# Patient Record
Sex: MALE | Race: WHITE | NOT HISPANIC OR LATINO | Employment: FULL TIME | URBAN - METROPOLITAN AREA
[De-identification: names, ages, dates, MRNs, and addresses within clinical notes are randomized per-mention and may not be internally consistent; named-entity substitution may affect disease eponyms.]

---

## 2017-03-15 ENCOUNTER — OFFICE VISIT (OUTPATIENT)
Dept: INTERNAL MEDICINE | Facility: CLINIC | Age: 52
End: 2017-03-15
Payer: COMMERCIAL

## 2017-03-15 VITALS
BODY MASS INDEX: 43.25 KG/M2 | RESPIRATION RATE: 20 BRPM | SYSTOLIC BLOOD PRESSURE: 136 MMHG | HEIGHT: 69 IN | WEIGHT: 292 LBS | TEMPERATURE: 99 F | DIASTOLIC BLOOD PRESSURE: 78 MMHG | HEART RATE: 100 BPM

## 2017-03-15 DIAGNOSIS — E11.69 OBESITY, DIABETES, AND HYPERTENSION SYNDROME: ICD-10-CM

## 2017-03-15 DIAGNOSIS — E78.00 PURE HYPERCHOLESTEROLEMIA: ICD-10-CM

## 2017-03-15 DIAGNOSIS — I10 ESSENTIAL HYPERTENSION: ICD-10-CM

## 2017-03-15 DIAGNOSIS — E66.01 MORBID OBESITY WITH BMI OF 40.0-44.9, ADULT: ICD-10-CM

## 2017-03-15 DIAGNOSIS — E11.59 OBESITY, DIABETES, AND HYPERTENSION SYNDROME: ICD-10-CM

## 2017-03-15 DIAGNOSIS — I15.2 HYPERTENSION COMPLICATING DIABETES: ICD-10-CM

## 2017-03-15 DIAGNOSIS — E11.59 HYPERTENSION COMPLICATING DIABETES: ICD-10-CM

## 2017-03-15 DIAGNOSIS — J06.9 UPPER RESPIRATORY TRACT INFECTION, UNSPECIFIED TYPE: Primary | ICD-10-CM

## 2017-03-15 DIAGNOSIS — I15.2 OBESITY, DIABETES, AND HYPERTENSION SYNDROME: ICD-10-CM

## 2017-03-15 DIAGNOSIS — G47.33 OSA (OBSTRUCTIVE SLEEP APNEA): ICD-10-CM

## 2017-03-15 DIAGNOSIS — E11.9 TYPE II OR UNSPECIFIED TYPE DIABETES MELLITUS WITHOUT MENTION OF COMPLICATION, NOT STATED AS UNCONTROLLED: ICD-10-CM

## 2017-03-15 DIAGNOSIS — E66.9 OBESITY, DIABETES, AND HYPERTENSION SYNDROME: ICD-10-CM

## 2017-03-15 PROCEDURE — 3075F SYST BP GE 130 - 139MM HG: CPT | Mod: S$GLB,,, | Performed by: INTERNAL MEDICINE

## 2017-03-15 PROCEDURE — 3044F HG A1C LEVEL LT 7.0%: CPT | Mod: S$GLB,,, | Performed by: INTERNAL MEDICINE

## 2017-03-15 PROCEDURE — 99213 OFFICE O/P EST LOW 20 MIN: CPT | Mod: S$GLB,,, | Performed by: INTERNAL MEDICINE

## 2017-03-15 PROCEDURE — 4010F ACE/ARB THERAPY RXD/TAKEN: CPT | Mod: S$GLB,,, | Performed by: INTERNAL MEDICINE

## 2017-03-15 PROCEDURE — 1160F RVW MEDS BY RX/DR IN RCRD: CPT | Mod: S$GLB,,, | Performed by: INTERNAL MEDICINE

## 2017-03-15 PROCEDURE — 2022F DILAT RTA XM EVC RTNOPTHY: CPT | Mod: S$GLB,,, | Performed by: INTERNAL MEDICINE

## 2017-03-15 PROCEDURE — 3078F DIAST BP <80 MM HG: CPT | Mod: S$GLB,,, | Performed by: INTERNAL MEDICINE

## 2017-03-15 PROCEDURE — 99999 PR PBB SHADOW E&M-EST. PATIENT-LVL III: CPT | Mod: PBBFAC,,, | Performed by: INTERNAL MEDICINE

## 2017-03-15 RX ORDER — METHYLPREDNISOLONE 4 MG/1
TABLET ORAL
Qty: 1 PACKAGE | Refills: 0 | Status: SHIPPED | OUTPATIENT
Start: 2017-03-15 | End: 2017-07-07 | Stop reason: ALTCHOICE

## 2017-03-15 RX ORDER — CODEINE PHOSPHATE AND GUAIFENESIN 10; 100 MG/5ML; MG/5ML
5 SOLUTION ORAL 3 TIMES DAILY PRN
Qty: 180 ML | Refills: 0 | Status: SHIPPED | OUTPATIENT
Start: 2017-03-15 | End: 2017-03-25

## 2017-03-15 NOTE — PROGRESS NOTES
Subjective:       Patient ID: Saud Mayers is a 52 y.o. male.    Chief Complaint: Nasal Congestion (c/o heat coming out of eyes, no fever x2 days); Cough; Back Pain; and Chills    HPI  Pt c/o 2 days of nonprod cough, hoarseness, coryza, HA, chill, myalgias, no fever or sOB.  + fluvax.  BSs in 150s.  Review of Systems    Objective:      Physical Exam   Constitutional: He appears well-developed. No distress.   Appears ill   HENT:   Head: Normocephalic.   Mouth/Throat: Oropharynx is clear and moist.   Eyes: Conjunctivae and EOM are normal. No scleral icterus.   Neck: Normal range of motion. No tracheal deviation present.   Cardiovascular: Normal rate, regular rhythm and intact distal pulses.    Pulmonary/Chest: Effort normal and breath sounds normal. No respiratory distress.   Abdominal: He exhibits no distension.   Musculoskeletal: Normal range of motion. He exhibits no edema.   Neurological: He is alert.   Skin: Skin is warm and dry. No rash noted. He is not diaphoretic. No erythema.   Psychiatric: He has a normal mood and affect. His behavior is normal.   Vitals reviewed.      Assessment:       1. Upper respiratory tract infection, unspecified type    2. Essential hypertension    3. Type II or unspecified type diabetes mellitus without mention of complication, not stated as uncontrolled    4. ANAMIKA (obstructive sleep apnea)    5. Morbid obesity with BMI of 40.0-44.9, adult    6. Hypertension complicating diabetes    7. Obesity, diabetes, and hypertension syndrome    8. Pure hypercholesterolemia        Plan:       Saud was seen today for nasal congestion, cough, back pain and chills.    Diagnoses and all orders for this visit:    Upper respiratory tract infection, unspecified type  -     guaifenesin-codeine 100-10 mg/5 ml (CHERATUSSIN AC)  mg/5 mL syrup; Take 5 mLs by mouth 3 (three) times daily as needed for Cough.  -     methylPREDNISolone (MEDROL DOSEPACK) 4 mg tablet; use as directed  -     POCT Influenza  A/B -    Essential hypertension   Well-cont    Type II or unspecified type diabetes mellitus without mention of complication, not stated as uncontrolled    ANAMIKA (obstructive sleep apnea)    Morbid obesity with BMI of 40.0-44.9, adult    Hypertension complicating diabetes    Obesity, diabetes, and hypertension syndrome    Pure hypercholesterolemia    Return if symptoms worsen or fail to improve.

## 2017-03-15 NOTE — MR AVS SNAPSHOT
[facility] - Internal Medicine  [address] [place] [address] [place], [address]  [place] [address]  Phone: [phone]  Fax: [phone]                  Saud Mayers   3/15/2017 3:00 PM   Office Visit    Description:  Male : 1965   Provider:  Tyler Tam MD   Department:  [facility] - Internal Medicine           Reason for Visit     Nasal Congestion     Cough     Back Pain     Chills           Diagnoses this Visit        Comments    Upper respiratory tract infection, unspecified type    -  Primary     Essential hypertension         Type II or unspecified type diabetes mellitus without mention of complication, not stated as uncontrolled         ANAMIKA (obstructive sleep apnea)         Morbid obesity with BMI of 40.0-44.9, adult         Hypertension complicating diabetes         Obesity, diabetes, and hypertension syndrome         Pure hypercholesterolemia                To Do List           Goals (5 Years of Data)     None      Follow-Up and Disposition     Return if symptoms worsen or fail to improve.       These Medications        Disp Refills Start End    guaifenesin-codeine 100-10 mg/5 ml (CHERATUSSIN AC)  mg/5 mL syrup 180 mL 0 3/15/2017 3/25/2017    Take 5 mLs by mouth 3 (three) times daily as needed for Cough. - Oral    Pharmacy: [facility] [address] [place] [address] [address] [facility] Ph #: [phone]       methylPREDNISolone (MEDROL DOSEPACK) 4 mg tablet 1 Package 0 3/15/2017     use as directed    Pharmacy: [facility] [address] [place] [address], [place] - [address] [facility] Ph #: [phone]         [facility]     [facility] On Call Nurse Care Line -  Assistance  Registered nurses in the [facility] provide clinical advisement, health education, appointment booking, and other advisory services.  Call for this free service at [phone].             Medications           Message regarding Medications     Verify  "the changes and/or additions to your medication regime listed below are the same as discussed with your clinician today.  If any of these changes or additions are incorrect, please notify your healthcare provider.        START taking these NEW medications        Refills    guaifenesin-codeine 100-10 mg/5 ml (CHERATUSSIN AC)  mg/5 mL syrup 0    Sig: Take 5 mLs by mouth 3 (three) times daily as needed for Cough.    Class: Normal    Route: Oral    methylPREDNISolone (MEDROL DOSEPACK) 4 mg tablet 0    Sig: use as directed    Class: Normal           Verify that the below list of medications is an accurate representation of the medications you are currently taking.  If none reported, the list may be blank. If incorrect, please contact your healthcare provider. Carry this list with you in case of emergency.           Current Medications     atorvastatin (LIPITOR) 20 MG tablet Take 1 tablet (20 mg total) by mouth once daily.    losartan (COZAAR) 50 MG tablet Take 1 tablet (50 mg total) by mouth once daily.    metformin (GLUCOPHAGE) 500 MG tablet Take 1 tablet (500 mg total) by mouth 2 (two) times daily with meals.    mometasone 0.1% (ELOCON) 0.1 % cream APPLY TO THE AFFECTED AREA EVERY DAY    sildenafil (VIAGRA) 100 MG tablet Take 1 tablet (100 mg total) by mouth daily as needed for Erectile Dysfunction.    guaifenesin-codeine 100-10 mg/5 ml (CHERATUSSIN AC)  mg/5 mL syrup Take 5 mLs by mouth 3 (three) times daily as needed for Cough.    methylPREDNISolone (MEDROL DOSEPACK) 4 mg tablet use as directed           Clinical Reference Information           Your Vitals Were     BP Pulse Temp Resp Height Weight    136/78 100 98.8 °F (37.1 °C) (Oral) 20 5' 9" (1.753 m) 132.5 kg (292 lb)    BMI                43.12 kg/m2          Blood Pressure          Most Recent Value    BP  136/78      Allergies as of 3/15/2017     Tizanidine      Immunizations Administered on Date of Encounter - 3/15/2017     None      Orders Placed " During Today's Visit      Normal Orders This Visit    POCT Influenza A/B       Language Assistance Services     ATTENTION: Language assistance services are available, free of charge. Please call [phone].      ATENCIÓN: Si ricardo miller, tiene a galvan disposición servicios gratuitos de asistencia lingüística. Llame al [phone].     CHÚ Ý: N?u b?n nói Ti?ng Vi?t, có các d?ch v? h? tr? ngôn ng? mi?n phí dành cho b?n. G?i s? [phone].         [facility] - Internal Medicine complies with applicable Federal civil rights laws and does not discriminate on the basis of race, color, national origin, age, disability, or sex.

## 2017-06-28 ENCOUNTER — PATIENT OUTREACH (OUTPATIENT)
Dept: ADMINISTRATIVE | Facility: HOSPITAL | Age: 52
End: 2017-06-28

## 2017-06-28 NOTE — PROGRESS NOTES
Phoned patient to discuss need for ey exam, labs and f/u visit. Labs scheduled for 6/29 and visit scheduled for 7/7 per patient's request. Patient stated that he will make an eye appt with an opthamologist soon, he knows it's time and needs new eye glasses. I asked that he have the results faxed to Dr. Tam office, pt verbalized understanding.

## 2017-06-29 ENCOUNTER — LAB VISIT (OUTPATIENT)
Dept: LAB | Facility: HOSPITAL | Age: 52
End: 2017-06-29
Attending: INTERNAL MEDICINE
Payer: COMMERCIAL

## 2017-06-29 DIAGNOSIS — E11.9 TYPE 2 DIABETES MELLITUS WITHOUT COMPLICATIONS: ICD-10-CM

## 2017-06-29 DIAGNOSIS — E78.00 PURE HYPERCHOLESTEROLEMIA: ICD-10-CM

## 2017-06-29 LAB
ALBUMIN SERPL BCP-MCNC: 4.1 G/DL
ALP SERPL-CCNC: 62 U/L
ALT SERPL W/O P-5'-P-CCNC: 50 U/L
ANION GAP SERPL CALC-SCNC: 10 MMOL/L
AST SERPL-CCNC: 32 U/L
BILIRUB SERPL-MCNC: 0.5 MG/DL
BUN SERPL-MCNC: 21 MG/DL
CALCIUM SERPL-MCNC: 8.8 MG/DL
CHLORIDE SERPL-SCNC: 103 MMOL/L
CHOLEST/HDLC SERPL: 4 {RATIO}
CO2 SERPL-SCNC: 28 MMOL/L
CREAT SERPL-MCNC: 0.83 MG/DL
EST. GFR  (AFRICAN AMERICAN): >60 ML/MIN/1.73 M^2
EST. GFR  (NON AFRICAN AMERICAN): >60 ML/MIN/1.73 M^2
ESTIMATED AVG GLUCOSE: 120 MG/DL
GLUCOSE SERPL-MCNC: 138 MG/DL
HBA1C MFR BLD HPLC: 5.8 %
HDL/CHOLESTEROL RATIO: 25.3 %
HDLC SERPL-MCNC: 162 MG/DL
HDLC SERPL-MCNC: 41 MG/DL
LDLC SERPL CALC-MCNC: 97 MG/DL
NONHDLC SERPL-MCNC: 121 MG/DL
POTASSIUM SERPL-SCNC: 4.4 MMOL/L
PROT SERPL-MCNC: 7.6 G/DL
SODIUM SERPL-SCNC: 141 MMOL/L
TRIGL SERPL-MCNC: 120 MG/DL

## 2017-06-29 PROCEDURE — 80053 COMPREHEN METABOLIC PANEL: CPT | Mod: PO

## 2017-06-29 PROCEDURE — 36415 COLL VENOUS BLD VENIPUNCTURE: CPT | Mod: PO

## 2017-06-29 PROCEDURE — 80061 LIPID PANEL: CPT

## 2017-06-29 PROCEDURE — 83036 HEMOGLOBIN GLYCOSYLATED A1C: CPT | Mod: PO

## 2017-07-07 ENCOUNTER — OFFICE VISIT (OUTPATIENT)
Dept: INTERNAL MEDICINE | Facility: CLINIC | Age: 52
End: 2017-07-07
Payer: COMMERCIAL

## 2017-07-07 VITALS
HEART RATE: 96 BPM | TEMPERATURE: 98 F | OXYGEN SATURATION: 95 % | SYSTOLIC BLOOD PRESSURE: 128 MMHG | RESPIRATION RATE: 16 BRPM | BODY MASS INDEX: 41.35 KG/M2 | DIASTOLIC BLOOD PRESSURE: 62 MMHG | HEIGHT: 69 IN | WEIGHT: 279.19 LBS

## 2017-07-07 DIAGNOSIS — I15.2 OBESITY, DIABETES, AND HYPERTENSION SYNDROME: ICD-10-CM

## 2017-07-07 DIAGNOSIS — E78.00 PURE HYPERCHOLESTEROLEMIA: ICD-10-CM

## 2017-07-07 DIAGNOSIS — E11.59 OBESITY, DIABETES, AND HYPERTENSION SYNDROME: ICD-10-CM

## 2017-07-07 DIAGNOSIS — E66.01 MORBID OBESITY WITH BMI OF 40.0-44.9, ADULT: ICD-10-CM

## 2017-07-07 DIAGNOSIS — E66.9 OBESITY, DIABETES, AND HYPERTENSION SYNDROME: ICD-10-CM

## 2017-07-07 DIAGNOSIS — Z00.00 ROUTINE GENERAL MEDICAL EXAMINATION AT A HEALTH CARE FACILITY: Primary | ICD-10-CM

## 2017-07-07 DIAGNOSIS — L30.9 ECZEMA, UNSPECIFIED TYPE: ICD-10-CM

## 2017-07-07 DIAGNOSIS — G47.33 OSA (OBSTRUCTIVE SLEEP APNEA): ICD-10-CM

## 2017-07-07 DIAGNOSIS — I10 ESSENTIAL HYPERTENSION: ICD-10-CM

## 2017-07-07 DIAGNOSIS — E11.9 TYPE 2 DIABETES MELLITUS WITHOUT COMPLICATION, WITHOUT LONG-TERM CURRENT USE OF INSULIN: ICD-10-CM

## 2017-07-07 DIAGNOSIS — E11.69 OBESITY, DIABETES, AND HYPERTENSION SYNDROME: ICD-10-CM

## 2017-07-07 DIAGNOSIS — E11.59 HYPERTENSION COMPLICATING DIABETES: ICD-10-CM

## 2017-07-07 DIAGNOSIS — I15.2 HYPERTENSION COMPLICATING DIABETES: ICD-10-CM

## 2017-07-07 PROCEDURE — 99396 PREV VISIT EST AGE 40-64: CPT | Mod: S$GLB,,, | Performed by: INTERNAL MEDICINE

## 2017-07-07 PROCEDURE — 99999 PR PBB SHADOW E&M-EST. PATIENT-LVL III: CPT | Mod: PBBFAC,,, | Performed by: INTERNAL MEDICINE

## 2017-07-07 RX ORDER — MOMETASONE FUROATE 1 MG/G
CREAM TOPICAL
Qty: 45 G | Refills: 3 | Status: SHIPPED | OUTPATIENT
Start: 2017-07-07 | End: 2019-08-13 | Stop reason: SDUPTHER

## 2017-07-07 RX ORDER — ATORVASTATIN CALCIUM 40 MG/1
40 TABLET, FILM COATED ORAL DAILY
Qty: 90 TABLET | Refills: 4 | Status: SHIPPED | OUTPATIENT
Start: 2017-07-07 | End: 2017-10-09 | Stop reason: SDUPTHER

## 2017-07-07 NOTE — PROGRESS NOTES
Subjective:       Patient ID: Saud Mayers is a 52 y.o. male.    Chief Complaint: Follow-up; Results; and Medication Refill    HPI  Wellness check.  Labs reviewed.  BSs < 120.  No pedal paresthesias.  Eye check due.  No CP, SOB.  Working on losing weight.  Review of Systems   All other systems reviewed and are negative.      Objective:      Physical Exam   Constitutional: He appears well-developed. No distress.   obese   HENT:   Head: Normocephalic.   Eyes: EOM are normal. No scleral icterus.   Neck: Normal range of motion. No tracheal deviation present.   Cardiovascular: Normal rate, regular rhythm, normal heart sounds and intact distal pulses.    Pulses:       Dorsalis pedis pulses are 3+ on the right side, and 3+ on the left side.        Posterior tibial pulses are 3+ on the right side, and 3+ on the left side.   Pulmonary/Chest: Effort normal and breath sounds normal. No respiratory distress.   Abdominal: Soft. Bowel sounds are normal. He exhibits no distension. There is no tenderness.   Musculoskeletal: Normal range of motion. He exhibits no edema.        Right foot: There is normal range of motion and no deformity.        Left foot: There is normal range of motion and no deformity.   Feet:   Right Foot:   Protective Sensation: 6 sites tested. 6 sites sensed.   Left Foot:   Protective Sensation: 6 sites tested. 6 sites sensed.   Neurological: He is alert.   Skin: Skin is warm and dry. No rash noted. He is not diaphoretic. No erythema.   Psychiatric: He has a normal mood and affect. His behavior is normal.   Vitals reviewed.      Assessment:       1. Routine general medical examination at a health care facility    2. Type 2 diabetes mellitus without complication, without long-term current use of insulin    3. Essential hypertension    4. ANAMIKA (obstructive sleep apnea)    5. Pure hypercholesterolemia    6. Morbid obesity with BMI of 40.0-44.9, adult    7. Hypertension complicating diabetes    8. Obesity,  diabetes, and hypertension syndrome    9. Eczema, unspecified type        Plan:       Saud was seen today for follow-up, results and medication refill.    Diagnoses and all orders for this visit:    Routine general medical examination at a health care facility  -     Prostate Specific Antigen, Diagnostic; Future    Type 2 diabetes mellitus without complication, without long-term current use of insulin  -     Hemoglobin A1c; Future   Well-cont    Essential hypertension   Well-cont    ANAMIKA (obstructive sleep apnea)    Pure hypercholesterolemia  -     Lipid panel; Future  -     atorvastatin (LIPITOR) 40 MG tablet; Take 1 tablet (40 mg total) by mouth once daily.    Morbid obesity with BMI of 40.0-44.9, adult    Hypertension complicating diabetes    Obesity, diabetes, and hypertension syndrome    Eczema, unspecified type  -     mometasone 0.1% (ELOCON) 0.1 % cream; APPLY TO THE AFFECTED AREA EVERY DAY      Return in about 6 months (around 1/7/2018).

## 2017-10-09 ENCOUNTER — OFFICE VISIT (OUTPATIENT)
Dept: INTERNAL MEDICINE | Facility: CLINIC | Age: 52
End: 2017-10-09
Payer: COMMERCIAL

## 2017-10-09 VITALS
TEMPERATURE: 99 F | RESPIRATION RATE: 20 BRPM | HEIGHT: 69 IN | SYSTOLIC BLOOD PRESSURE: 150 MMHG | WEIGHT: 296.88 LBS | BODY MASS INDEX: 43.97 KG/M2 | DIASTOLIC BLOOD PRESSURE: 64 MMHG | HEART RATE: 108 BPM

## 2017-10-09 DIAGNOSIS — I10 ESSENTIAL HYPERTENSION: ICD-10-CM

## 2017-10-09 DIAGNOSIS — Z23 NEED FOR PROPHYLACTIC VACCINATION AND INOCULATION AGAINST INFLUENZA: ICD-10-CM

## 2017-10-09 DIAGNOSIS — E78.00 PURE HYPERCHOLESTEROLEMIA: ICD-10-CM

## 2017-10-09 DIAGNOSIS — Z00.00 ROUTINE GENERAL MEDICAL EXAMINATION AT A HEALTH CARE FACILITY: ICD-10-CM

## 2017-10-09 DIAGNOSIS — M54.50 ACUTE BILATERAL LOW BACK PAIN WITHOUT SCIATICA: Primary | ICD-10-CM

## 2017-10-09 DIAGNOSIS — E11.9 TYPE 2 DIABETES MELLITUS WITHOUT COMPLICATION, UNSPECIFIED LONG TERM INSULIN USE STATUS: ICD-10-CM

## 2017-10-09 PROCEDURE — 99214 OFFICE O/P EST MOD 30 MIN: CPT | Mod: 25,S$GLB,, | Performed by: INTERNAL MEDICINE

## 2017-10-09 PROCEDURE — 90471 IMMUNIZATION ADMIN: CPT | Mod: S$GLB,,, | Performed by: INTERNAL MEDICINE

## 2017-10-09 PROCEDURE — 90715 TDAP VACCINE 7 YRS/> IM: CPT | Mod: S$GLB,,, | Performed by: INTERNAL MEDICINE

## 2017-10-09 PROCEDURE — 99999 PR PBB SHADOW E&M-EST. PATIENT-LVL III: CPT | Mod: PBBFAC,,, | Performed by: INTERNAL MEDICINE

## 2017-10-09 PROCEDURE — 90686 IIV4 VACC NO PRSV 0.5 ML IM: CPT | Mod: S$GLB,,, | Performed by: INTERNAL MEDICINE

## 2017-10-09 PROCEDURE — 90472 IMMUNIZATION ADMIN EACH ADD: CPT | Mod: S$GLB,,, | Performed by: INTERNAL MEDICINE

## 2017-10-09 RX ORDER — ATORVASTATIN CALCIUM 40 MG/1
40 TABLET, FILM COATED ORAL DAILY
Qty: 90 TABLET | Refills: 4 | Status: SHIPPED | OUTPATIENT
Start: 2017-10-09 | End: 2019-02-16 | Stop reason: SDUPTHER

## 2017-10-09 RX ORDER — PREDNISONE 20 MG/1
TABLET ORAL
Qty: 14 TABLET | Refills: 0 | Status: SHIPPED | OUTPATIENT
Start: 2017-10-09 | End: 2017-12-05

## 2017-10-09 RX ORDER — METFORMIN HYDROCHLORIDE 500 MG/1
500 TABLET ORAL 2 TIMES DAILY WITH MEALS
Qty: 180 TABLET | Refills: 3 | Status: SHIPPED | OUTPATIENT
Start: 2017-10-09 | End: 2019-01-09

## 2017-10-09 RX ORDER — TIZANIDINE 4 MG/1
4 TABLET ORAL EVERY 6 HOURS PRN
Qty: 60 TABLET | Refills: 3 | Status: SHIPPED | OUTPATIENT
Start: 2017-10-09 | End: 2017-10-19

## 2017-10-09 RX ORDER — LOSARTAN POTASSIUM 50 MG/1
50 TABLET ORAL DAILY
Qty: 90 TABLET | Refills: 3 | Status: SHIPPED | OUTPATIENT
Start: 2017-10-09 | End: 2019-01-09

## 2017-10-09 NOTE — PROGRESS NOTES
Subjective:       Patient ID: Saud Mayers is a 52 y.o. male.    Chief Complaint: Back Pain (lower back pain severe x 2 weeks, denies injury); Medication Refill; and Flu Vaccine    HPI  Pt with chronic LBP, hx of R sided sciatica with 10 days of exacerbation of LBP.  No radicular sxs,  No weakness, no incontinence.  Feels better when he moves around.  BSs in 120s.  Review of Systems   All other systems reviewed and are negative.      Objective:      Physical Exam   Constitutional: He appears well-developed. No distress.   HENT:   Head: Normocephalic.   Eyes: EOM are normal. No scleral icterus.   Neck: Normal range of motion. No tracheal deviation present.   Cardiovascular: Normal rate, regular rhythm and intact distal pulses.    Pulses:       Dorsalis pedis pulses are 3+ on the right side, and 3+ on the left side.        Posterior tibial pulses are 3+ on the right side, and 3+ on the left side.   Pulmonary/Chest: Effort normal. No respiratory distress.   Abdominal: He exhibits no distension.   Musculoskeletal: Normal range of motion. He exhibits edema (1+ ankles).        Right foot: There is normal range of motion and no deformity.        Left foot: There is normal range of motion and no deformity.   Feet:   Right Foot:   Protective Sensation: 0 sites tested. 0 sites sensed.   Left Foot:   Protective Sensation: 0 sites tested. 0 sites sensed.   Neurological: He is alert. He exhibits normal muscle tone.   bilat SLR at 20 deg   Skin: Skin is warm and dry. No rash noted. He is not diaphoretic. No erythema.   Psychiatric: He has a normal mood and affect. His behavior is normal.   Vitals reviewed.      Assessment:       1. Acute bilateral low back pain without sciatica    2. Pure hypercholesterolemia    3. Essential hypertension    4. Type 2 diabetes mellitus without complication, unspecified long term insulin use status    5. Need for prophylactic vaccination and inoculation against influenza    6. Routine general  medical examination at a health care facility        Plan:       Saud was seen today for back pain, medication refill and flu vaccine.    Diagnoses and all orders for this visit:    Acute bilateral low back pain without sciatica  -     predniSONE (DELTASONE) 20 MG tablet; 2 tabs po qd x 7 days  -     tizanidine (ZANAFLEX) 4 MG tablet; Take 1 tablet (4 mg total) by mouth every 6 (six) hours as needed.    Pure hypercholesterolemia  -     atorvastatin (LIPITOR) 40 MG tablet; Take 1 tablet (40 mg total) by mouth once daily.    Essential hypertension  -     losartan (COZAAR) 50 MG tablet; Take 1 tablet (50 mg total) by mouth once daily.    Type 2 diabetes mellitus without complication, unspecified long term insulin use status  -     metformin (GLUCOPHAGE) 500 MG tablet; Take 1 tablet (500 mg total) by mouth 2 (two) times daily with meals.  -     Ambulatory referral to Ophthalmology    Need for prophylactic vaccination and inoculation against influenza  -     Influenza - Quadrivalent (3 years & older) (PF)    Routine general medical examination at a health care facility  -     Tdap Vaccine      Return in about 3 months (around 1/9/2018).

## 2017-12-05 ENCOUNTER — OFFICE VISIT (OUTPATIENT)
Dept: INTERNAL MEDICINE | Facility: CLINIC | Age: 52
End: 2017-12-05
Payer: COMMERCIAL

## 2017-12-05 VITALS
RESPIRATION RATE: 20 BRPM | SYSTOLIC BLOOD PRESSURE: 130 MMHG | TEMPERATURE: 98 F | WEIGHT: 295.06 LBS | HEART RATE: 96 BPM | DIASTOLIC BLOOD PRESSURE: 66 MMHG | BODY MASS INDEX: 43.58 KG/M2

## 2017-12-05 DIAGNOSIS — J20.9 ACUTE BRONCHITIS, UNSPECIFIED ORGANISM: Primary | ICD-10-CM

## 2017-12-05 DIAGNOSIS — E11.9 TYPE 2 DIABETES MELLITUS WITHOUT COMPLICATION, UNSPECIFIED LONG TERM INSULIN USE STATUS: ICD-10-CM

## 2017-12-05 DIAGNOSIS — I10 HYPERTENSION, UNSPECIFIED TYPE: ICD-10-CM

## 2017-12-05 PROCEDURE — 99999 PR PBB SHADOW E&M-EST. PATIENT-LVL III: CPT | Mod: PBBFAC,,, | Performed by: INTERNAL MEDICINE

## 2017-12-05 PROCEDURE — 99213 OFFICE O/P EST LOW 20 MIN: CPT | Mod: S$GLB,,, | Performed by: INTERNAL MEDICINE

## 2017-12-05 RX ORDER — ALBUTEROL SULFATE 90 UG/1
2 AEROSOL, METERED RESPIRATORY (INHALATION) EVERY 6 HOURS PRN
Qty: 18 G | Refills: 6 | Status: SHIPPED | OUTPATIENT
Start: 2017-12-05 | End: 2019-01-09

## 2017-12-05 RX ORDER — CODEINE PHOSPHATE AND GUAIFENESIN 10; 100 MG/5ML; MG/5ML
5 SOLUTION ORAL 3 TIMES DAILY PRN
Qty: 180 ML | Refills: 0 | Status: SHIPPED | OUTPATIENT
Start: 2017-12-05 | End: 2017-12-12 | Stop reason: SDUPTHER

## 2017-12-05 RX ORDER — MELOXICAM 15 MG/1
1 TABLET ORAL DAILY
Refills: 5 | COMMUNITY
Start: 2017-11-10 | End: 2019-01-09

## 2017-12-05 NOTE — PROGRESS NOTES
Subjective:       Patient ID: Saud Mayers is a 52 y.o. male.    Chief Complaint: Cough (started as dry hacking x 3 wks ago, now becoming productive)    HPI  Pt with nonprod cough x 3 weeks.  No F/C, no SOB.  No resp to OTC meds.  BSs in 160s.  Review of Systems    Objective:      Physical Exam   Constitutional: He appears well-developed. No distress.   obese   HENT:   Head: Normocephalic.   Eyes: EOM are normal. No scleral icterus.   Neck: Normal range of motion. No tracheal deviation present.   Cardiovascular: Normal rate, regular rhythm and intact distal pulses.    Pulmonary/Chest: Effort normal and breath sounds normal. No respiratory distress.   Abdominal: He exhibits no distension.   Musculoskeletal: Normal range of motion. He exhibits no edema.   Neurological: He is alert.   Skin: Skin is warm and dry. No rash noted. He is not diaphoretic. No erythema.   Psychiatric: He has a normal mood and affect. His behavior is normal.   Vitals reviewed.      Assessment:       1. Acute bronchitis, unspecified organism    2. Hypertension, unspecified type    3. Type 2 diabetes mellitus without complication, unspecified long term insulin use status        Plan:       Saud was seen today for cough.    Diagnoses and all orders for this visit:    Acute bronchitis, unspecified organism  -     albuterol 90 mcg/actuation inhaler; Inhale 2 puffs into the lungs every 6 (six) hours as needed for Wheezing.  -     guaifenesin-codeine 100-10 mg/5 ml (CHERATUSSIN AC)  mg/5 mL syrup; Take 5 mLs by mouth 3 (three) times daily as needed for Cough.    Hypertension, unspecified type   Well-cont    Type 2 diabetes mellitus without complication, unspecified long term insulin use status      Return if symptoms worsen or fail to improve.

## 2017-12-12 DIAGNOSIS — J20.9 ACUTE BRONCHITIS, UNSPECIFIED ORGANISM: ICD-10-CM

## 2017-12-12 RX ORDER — TIZANIDINE 4 MG/1
TABLET ORAL
Qty: 180 ML | Refills: 0 | Status: SHIPPED | OUTPATIENT
Start: 2017-12-12 | End: 2018-01-10 | Stop reason: SDUPTHER

## 2018-01-10 DIAGNOSIS — J20.9 ACUTE BRONCHITIS, UNSPECIFIED ORGANISM: ICD-10-CM

## 2018-01-10 RX ORDER — CODEINE PHOSPHATE AND GUAIFENESIN 10; 100 MG/5ML; MG/5ML
SOLUTION ORAL
Qty: 180 ML | Refills: 0 | Status: SHIPPED | OUTPATIENT
Start: 2018-01-10 | End: 2019-01-09

## 2018-09-07 DIAGNOSIS — E11.9 TYPE 2 DIABETES MELLITUS WITHOUT COMPLICATION: ICD-10-CM

## 2018-10-22 DIAGNOSIS — E11.9 TYPE 2 DIABETES MELLITUS WITHOUT COMPLICATION: ICD-10-CM

## 2018-12-12 ENCOUNTER — TELEPHONE (OUTPATIENT)
Dept: INTERNAL MEDICINE | Facility: CLINIC | Age: 53
End: 2018-12-12

## 2018-12-12 NOTE — TELEPHONE ENCOUNTER
----- Message from Deonna Martinez sent at 12/12/2018 11:40 AM CST -----  Contact: [phone]/ self   Patient called in returning your call from yesterday . Please advise

## 2018-12-17 ENCOUNTER — LAB VISIT (OUTPATIENT)
Dept: LAB | Facility: HOSPITAL | Age: 53
End: 2018-12-17
Attending: INTERNAL MEDICINE
Payer: COMMERCIAL

## 2018-12-17 DIAGNOSIS — E11.9 TYPE 2 DIABETES MELLITUS WITHOUT COMPLICATION: ICD-10-CM

## 2018-12-17 LAB
ALBUMIN/CREAT UR: 6.7 UG/MG
CHOLEST SERPL-MCNC: 160 MG/DL
CHOLEST/HDLC SERPL: 3.8 {RATIO}
CREAT UR-MCNC: 267 MG/DL
ESTIMATED AVG GLUCOSE: 243 MG/DL
HBA1C MFR BLD HPLC: 10.1 %
HDLC SERPL-MCNC: 42 MG/DL
HDLC SERPL: 26.3 %
LDLC SERPL CALC-MCNC: 92.4 MG/DL
MICROALBUMIN UR DL<=1MG/L-MCNC: 18 UG/ML
NONHDLC SERPL-MCNC: 118 MG/DL
TRIGL SERPL-MCNC: 128 MG/DL

## 2018-12-17 PROCEDURE — 80061 LIPID PANEL: CPT

## 2018-12-17 PROCEDURE — 82043 UR ALBUMIN QUANTITATIVE: CPT | Mod: PO

## 2018-12-17 PROCEDURE — 83036 HEMOGLOBIN GLYCOSYLATED A1C: CPT

## 2018-12-17 PROCEDURE — 36415 COLL VENOUS BLD VENIPUNCTURE: CPT | Mod: PO

## 2019-01-09 ENCOUNTER — OFFICE VISIT (OUTPATIENT)
Dept: INTERNAL MEDICINE | Facility: CLINIC | Age: 54
End: 2019-01-09
Payer: COMMERCIAL

## 2019-01-09 ENCOUNTER — CLINICAL SUPPORT (OUTPATIENT)
Dept: FAMILY MEDICINE | Facility: CLINIC | Age: 54
End: 2019-01-09
Attending: INTERNAL MEDICINE
Payer: COMMERCIAL

## 2019-01-09 VITALS
BODY MASS INDEX: 40.34 KG/M2 | HEIGHT: 69 IN | SYSTOLIC BLOOD PRESSURE: 140 MMHG | OXYGEN SATURATION: 95 % | DIASTOLIC BLOOD PRESSURE: 78 MMHG | WEIGHT: 272.38 LBS | HEART RATE: 92 BPM

## 2019-01-09 DIAGNOSIS — E11.59 HYPERTENSION COMPLICATING DIABETES: ICD-10-CM

## 2019-01-09 DIAGNOSIS — I15.2 HYPERTENSION COMPLICATING DIABETES: ICD-10-CM

## 2019-01-09 DIAGNOSIS — Z00.00 ROUTINE GENERAL MEDICAL EXAMINATION AT A HEALTH CARE FACILITY: Primary | ICD-10-CM

## 2019-01-09 DIAGNOSIS — I15.2 OBESITY, DIABETES, AND HYPERTENSION SYNDROME: ICD-10-CM

## 2019-01-09 DIAGNOSIS — G47.33 OSA (OBSTRUCTIVE SLEEP APNEA): ICD-10-CM

## 2019-01-09 DIAGNOSIS — E11.9 TYPE 2 DIABETES MELLITUS WITHOUT COMPLICATION, WITHOUT LONG-TERM CURRENT USE OF INSULIN: ICD-10-CM

## 2019-01-09 DIAGNOSIS — E11.69 OBESITY, DIABETES, AND HYPERTENSION SYNDROME: ICD-10-CM

## 2019-01-09 DIAGNOSIS — E66.01 MORBID OBESITY WITH BMI OF 40.0-44.9, ADULT: ICD-10-CM

## 2019-01-09 DIAGNOSIS — E66.9 OBESITY, DIABETES, AND HYPERTENSION SYNDROME: ICD-10-CM

## 2019-01-09 DIAGNOSIS — I10 ESSENTIAL HYPERTENSION: ICD-10-CM

## 2019-01-09 DIAGNOSIS — E78.00 PURE HYPERCHOLESTEROLEMIA: ICD-10-CM

## 2019-01-09 DIAGNOSIS — E11.59 OBESITY, DIABETES, AND HYPERTENSION SYNDROME: ICD-10-CM

## 2019-01-09 DIAGNOSIS — N52.9 IMPOTENCE: ICD-10-CM

## 2019-01-09 PROCEDURE — 90686 FLU VACCINE (QUAD) GREATER THAN OR EQUAL TO 3YO PRESERVATIVE FREE IM: ICD-10-PCS | Mod: S$GLB,,, | Performed by: INTERNAL MEDICINE

## 2019-01-09 PROCEDURE — 99396 PREV VISIT EST AGE 40-64: CPT | Mod: 25,S$GLB,, | Performed by: INTERNAL MEDICINE

## 2019-01-09 PROCEDURE — 99999 PR PBB SHADOW E&M-EST. PATIENT-LVL III: CPT | Mod: PBBFAC,,, | Performed by: INTERNAL MEDICINE

## 2019-01-09 PROCEDURE — 90471 FLU VACCINE (QUAD) GREATER THAN OR EQUAL TO 3YO PRESERVATIVE FREE IM: ICD-10-PCS | Mod: S$GLB,,, | Performed by: INTERNAL MEDICINE

## 2019-01-09 PROCEDURE — 90686 IIV4 VACC NO PRSV 0.5 ML IM: CPT | Mod: S$GLB,,, | Performed by: INTERNAL MEDICINE

## 2019-01-09 PROCEDURE — 99396 PR PREVENTIVE VISIT,EST,40-64: ICD-10-PCS | Mod: 25,S$GLB,, | Performed by: INTERNAL MEDICINE

## 2019-01-09 PROCEDURE — 90471 IMMUNIZATION ADMIN: CPT | Mod: S$GLB,,, | Performed by: INTERNAL MEDICINE

## 2019-01-09 PROCEDURE — 99999 PR PBB SHADOW E&M-EST. PATIENT-LVL III: ICD-10-PCS | Mod: PBBFAC,,, | Performed by: INTERNAL MEDICINE

## 2019-01-09 RX ORDER — METFORMIN HYDROCHLORIDE 1000 MG/1
1000 TABLET ORAL 2 TIMES DAILY WITH MEALS
Qty: 180 TABLET | Refills: 3 | Status: SHIPPED | OUTPATIENT
Start: 2019-01-09 | End: 2019-12-04 | Stop reason: SDUPTHER

## 2019-01-09 RX ORDER — LOSARTAN POTASSIUM 100 MG/1
100 TABLET ORAL DAILY
Qty: 90 TABLET | Refills: 3 | Status: SHIPPED | OUTPATIENT
Start: 2019-01-09 | End: 2020-03-02

## 2019-01-09 RX ORDER — SILDENAFIL 100 MG/1
100 TABLET, FILM COATED ORAL DAILY PRN
Qty: 12 TABLET | Refills: 6 | Status: SHIPPED | OUTPATIENT
Start: 2019-01-09 | End: 2020-05-08 | Stop reason: SDUPTHER

## 2019-01-09 NOTE — PROGRESS NOTES
Saud Mayers is a 53 y.o. male here for a diabetic eye screening with non-dilated fundus photos per .    Patient cooperative?: Yes  Small pupils?: No  Last eye exam: unknown     For exam results, see Encounter Report.

## 2019-01-09 NOTE — PROGRESS NOTES
Subjective:       Patient ID: Saud Mayers is a 53 y.o. male.    Chief Complaint: Annual Exam (with labs)    HPI  Wellness check.  Labs reviewed.  Pt misses his meds a lot.  BSs in 200s.  C/O polyuria.  No pedal paresthesias.  No CP, SOB.  Review of Systems   All other systems reviewed and are negative.      Objective:      Physical Exam   Constitutional: He appears well-developed. No distress.   obese   HENT:   Head: Normocephalic.   Eyes: EOM are normal. No scleral icterus.   Neck: Normal range of motion. No tracheal deviation present.   Cardiovascular: Normal rate, regular rhythm, normal heart sounds and intact distal pulses.   Pulses:       Dorsalis pedis pulses are 2+ on the right side, and 2+ on the left side.        Posterior tibial pulses are 1+ on the right side, and 1+ on the left side.   Pulmonary/Chest: Effort normal and breath sounds normal. No respiratory distress.   Abdominal: Soft. Bowel sounds are normal. He exhibits no distension. There is no tenderness.   Musculoskeletal: Normal range of motion. He exhibits no edema.        Right foot: There is normal range of motion and no deformity.        Left foot: There is normal range of motion and no deformity.   Feet:   Right Foot:   Protective Sensation: 6 sites tested. 6 sites sensed.   Left Foot:   Protective Sensation: 6 sites tested. 6 sites sensed.   Neurological: He is alert.   Skin: Skin is warm and dry. No rash noted. He is not diaphoretic. No erythema.   Psychiatric: He has a normal mood and affect. His behavior is normal.   Vitals reviewed.      Assessment:       1. Routine general medical examination at a health care facility    2. Type 2 diabetes mellitus without complication, without long-term current use of insulin    3. Essential hypertension    4. Pure hypercholesterolemia    5. Morbid obesity with BMI of 40.0-44.9, adult    6. Hypertension complicating diabetes    7. Obesity, diabetes, and hypertension syndrome    8. ANAMIKA (obstructive  sleep apnea)    9. Impotence        Plan:       Saud was seen today for annual exam.    Diagnoses and all orders for this visit:    Routine general medical examination at a health care facility  -     Prostate Specific Antigen, Diagnostic; Future    Type 2 diabetes mellitus without complication, without long-term current use of insulin  -     Influenza - Quadrivalent (3 years & older) (PF)  -     Hemoglobin A1c; Future  -     metFORMIN (GLUCOPHAGE) 1000 MG tablet; Take 1 tablet (1,000 mg total) by mouth 2 (two) times daily with meals.  -     sildenafil (VIAGRA) 100 MG tablet; Take 1 tablet (100 mg total) by mouth daily as needed for Erectile Dysfunction.  -     Diabetic Eye Screening Photo; Future    Essential hypertension  -     losartan (COZAAR) 100 MG tablet; Take 1 tablet (100 mg total) by mouth once daily.    Pure hypercholesterolemia   Cont rx    Morbid obesity with BMI of 40.0-44.9, adult   Cont weight loss    Hypertension complicating diabetes   Monitor    Obesity, diabetes, and hypertension syndrome   Monitor    ANAMIKA (obstructive sleep apnea)   Cont CPAP    Impotence   Cont Viagra    Follow-up in about 3 months (around 4/9/2019).

## 2019-02-16 DIAGNOSIS — E78.00 PURE HYPERCHOLESTEROLEMIA: ICD-10-CM

## 2019-02-18 RX ORDER — ATORVASTATIN CALCIUM 40 MG/1
40 TABLET, FILM COATED ORAL DAILY
Qty: 90 TABLET | Refills: 4 | Status: SHIPPED | OUTPATIENT
Start: 2019-02-18 | End: 2020-05-08 | Stop reason: SDUPTHER

## 2019-03-26 ENCOUNTER — PATIENT OUTREACH (OUTPATIENT)
Dept: ADMINISTRATIVE | Facility: HOSPITAL | Age: 54
End: 2019-03-26

## 2019-04-09 ENCOUNTER — LAB VISIT (OUTPATIENT)
Dept: LAB | Facility: HOSPITAL | Age: 54
End: 2019-04-09
Attending: INTERNAL MEDICINE
Payer: COMMERCIAL

## 2019-04-09 DIAGNOSIS — Z00.00 ROUTINE GENERAL MEDICAL EXAMINATION AT A HEALTH CARE FACILITY: ICD-10-CM

## 2019-04-09 DIAGNOSIS — E11.9 TYPE 2 DIABETES MELLITUS WITHOUT COMPLICATION, WITHOUT LONG-TERM CURRENT USE OF INSULIN: ICD-10-CM

## 2019-04-09 LAB
COMPLEXED PSA SERPL-MCNC: 0.35 NG/ML (ref 0–4)
ESTIMATED AVG GLUCOSE: 229 MG/DL (ref 68–131)
HBA1C MFR BLD HPLC: 9.6 % (ref 4–5.6)

## 2019-04-09 PROCEDURE — 83036 HEMOGLOBIN GLYCOSYLATED A1C: CPT

## 2019-04-09 PROCEDURE — 36415 COLL VENOUS BLD VENIPUNCTURE: CPT | Mod: PO

## 2019-04-09 PROCEDURE — 84153 ASSAY OF PSA TOTAL: CPT

## 2019-04-11 ENCOUNTER — OFFICE VISIT (OUTPATIENT)
Dept: INTERNAL MEDICINE | Facility: CLINIC | Age: 54
End: 2019-04-11
Payer: COMMERCIAL

## 2019-04-11 VITALS
BODY MASS INDEX: 40.15 KG/M2 | HEIGHT: 69 IN | WEIGHT: 271.06 LBS | DIASTOLIC BLOOD PRESSURE: 74 MMHG | HEART RATE: 99 BPM | OXYGEN SATURATION: 97 % | SYSTOLIC BLOOD PRESSURE: 110 MMHG

## 2019-04-11 DIAGNOSIS — I10 ESSENTIAL HYPERTENSION: ICD-10-CM

## 2019-04-11 DIAGNOSIS — E66.01 MORBID OBESITY WITH BMI OF 40.0-44.9, ADULT: ICD-10-CM

## 2019-04-11 DIAGNOSIS — E11.9 TYPE 2 DIABETES MELLITUS WITHOUT COMPLICATION, WITHOUT LONG-TERM CURRENT USE OF INSULIN: Primary | ICD-10-CM

## 2019-04-11 DIAGNOSIS — E78.00 PURE HYPERCHOLESTEROLEMIA: ICD-10-CM

## 2019-04-11 PROCEDURE — 99999 PR PBB SHADOW E&M-EST. PATIENT-LVL III: CPT | Mod: PBBFAC,,, | Performed by: INTERNAL MEDICINE

## 2019-04-11 PROCEDURE — 99214 OFFICE O/P EST MOD 30 MIN: CPT | Mod: S$GLB,,, | Performed by: INTERNAL MEDICINE

## 2019-04-11 PROCEDURE — 99999 PR PBB SHADOW E&M-EST. PATIENT-LVL III: ICD-10-PCS | Mod: PBBFAC,,, | Performed by: INTERNAL MEDICINE

## 2019-04-11 PROCEDURE — 99214 PR OFFICE/OUTPT VISIT, EST, LEVL IV, 30-39 MIN: ICD-10-PCS | Mod: S$GLB,,, | Performed by: INTERNAL MEDICINE

## 2019-04-11 RX ORDER — GLIMEPIRIDE 4 MG/1
4 TABLET ORAL
Qty: 90 TABLET | Refills: 3 | Status: SHIPPED | OUTPATIENT
Start: 2019-04-11 | End: 2020-08-04 | Stop reason: SDUPTHER

## 2019-04-11 NOTE — PROGRESS NOTES
Subjective:       Patient ID: Saud Mayers is a 54 y.o. male.    Chief Complaint: Follow-up    HPI  Recheck.  Chart reviewed.  Pt noncompliant with diet, not exercisng.  C/O stiffness.  Less polyuria, no polydipsia.  No visual problems.  No CP, SOB.  Review of Systems   All other systems reviewed and are negative.      Objective:      Physical Exam   Constitutional: He appears well-developed. No distress.   obese   HENT:   Head: Normocephalic.   Eyes: EOM are normal. No scleral icterus.   Neck: Normal range of motion. No tracheal deviation present.   Cardiovascular: Normal rate, regular rhythm, normal heart sounds and intact distal pulses.   Pulmonary/Chest: Effort normal and breath sounds normal. No respiratory distress.   Abdominal: Soft. Bowel sounds are normal. He exhibits no distension. There is no tenderness.   Musculoskeletal: Normal range of motion. He exhibits no edema.   Neurological: He is alert.   Skin: Skin is warm and dry. No rash noted. He is not diaphoretic. No erythema.   Psychiatric: He has a normal mood and affect. His behavior is normal.   Vitals reviewed.      Assessment:       1. Type 2 diabetes mellitus without complication, without long-term current use of insulin    2. Essential hypertension    3. Pure hypercholesterolemia    4. Morbid obesity with BMI of 40.0-44.9, adult        Plan:       Saud was seen today for follow-up.    Diagnoses and all orders for this visit:    Type 2 diabetes mellitus without complication, without long-term current use of insulin  -     Hemoglobin A1c; Future  -     glimepiride (AMARYL) 4 MG tablet; Take 1 tablet (4 mg total) by mouth before breakfast.    Essential hypertension   Well-cont    Pure hypercholesterolemia   Well-cont    Morbid obesity with BMI of 40.0-44.9, adult   Urged weight loss    Follow up in about 3 months (around 7/11/2019).

## 2019-07-08 ENCOUNTER — PATIENT MESSAGE (OUTPATIENT)
Dept: INTERNAL MEDICINE | Facility: CLINIC | Age: 54
End: 2019-07-08

## 2019-08-13 DIAGNOSIS — L30.9 ECZEMA, UNSPECIFIED TYPE: ICD-10-CM

## 2019-08-13 RX ORDER — MOMETASONE FUROATE 1 MG/G
CREAM TOPICAL
Qty: 45 G | Refills: 4 | Status: SHIPPED | OUTPATIENT
Start: 2019-08-13 | End: 2020-05-08 | Stop reason: SDUPTHER

## 2019-08-28 ENCOUNTER — PATIENT MESSAGE (OUTPATIENT)
Dept: INTERNAL MEDICINE | Facility: CLINIC | Age: 54
End: 2019-08-28

## 2019-08-28 ENCOUNTER — TELEPHONE (OUTPATIENT)
Dept: INTERNAL MEDICINE | Facility: CLINIC | Age: 54
End: 2019-08-28

## 2019-08-28 NOTE — TELEPHONE ENCOUNTER
Spoke with pt. Pt states that he is on vacation in [facility] and is having terrible ear pain and is wanting  To call something in to pharmacy out in [facility]. Pt reports that he has already taken over the counter medication and it is not helping. Informed pt that I would send message to  About his request. Pt verbalized understanding.

## 2019-08-28 NOTE — TELEPHONE ENCOUNTER
----- Message from Amelie Flores sent at 8/28/2019  4:13 PM CDT -----  Pt can be reached at [phone]    Pt is requesting his medication for left ear pain possible infected to be sent to [facility] [address] [place] [address]. phone [phone]      Thank you!

## 2019-08-28 NOTE — TELEPHONE ENCOUNTER
----- Message from Amelie Flores sent at 8/28/2019  4:13 PM CDT -----  Pt can be reached at [phone]    Pt is requesting his medication for left ear pain possible infected to be sent to [facility] [address] [place] [address]. phone [phone]      Thank you!

## 2019-08-29 NOTE — TELEPHONE ENCOUNTER
Left detailed message for pt, informing him that his Provider stated that he will have to see a Dr over in [facility] for his ear infection.

## 2019-09-06 ENCOUNTER — TELEPHONE (OUTPATIENT)
Dept: INTERNAL MEDICINE | Facility: CLINIC | Age: 54
End: 2019-09-06

## 2019-09-06 NOTE — TELEPHONE ENCOUNTER
----- Message from Yaz No sent at 9/6/2019  3:06 PM CDT -----  Contact: [phone]/self   Patient requesting orders for blood work. Patient is scheduled to be seen on 9/13/19. Please notify patient when orders are in.

## 2019-09-09 ENCOUNTER — LAB VISIT (OUTPATIENT)
Dept: LAB | Facility: HOSPITAL | Age: 54
End: 2019-09-09
Attending: INTERNAL MEDICINE
Payer: COMMERCIAL

## 2019-09-09 DIAGNOSIS — E11.9 TYPE 2 DIABETES MELLITUS WITHOUT COMPLICATION, WITHOUT LONG-TERM CURRENT USE OF INSULIN: ICD-10-CM

## 2019-09-09 LAB
ESTIMATED AVG GLUCOSE: 160 MG/DL (ref 68–131)
HBA1C MFR BLD HPLC: 7.2 % (ref 4–5.6)

## 2019-09-09 PROCEDURE — 83036 HEMOGLOBIN GLYCOSYLATED A1C: CPT

## 2019-09-09 PROCEDURE — 36415 COLL VENOUS BLD VENIPUNCTURE: CPT | Mod: PO

## 2019-09-13 ENCOUNTER — OFFICE VISIT (OUTPATIENT)
Dept: INTERNAL MEDICINE | Facility: CLINIC | Age: 54
End: 2019-09-13
Payer: COMMERCIAL

## 2019-09-13 VITALS
WEIGHT: 288.81 LBS | DIASTOLIC BLOOD PRESSURE: 88 MMHG | BODY MASS INDEX: 42.78 KG/M2 | HEIGHT: 69 IN | OXYGEN SATURATION: 97 % | SYSTOLIC BLOOD PRESSURE: 146 MMHG | HEART RATE: 109 BPM

## 2019-09-13 DIAGNOSIS — I10 ESSENTIAL HYPERTENSION: ICD-10-CM

## 2019-09-13 DIAGNOSIS — E66.01 MORBID OBESITY WITH BMI OF 40.0-44.9, ADULT: ICD-10-CM

## 2019-09-13 DIAGNOSIS — E11.9 TYPE 2 DIABETES MELLITUS WITHOUT COMPLICATION, WITHOUT LONG-TERM CURRENT USE OF INSULIN: Primary | ICD-10-CM

## 2019-09-13 PROCEDURE — 90686 FLU VACCINE (QUAD) GREATER THAN OR EQUAL TO 3YO PRESERVATIVE FREE IM: ICD-10-PCS | Mod: S$GLB,,, | Performed by: INTERNAL MEDICINE

## 2019-09-13 PROCEDURE — 99999 PR PBB SHADOW E&M-EST. PATIENT-LVL III: ICD-10-PCS | Mod: PBBFAC,,, | Performed by: INTERNAL MEDICINE

## 2019-09-13 PROCEDURE — 99999 PR PBB SHADOW E&M-EST. PATIENT-LVL III: CPT | Mod: PBBFAC,,, | Performed by: INTERNAL MEDICINE

## 2019-09-13 PROCEDURE — 90471 IMMUNIZATION ADMIN: CPT | Mod: S$GLB,,, | Performed by: INTERNAL MEDICINE

## 2019-09-13 PROCEDURE — 99214 PR OFFICE/OUTPT VISIT, EST, LEVL IV, 30-39 MIN: ICD-10-PCS | Mod: 25,S$GLB,, | Performed by: INTERNAL MEDICINE

## 2019-09-13 PROCEDURE — 90686 IIV4 VACC NO PRSV 0.5 ML IM: CPT | Mod: S$GLB,,, | Performed by: INTERNAL MEDICINE

## 2019-09-13 PROCEDURE — 99214 OFFICE O/P EST MOD 30 MIN: CPT | Mod: 25,S$GLB,, | Performed by: INTERNAL MEDICINE

## 2019-09-13 PROCEDURE — 90471 FLU VACCINE (QUAD) GREATER THAN OR EQUAL TO 3YO PRESERVATIVE FREE IM: ICD-10-PCS | Mod: S$GLB,,, | Performed by: INTERNAL MEDICINE

## 2019-09-13 RX ORDER — METOPROLOL SUCCINATE 50 MG/1
50 TABLET, EXTENDED RELEASE ORAL DAILY
Qty: 90 TABLET | Refills: 3 | Status: SHIPPED | OUTPATIENT
Start: 2019-09-13 | End: 2020-09-28 | Stop reason: SDUPTHER

## 2019-09-13 RX ORDER — CLINDAMYCIN HYDROCHLORIDE 300 MG/1
300 CAPSULE ORAL EVERY 6 HOURS
COMMUNITY
End: 2021-07-07

## 2019-09-13 NOTE — PROGRESS NOTES
Subjective:       Patient ID: Saud Mayers is a 54 y.o. male.    Chief Complaint: Follow-up    HPI  Checkup.  Chart reviewed.  Went to [facility], gained 18 lbs.  No CP, SOB, HA.  No complaints.  Chart reviewed.  Review of Systems   All other systems reviewed and are negative.      Objective:      Physical Exam   Constitutional: He appears well-developed.   obese   HENT:   Head: Normocephalic.   Eyes: EOM are normal.   Neck: Normal range of motion.   Cardiovascular: Normal rate, regular rhythm, normal heart sounds and intact distal pulses.   Pulmonary/Chest: Effort normal and breath sounds normal.   Abdominal: Bowel sounds are normal.   Musculoskeletal: Normal range of motion.   Neurological: He is alert.   Skin: Skin is warm and dry.   Psychiatric: He has a normal mood and affect.   Vitals reviewed.      Assessment:       1. Type 2 diabetes mellitus without complication, without long-term current use of insulin    2. Essential hypertension    3. Morbid obesity with BMI of 40.0-44.9, adult        Plan:       Saud was seen today for follow-up.    Diagnoses and all orders for this visit:    Type 2 diabetes mellitus without complication, without long-term current use of insulin  -     Influenza - Quadrivalent (PF)    Essential hypertension  -     metoprolol succinate (TOPROL-XL) 50 MG 24 hr tablet; Take 1 tablet (50 mg total) by mouth once daily.    Morbid obesity with BMI of 40.0-44.9, adult   Urged weight loss      Follow up in about 1 month (around 10/13/2019).

## 2019-10-17 ENCOUNTER — PATIENT MESSAGE (OUTPATIENT)
Dept: INTERNAL MEDICINE | Facility: CLINIC | Age: 54
End: 2019-10-17

## 2019-10-21 ENCOUNTER — OFFICE VISIT (OUTPATIENT)
Dept: INTERNAL MEDICINE | Facility: CLINIC | Age: 54
End: 2019-10-21
Payer: COMMERCIAL

## 2019-10-21 VITALS
BODY MASS INDEX: 43.15 KG/M2 | SYSTOLIC BLOOD PRESSURE: 122 MMHG | HEART RATE: 79 BPM | WEIGHT: 291.31 LBS | HEIGHT: 69 IN | OXYGEN SATURATION: 97 % | DIASTOLIC BLOOD PRESSURE: 76 MMHG

## 2019-10-21 DIAGNOSIS — E66.01 MORBID OBESITY WITH BMI OF 40.0-44.9, ADULT: ICD-10-CM

## 2019-10-21 DIAGNOSIS — E11.9 TYPE 2 DIABETES MELLITUS WITHOUT COMPLICATION, WITHOUT LONG-TERM CURRENT USE OF INSULIN: ICD-10-CM

## 2019-10-21 DIAGNOSIS — I10 ESSENTIAL HYPERTENSION: Primary | ICD-10-CM

## 2019-10-21 DIAGNOSIS — E78.00 PURE HYPERCHOLESTEROLEMIA: ICD-10-CM

## 2019-10-21 PROCEDURE — 99999 PR PBB SHADOW E&M-EST. PATIENT-LVL III: CPT | Mod: PBBFAC,,, | Performed by: INTERNAL MEDICINE

## 2019-10-21 PROCEDURE — 99214 OFFICE O/P EST MOD 30 MIN: CPT | Mod: S$GLB,,, | Performed by: INTERNAL MEDICINE

## 2019-10-21 PROCEDURE — 99999 PR PBB SHADOW E&M-EST. PATIENT-LVL III: ICD-10-PCS | Mod: PBBFAC,,, | Performed by: INTERNAL MEDICINE

## 2019-10-21 PROCEDURE — 99214 PR OFFICE/OUTPT VISIT, EST, LEVL IV, 30-39 MIN: ICD-10-PCS | Mod: S$GLB,,, | Performed by: INTERNAL MEDICINE

## 2019-10-21 NOTE — PROGRESS NOTES
Subjective:       Patient ID: Saud Mayers is a 54 y.o. male.    Chief Complaint: Hypertension    HPI  Recheck.  Chart reviewed.  Exercising minimally.  Weight up 2 lbs/ 1 month.  C/o frequent stools since starting lopressor.  FBSs 140-150.  No polyuria/nocturia.  Review of Systems   All other systems reviewed and are negative.      Objective:      Physical Exam   Constitutional: He appears well-developed.   obese   HENT:   Head: Normocephalic.   Eyes: EOM are normal.   Neck: Normal range of motion.   Cardiovascular: Normal rate, regular rhythm, normal heart sounds and intact distal pulses.   Pulmonary/Chest: Effort normal and breath sounds normal.   Abdominal: Bowel sounds are normal.   Musculoskeletal: Normal range of motion. He exhibits no edema.   Neurological: He is alert.   Skin: Skin is warm and dry.   Psychiatric: He has a normal mood and affect.   Vitals reviewed.      Assessment:       1. Essential hypertension    2. Type 2 diabetes mellitus without complication, without long-term current use of insulin    3. Morbid obesity with BMI of 40.0-44.9, adult    4. Pure hypercholesterolemia        Plan:       Saud was seen today for hypertension.    Diagnoses and all orders for this visit:    Essential hypertension   Well-cont    Type 2 diabetes mellitus without complication, without long-term current use of insulin  -     Hemoglobin A1c; Future    Morbid obesity with BMI of 40.0-44.9, adult    Pure hypercholesterolemia  -     Lipid panel; Future      Follow up in about 3 months (around 1/21/2020).

## 2019-12-04 DIAGNOSIS — E11.9 TYPE 2 DIABETES MELLITUS WITHOUT COMPLICATION, WITHOUT LONG-TERM CURRENT USE OF INSULIN: ICD-10-CM

## 2019-12-04 RX ORDER — METFORMIN HYDROCHLORIDE 1000 MG/1
1000 TABLET ORAL 2 TIMES DAILY WITH MEALS
Qty: 180 TABLET | Refills: 4 | Status: SHIPPED | OUTPATIENT
Start: 2019-12-04 | End: 2020-12-10 | Stop reason: SDUPTHER

## 2020-03-02 DIAGNOSIS — I10 ESSENTIAL HYPERTENSION: ICD-10-CM

## 2020-03-02 RX ORDER — LOSARTAN POTASSIUM 100 MG/1
TABLET ORAL
Qty: 90 TABLET | Refills: 4 | Status: SHIPPED | OUTPATIENT
Start: 2020-03-02 | End: 2021-05-25

## 2020-03-19 ENCOUNTER — PATIENT MESSAGE (OUTPATIENT)
Dept: INTERNAL MEDICINE | Facility: CLINIC | Age: 55
End: 2020-03-19

## 2020-03-19 DIAGNOSIS — R50.9 FEVER, UNSPECIFIED FEVER CAUSE: Primary | ICD-10-CM

## 2020-03-20 ENCOUNTER — CLINICAL SUPPORT (OUTPATIENT)
Dept: INTERNAL MEDICINE | Facility: CLINIC | Age: 55
End: 2020-03-20
Payer: COMMERCIAL

## 2020-03-20 DIAGNOSIS — R50.9 FEVER, UNSPECIFIED FEVER CAUSE: ICD-10-CM

## 2020-03-20 PROCEDURE — U0002 COVID-19 LAB TEST NON-CDC: HCPCS

## 2020-03-21 LAB — SARS-COV-2 RNA RESP QL NAA+PROBE: DETECTED

## 2020-03-25 ENCOUNTER — TELEPHONE (OUTPATIENT)
Dept: FAMILY MEDICINE | Facility: CLINIC | Age: 55
End: 2020-03-25

## 2020-03-25 NOTE — TELEPHONE ENCOUNTER
----- Message from Stephanie Huff sent at 3/25/2020  8:29 AM CDT -----  Contact: Patient   Type:  Test Results    Who Called:  Saud  Name of Test (Lab/Mammo/Etc):  COVID-19 Test   Date of Test:  3/20/20  Ordering Provider:  Lisa thru  Where the test was performed:  [facility]  Would the patient rather a call back or a response via [facility]?  Call back   Best Call Back Number:  [phone]  Additional Information:  Patient has not received any notification about results

## 2020-04-01 ENCOUNTER — TELEPHONE (OUTPATIENT)
Dept: FAMILY MEDICINE | Facility: CLINIC | Age: 55
End: 2020-04-01

## 2020-04-01 NOTE — TELEPHONE ENCOUNTER
----- Message from Arlene Gan sent at 4/1/2020  1:32 PM CDT -----  Contact: [phone]/ self   Patient requesting to speak with you regarding getting an appointment scheduled for this upcoming Monday. Please advise.

## 2020-04-06 ENCOUNTER — OFFICE VISIT (OUTPATIENT)
Dept: FAMILY MEDICINE | Facility: CLINIC | Age: 55
End: 2020-04-06
Payer: COMMERCIAL

## 2020-04-06 VITALS
SYSTOLIC BLOOD PRESSURE: 150 MMHG | WEIGHT: 275 LBS | DIASTOLIC BLOOD PRESSURE: 76 MMHG | HEART RATE: 86 BPM | OXYGEN SATURATION: 95 % | BODY MASS INDEX: 40.73 KG/M2 | TEMPERATURE: 98 F | HEIGHT: 69 IN

## 2020-04-06 DIAGNOSIS — U07.1 COVID-19 VIRUS INFECTION: Primary | ICD-10-CM

## 2020-04-06 DIAGNOSIS — E78.00 PURE HYPERCHOLESTEROLEMIA: ICD-10-CM

## 2020-04-06 DIAGNOSIS — I10 ESSENTIAL HYPERTENSION: ICD-10-CM

## 2020-04-06 DIAGNOSIS — E11.9 TYPE 2 DIABETES MELLITUS WITHOUT COMPLICATION, WITHOUT LONG-TERM CURRENT USE OF INSULIN: ICD-10-CM

## 2020-04-06 DIAGNOSIS — N40.1 BENIGN PROSTATIC HYPERPLASIA WITH NOCTURIA: ICD-10-CM

## 2020-04-06 DIAGNOSIS — R35.1 BENIGN PROSTATIC HYPERPLASIA WITH NOCTURIA: ICD-10-CM

## 2020-04-06 PROCEDURE — 99214 OFFICE O/P EST MOD 30 MIN: CPT | Mod: S$GLB,,, | Performed by: INTERNAL MEDICINE

## 2020-04-06 PROCEDURE — 99214 PR OFFICE/OUTPT VISIT, EST, LEVL IV, 30-39 MIN: ICD-10-PCS | Mod: S$GLB,,, | Performed by: INTERNAL MEDICINE

## 2020-04-06 RX ORDER — PROMETHAZINE HYDROCHLORIDE AND CODEINE PHOSPHATE 6.25; 1 MG/5ML; MG/5ML
5 SOLUTION ORAL EVERY 4 HOURS PRN
Qty: 120 ML | Refills: 0 | Status: SHIPPED | OUTPATIENT
Start: 2020-04-06 | End: 2020-04-16

## 2020-04-06 NOTE — PROGRESS NOTES
Subjective:       Patient ID: Saud Mayers is a 55 y.o. male.    Chief Complaint: Follow-up (Clearance to return back to work) and Cough    HPI  Rechec.  Pt sickened from COVID 3/20 with F/C, cough, sl sOB, ageusia, anorexia.  Weight down 16 lbs from Oct.  Now feels well save for persistence of cough.   5 days ago.  Review of Systems   All other systems reviewed and are negative.      Objective:      Physical Exam   Constitutional: He appears well-developed.   obese   HENT:   Head: Normocephalic.   Eyes: EOM are normal.   Neck: Normal range of motion.   Cardiovascular: Normal rate, regular rhythm, normal heart sounds and intact distal pulses.   Pulmonary/Chest: Effort normal and breath sounds normal.   Abdominal: Bowel sounds are normal.   Musculoskeletal: Normal range of motion. He exhibits no edema.   Neurological: He is alert.   Skin: Skin is warm and dry.   Psychiatric: He has a normal mood and affect.   Vitals reviewed.      Assessment:       1. COVID-19 virus infection    2. Essential hypertension    3. Type 2 diabetes mellitus without complication, without long-term current use of insulin    4. Pure hypercholesterolemia    5. Benign prostatic hyperplasia with nocturia        Plan:       Saud was seen today for follow-up and cough.    Diagnoses and all orders for this visit:    COVID-19 virus infection  -     promethazine-codeine 6.25-10 mg/5 ml (PHENERGAN WITH CODEINE) 6.25-10 mg/5 mL syrup; Take 5 mLs by mouth every 4 (four) hours as needed for Cough.    Essential hypertension   Well-cont    Type 2 diabetes mellitus without complication, without long-term current use of insulin  -     Hemoglobin A1c; Future   Back to work in 1 week    Pure hypercholesterolemia  -     Comprehensive metabolic panel; Future  -     Lipid panel; Future    Benign prostatic hyperplasia with nocturia  -     Prostate Specific Antigen, Diagnostic; Future      Follow up in about 1 month (around 5/6/2020).

## 2020-05-05 ENCOUNTER — LAB VISIT (OUTPATIENT)
Dept: LAB | Facility: HOSPITAL | Age: 55
End: 2020-05-05
Attending: INTERNAL MEDICINE
Payer: COMMERCIAL

## 2020-05-05 DIAGNOSIS — E11.9 TYPE 2 DIABETES MELLITUS WITHOUT COMPLICATION, WITHOUT LONG-TERM CURRENT USE OF INSULIN: ICD-10-CM

## 2020-05-05 DIAGNOSIS — R35.1 BENIGN PROSTATIC HYPERPLASIA WITH NOCTURIA: ICD-10-CM

## 2020-05-05 DIAGNOSIS — E78.00 PURE HYPERCHOLESTEROLEMIA: ICD-10-CM

## 2020-05-05 DIAGNOSIS — N40.1 BENIGN PROSTATIC HYPERPLASIA WITH NOCTURIA: ICD-10-CM

## 2020-05-05 LAB
ALBUMIN SERPL BCP-MCNC: 4.2 G/DL (ref 3.5–5.2)
ALP SERPL-CCNC: 64 U/L (ref 38–126)
ALT SERPL W/O P-5'-P-CCNC: 81 U/L (ref 10–44)
ANION GAP SERPL CALC-SCNC: 9 MMOL/L (ref 8–16)
AST SERPL-CCNC: 58 U/L (ref 15–46)
BILIRUB SERPL-MCNC: 0.7 MG/DL (ref 0.1–1)
BUN SERPL-MCNC: 15 MG/DL (ref 2–20)
CALCIUM SERPL-MCNC: 9.1 MG/DL (ref 8.7–10.5)
CHLORIDE SERPL-SCNC: 102 MMOL/L (ref 95–110)
CHOLEST SERPL-MCNC: 157 MG/DL (ref 120–199)
CHOLEST/HDLC SERPL: 3.8 {RATIO} (ref 2–5)
CO2 SERPL-SCNC: 26 MMOL/L (ref 23–29)
COMPLEXED PSA SERPL-MCNC: 0.36 NG/ML (ref 0–4)
CREAT SERPL-MCNC: 0.67 MG/DL (ref 0.5–1.4)
EST. GFR  (AFRICAN AMERICAN): >60 ML/MIN/1.73 M^2
EST. GFR  (NON AFRICAN AMERICAN): >60 ML/MIN/1.73 M^2
ESTIMATED AVG GLUCOSE: 194 MG/DL (ref 68–131)
GLUCOSE SERPL-MCNC: 227 MG/DL (ref 70–110)
HBA1C MFR BLD HPLC: 8.4 % (ref 4–5.6)
HDLC SERPL-MCNC: 41 MG/DL (ref 40–75)
HDLC SERPL: 26.1 % (ref 20–50)
LDLC SERPL CALC-MCNC: 87.6 MG/DL (ref 63–159)
NONHDLC SERPL-MCNC: 116 MG/DL
POTASSIUM SERPL-SCNC: 4.1 MMOL/L (ref 3.5–5.1)
PROT SERPL-MCNC: 7.6 G/DL (ref 6–8.4)
SODIUM SERPL-SCNC: 137 MMOL/L (ref 136–145)
TRIGL SERPL-MCNC: 142 MG/DL (ref 30–150)

## 2020-05-05 PROCEDURE — 84153 ASSAY OF PSA TOTAL: CPT

## 2020-05-05 PROCEDURE — 80061 LIPID PANEL: CPT

## 2020-05-05 PROCEDURE — 83036 HEMOGLOBIN GLYCOSYLATED A1C: CPT

## 2020-05-05 PROCEDURE — 36415 COLL VENOUS BLD VENIPUNCTURE: CPT | Mod: PO

## 2020-05-05 PROCEDURE — 80053 COMPREHEN METABOLIC PANEL: CPT | Mod: PO

## 2020-05-08 ENCOUNTER — OFFICE VISIT (OUTPATIENT)
Dept: INTERNAL MEDICINE | Facility: CLINIC | Age: 55
End: 2020-05-08
Payer: COMMERCIAL

## 2020-05-08 ENCOUNTER — TELEPHONE (OUTPATIENT)
Dept: FAMILY MEDICINE | Facility: CLINIC | Age: 55
End: 2020-05-08

## 2020-05-08 VITALS
WEIGHT: 278.56 LBS | OXYGEN SATURATION: 95 % | HEART RATE: 88 BPM | HEIGHT: 69 IN | SYSTOLIC BLOOD PRESSURE: 136 MMHG | TEMPERATURE: 99 F | BODY MASS INDEX: 41.26 KG/M2 | DIASTOLIC BLOOD PRESSURE: 78 MMHG

## 2020-05-08 DIAGNOSIS — E11.59 OBESITY, DIABETES, AND HYPERTENSION SYNDROME: ICD-10-CM

## 2020-05-08 DIAGNOSIS — E11.69 OBESITY, DIABETES, AND HYPERTENSION SYNDROME: ICD-10-CM

## 2020-05-08 DIAGNOSIS — I10 ESSENTIAL HYPERTENSION: ICD-10-CM

## 2020-05-08 DIAGNOSIS — R05.9 COUGH: ICD-10-CM

## 2020-05-08 DIAGNOSIS — E78.00 PURE HYPERCHOLESTEROLEMIA: ICD-10-CM

## 2020-05-08 DIAGNOSIS — E11.9 TYPE 2 DIABETES MELLITUS WITHOUT COMPLICATION, WITHOUT LONG-TERM CURRENT USE OF INSULIN: ICD-10-CM

## 2020-05-08 DIAGNOSIS — E11.59 HYPERTENSION COMPLICATING DIABETES: ICD-10-CM

## 2020-05-08 DIAGNOSIS — L30.9 ECZEMA, UNSPECIFIED TYPE: ICD-10-CM

## 2020-05-08 DIAGNOSIS — I15.2 HYPERTENSION COMPLICATING DIABETES: ICD-10-CM

## 2020-05-08 DIAGNOSIS — Z00.00 ROUTINE GENERAL MEDICAL EXAMINATION AT A HEALTH CARE FACILITY: Primary | ICD-10-CM

## 2020-05-08 DIAGNOSIS — G47.33 OSA (OBSTRUCTIVE SLEEP APNEA): ICD-10-CM

## 2020-05-08 DIAGNOSIS — R01.1 SYSTOLIC MURMUR: ICD-10-CM

## 2020-05-08 DIAGNOSIS — E66.01 MORBID OBESITY WITH BMI OF 40.0-44.9, ADULT: ICD-10-CM

## 2020-05-08 DIAGNOSIS — E66.9 OBESITY, DIABETES, AND HYPERTENSION SYNDROME: ICD-10-CM

## 2020-05-08 DIAGNOSIS — I15.2 OBESITY, DIABETES, AND HYPERTENSION SYNDROME: ICD-10-CM

## 2020-05-08 PROCEDURE — 99999 PR PBB SHADOW E&M-EST. PATIENT-LVL III: CPT | Mod: PBBFAC,,, | Performed by: INTERNAL MEDICINE

## 2020-05-08 PROCEDURE — 93005 EKG 12-LEAD: ICD-10-PCS | Mod: S$GLB,,, | Performed by: INTERNAL MEDICINE

## 2020-05-08 PROCEDURE — 93005 ELECTROCARDIOGRAM TRACING: CPT | Mod: S$GLB,,, | Performed by: INTERNAL MEDICINE

## 2020-05-08 PROCEDURE — 99396 PR PREVENTIVE VISIT,EST,40-64: ICD-10-PCS | Mod: S$GLB,,, | Performed by: INTERNAL MEDICINE

## 2020-05-08 PROCEDURE — 93010 ELECTROCARDIOGRAM REPORT: CPT | Mod: S$GLB,,, | Performed by: INTERNAL MEDICINE

## 2020-05-08 PROCEDURE — 99999 PR PBB SHADOW E&M-EST. PATIENT-LVL III: ICD-10-PCS | Mod: PBBFAC,,, | Performed by: INTERNAL MEDICINE

## 2020-05-08 PROCEDURE — 99396 PREV VISIT EST AGE 40-64: CPT | Mod: S$GLB,,, | Performed by: INTERNAL MEDICINE

## 2020-05-08 PROCEDURE — 93010 EKG 12-LEAD: ICD-10-PCS | Mod: S$GLB,,, | Performed by: INTERNAL MEDICINE

## 2020-05-08 RX ORDER — ATORVASTATIN CALCIUM 40 MG/1
40 TABLET, FILM COATED ORAL DAILY
Qty: 90 TABLET | Refills: 4 | Status: SHIPPED | OUTPATIENT
Start: 2020-05-08 | End: 2021-07-07 | Stop reason: SDUPTHER

## 2020-05-08 RX ORDER — MOMETASONE FUROATE 1 MG/G
CREAM TOPICAL
Qty: 45 G | Refills: 4 | Status: SHIPPED | OUTPATIENT
Start: 2020-05-08 | End: 2021-07-07

## 2020-05-08 RX ORDER — METOPROLOL SUCCINATE 100 MG/1
100 TABLET, EXTENDED RELEASE ORAL DAILY
Qty: 90 TABLET | Refills: 3 | Status: SHIPPED | OUTPATIENT
Start: 2020-05-08 | End: 2021-05-08

## 2020-05-08 RX ORDER — SILDENAFIL 100 MG/1
100 TABLET, FILM COATED ORAL DAILY PRN
Qty: 12 TABLET | Refills: 6 | Status: SHIPPED | OUTPATIENT
Start: 2020-05-08 | End: 2021-07-07 | Stop reason: SDUPTHER

## 2020-05-08 RX ORDER — BENZONATATE 200 MG/1
200 CAPSULE ORAL 3 TIMES DAILY PRN
Qty: 30 CAPSULE | Refills: 3 | Status: SHIPPED | OUTPATIENT
Start: 2020-05-08 | End: 2020-05-18

## 2020-05-08 NOTE — PROGRESS NOTES
Subjective:       Patient ID: Saud Mayers is a 55 y.o. male.    Chief Complaint: Follow-up and Medication Refill (atorvastatin, Elocon cream & ( viagra send to [facility])    \[facility]\""  Wellness check.  Chart reviewed.  Still feels weak with a nonprod cough s/p[place] virus from 1 mo ago.  Stamina still not up to snuff.  No F/C, SOB.  BSs in 200s.  No N/V/D.  Not using CPAP.  Review of Systems   All other systems reviewed and are negative.      Objective:      Physical Exam   Constitutional: He appears well-developed.   obese   HENT:   Head: Normocephalic.   Eyes: EOM are normal.   Neck: Normal range of motion.   Cardiovascular: Normal rate, regular rhythm, normal heart sounds and intact distal pulses.   Pulses:       Dorsalis pedis pulses are 3+ on the right side, and 3+ on the left side.        Posterior tibial pulses are 3+ on the right side, and 3+ on the left side.   Pulmonary/Chest: Effort normal and breath sounds normal. No respiratory distress.   Abdominal: Soft. Bowel sounds are normal. He exhibits no distension. There is no tenderness.   Musculoskeletal: Normal range of motion. He exhibits no edema.        Right foot: There is normal range of motion and no deformity.        Left foot: There is normal range of motion and no deformity.   Feet:   Right Foot:   Protective Sensation: 0 sites tested. 0 sites sensed.   Left Foot:   Protective Sensation: 0 sites tested. 0 sites sensed.   Neurological: He is alert.   Skin: Skin is warm and dry.   Psychiatric: He has a normal mood and affect.   Vitals reviewed.      Assessment:       1. Routine general medical examination at a health care facility    2. Systolic murmur    3. Essential hypertension    4. Type 2 diabetes mellitus without complication, without long-term current use of insulin    5. Morbid obesity with BMI of 40.0-44.9, adult    6. Pure hypercholesterolemia    7. Hypertension complicating diabetes    8. Obesity, diabetes, and hypertension syndrome     9. ANAMIKA (obstructive sleep apnea)    10. Eczema, unspecified type    11. Cough        Plan:       Saud was seen today for follow-up and medication refill.    Diagnoses and all orders for this visit:    Routine general medical examination at a health care facility    Systolic murmur  -     Echo Color Flow Doppler? Yes    Essential hypertension  -     EKG 12-lead  -     metoprolol succinate (TOPROL-XL) 100 MG 24 hr tablet; Take 1 tablet (100 mg total) by mouth once daily.   Cont other x    Type 2 diabetes mellitus without complication, without long-term current use of insulin  -     Hemoglobin A1C; Future    Morbid obesity with BMI of 40.0-44.9, adult   Urged weight loss    Pure hypercholesterolemia   Well-cont    Hypertension complicating diabetes  -     metoprolol succinate (TOPROL-XL) 100 MG 24 hr tablet; Take 1 tablet (100 mg total) by mouth once daily.    Obesity, diabetes, and hypertension syndrome   Monitor    ANAMIKA (obstructive sleep apnea)      Follow up in about 3 months (around 8/8/2020).

## 2020-05-12 ENCOUNTER — TELEPHONE (OUTPATIENT)
Dept: INTERNAL MEDICINE | Facility: CLINIC | Age: 55
End: 2020-05-12

## 2020-05-15 ENCOUNTER — TELEPHONE (OUTPATIENT)
Dept: INTERNAL MEDICINE | Facility: CLINIC | Age: 55
End: 2020-05-15

## 2020-05-15 ENCOUNTER — HOSPITAL ENCOUNTER (OUTPATIENT)
Dept: CARDIOLOGY | Facility: HOSPITAL | Age: 55
Discharge: HOME | End: 2020-05-15
Attending: INTERNAL MEDICINE
Payer: COMMERCIAL

## 2020-05-15 LAB
AORTIC ROOT ANNULUS: 2.84 CM
AORTIC VALVE CUSP SEPERATION: 1.61 CM
AV INDEX (PROSTH): 0.5
AV MEAN GRADIENT: 13 MMHG
AV PEAK GRADIENT: 26 MMHG
AV VALVE AREA: 1.99 CM2
AV VELOCITY RATIO: 0.47
CV ECHO LV RWT: 0.51 CM
DOP CALC AO PEAK VEL: 2.54 M/S
DOP CALC AO VTI: 48.99 CM
DOP CALC LVOT AREA: 4 CM2
DOP CALC LVOT DIAMETER: 2.26 CM
DOP CALC LVOT PEAK VEL: 1.19 M/S
DOP CALC LVOT STROKE VOLUME: 97.59 CM3
DOP CALCLVOT PEAK VEL VTI: 24.34 CM
E WAVE DECELERATION TIME: 208.77 MSEC
E/A RATIO: 1.32
E/E' RATIO: 8.73 M/S
ECHO LV POSTERIOR WALL: 1.17 CM (ref 0.6–1.1)
FRACTIONAL SHORTENING: 31 % (ref 28–44)
INTERVENTRICULAR SEPTUM: 1.19 CM (ref 0.6–1.1)
LA MAJOR: 4.36 CM
LA MINOR: 4.17 CM
LA WIDTH: 2.47 CM
LEFT ATRIUM SIZE: 3.79 CM
LEFT ATRIUM VOLUME: 33.92 CM3
LEFT INTERNAL DIMENSION IN SYSTOLE: 3.17 CM (ref 2.1–4)
LEFT VENTRICLE DIASTOLIC VOLUME: 97.57 ML
LEFT VENTRICLE SYSTOLIC VOLUME: 39.99 ML
LEFT VENTRICULAR INTERNAL DIMENSION IN DIASTOLE: 4.6 CM (ref 3.5–6)
LEFT VENTRICULAR MASS: 200.13 G
LV LATERAL E/E' RATIO: 7.38 M/S
LV SEPTAL E/E' RATIO: 10.67 M/S
MV PEAK A VEL: 0.73 M/S
MV PEAK E VEL: 0.96 M/S
PISA TR MAX VEL: 2.14 M/S
PV PEAK VELOCITY: 1.42 CM/S
RA MAJOR: 3.94 CM
RA PRESSURE: 3 MMHG
RA WIDTH: 4.48 CM
RIGHT VENTRICULAR END-DIASTOLIC DIMENSION: 2.5 CM
SINUS: 2.75 CM
TDI LATERAL: 0.13 M/S
TDI SEPTAL: 0.09 M/S
TDI: 0.11 M/S
TR MAX PG: 18 MMHG
TRICUSPID ANNULAR PLANE SYSTOLIC EXCURSION: 2.58 CM
TV REST PULMONARY ARTERY PRESSURE: 21 MMHG

## 2020-05-15 PROCEDURE — 93306 TTE W/DOPPLER COMPLETE: CPT | Mod: PO

## 2020-05-15 PROCEDURE — 93306 ECHO (CUPID ONLY): ICD-10-PCS | Mod: 26,,, | Performed by: INTERNAL MEDICINE

## 2020-05-15 PROCEDURE — 93306 TTE W/DOPPLER COMPLETE: CPT | Mod: 26,,, | Performed by: INTERNAL MEDICINE

## 2020-05-15 NOTE — TELEPHONE ENCOUNTER
----- Message from Jeanie Romero sent at 5/15/2020  1:42 PM CDT -----  Contact: Pt/ [phone]  Pt is requesting a callback.    Please call

## 2020-05-15 NOTE — TELEPHONE ENCOUNTER
Dr campbell tried to contact patient  I called the patient again and transferred the call  Patient and doctor talking about results.

## 2020-12-10 DIAGNOSIS — E11.9 TYPE 2 DIABETES MELLITUS WITHOUT COMPLICATION, WITHOUT LONG-TERM CURRENT USE OF INSULIN: ICD-10-CM

## 2020-12-10 RX ORDER — METFORMIN HYDROCHLORIDE 1000 MG/1
1000 TABLET ORAL 2 TIMES DAILY WITH MEALS
Qty: 180 TABLET | Refills: 0 | Status: SHIPPED | OUTPATIENT
Start: 2020-12-10 | End: 2020-12-16 | Stop reason: SDUPTHER

## 2020-12-11 ENCOUNTER — TELEPHONE (OUTPATIENT)
Dept: INTERNAL MEDICINE | Facility: CLINIC | Age: 55
End: 2020-12-11

## 2020-12-14 ENCOUNTER — LAB VISIT (OUTPATIENT)
Dept: LAB | Facility: HOSPITAL | Age: 55
End: 2020-12-14
Attending: INTERNAL MEDICINE
Payer: COMMERCIAL

## 2020-12-14 DIAGNOSIS — E11.9 TYPE 2 DIABETES MELLITUS WITHOUT COMPLICATION, WITHOUT LONG-TERM CURRENT USE OF INSULIN: ICD-10-CM

## 2020-12-14 LAB
ESTIMATED AVG GLUCOSE: 166 MG/DL (ref 68–131)
HBA1C MFR BLD HPLC: 7.4 % (ref 4–5.6)

## 2020-12-14 PROCEDURE — 36415 COLL VENOUS BLD VENIPUNCTURE: CPT | Mod: PO

## 2020-12-14 PROCEDURE — 83036 HEMOGLOBIN GLYCOSYLATED A1C: CPT

## 2020-12-16 ENCOUNTER — OFFICE VISIT (OUTPATIENT)
Dept: INTERNAL MEDICINE | Facility: CLINIC | Age: 55
End: 2020-12-16
Payer: COMMERCIAL

## 2020-12-16 VITALS
OXYGEN SATURATION: 98 % | BODY MASS INDEX: 42.52 KG/M2 | DIASTOLIC BLOOD PRESSURE: 82 MMHG | SYSTOLIC BLOOD PRESSURE: 160 MMHG | WEIGHT: 287.06 LBS | HEART RATE: 85 BPM | HEIGHT: 69 IN

## 2020-12-16 DIAGNOSIS — E66.01 MORBID OBESITY WITH BMI OF 40.0-44.9, ADULT: ICD-10-CM

## 2020-12-16 DIAGNOSIS — E78.00 PURE HYPERCHOLESTEROLEMIA: ICD-10-CM

## 2020-12-16 DIAGNOSIS — I10 ESSENTIAL HYPERTENSION: ICD-10-CM

## 2020-12-16 DIAGNOSIS — E11.9 TYPE 2 DIABETES MELLITUS WITHOUT COMPLICATION, WITHOUT LONG-TERM CURRENT USE OF INSULIN: Primary | ICD-10-CM

## 2020-12-16 PROCEDURE — 99999 PR PBB SHADOW E&M-EST. PATIENT-LVL IV: CPT | Mod: PBBFAC,,, | Performed by: INTERNAL MEDICINE

## 2020-12-16 PROCEDURE — 99214 PR OFFICE/OUTPT VISIT, EST, LEVL IV, 30-39 MIN: ICD-10-PCS | Mod: S$GLB,,, | Performed by: INTERNAL MEDICINE

## 2020-12-16 PROCEDURE — 99999 PR PBB SHADOW E&M-EST. PATIENT-LVL IV: ICD-10-PCS | Mod: PBBFAC,,, | Performed by: INTERNAL MEDICINE

## 2020-12-16 PROCEDURE — 99214 OFFICE O/P EST MOD 30 MIN: CPT | Mod: S$GLB,,, | Performed by: INTERNAL MEDICINE

## 2020-12-16 RX ORDER — METFORMIN HYDROCHLORIDE 1000 MG/1
1000 TABLET ORAL 2 TIMES DAILY WITH MEALS
Qty: 180 TABLET | Refills: 4 | Status: SHIPPED | OUTPATIENT
Start: 2020-12-16 | End: 2021-07-07 | Stop reason: SDUPTHER

## 2020-12-16 NOTE — PROGRESS NOTES
Subjective:       Patient ID: Saud Mayers is a 55 y.o. male.    Chief Complaint: Diabetes (labs done,eye exam,flu shot, pnemonia shot)    HPI  Checkup.  Chart reviewed.  Weight up 9 lbs/ 3 mo.  Having severe bilat sciatica, as well as frequent numbness, paresthesias in UEs.  No incontinence or saddle anaesthesia.  Undergoing neurosurgical w/u.  No CP, SOB.  Eye check overdue.  Review of Systems   All other systems reviewed and are negative.      Objective:      Physical Exam  Vitals signs reviewed.   Constitutional:       General: He is not in acute distress.     Appearance: He is well-developed. He is obese.   HENT:      Head: Normocephalic.      Mouth/Throat:      Mouth: Mucous membranes are moist.   Eyes:      General: No scleral icterus.     Extraocular Movements: Extraocular movements intact.      Conjunctiva/sclera: Conjunctivae normal.   Neck:      Musculoskeletal: Normal range of motion.   Cardiovascular:      Rate and Rhythm: Normal rate and regular rhythm.      Pulses: Normal pulses.      Heart sounds: Normal heart sounds.   Pulmonary:      Effort: Pulmonary effort is normal.      Breath sounds: Normal breath sounds.   Abdominal:      General: Bowel sounds are normal. There is no distension.      Palpations: Abdomen is soft. There is no mass.   Musculoskeletal: Normal range of motion.      Right lower leg: No edema.      Left lower leg: No edema.   Skin:     General: Skin is warm and dry.   Neurological:      General: No focal deficit present.      Mental Status: He is alert.      Comments: - Phalen's, tinel's signs bilat   Psychiatric:         Mood and Affect: Mood normal.         Assessment:       1. Type 2 diabetes mellitus without complication, without long-term current use of insulin    2. Essential hypertension    3. Morbid obesity with BMI of 40.0-44.9, adult    4. Pure hypercholesterolemia        Plan:       Saud was seen today for diabetes.    Diagnoses and all orders for this visit:    Type 2  diabetes mellitus without complication, without long-term current use of insulin  -     metFORMIN (GLUCOPHAGE) 1000 MG tablet; Take 1 tablet (1,000 mg total) by mouth 2 (two) times daily with meals.  -     Ambulatory referral/consult to Ophthalmology; Future  -     Influenza - Quadrivalent (PF)    Essential hypertension   Well-cont    Morbid obesity with BMI of 40.0-44.9, adult   Urged weight loss    Pure hypercholesterolemia   Cont rx      Follow up in about 1 month (around 1/16/2021).

## 2021-02-26 ENCOUNTER — TELEPHONE (OUTPATIENT)
Dept: NEUROSURGERY | Facility: CLINIC | Age: 56
End: 2021-02-26

## 2021-03-31 DIAGNOSIS — E11.9 TYPE 2 DIABETES MELLITUS WITHOUT COMPLICATION: ICD-10-CM

## 2021-04-05 ENCOUNTER — PATIENT MESSAGE (OUTPATIENT)
Dept: ADMINISTRATIVE | Facility: HOSPITAL | Age: 56
End: 2021-04-05

## 2021-04-20 ENCOUNTER — TELEPHONE (OUTPATIENT)
Dept: NEUROSURGERY | Facility: CLINIC | Age: 56
End: 2021-04-20

## 2021-04-20 ENCOUNTER — OFFICE VISIT (OUTPATIENT)
Dept: NEUROSURGERY | Facility: CLINIC | Age: 56
End: 2021-04-20
Payer: COMMERCIAL

## 2021-04-20 VITALS
SYSTOLIC BLOOD PRESSURE: 173 MMHG | TEMPERATURE: 98 F | HEIGHT: 69 IN | WEIGHT: 281.5 LBS | BODY MASS INDEX: 41.69 KG/M2 | HEART RATE: 84 BPM | DIASTOLIC BLOOD PRESSURE: 98 MMHG

## 2021-04-20 DIAGNOSIS — M54.16 LUMBAR RADICULAR PAIN: ICD-10-CM

## 2021-04-20 DIAGNOSIS — M51.9 LUMBAR DISC DISEASE: Primary | ICD-10-CM

## 2021-04-20 PROCEDURE — 99999 PR PBB SHADOW E&M-EST. PATIENT-LVL IV: CPT | Mod: PBBFAC,,, | Performed by: NEUROLOGICAL SURGERY

## 2021-04-20 PROCEDURE — 99204 OFFICE O/P NEW MOD 45 MIN: CPT | Mod: S$GLB,,, | Performed by: NEUROLOGICAL SURGERY

## 2021-04-20 PROCEDURE — 99999 PR PBB SHADOW E&M-EST. PATIENT-LVL IV: ICD-10-PCS | Mod: PBBFAC,,, | Performed by: NEUROLOGICAL SURGERY

## 2021-04-20 PROCEDURE — 99204 PR OFFICE/OUTPT VISIT, NEW, LEVL IV, 45-59 MIN: ICD-10-PCS | Mod: S$GLB,,, | Performed by: NEUROLOGICAL SURGERY

## 2021-04-29 ENCOUNTER — CLINICAL SUPPORT (OUTPATIENT)
Dept: REHABILITATION | Facility: HOSPITAL | Age: 56
End: 2021-04-29
Attending: NEUROLOGICAL SURGERY
Payer: COMMERCIAL

## 2021-04-29 DIAGNOSIS — M51.9 LUMBAR DISC DISEASE: ICD-10-CM

## 2021-04-29 DIAGNOSIS — M53.86 DECREASED ROM OF LUMBAR SPINE: ICD-10-CM

## 2021-04-29 LAB
LEFT EYE DM RETINOPATHY: NEGATIVE
RIGHT EYE DM RETINOPATHY: NEGATIVE

## 2021-04-29 PROCEDURE — 97162 PT EVAL MOD COMPLEX 30 MIN: CPT | Mod: PO

## 2021-04-30 ENCOUNTER — TELEPHONE (OUTPATIENT)
Dept: NEUROSURGERY | Facility: CLINIC | Age: 56
End: 2021-04-30

## 2021-05-02 PROBLEM — M53.86 DECREASED ROM OF LUMBAR SPINE: Status: ACTIVE | Noted: 2021-05-02

## 2021-05-05 DIAGNOSIS — E11.9 TYPE 2 DIABETES MELLITUS WITHOUT COMPLICATION: ICD-10-CM

## 2021-05-06 ENCOUNTER — CLINICAL SUPPORT (OUTPATIENT)
Dept: REHABILITATION | Facility: HOSPITAL | Age: 56
End: 2021-05-06
Attending: NEUROLOGICAL SURGERY
Payer: COMMERCIAL

## 2021-05-06 DIAGNOSIS — M53.86 DECREASED ROM OF LUMBAR SPINE: ICD-10-CM

## 2021-05-06 PROCEDURE — 97110 THERAPEUTIC EXERCISES: CPT | Mod: PO

## 2021-05-13 ENCOUNTER — CLINICAL SUPPORT (OUTPATIENT)
Dept: REHABILITATION | Facility: HOSPITAL | Age: 56
End: 2021-05-13
Attending: NEUROLOGICAL SURGERY
Payer: COMMERCIAL

## 2021-05-13 DIAGNOSIS — M53.86 DECREASED ROM OF LUMBAR SPINE: ICD-10-CM

## 2021-05-13 PROCEDURE — 97110 THERAPEUTIC EXERCISES: CPT | Mod: PO

## 2021-05-18 ENCOUNTER — CLINICAL SUPPORT (OUTPATIENT)
Dept: REHABILITATION | Facility: HOSPITAL | Age: 56
End: 2021-05-18
Attending: NEUROLOGICAL SURGERY
Payer: COMMERCIAL

## 2021-05-18 DIAGNOSIS — M53.86 DECREASED ROM OF LUMBAR SPINE: ICD-10-CM

## 2021-05-18 PROCEDURE — 97110 THERAPEUTIC EXERCISES: CPT | Mod: PO

## 2021-05-21 ENCOUNTER — CLINICAL SUPPORT (OUTPATIENT)
Dept: REHABILITATION | Facility: HOSPITAL | Age: 56
End: 2021-05-21
Attending: NEUROLOGICAL SURGERY
Payer: COMMERCIAL

## 2021-05-21 DIAGNOSIS — M53.86 DECREASED ROM OF LUMBAR SPINE: ICD-10-CM

## 2021-05-21 PROCEDURE — 97110 THERAPEUTIC EXERCISES: CPT | Mod: PO

## 2021-05-25 DIAGNOSIS — I10 ESSENTIAL HYPERTENSION: ICD-10-CM

## 2021-05-25 RX ORDER — LOSARTAN POTASSIUM 100 MG/1
TABLET ORAL
Qty: 30 TABLET | Refills: 0 | Status: SHIPPED | OUTPATIENT
Start: 2021-05-25 | End: 2021-07-07 | Stop reason: SDUPTHER

## 2021-06-09 ENCOUNTER — TELEPHONE (OUTPATIENT)
Dept: NEUROSURGERY | Facility: CLINIC | Age: 56
End: 2021-06-09

## 2021-06-22 ENCOUNTER — TELEPHONE (OUTPATIENT)
Dept: FAMILY MEDICINE | Facility: CLINIC | Age: 56
End: 2021-06-22

## 2021-06-23 ENCOUNTER — LAB VISIT (OUTPATIENT)
Dept: LAB | Facility: HOSPITAL | Age: 56
End: 2021-06-23
Attending: INTERNAL MEDICINE
Payer: COMMERCIAL

## 2021-06-23 DIAGNOSIS — E11.9 TYPE 2 DIABETES MELLITUS WITHOUT COMPLICATION: ICD-10-CM

## 2021-06-23 LAB
ALBUMIN/CREAT UR: 9.5 UG/MG (ref 0–30)
CHOLEST SERPL-MCNC: 137 MG/DL (ref 120–199)
CHOLEST/HDLC SERPL: 4 {RATIO} (ref 2–5)
CREAT UR-MCNC: 326 MG/DL (ref 23–375)
ESTIMATED AVG GLUCOSE: 212 MG/DL (ref 68–131)
HBA1C MFR BLD: 9 % (ref 4–5.6)
HDLC SERPL-MCNC: 34 MG/DL (ref 40–75)
HDLC SERPL: 24.8 % (ref 20–50)
LDLC SERPL CALC-MCNC: 71 MG/DL (ref 63–159)
MICROALBUMIN UR DL<=1MG/L-MCNC: 31 UG/ML
NONHDLC SERPL-MCNC: 103 MG/DL
TRIGL SERPL-MCNC: 160 MG/DL (ref 30–150)

## 2021-06-23 PROCEDURE — 82043 UR ALBUMIN QUANTITATIVE: CPT | Mod: PO | Performed by: INTERNAL MEDICINE

## 2021-06-23 PROCEDURE — 82570 ASSAY OF URINE CREATININE: CPT | Mod: PO | Performed by: INTERNAL MEDICINE

## 2021-06-23 PROCEDURE — 36415 COLL VENOUS BLD VENIPUNCTURE: CPT | Mod: PO | Performed by: INTERNAL MEDICINE

## 2021-06-23 PROCEDURE — 83036 HEMOGLOBIN GLYCOSYLATED A1C: CPT | Performed by: INTERNAL MEDICINE

## 2021-06-23 PROCEDURE — 80061 LIPID PANEL: CPT | Performed by: INTERNAL MEDICINE

## 2021-06-25 ENCOUNTER — TELEPHONE (OUTPATIENT)
Dept: INTERVENTIONAL RADIOLOGY/VASCULAR | Facility: CLINIC | Age: 56
End: 2021-06-25

## 2021-06-28 ENCOUNTER — CLINICAL SUPPORT (OUTPATIENT)
Dept: REHABILITATION | Facility: HOSPITAL | Age: 56
End: 2021-06-28
Attending: NEUROLOGICAL SURGERY
Payer: COMMERCIAL

## 2021-06-28 ENCOUNTER — DOCUMENTATION ONLY (OUTPATIENT)
Dept: REHABILITATION | Facility: HOSPITAL | Age: 56
End: 2021-06-28

## 2021-06-28 DIAGNOSIS — M53.86 DECREASED ROM OF LUMBAR SPINE: ICD-10-CM

## 2021-06-28 PROCEDURE — 97110 THERAPEUTIC EXERCISES: CPT | Mod: PO

## 2021-07-07 ENCOUNTER — OFFICE VISIT (OUTPATIENT)
Dept: FAMILY MEDICINE | Facility: CLINIC | Age: 56
End: 2021-07-07
Payer: COMMERCIAL

## 2021-07-07 VITALS
TEMPERATURE: 98 F | WEIGHT: 282.94 LBS | BODY MASS INDEX: 41.91 KG/M2 | HEIGHT: 69 IN | SYSTOLIC BLOOD PRESSURE: 128 MMHG | OXYGEN SATURATION: 95 % | HEART RATE: 84 BPM | DIASTOLIC BLOOD PRESSURE: 82 MMHG

## 2021-07-07 DIAGNOSIS — N52.9 IMPOTENCE: ICD-10-CM

## 2021-07-07 DIAGNOSIS — L30.9 DERMATITIS: ICD-10-CM

## 2021-07-07 DIAGNOSIS — I10 ESSENTIAL HYPERTENSION: ICD-10-CM

## 2021-07-07 DIAGNOSIS — E11.9 TYPE 2 DIABETES MELLITUS WITHOUT COMPLICATION, WITHOUT LONG-TERM CURRENT USE OF INSULIN: ICD-10-CM

## 2021-07-07 DIAGNOSIS — R01.1 SYSTOLIC MURMUR: ICD-10-CM

## 2021-07-07 DIAGNOSIS — E78.00 PURE HYPERCHOLESTEROLEMIA: ICD-10-CM

## 2021-07-07 DIAGNOSIS — E66.01 CLASS 3 SEVERE OBESITY DUE TO EXCESS CALORIES WITH SERIOUS COMORBIDITY AND BODY MASS INDEX (BMI) OF 40.0 TO 44.9 IN ADULT: ICD-10-CM

## 2021-07-07 DIAGNOSIS — Z12.5 PROSTATE CANCER SCREENING: Primary | ICD-10-CM

## 2021-07-07 DIAGNOSIS — I35.0 NONRHEUMATIC AORTIC VALVE STENOSIS: ICD-10-CM

## 2021-07-07 PROCEDURE — 99214 PR OFFICE/OUTPT VISIT, EST, LEVL IV, 30-39 MIN: ICD-10-PCS | Mod: S$GLB,,, | Performed by: STUDENT IN AN ORGANIZED HEALTH CARE EDUCATION/TRAINING PROGRAM

## 2021-07-07 PROCEDURE — 99214 OFFICE O/P EST MOD 30 MIN: CPT | Mod: S$GLB,,, | Performed by: STUDENT IN AN ORGANIZED HEALTH CARE EDUCATION/TRAINING PROGRAM

## 2021-07-07 RX ORDER — LOSARTAN POTASSIUM 100 MG/1
100 TABLET ORAL DAILY
Qty: 90 TABLET | Refills: 1 | Status: SHIPPED | OUTPATIENT
Start: 2021-07-07 | End: 2021-10-14 | Stop reason: SDUPTHER

## 2021-07-07 RX ORDER — BETAMETHASONE DIPROPIONATE 0.5 MG/G
OINTMENT TOPICAL 2 TIMES DAILY
Qty: 1 TUBE | Refills: 0 | Status: SHIPPED | OUTPATIENT
Start: 2021-07-07 | End: 2021-10-14 | Stop reason: SDUPTHER

## 2021-07-07 RX ORDER — GLIMEPIRIDE 4 MG/1
4 TABLET ORAL
Qty: 90 TABLET | Refills: 1 | Status: SHIPPED | OUTPATIENT
Start: 2021-07-07 | End: 2021-10-14 | Stop reason: SDUPTHER

## 2021-07-07 RX ORDER — ATORVASTATIN CALCIUM 40 MG/1
40 TABLET, FILM COATED ORAL DAILY
Qty: 90 TABLET | Refills: 1 | Status: SHIPPED | OUTPATIENT
Start: 2021-07-07 | End: 2021-10-14 | Stop reason: SDUPTHER

## 2021-07-07 RX ORDER — SILDENAFIL 100 MG/1
100 TABLET, FILM COATED ORAL DAILY PRN
Qty: 30 TABLET | Refills: 5 | Status: SHIPPED | OUTPATIENT
Start: 2021-07-07 | End: 2022-08-28 | Stop reason: SDUPTHER

## 2021-07-07 RX ORDER — METOPROLOL SUCCINATE 50 MG/1
50 TABLET, EXTENDED RELEASE ORAL DAILY
Qty: 90 TABLET | Refills: 1 | Status: SHIPPED | OUTPATIENT
Start: 2021-07-07 | End: 2021-10-14 | Stop reason: SDUPTHER

## 2021-07-07 RX ORDER — METFORMIN HYDROCHLORIDE 1000 MG/1
1000 TABLET ORAL 2 TIMES DAILY WITH MEALS
Qty: 180 TABLET | Refills: 1 | Status: SHIPPED | OUTPATIENT
Start: 2021-07-07 | End: 2021-10-14 | Stop reason: SDUPTHER

## 2021-07-27 ENCOUNTER — CLINICAL SUPPORT (OUTPATIENT)
Dept: REHABILITATION | Facility: HOSPITAL | Age: 56
End: 2021-07-27
Payer: COMMERCIAL

## 2021-07-27 DIAGNOSIS — M53.86 DECREASED ROM OF LUMBAR SPINE: ICD-10-CM

## 2021-07-27 PROCEDURE — 97110 THERAPEUTIC EXERCISES: CPT | Mod: PO

## 2021-08-02 DIAGNOSIS — Z01.812 PRE-PROCEDURE LAB EXAM: Primary | ICD-10-CM

## 2021-08-04 ENCOUNTER — CLINICAL SUPPORT (OUTPATIENT)
Dept: REHABILITATION | Facility: HOSPITAL | Age: 56
End: 2021-08-04
Payer: COMMERCIAL

## 2021-08-04 DIAGNOSIS — M53.86 DECREASED ROM OF LUMBAR SPINE: ICD-10-CM

## 2021-08-04 PROCEDURE — 97110 THERAPEUTIC EXERCISES: CPT | Mod: PO

## 2021-08-19 ENCOUNTER — CLINICAL SUPPORT (OUTPATIENT)
Dept: REHABILITATION | Facility: HOSPITAL | Age: 56
End: 2021-08-19
Payer: COMMERCIAL

## 2021-08-19 DIAGNOSIS — M53.86 DECREASED ROM OF LUMBAR SPINE: ICD-10-CM

## 2021-08-19 PROCEDURE — 97110 THERAPEUTIC EXERCISES: CPT | Mod: PO

## 2021-08-19 PROCEDURE — 97140 MANUAL THERAPY 1/> REGIONS: CPT | Mod: PO

## 2021-08-23 ENCOUNTER — PATIENT OUTREACH (OUTPATIENT)
Dept: ADMINISTRATIVE | Facility: HOSPITAL | Age: 56
End: 2021-08-23

## 2021-08-27 ENCOUNTER — CLINICAL SUPPORT (OUTPATIENT)
Dept: REHABILITATION | Facility: HOSPITAL | Age: 56
End: 2021-08-27
Payer: COMMERCIAL

## 2021-08-27 DIAGNOSIS — M53.86 DECREASED ROM OF LUMBAR SPINE: ICD-10-CM

## 2021-08-27 PROCEDURE — 97110 THERAPEUTIC EXERCISES: CPT | Mod: PO

## 2021-08-27 PROCEDURE — 97164 PT RE-EVAL EST PLAN CARE: CPT | Mod: PO

## 2021-09-03 ENCOUNTER — PATIENT MESSAGE (OUTPATIENT)
Dept: NEUROSURGERY | Facility: CLINIC | Age: 56
End: 2021-09-03

## 2021-09-08 DIAGNOSIS — Z01.818 PRE-OP TESTING: ICD-10-CM

## 2021-09-20 ENCOUNTER — LAB VISIT (OUTPATIENT)
Dept: FAMILY MEDICINE | Facility: CLINIC | Age: 56
End: 2021-09-20
Payer: COMMERCIAL

## 2021-09-20 DIAGNOSIS — Z01.818 PRE-OP TESTING: ICD-10-CM

## 2021-09-20 LAB
SARS-COV-2 RNA RESP QL NAA+PROBE: NOT DETECTED
SARS-COV-2- CYCLE NUMBER: NORMAL

## 2021-09-20 PROCEDURE — U0005 INFEC AGEN DETEC AMPLI PROBE: HCPCS | Performed by: PHYSICIAN ASSISTANT

## 2021-09-20 PROCEDURE — U0003 INFECTIOUS AGENT DETECTION BY NUCLEIC ACID (DNA OR RNA); SEVERE ACUTE RESPIRATORY SYNDROME CORONAVIRUS 2 (SARS-COV-2) (CORONAVIRUS DISEASE [COVID-19]), AMPLIFIED PROBE TECHNIQUE, MAKING USE OF HIGH THROUGHPUT TECHNOLOGIES AS DESCRIBED BY CMS-2020-01-R: HCPCS | Performed by: PHYSICIAN ASSISTANT

## 2021-09-21 ENCOUNTER — HOSPITAL ENCOUNTER (OUTPATIENT)
Dept: INTERVENTIONAL RADIOLOGY/VASCULAR | Facility: HOSPITAL | Age: 56
Discharge: HOME | End: 2021-09-21
Attending: NEUROLOGICAL SURGERY
Payer: COMMERCIAL

## 2021-09-21 VITALS
OXYGEN SATURATION: 100 % | DIASTOLIC BLOOD PRESSURE: 80 MMHG | HEART RATE: 65 BPM | SYSTOLIC BLOOD PRESSURE: 142 MMHG | RESPIRATION RATE: 18 BRPM

## 2021-09-21 DIAGNOSIS — M51.9 LUMBAR DISC DISEASE: ICD-10-CM

## 2021-09-21 PROCEDURE — 62323 NJX INTERLAMINAR LMBR/SAC: CPT | Mod: 50 | Performed by: RADIOLOGY

## 2021-09-21 PROCEDURE — 64493 INJ PARAVERT F JNT L/S 1 LEV: CPT | Mod: 50 | Performed by: RADIOLOGY

## 2021-09-21 PROCEDURE — 63600175 PHARM REV CODE 636 W HCPCS: Performed by: RADIOLOGY

## 2021-09-21 PROCEDURE — 62323 NJX INTERLAMINAR LMBR/SAC: CPT | Mod: 50,,, | Performed by: RADIOLOGY

## 2021-09-21 PROCEDURE — 64493 IR FACET INJ LUMBAR 1ST VERT BI: ICD-10-PCS | Mod: 50,51,, | Performed by: RADIOLOGY

## 2021-09-21 PROCEDURE — 62323 PR INJ LUMBAR/SACRAL, W/IMAGING GUIDANCE: ICD-10-PCS | Mod: 50,,, | Performed by: RADIOLOGY

## 2021-09-21 PROCEDURE — A4215 STERILE NEEDLE: HCPCS

## 2021-09-21 RX ORDER — METHYLPREDNISOLONE ACETATE 40 MG/ML
INJECTION, SUSPENSION INTRA-ARTICULAR; INTRALESIONAL; INTRAMUSCULAR; SOFT TISSUE CODE/TRAUMA/SEDATION MEDICATION
Status: COMPLETED | OUTPATIENT
Start: 2021-09-21 | End: 2021-09-21

## 2021-09-21 RX ADMIN — METHYLPREDNISOLONE ACETATE 80 MG: 40 INJECTION, SUSPENSION INTRA-ARTICULAR; INTRALESIONAL; INTRAMUSCULAR; SOFT TISSUE at 02:09

## 2021-10-04 ENCOUNTER — PATIENT MESSAGE (OUTPATIENT)
Dept: FAMILY MEDICINE | Facility: CLINIC | Age: 56
End: 2021-10-04

## 2021-10-05 ENCOUNTER — TELEPHONE (OUTPATIENT)
Dept: FAMILY MEDICINE | Facility: CLINIC | Age: 56
End: 2021-10-05

## 2021-10-08 ENCOUNTER — LAB VISIT (OUTPATIENT)
Dept: LAB | Facility: HOSPITAL | Age: 56
End: 2021-10-08
Attending: STUDENT IN AN ORGANIZED HEALTH CARE EDUCATION/TRAINING PROGRAM
Payer: COMMERCIAL

## 2021-10-08 DIAGNOSIS — Z12.5 PROSTATE CANCER SCREENING: ICD-10-CM

## 2021-10-08 DIAGNOSIS — E11.9 TYPE 2 DIABETES MELLITUS WITHOUT COMPLICATION, WITHOUT LONG-TERM CURRENT USE OF INSULIN: ICD-10-CM

## 2021-10-08 LAB
ANION GAP SERPL CALC-SCNC: 7 MMOL/L (ref 8–16)
CALCIUM SERPL-MCNC: 9 MG/DL (ref 8.7–10.5)
CHLORIDE SERPL-SCNC: 100 MMOL/L (ref 95–110)
CO2 SERPL-SCNC: 31 MMOL/L (ref 23–29)
COMPLEXED PSA SERPL-MCNC: 0.33 NG/ML (ref 0–4)
CREAT SERPL-MCNC: 0.82 MG/DL (ref 0.5–1.4)
EST. GFR  (AFRICAN AMERICAN): >60 ML/MIN/1.73 M^2
EST. GFR  (NON AFRICAN AMERICAN): >60 ML/MIN/1.73 M^2
ESTIMATED AVG GLUCOSE: 148 MG/DL (ref 68–131)
GLUCOSE SERPL-MCNC: 213 MG/DL (ref 70–110)
HBA1C MFR BLD: 6.8 % (ref 4–5.6)
POTASSIUM SERPL-SCNC: 4.5 MMOL/L (ref 3.5–5.1)
SODIUM SERPL-SCNC: 138 MMOL/L (ref 136–145)
UUN UR-MCNC: 19 MG/DL (ref 2–20)

## 2021-10-08 PROCEDURE — 80048 BASIC METABOLIC PNL TOTAL CA: CPT | Mod: PO | Performed by: STUDENT IN AN ORGANIZED HEALTH CARE EDUCATION/TRAINING PROGRAM

## 2021-10-08 PROCEDURE — 36415 COLL VENOUS BLD VENIPUNCTURE: CPT | Mod: PO | Performed by: STUDENT IN AN ORGANIZED HEALTH CARE EDUCATION/TRAINING PROGRAM

## 2021-10-08 PROCEDURE — 83036 HEMOGLOBIN GLYCOSYLATED A1C: CPT | Performed by: STUDENT IN AN ORGANIZED HEALTH CARE EDUCATION/TRAINING PROGRAM

## 2021-10-08 PROCEDURE — 84153 ASSAY OF PSA TOTAL: CPT | Performed by: STUDENT IN AN ORGANIZED HEALTH CARE EDUCATION/TRAINING PROGRAM

## 2021-10-14 ENCOUNTER — OFFICE VISIT (OUTPATIENT)
Dept: FAMILY MEDICINE | Facility: CLINIC | Age: 56
End: 2021-10-14
Payer: COMMERCIAL

## 2021-10-14 VITALS
OXYGEN SATURATION: 95 % | HEIGHT: 69 IN | HEART RATE: 80 BPM | SYSTOLIC BLOOD PRESSURE: 136 MMHG | BODY MASS INDEX: 40.58 KG/M2 | WEIGHT: 274 LBS | DIASTOLIC BLOOD PRESSURE: 74 MMHG

## 2021-10-14 DIAGNOSIS — I10 ESSENTIAL HYPERTENSION: ICD-10-CM

## 2021-10-14 DIAGNOSIS — L30.9 DERMATITIS: ICD-10-CM

## 2021-10-14 DIAGNOSIS — R01.1 SYSTOLIC MURMUR: Primary | ICD-10-CM

## 2021-10-14 DIAGNOSIS — E66.01 CLASS 3 SEVERE OBESITY DUE TO EXCESS CALORIES WITH SERIOUS COMORBIDITY AND BODY MASS INDEX (BMI) OF 40.0 TO 44.9 IN ADULT: ICD-10-CM

## 2021-10-14 DIAGNOSIS — E78.00 PURE HYPERCHOLESTEROLEMIA: ICD-10-CM

## 2021-10-14 DIAGNOSIS — E11.9 TYPE 2 DIABETES MELLITUS WITHOUT COMPLICATION, WITHOUT LONG-TERM CURRENT USE OF INSULIN: ICD-10-CM

## 2021-10-14 PROCEDURE — 99214 PR OFFICE/OUTPT VISIT, EST, LEVL IV, 30-39 MIN: ICD-10-PCS | Mod: S$GLB,,, | Performed by: STUDENT IN AN ORGANIZED HEALTH CARE EDUCATION/TRAINING PROGRAM

## 2021-10-14 PROCEDURE — 99214 OFFICE O/P EST MOD 30 MIN: CPT | Mod: S$GLB,,, | Performed by: STUDENT IN AN ORGANIZED HEALTH CARE EDUCATION/TRAINING PROGRAM

## 2021-10-14 RX ORDER — METOPROLOL SUCCINATE 50 MG/1
50 TABLET, EXTENDED RELEASE ORAL DAILY
Qty: 90 TABLET | Refills: 3 | Status: SHIPPED | OUTPATIENT
Start: 2021-10-14 | End: 2022-11-04 | Stop reason: SDUPTHER

## 2021-10-14 RX ORDER — ATORVASTATIN CALCIUM 40 MG/1
40 TABLET, FILM COATED ORAL DAILY
Qty: 90 TABLET | Refills: 3 | Status: SHIPPED | OUTPATIENT
Start: 2021-10-14 | End: 2022-08-03

## 2021-10-14 RX ORDER — BETAMETHASONE DIPROPIONATE 0.5 MG/G
OINTMENT TOPICAL 2 TIMES DAILY
Qty: 1 TUBE | Refills: 3 | Status: SHIPPED | OUTPATIENT
Start: 2021-10-14 | End: 2022-08-04 | Stop reason: SDUPTHER

## 2021-10-14 RX ORDER — GLIMEPIRIDE 4 MG/1
4 TABLET ORAL
Qty: 90 TABLET | Refills: 3 | Status: ON HOLD | OUTPATIENT
Start: 2021-10-14 | End: 2022-11-11 | Stop reason: HOSPADM

## 2021-10-14 RX ORDER — METFORMIN HYDROCHLORIDE 1000 MG/1
1000 TABLET ORAL 2 TIMES DAILY WITH MEALS
Qty: 180 TABLET | Refills: 3 | Status: SHIPPED | OUTPATIENT
Start: 2021-10-14 | End: 2023-01-31 | Stop reason: SDUPTHER

## 2021-10-14 RX ORDER — LOSARTAN POTASSIUM 100 MG/1
100 TABLET ORAL DAILY
Qty: 90 TABLET | Refills: 3 | Status: SHIPPED | OUTPATIENT
Start: 2021-10-14 | End: 2022-08-03

## 2021-11-02 ENCOUNTER — OFFICE VISIT (OUTPATIENT)
Dept: NEUROSURGERY | Facility: CLINIC | Age: 56
End: 2021-11-02
Payer: COMMERCIAL

## 2021-11-02 VITALS — SYSTOLIC BLOOD PRESSURE: 149 MMHG | DIASTOLIC BLOOD PRESSURE: 82 MMHG | HEART RATE: 72 BPM

## 2021-11-02 DIAGNOSIS — M54.16 LUMBAR RADICULAR PAIN: ICD-10-CM

## 2021-11-02 DIAGNOSIS — M51.9 LUMBAR DISC DISEASE: Primary | ICD-10-CM

## 2021-11-02 DIAGNOSIS — M54.9 DORSALGIA, UNSPECIFIED: ICD-10-CM

## 2021-11-02 PROCEDURE — 3077F SYST BP >= 140 MM HG: CPT | Mod: CPTII,S$GLB,, | Performed by: NEUROLOGICAL SURGERY

## 2021-11-02 PROCEDURE — 99999 PR PBB SHADOW E&M-EST. PATIENT-LVL II: CPT | Mod: PBBFAC,,, | Performed by: NEUROLOGICAL SURGERY

## 2021-11-02 PROCEDURE — 3079F PR MOST RECENT DIASTOLIC BLOOD PRESSURE 80-89 MM HG: ICD-10-PCS | Mod: CPTII,S$GLB,, | Performed by: NEUROLOGICAL SURGERY

## 2021-11-02 PROCEDURE — 99214 OFFICE O/P EST MOD 30 MIN: CPT | Mod: S$GLB,,, | Performed by: NEUROLOGICAL SURGERY

## 2021-11-02 PROCEDURE — 3079F DIAST BP 80-89 MM HG: CPT | Mod: CPTII,S$GLB,, | Performed by: NEUROLOGICAL SURGERY

## 2021-11-02 PROCEDURE — 99999 PR PBB SHADOW E&M-EST. PATIENT-LVL II: ICD-10-PCS | Mod: PBBFAC,,, | Performed by: NEUROLOGICAL SURGERY

## 2021-11-02 PROCEDURE — 3077F PR MOST RECENT SYSTOLIC BLOOD PRESSURE >= 140 MM HG: ICD-10-PCS | Mod: CPTII,S$GLB,, | Performed by: NEUROLOGICAL SURGERY

## 2021-11-02 PROCEDURE — 99214 PR OFFICE/OUTPT VISIT, EST, LEVL IV, 30-39 MIN: ICD-10-PCS | Mod: S$GLB,,, | Performed by: NEUROLOGICAL SURGERY

## 2021-11-24 ENCOUNTER — TELEPHONE (OUTPATIENT)
Dept: NEUROSURGERY | Facility: CLINIC | Age: 56
End: 2021-11-24
Payer: COMMERCIAL

## 2021-12-09 ENCOUNTER — OFFICE VISIT (OUTPATIENT)
Dept: FAMILY MEDICINE | Facility: CLINIC | Age: 56
End: 2021-12-09
Payer: COMMERCIAL

## 2021-12-09 ENCOUNTER — HOSPITAL ENCOUNTER (OUTPATIENT)
Dept: RADIOLOGY | Facility: HOSPITAL | Age: 56
Discharge: HOME | End: 2021-12-09
Attending: STUDENT IN AN ORGANIZED HEALTH CARE EDUCATION/TRAINING PROGRAM
Payer: COMMERCIAL

## 2021-12-09 VITALS
HEART RATE: 87 BPM | SYSTOLIC BLOOD PRESSURE: 138 MMHG | TEMPERATURE: 98 F | WEIGHT: 280.56 LBS | HEIGHT: 69 IN | BODY MASS INDEX: 41.55 KG/M2 | DIASTOLIC BLOOD PRESSURE: 84 MMHG | OXYGEN SATURATION: 97 %

## 2021-12-09 DIAGNOSIS — M25.511 ACUTE PAIN OF RIGHT SHOULDER: Primary | ICD-10-CM

## 2021-12-09 DIAGNOSIS — M25.511 ACUTE PAIN OF RIGHT SHOULDER: ICD-10-CM

## 2021-12-09 PROCEDURE — 4010F ACE/ARB THERAPY RXD/TAKEN: CPT | Mod: CPTII,S$GLB,, | Performed by: STUDENT IN AN ORGANIZED HEALTH CARE EDUCATION/TRAINING PROGRAM

## 2021-12-09 PROCEDURE — 3066F NEPHROPATHY DOC TX: CPT | Mod: CPTII,S$GLB,, | Performed by: STUDENT IN AN ORGANIZED HEALTH CARE EDUCATION/TRAINING PROGRAM

## 2021-12-09 PROCEDURE — 99214 OFFICE O/P EST MOD 30 MIN: CPT | Mod: S$GLB,,, | Performed by: STUDENT IN AN ORGANIZED HEALTH CARE EDUCATION/TRAINING PROGRAM

## 2021-12-09 PROCEDURE — 73030 X-RAY EXAM OF SHOULDER: CPT | Mod: TC,FY,PO,RT

## 2021-12-09 PROCEDURE — 4010F PR ACE/ARB THEARPY RXD/TAKEN: ICD-10-PCS | Mod: CPTII,S$GLB,, | Performed by: STUDENT IN AN ORGANIZED HEALTH CARE EDUCATION/TRAINING PROGRAM

## 2021-12-09 PROCEDURE — 3066F PR DOCUMENTATION OF TREATMENT FOR NEPHROPATHY: ICD-10-PCS | Mod: CPTII,S$GLB,, | Performed by: STUDENT IN AN ORGANIZED HEALTH CARE EDUCATION/TRAINING PROGRAM

## 2021-12-09 PROCEDURE — 3061F NEG MICROALBUMINURIA REV: CPT | Mod: CPTII,S$GLB,, | Performed by: STUDENT IN AN ORGANIZED HEALTH CARE EDUCATION/TRAINING PROGRAM

## 2021-12-09 PROCEDURE — 99214 PR OFFICE/OUTPT VISIT, EST, LEVL IV, 30-39 MIN: ICD-10-PCS | Mod: S$GLB,,, | Performed by: STUDENT IN AN ORGANIZED HEALTH CARE EDUCATION/TRAINING PROGRAM

## 2021-12-09 PROCEDURE — 3061F PR NEG MICROALBUMINURIA RESULT DOCUMENTED/REVIEW: ICD-10-PCS | Mod: CPTII,S$GLB,, | Performed by: STUDENT IN AN ORGANIZED HEALTH CARE EDUCATION/TRAINING PROGRAM

## 2021-12-09 RX ORDER — DIAZEPAM 5 MG/1
5 TABLET ORAL ONCE AS NEEDED
Qty: 1 TABLET | Refills: 0 | Status: SHIPPED | OUTPATIENT
Start: 2021-12-09 | End: 2022-06-08

## 2021-12-09 RX ORDER — TRAMADOL HYDROCHLORIDE 50 MG/1
50 TABLET ORAL EVERY 8 HOURS PRN
Qty: 21 TABLET | Refills: 0 | Status: SHIPPED | OUTPATIENT
Start: 2021-12-09 | End: 2022-06-08

## 2021-12-22 ENCOUNTER — TELEPHONE (OUTPATIENT)
Dept: FAMILY MEDICINE | Facility: CLINIC | Age: 56
End: 2021-12-22
Payer: COMMERCIAL

## 2021-12-22 ENCOUNTER — HOSPITAL ENCOUNTER (OUTPATIENT)
Dept: RADIOLOGY | Facility: HOSPITAL | Age: 56
Discharge: HOME | End: 2021-12-22
Attending: STUDENT IN AN ORGANIZED HEALTH CARE EDUCATION/TRAINING PROGRAM
Payer: COMMERCIAL

## 2021-12-22 DIAGNOSIS — M25.511 ACUTE PAIN OF RIGHT SHOULDER: ICD-10-CM

## 2021-12-22 DIAGNOSIS — M25.511 RIGHT SHOULDER PAIN, UNSPECIFIED CHRONICITY: ICD-10-CM

## 2021-12-23 ENCOUNTER — TELEPHONE (OUTPATIENT)
Dept: FAMILY MEDICINE | Facility: CLINIC | Age: 56
End: 2021-12-23
Payer: COMMERCIAL

## 2021-12-27 ENCOUNTER — PATIENT MESSAGE (OUTPATIENT)
Dept: ADMINISTRATIVE | Facility: HOSPITAL | Age: 56
End: 2021-12-27
Payer: COMMERCIAL

## 2022-01-06 ENCOUNTER — TELEPHONE (OUTPATIENT)
Dept: FAMILY MEDICINE | Facility: CLINIC | Age: 57
End: 2022-01-06
Payer: COMMERCIAL

## 2022-01-06 DIAGNOSIS — M75.101 TEAR OF RIGHT SUPRASPINATUS TENDON: Primary | ICD-10-CM

## 2022-01-06 NOTE — PROGRESS NOTES
Please call patient and let him know that his MRI shows a tear in his rotator cuff. We will refer him to ortho to discuss treatment. Patient sees Dr Gan who is aware of the results of the MRI

## 2022-01-06 NOTE — TELEPHONE ENCOUNTER
PER DR. ALCARAZ  Please call patient and let him know that his MRI shows a tear in his rotator cuff. We will refer him to ortho to discuss treatment.     Pt is currently being treated by Dr. Gan

## 2022-01-10 ENCOUNTER — PATIENT MESSAGE (OUTPATIENT)
Dept: ADMINISTRATIVE | Facility: HOSPITAL | Age: 57
End: 2022-01-10
Payer: COMMERCIAL

## 2022-01-11 ENCOUNTER — PATIENT MESSAGE (OUTPATIENT)
Dept: NEUROSURGERY | Facility: CLINIC | Age: 57
End: 2022-01-11
Payer: COMMERCIAL

## 2022-01-11 NOTE — PROGRESS NOTES
Neurosurgery  Established Patient    SUBJECTIVE:     History of Present Illness:  Patient comes back to see me for follow-up after last evaluation the patient on 04/20/2021.  This is a 56-year-old male with a long history of lower back pain and intermittent radiation into the left but it some time into the left thigh.  He has had physical therapy in the past but nothing recently.  He has had epidural injection without significant relief.  He was worked up found have significant degenerative disc disease at L5-S1.  We tried a facet injections with not give him significant relief.  He has tried back brace without significant relief.  He comes back now with bilateral lower extremity pain.    Review of patient's allergies indicates:   Allergen Reactions    Gabapentin Hallucinations    Tizanidine Other (See Comments)     somnolence       Current Outpatient Medications   Medication Sig Dispense Refill    atorvastatin (LIPITOR) 40 MG tablet Take 1 tablet (40 mg total) by mouth once daily. 90 tablet 3    betamethasone dipropionate (DIPROLENE) 0.05 % ointment Apply topically 2 (two) times daily. 1 Tube 3    glimepiride (AMARYL) 4 MG tablet Take 1 tablet (4 mg total) by mouth daily with breakfast. 90 tablet 3    losartan (COZAAR) 100 MG tablet Take 1 tablet (100 mg total) by mouth once daily. 90 tablet 3    metFORMIN (GLUCOPHAGE) 1000 MG tablet Take 1 tablet (1,000 mg total) by mouth 2 (two) times daily with meals. 180 tablet 3    metoprolol succinate (TOPROL-XL) 50 MG 24 hr tablet Take 1 tablet (50 mg total) by mouth once daily. 90 tablet 3    sildenafiL (VIAGRA) 100 MG tablet Take 1 tablet (100 mg total) by mouth daily as needed for Erectile Dysfunction. 30 tablet 5    diazePAM (VALIUM) 5 MG tablet Take 1 tablet (5 mg total) by mouth once as needed (45 minutes prior to procedure). Do not drive yourself to appointment 1 tablet 0    traMADoL (ULTRAM) 50 mg tablet Take 1 tablet (50 mg total) by mouth every 8 (eight)  hours as needed for Pain. 21 tablet 0     No current facility-administered medications for this visit.       Past Medical History:   Diagnosis Date    Asthma     Hypertension     ANAMIKA on CPAP     Testicular hypofunction      Past Surgical History:   Procedure Laterality Date    CHOLECYSTECTOMY      COLONOSCOPY      ORTHOPEDIC SURGERY Bilateral 2007    feet plantar    ORTHOPEDIC SURGERY Right 2006    knee    ROTATOR CUFF REPAIR Left      Family History     Problem Relation (Age of Onset)    Heart attack Father        Social History     Socioeconomic History    Marital status:    Tobacco Use    Smoking status: Former Smoker     Types: Cigarettes     Quit date: 1/15/1992     Years since quittin.0    Smokeless tobacco: Never Used   Substance and Sexual Activity    Sexual activity: Yes     Partners: Female       Review of Systems    OBJECTIVE:     Vital Signs  Pulse: 72  BP: (!) 149/82  Pain Score: 0-No pain  There is no height or weight on file to calculate BMI.    Neurosurgery Physical Exam      Diagnostic Results:  No updated imaging    ASSESSMENT/PLAN:     Patient with a complex history of lower back pain and radiculopathy.  He is not localizing very well on exam or with pain pattern.  With increasing pain now to bilateral lower extremities I think is reasonable try to get EMG nerve conduction test I would try to do a diskogram looking at L5-S1 with controls above.  I would like to get an updated MRI        Note dictated with voice recognition software, please excuse any grammatical errors.

## 2022-01-12 ENCOUNTER — TELEPHONE (OUTPATIENT)
Dept: INTERVENTIONAL RADIOLOGY/VASCULAR | Facility: CLINIC | Age: 57
End: 2022-01-12
Payer: COMMERCIAL

## 2022-01-12 NOTE — TELEPHONE ENCOUNTER
RTC from pt.    Pt stated he no longer interested in having discogram procedure done. Forward message to provider.

## 2022-01-12 NOTE — TELEPHONE ENCOUNTER
Call pt to schedule NeuroIR clinic consult for a discogram procedure.  No answer/lvm to rtc to [phone]

## 2022-03-30 DIAGNOSIS — E11.9 TYPE 2 DIABETES MELLITUS WITHOUT COMPLICATION, UNSPECIFIED WHETHER LONG TERM INSULIN USE: ICD-10-CM

## 2022-03-31 ENCOUNTER — PATIENT OUTREACH (OUTPATIENT)
Dept: ADMINISTRATIVE | Facility: HOSPITAL | Age: 57
End: 2022-03-31
Payer: COMMERCIAL

## 2022-03-31 NOTE — LETTER
AUTHORIZATION FOR RELEASE OF   CONFIDENTIAL INFORMATION    Dear [facility] Kevin,    We are seeing Saud Mayers, date of birth 1965, in the clinic at [facility]. Anson Blunt MD is the patient's PCP. Saud Mayers has an outstanding lab/procedure at the time we reviewed his chart. In order to help keep his health information updated, he has authorized us to request the following medical record(s):                                        ( X )  EYE EXAM                   Please fax records to [facility], Addy N Reine, [place], [phone]     If you have any questions, please contact Shanika at [phone].        Patient Name: Saud Mayers  : 1965  Patient Phone #: [phone]

## 2022-03-31 NOTE — LETTER
AUTHORIZATION FOR RELEASE OF   CONFIDENTIAL INFORMATION    Dear [facility],    We are seeing Saud Mayers, date of birth 1965, in the clinic at [facility]. Anson Blunt MD is the patient's PCP. Saud Mayers has an outstanding lab/procedure at the time we reviewed his chart. In order to help keep his health information updated, he has authorized us to request the following medical record(s):                        ( X )  COLONOSCOPY            Please fax records to [facility], Addy N Reine, [place], [phone]     If you have any questions, please contact Shanika at [phone].           Patient Name: Saud Mayers  : 1965  Patient Phone #: [phone]

## 2022-04-01 ENCOUNTER — PATIENT OUTREACH (OUTPATIENT)
Dept: ADMINISTRATIVE | Facility: HOSPITAL | Age: 57
End: 2022-04-01
Payer: COMMERCIAL

## 2022-04-04 ENCOUNTER — PATIENT MESSAGE (OUTPATIENT)
Dept: ADMINISTRATIVE | Facility: HOSPITAL | Age: 57
End: 2022-04-04
Payer: COMMERCIAL

## 2022-04-08 ENCOUNTER — HOSPITAL ENCOUNTER (OUTPATIENT)
Dept: CARDIOLOGY | Facility: HOSPITAL | Age: 57
Discharge: HOME | End: 2022-04-08
Attending: STUDENT IN AN ORGANIZED HEALTH CARE EDUCATION/TRAINING PROGRAM
Payer: COMMERCIAL

## 2022-04-08 VITALS — BODY MASS INDEX: 41.47 KG/M2 | HEIGHT: 69 IN | WEIGHT: 280 LBS

## 2022-04-08 DIAGNOSIS — R01.1 SYSTOLIC MURMUR: ICD-10-CM

## 2022-04-08 LAB
AV INDEX (PROSTH): 0.36
AV MEAN GRADIENT: 23 MMHG
AV PEAK GRADIENT: 38 MMHG
AV VALVE AREA: 1.36 CM2
AV VELOCITY RATIO: 0.33
BSA FOR ECHO PROCEDURE: 2.49 M2
CV ECHO LV RWT: 0.37 CM
DOP CALC AO PEAK VEL: 3.09 M/S
DOP CALC AO VTI: 63.98 CM
DOP CALC LVOT AREA: 3.8 CM2
DOP CALC LVOT DIAMETER: 2.2 CM
DOP CALC LVOT PEAK VEL: 1.02 M/S
DOP CALC LVOT STROKE VOLUME: 86.89 CM3
DOP CALC MV VTI: 28.47 CM
DOP CALCLVOT PEAK VEL VTI: 22.87 CM
E WAVE DECELERATION TIME: 182.36 MSEC
E/A RATIO: 1.19
E/E' RATIO: 7.79 M/S
ECHO LV POSTERIOR WALL: 0.92 CM (ref 0.6–1.1)
EJECTION FRACTION: 60 %
FRACTIONAL SHORTENING: 43 % (ref 28–44)
INTERVENTRICULAR SEPTUM: 0.91 CM (ref 0.6–1.1)
IVC DIAMETER: 2.2 CM
IVRT: 96.89 MSEC
LA MAJOR: 4.79 CM
LA MINOR: 5.18 CM
LA WIDTH: 4.18 CM
LEFT ATRIUM SIZE: 3.54 CM
LEFT ATRIUM VOLUME INDEX MOD: 22.8 ML/M2
LEFT ATRIUM VOLUME INDEX: 26.3 ML/M2
LEFT ATRIUM VOLUME MOD: 54.2 CM3
LEFT ATRIUM VOLUME: 62.6 CM3
LEFT INTERNAL DIMENSION IN SYSTOLE: 2.84 CM (ref 2.1–4)
LEFT VENTRICLE DIASTOLIC VOLUME INDEX: 49.71 ML/M2
LEFT VENTRICLE DIASTOLIC VOLUME: 118.31 ML
LEFT VENTRICLE MASS INDEX: 68 G/M2
LEFT VENTRICLE SYSTOLIC VOLUME INDEX: 12.8 ML/M2
LEFT VENTRICLE SYSTOLIC VOLUME: 30.48 ML
LEFT VENTRICULAR INTERNAL DIMENSION IN DIASTOLE: 5 CM (ref 3.5–6)
LEFT VENTRICULAR MASS: 161.69 G
LV LATERAL E/E' RATIO: 6.73 M/S
LV SEPTAL E/E' RATIO: 9.25 M/S
MV MEAN GRADIENT: 1 MMHG
MV PEAK A VEL: 0.62 M/S
MV PEAK E VEL: 0.74 M/S
MV PEAK GRADIENT: 4 MMHG
MV STENOSIS PRESSURE HALF TIME: 52.88 MS
MV VALVE AREA BY CONTINUITY EQUATION: 3.05 CM2
MV VALVE AREA P 1/2 METHOD: 4.16 CM2
PISA TR MAX VEL: 2.14 M/S
PULM VEIN S/D RATIO: 0.98
PV PEAK D VEL: 0.51 M/S
PV PEAK S VEL: 0.5 M/S
PV PEAK VELOCITY: 1.31 CM/S
RA MAJOR: 4.75 CM
RA PRESSURE: 8 MMHG
RA WIDTH: 4.18 CM
RV TISSUE DOPPLER FREE WALL SYSTOLIC VELOCITY 1 (APICAL 4 CHAMBER VIEW): 13.53 CM/S
TDI LATERAL: 0.11 M/S
TDI SEPTAL: 0.08 M/S
TDI: 0.1 M/S
TR MAX PG: 18 MMHG
TV REST PULMONARY ARTERY PRESSURE: 26 MMHG

## 2022-04-08 PROCEDURE — 93306 TTE W/DOPPLER COMPLETE: CPT | Mod: 26,,, | Performed by: INTERNAL MEDICINE

## 2022-04-08 PROCEDURE — 93306 TTE W/DOPPLER COMPLETE: CPT | Mod: PO

## 2022-04-08 PROCEDURE — 93306 ECHO (CUPID ONLY): ICD-10-PCS | Mod: 26,,, | Performed by: INTERNAL MEDICINE

## 2022-04-14 ENCOUNTER — TELEPHONE (OUTPATIENT)
Dept: FAMILY MEDICINE | Facility: CLINIC | Age: 57
End: 2022-04-14

## 2022-04-14 ENCOUNTER — OFFICE VISIT (OUTPATIENT)
Dept: FAMILY MEDICINE | Facility: CLINIC | Age: 57
End: 2022-04-14
Payer: COMMERCIAL

## 2022-04-14 VITALS
HEIGHT: 69 IN | SYSTOLIC BLOOD PRESSURE: 122 MMHG | TEMPERATURE: 98 F | HEART RATE: 69 BPM | BODY MASS INDEX: 40.94 KG/M2 | OXYGEN SATURATION: 96 % | WEIGHT: 276.44 LBS | DIASTOLIC BLOOD PRESSURE: 80 MMHG

## 2022-04-14 DIAGNOSIS — I10 ESSENTIAL HYPERTENSION: ICD-10-CM

## 2022-04-14 DIAGNOSIS — I35.0 NONRHEUMATIC AORTIC VALVE STENOSIS: ICD-10-CM

## 2022-04-14 DIAGNOSIS — E78.1 PURE HYPERTRIGLYCERIDEMIA: ICD-10-CM

## 2022-04-14 DIAGNOSIS — E66.01 CLASS 3 SEVERE OBESITY DUE TO EXCESS CALORIES WITH SERIOUS COMORBIDITY AND BODY MASS INDEX (BMI) OF 40.0 TO 44.9 IN ADULT: ICD-10-CM

## 2022-04-14 DIAGNOSIS — E11.65 TYPE 2 DIABETES MELLITUS WITH HYPERGLYCEMIA, WITHOUT LONG-TERM CURRENT USE OF INSULIN: Primary | ICD-10-CM

## 2022-04-14 PROCEDURE — 3066F PR DOCUMENTATION OF TREATMENT FOR NEPHROPATHY: ICD-10-PCS | Mod: CPTII,S$GLB,, | Performed by: STUDENT IN AN ORGANIZED HEALTH CARE EDUCATION/TRAINING PROGRAM

## 2022-04-14 PROCEDURE — 3008F PR BODY MASS INDEX (BMI) DOCUMENTED: ICD-10-PCS | Mod: CPTII,S$GLB,, | Performed by: STUDENT IN AN ORGANIZED HEALTH CARE EDUCATION/TRAINING PROGRAM

## 2022-04-14 PROCEDURE — 1160F PR REVIEW ALL MEDS BY PRESCRIBER/CLIN PHARMACIST DOCUMENTED: ICD-10-PCS | Mod: CPTII,S$GLB,, | Performed by: STUDENT IN AN ORGANIZED HEALTH CARE EDUCATION/TRAINING PROGRAM

## 2022-04-14 PROCEDURE — 3061F NEG MICROALBUMINURIA REV: CPT | Mod: CPTII,S$GLB,, | Performed by: STUDENT IN AN ORGANIZED HEALTH CARE EDUCATION/TRAINING PROGRAM

## 2022-04-14 PROCEDURE — 99214 OFFICE O/P EST MOD 30 MIN: CPT | Mod: S$GLB,,, | Performed by: STUDENT IN AN ORGANIZED HEALTH CARE EDUCATION/TRAINING PROGRAM

## 2022-04-14 PROCEDURE — 1159F MED LIST DOCD IN RCRD: CPT | Mod: CPTII,S$GLB,, | Performed by: STUDENT IN AN ORGANIZED HEALTH CARE EDUCATION/TRAINING PROGRAM

## 2022-04-14 PROCEDURE — 3061F PR NEG MICROALBUMINURIA RESULT DOCUMENTED/REVIEW: ICD-10-PCS | Mod: CPTII,S$GLB,, | Performed by: STUDENT IN AN ORGANIZED HEALTH CARE EDUCATION/TRAINING PROGRAM

## 2022-04-14 PROCEDURE — 3079F PR MOST RECENT DIASTOLIC BLOOD PRESSURE 80-89 MM HG: ICD-10-PCS | Mod: CPTII,S$GLB,, | Performed by: STUDENT IN AN ORGANIZED HEALTH CARE EDUCATION/TRAINING PROGRAM

## 2022-04-14 PROCEDURE — 3066F NEPHROPATHY DOC TX: CPT | Mod: CPTII,S$GLB,, | Performed by: STUDENT IN AN ORGANIZED HEALTH CARE EDUCATION/TRAINING PROGRAM

## 2022-04-14 PROCEDURE — 3074F SYST BP LT 130 MM HG: CPT | Mod: CPTII,S$GLB,, | Performed by: STUDENT IN AN ORGANIZED HEALTH CARE EDUCATION/TRAINING PROGRAM

## 2022-04-14 PROCEDURE — 99214 PR OFFICE/OUTPT VISIT, EST, LEVL IV, 30-39 MIN: ICD-10-PCS | Mod: S$GLB,,, | Performed by: STUDENT IN AN ORGANIZED HEALTH CARE EDUCATION/TRAINING PROGRAM

## 2022-04-14 PROCEDURE — 3079F DIAST BP 80-89 MM HG: CPT | Mod: CPTII,S$GLB,, | Performed by: STUDENT IN AN ORGANIZED HEALTH CARE EDUCATION/TRAINING PROGRAM

## 2022-04-14 PROCEDURE — 3046F HEMOGLOBIN A1C LEVEL >9.0%: CPT | Mod: CPTII,S$GLB,, | Performed by: STUDENT IN AN ORGANIZED HEALTH CARE EDUCATION/TRAINING PROGRAM

## 2022-04-14 PROCEDURE — 3008F BODY MASS INDEX DOCD: CPT | Mod: CPTII,S$GLB,, | Performed by: STUDENT IN AN ORGANIZED HEALTH CARE EDUCATION/TRAINING PROGRAM

## 2022-04-14 PROCEDURE — 1160F RVW MEDS BY RX/DR IN RCRD: CPT | Mod: CPTII,S$GLB,, | Performed by: STUDENT IN AN ORGANIZED HEALTH CARE EDUCATION/TRAINING PROGRAM

## 2022-04-14 PROCEDURE — 3074F PR MOST RECENT SYSTOLIC BLOOD PRESSURE < 130 MM HG: ICD-10-PCS | Mod: CPTII,S$GLB,, | Performed by: STUDENT IN AN ORGANIZED HEALTH CARE EDUCATION/TRAINING PROGRAM

## 2022-04-14 PROCEDURE — 3046F PR MOST RECENT HEMOGLOBIN A1C LEVEL > 9.0%: ICD-10-PCS | Mod: CPTII,S$GLB,, | Performed by: STUDENT IN AN ORGANIZED HEALTH CARE EDUCATION/TRAINING PROGRAM

## 2022-04-14 PROCEDURE — 1159F PR MEDICATION LIST DOCUMENTED IN MEDICAL RECORD: ICD-10-PCS | Mod: CPTII,S$GLB,, | Performed by: STUDENT IN AN ORGANIZED HEALTH CARE EDUCATION/TRAINING PROGRAM

## 2022-04-14 RX ORDER — EMPAGLIFLOZIN 10 MG/1
10 TABLET, FILM COATED ORAL DAILY
Qty: 90 TABLET | Refills: 3 | Status: SHIPPED | OUTPATIENT
Start: 2022-04-14 | End: 2022-04-19

## 2022-04-14 NOTE — PROGRESS NOTES
Patient ID: Saud Mayers is a 57 y.o. male.     Chief Complaint: Follow-up (6 month )    HPI   patient here for 6 month follow up. He has had 3 orthopedic surgeries since our last visit. He has had a rotator cuff repair. He has also had bilateral carpal tunnel revision. During recovery from his recent surgeries he has not been watching his diet.     Review of Systems  Review of Systems   Constitutional: Negative for fever.   HENT: Negative for ear pain and sinus pain.    Eyes: Negative for discharge.   Respiratory: Negative for cough and shortness of breath.    Cardiovascular: Negative for chest pain and leg swelling.   Gastrointestinal: Negative for diarrhea, nausea and vomiting.   Genitourinary: Negative for urgency.   Musculoskeletal: Negative for myalgias.   Skin: Negative for rash.   Neurological: Negative for weakness and headaches.   Psychiatric/Behavioral: Negative for depression.   All other systems reviewed and are negative.      Currently Medications  Current Outpatient Medications on File Prior to Visit   Medication Sig Dispense Refill    atorvastatin (LIPITOR) 40 MG tablet Take 1 tablet (40 mg total) by mouth once daily. 90 tablet 3    betamethasone dipropionate (DIPROLENE) 0.05 % ointment Apply topically 2 (two) times daily. 1 Tube 3    glimepiride (AMARYL) 4 MG tablet Take 1 tablet (4 mg total) by mouth daily with breakfast. 90 tablet 3    losartan (COZAAR) 100 MG tablet Take 1 tablet (100 mg total) by mouth once daily. 90 tablet 3    metFORMIN (GLUCOPHAGE) 1000 MG tablet Take 1 tablet (1,000 mg total) by mouth 2 (two) times daily with meals. 180 tablet 3    metoprolol succinate (TOPROL-XL) 50 MG 24 hr tablet Take 1 tablet (50 mg total) by mouth once daily. 90 tablet 3    sildenafiL (VIAGRA) 100 MG tablet Take 1 tablet (100 mg total) by mouth daily as needed for Erectile Dysfunction. 30 tablet 5    traMADoL (ULTRAM) 50 mg tablet Take 1 tablet (50 mg total) by mouth every 8 (eight) hours as  "needed for Pain. 21 tablet 0    diazePAM (VALIUM) 5 MG tablet Take 1 tablet (5 mg total) by mouth once as needed (45 minutes prior to procedure). Do not drive yourself to appointment 1 tablet 0     No current facility-administered medications on file prior to visit.       Physical  Exam  Vitals:    04/14/22 0834   BP: 122/80   BP Location: Left arm   Patient Position: Sitting   Pulse: 69   Temp: 98.3 °F (36.8 °C)   SpO2: 96%   Weight: 125.4 kg (276 lb 7.3 oz)   Height: 5' 9" (1.753 m)      Body mass index is 40.83 kg/m².    Physical Exam  Vitals and nursing note reviewed.   Constitutional:       General: He is not in acute distress.     Appearance: He is not ill-appearing.   HENT:      Head: Normocephalic and atraumatic.      Right Ear: External ear normal.      Left Ear: External ear normal.      Nose: Nose normal.      Mouth/Throat:      Mouth: Mucous membranes are moist.   Eyes:      Extraocular Movements: Extraocular movements intact.      Conjunctiva/sclera: Conjunctivae normal.   Cardiovascular:      Rate and Rhythm: Normal rate and regular rhythm.      Pulses: Normal pulses.      Heart sounds: No murmur heard.  Pulmonary:      Effort: Pulmonary effort is normal. No respiratory distress.      Breath sounds: No wheezing.   Abdominal:      General: There is no distension.      Palpations: Abdomen is soft. There is no mass.      Tenderness: There is no abdominal tenderness.   Musculoskeletal:         General: No swelling.      Cervical back: Normal range of motion.   Skin:     Coloration: Skin is not jaundiced.      Findings: No rash.   Neurological:      General: No focal deficit present.      Mental Status: He is alert and oriented to person, place, and time.   Psychiatric:         Mood and Affect: Mood normal.         Thought Content: Thought content normal.         Labs:    Complete Blood Count  Lab Results   Component Value Date    RBC 4.46 (L) 03/17/2010    HGB 14.8 03/17/2010    HCT 43.3 03/17/2010    MCV " 97.0 (H) 03/17/2010    MCH 33.2 (H) 03/17/2010    MCHC 34.2 03/17/2010    RDW 12.8 03/17/2010     03/17/2010    MPV 7.1 (L) 03/17/2010    GRAN 3.5 03/17/2010    GRAN 52.7 03/17/2010    LYMPH 35.1 03/17/2010    LYMPH 2.4 03/17/2010    MONO 9.5 (H) 03/17/2010    MONO 0.6 03/17/2010    EOS 0.1 03/17/2010    BASO 0.1 03/17/2010    EOSINOPHIL 1.9 03/17/2010    BASOPHIL 0.8 03/17/2010       Comprehensive Metabolic Panel  Lab Results   Component Value Date     (H) 04/08/2022    BUN 18 04/08/2022    CREATININE 0.65 04/08/2022     04/08/2022    K 4.6 04/08/2022     04/08/2022    PROT 8.1 04/08/2022    ALBUMIN 4.5 04/08/2022    BILITOT 0.6 04/08/2022    AST 62 (H) 04/08/2022    ALKPHOS 90 04/08/2022    CO2 29 04/08/2022    ALT 83 (H) 04/08/2022    ANIONGAP 9 04/08/2022    EGFRNONAA >60.0 04/08/2022    ESTGFRAFRICA >60.0 04/08/2022       TSH  No results found for: TSH    Imaging:  Echo  · Normal systolic function.  · The estimated ejection fraction is 60-65%.  · Normal left ventricular diastolic function.  · Normal right ventricular size with normal right ventricular systolic   function.  · There is moderate aortic valve stenosis.  · Aortic valve area is 1.36 cm2; peak velocity is 3.09 m/s; mean gradient   is 23 mmHg.  · Intermediate central venous pressure (8 mmHg).  · The estimated PA systolic pressure is 26 mmHg.         Assessment/Plan:    Problem List Items Addressed This Visit        Cardiac/Vascular    Essential hypertension    Nonrheumatic aortic valve stenosis    Pure hypertriglyceridemia    Relevant Orders    Lipid Panel       Endocrine    Class 3 severe obesity due to excess calories with serious comorbidity and body mass index (BMI) of 40.0 to 44.9 in adult    Type 2 diabetes mellitus without complication, without long-term current use of insulin - Primary    Overview     - follows with [facility]  - taking ARB           Relevant Medications    empagliflozin (JARDIANCE) 10 mg tablet            Discussed how to stay healthy including: diet, exercise, refraining from smoking and discussed screening exams / tests needed for age, sex and family Hx.    RTC 3 mo    Anson Blunt MD

## 2022-04-14 NOTE — TELEPHONE ENCOUNTER
----- Message from Yaz No sent at 4/14/2022  2:43 PM CDT -----  Contact: [phone]/ Self  Type: Requesting to speak with nurse    Who Called: Pt   Regarding: medication empagliflozin (JARDIANCE) 10 mg tablet is too expensive    Would the patient rather a call back or a response via [facility]? Call back  Best Call Back Number: [phone]  Additional Information: n/a         
Abdomen soft, non-tender, no guarding.

## 2022-04-18 ENCOUNTER — TELEPHONE (OUTPATIENT)
Dept: FAMILY MEDICINE | Facility: CLINIC | Age: 57
End: 2022-04-18
Payer: COMMERCIAL

## 2022-04-18 NOTE — TELEPHONE ENCOUNTER
----- Message from Madalyn Ureña sent at 4/18/2022  2:59 PM CDT -----  Needs advice from nurse:      Who Called:pt  Regarding:needs to discuss getting on something for diabetes//states first medication was too expensive  Would the patient rather a call back or VIA [facility]?  Best Call Back number:[phone]  Additional Info:

## 2022-04-18 NOTE — TELEPHONE ENCOUNTER
Spoke with pt and he stated that jardiance is to expensive  and would like something else called in

## 2022-04-29 ENCOUNTER — TELEPHONE (OUTPATIENT)
Dept: FAMILY MEDICINE | Facility: CLINIC | Age: 57
End: 2022-04-29
Payer: COMMERCIAL

## 2022-04-29 RX ORDER — PIOGLITAZONEHYDROCHLORIDE 15 MG/1
15 TABLET ORAL DAILY
Qty: 90 TABLET | Refills: 3 | Status: SHIPPED | OUTPATIENT
Start: 2022-04-29 | End: 2022-06-23 | Stop reason: SINTOL

## 2022-04-29 NOTE — TELEPHONE ENCOUNTER
----- Message from Madalyn Ureña sent at 4/29/2022 12:43 PM CDT -----  Needs advice from nurse:      Who Called:pt  Regarding:had a reaction to a medication-Januvia, needs something else called in   Would the patient rather a call back or [facility]IA [facility]?  Best Call Back number:[phone]  Additional Info:[id number] [place] [address] AT [facility] (Ph: [phone])      He took 2 doses  He stomach hurt for 3 days   Can you change the januvia to something else?  jardiance was too expensive

## 2022-05-31 ENCOUNTER — PATIENT MESSAGE (OUTPATIENT)
Dept: ADMINISTRATIVE | Facility: HOSPITAL | Age: 57
End: 2022-05-31
Payer: COMMERCIAL

## 2022-06-06 ENCOUNTER — TELEPHONE (OUTPATIENT)
Dept: FAMILY MEDICINE | Facility: CLINIC | Age: 57
End: 2022-06-06
Payer: COMMERCIAL

## 2022-06-06 NOTE — TELEPHONE ENCOUNTER
JALYN for pt to call back clinic. Will offer pt to see Dr. Kraft this Wednesday    ----- Message from Nydia Higuera sent at 6/6/2022  8:31 AM CDT -----  Regarding: sooner  Contact: [phone]  Patient is requesting a call back regarding scheduling an UC follow up with stomach issues. He would like to be seen on Wednesday.   Would the patient rather a call back or a response via [facility]?  Call   Best Call Back Number:  [phone]  Additional Information:

## 2022-06-08 ENCOUNTER — OFFICE VISIT (OUTPATIENT)
Dept: FAMILY MEDICINE | Facility: CLINIC | Age: 57
End: 2022-06-08
Payer: COMMERCIAL

## 2022-06-08 VITALS
SYSTOLIC BLOOD PRESSURE: 138 MMHG | DIASTOLIC BLOOD PRESSURE: 66 MMHG | WEIGHT: 261.81 LBS | TEMPERATURE: 99 F | HEART RATE: 89 BPM | BODY MASS INDEX: 38.78 KG/M2 | OXYGEN SATURATION: 97 % | HEIGHT: 69 IN

## 2022-06-08 DIAGNOSIS — E11.65 TYPE 2 DIABETES MELLITUS WITH HYPERGLYCEMIA, WITHOUT LONG-TERM CURRENT USE OF INSULIN: Primary | ICD-10-CM

## 2022-06-08 DIAGNOSIS — R10.30 LOWER ABDOMINAL PAIN: ICD-10-CM

## 2022-06-08 PROCEDURE — 3046F HEMOGLOBIN A1C LEVEL >9.0%: CPT | Mod: CPTII,S$GLB,, | Performed by: FAMILY MEDICINE

## 2022-06-08 PROCEDURE — 3066F PR DOCUMENTATION OF TREATMENT FOR NEPHROPATHY: ICD-10-PCS | Mod: CPTII,S$GLB,, | Performed by: FAMILY MEDICINE

## 2022-06-08 PROCEDURE — 3078F PR MOST RECENT DIASTOLIC BLOOD PRESSURE < 80 MM HG: ICD-10-PCS | Mod: CPTII,S$GLB,, | Performed by: FAMILY MEDICINE

## 2022-06-08 PROCEDURE — 3046F PR MOST RECENT HEMOGLOBIN A1C LEVEL > 9.0%: ICD-10-PCS | Mod: CPTII,S$GLB,, | Performed by: FAMILY MEDICINE

## 2022-06-08 PROCEDURE — 3075F SYST BP GE 130 - 139MM HG: CPT | Mod: CPTII,S$GLB,, | Performed by: FAMILY MEDICINE

## 2022-06-08 PROCEDURE — 3075F PR MOST RECENT SYSTOLIC BLOOD PRESS GE 130-139MM HG: ICD-10-PCS | Mod: CPTII,S$GLB,, | Performed by: FAMILY MEDICINE

## 2022-06-08 PROCEDURE — 99214 OFFICE O/P EST MOD 30 MIN: CPT | Mod: S$GLB,,, | Performed by: FAMILY MEDICINE

## 2022-06-08 PROCEDURE — 3008F PR BODY MASS INDEX (BMI) DOCUMENTED: ICD-10-PCS | Mod: CPTII,S$GLB,, | Performed by: FAMILY MEDICINE

## 2022-06-08 PROCEDURE — 99214 PR OFFICE/OUTPT VISIT, EST, LEVL IV, 30-39 MIN: ICD-10-PCS | Mod: S$GLB,,, | Performed by: FAMILY MEDICINE

## 2022-06-08 PROCEDURE — 1159F PR MEDICATION LIST DOCUMENTED IN MEDICAL RECORD: ICD-10-PCS | Mod: CPTII,S$GLB,, | Performed by: FAMILY MEDICINE

## 2022-06-08 PROCEDURE — 4010F ACE/ARB THERAPY RXD/TAKEN: CPT | Mod: CPTII,S$GLB,, | Performed by: FAMILY MEDICINE

## 2022-06-08 PROCEDURE — 1160F RVW MEDS BY RX/DR IN RCRD: CPT | Mod: CPTII,S$GLB,, | Performed by: FAMILY MEDICINE

## 2022-06-08 PROCEDURE — 4010F PR ACE/ARB THEARPY RXD/TAKEN: ICD-10-PCS | Mod: CPTII,S$GLB,, | Performed by: FAMILY MEDICINE

## 2022-06-08 PROCEDURE — 3061F PR NEG MICROALBUMINURIA RESULT DOCUMENTED/REVIEW: ICD-10-PCS | Mod: CPTII,S$GLB,, | Performed by: FAMILY MEDICINE

## 2022-06-08 PROCEDURE — 1159F MED LIST DOCD IN RCRD: CPT | Mod: CPTII,S$GLB,, | Performed by: FAMILY MEDICINE

## 2022-06-08 PROCEDURE — 1160F PR REVIEW ALL MEDS BY PRESCRIBER/CLIN PHARMACIST DOCUMENTED: ICD-10-PCS | Mod: CPTII,S$GLB,, | Performed by: FAMILY MEDICINE

## 2022-06-08 PROCEDURE — 3066F NEPHROPATHY DOC TX: CPT | Mod: CPTII,S$GLB,, | Performed by: FAMILY MEDICINE

## 2022-06-08 PROCEDURE — 3061F NEG MICROALBUMINURIA REV: CPT | Mod: CPTII,S$GLB,, | Performed by: FAMILY MEDICINE

## 2022-06-08 PROCEDURE — 3078F DIAST BP <80 MM HG: CPT | Mod: CPTII,S$GLB,, | Performed by: FAMILY MEDICINE

## 2022-06-08 PROCEDURE — 3008F BODY MASS INDEX DOCD: CPT | Mod: CPTII,S$GLB,, | Performed by: FAMILY MEDICINE

## 2022-06-08 RX ORDER — DICYCLOMINE HYDROCHLORIDE 20 MG/1
20 TABLET ORAL 4 TIMES DAILY PRN
COMMUNITY
Start: 2022-06-05 | End: 2022-08-16 | Stop reason: SDUPTHER

## 2022-06-08 RX ORDER — FAMOTIDINE 20 MG/1
20 TABLET, FILM COATED ORAL DAILY
COMMUNITY
End: 2022-06-23

## 2022-06-08 RX ORDER — PROMETHAZINE HYDROCHLORIDE AND CODEINE PHOSPHATE 6.25; 1 MG/5ML; MG/5ML
SOLUTION ORAL
COMMUNITY
Start: 2022-06-05 | End: 2022-06-23

## 2022-06-08 NOTE — PROGRESS NOTES
Subjective:       Patient ID: Saud Mayers is a 57 y.o. male.    Chief Complaint: Follow-up and Abdominal Pain    56 y/o male with hx of DM x  4  Years, with c/o abdominal pain, x 5-6 weeks, states started with symptoms since he was started on actos,  Was seen in at UC and tx with bentyl, with some relief,    A1c    Denies f/c    Had some constipation and now has got stomach cleaned up, dnies diarrhea    Denies f/c/ or     Review of Systems    Objective:      Physical Exam  Vitals and nursing note reviewed.   Constitutional:       General: He is not in acute distress.     Appearance: Normal appearance. He is well-developed. He is not diaphoretic.   HENT:      Head: Normocephalic and atraumatic.      Nose: Nose normal.   Eyes:      General: Lids are normal.      Conjunctiva/sclera: Conjunctivae normal.   Neck:      Trachea: Trachea and phonation normal.   Cardiovascular:      Rate and Rhythm: Normal rate and regular rhythm.      Pulses: Normal pulses.      Heart sounds: Normal heart sounds.   Pulmonary:      Effort: Pulmonary effort is normal.      Breath sounds: Normal breath sounds.   Abdominal:      General: Bowel sounds are normal. There is no abdominal bruit.      Palpations: Abdomen is soft. There is no mass or pulsatile mass.   Musculoskeletal:         General: No deformity.      Cervical back: Full passive range of motion without pain, normal range of motion and neck supple.   Skin:     General: Skin is warm and dry.   Neurological:      Mental Status: He is alert and oriented to person, place, and time.      Sensory: No sensory deficit.      Deep Tendon Reflexes: Reflexes are normal and symmetric.   Psychiatric:         Speech: Speech normal.         Behavior: Behavior normal. Behavior is cooperative.         Thought Content: Thought content normal.         Judgment: Judgment normal.         Assessment:       1. Type 2 diabetes mellitus with hyperglycemia, without long-term current use of insulin     2. Lower abdominal pain        Plan:         Saud was seen today for follow-up and abdominal pain.    Diagnoses and all orders for this visit:    Type 2 diabetes mellitus with hyperglycemia, without long-term current use of insulin  -     Ambulatory referral/consult to Ophthalmology; Future    Lower abdominal pain    DC Actos    Reviewing labs shows he has improved from last year A1c of 9 to around 7    Continue diet, exercise, and metformin and glimeperide    Continue bentyl and pepcid

## 2022-06-13 ENCOUNTER — HOSPITAL ENCOUNTER (OUTPATIENT)
Dept: RADIOLOGY | Facility: HOSPITAL | Age: 57
Discharge: HOME | End: 2022-06-13
Attending: FAMILY MEDICINE
Payer: COMMERCIAL

## 2022-06-13 ENCOUNTER — TELEPHONE (OUTPATIENT)
Dept: FAMILY MEDICINE | Facility: CLINIC | Age: 57
End: 2022-06-13
Payer: COMMERCIAL

## 2022-06-13 DIAGNOSIS — R10.30 LOWER ABDOMINAL PAIN: ICD-10-CM

## 2022-06-13 PROCEDURE — 74019 RADEX ABDOMEN 2 VIEWS: CPT | Mod: TC,FY,PO

## 2022-06-13 NOTE — TELEPHONE ENCOUNTER
----- Message from Sri Bangura sent at 6/13/2022  8:51 AM CDT -----  Contact: patient  [phone]  Patient calling to speak with you regarding still having stomach pains and he would like to have test   Please call back to  assist at [phone]

## 2022-06-14 ENCOUNTER — TELEPHONE (OUTPATIENT)
Dept: FAMILY MEDICINE | Facility: CLINIC | Age: 57
End: 2022-06-14
Payer: COMMERCIAL

## 2022-06-14 NOTE — TELEPHONE ENCOUNTER
----- Message from Nydia Higuera sent at 6/14/2022  9:05 AM CDT -----  Regarding: results  Contact: [phone]  Type:  Test Results    Who Called:  self   Name of Test (Lab/Mammo/Etc):  xray   Date of Test:  06/13  Ordering Provider:    Where the test was performed:  [facility]  Would the patient rather a call back or a response via [facility]?  [facility]   Best Call Back Number:  [phone]  Additional Information:          Please advise. Thank you.

## 2022-06-14 NOTE — TELEPHONE ENCOUNTER
----- Message from Katheryn Lal sent at 6/14/2022  1:31 PM CDT -----  Contact: [phone]  Type:  Needs Medical Advice    Who Called: pt  Reason for call: regarding the results from his X rays on Yesterday  Pharmacy name and phone #:  n/a  Would the patient rather a call back or a response via [facility]? Call back  Best Call Back Number: [phone]

## 2022-06-14 NOTE — TELEPHONE ENCOUNTER
Spoke to patient. Patient verbally understood the message below. Patient has had to take laxatives on and off to keep his BM regular. Patient asked what his next steps are in regards to figuring out what this constant abdominal pain is.

## 2022-06-14 NOTE — TELEPHONE ENCOUNTER
----- Message from Katheryn Lal sent at 6/14/2022  1:31 PM CDT -----  Contact: [phone]  Type:  Needs Medical Advice     Who Called: pt  Reason for call: regarding the results from his X rays on Yesterday  Pharmacy name and phone #:  n/a  Would the patient rather a call back or a response via [facility]? Call back  Best Call Back Number: [phone]

## 2022-06-14 NOTE — TELEPHONE ENCOUNTER
Spoke with pt. Appt has been scheduled. Offered pt appt on 6/15, pt declined due to having to work. Several other appts were also offered. Pt declined.

## 2022-06-23 ENCOUNTER — OFFICE VISIT (OUTPATIENT)
Dept: FAMILY MEDICINE | Facility: CLINIC | Age: 57
End: 2022-06-23
Payer: COMMERCIAL

## 2022-06-23 VITALS
WEIGHT: 253 LBS | TEMPERATURE: 98 F | OXYGEN SATURATION: 98 % | SYSTOLIC BLOOD PRESSURE: 138 MMHG | HEIGHT: 69 IN | BODY MASS INDEX: 37.47 KG/M2 | DIASTOLIC BLOOD PRESSURE: 72 MMHG | HEART RATE: 93 BPM

## 2022-06-23 DIAGNOSIS — G89.29 CHRONIC ABDOMINAL PAIN: Primary | ICD-10-CM

## 2022-06-23 DIAGNOSIS — R10.9 CHRONIC ABDOMINAL PAIN: Primary | ICD-10-CM

## 2022-06-23 PROCEDURE — 1159F MED LIST DOCD IN RCRD: CPT | Mod: CPTII,S$GLB,, | Performed by: STUDENT IN AN ORGANIZED HEALTH CARE EDUCATION/TRAINING PROGRAM

## 2022-06-23 PROCEDURE — 3078F PR MOST RECENT DIASTOLIC BLOOD PRESSURE < 80 MM HG: ICD-10-PCS | Mod: CPTII,S$GLB,, | Performed by: STUDENT IN AN ORGANIZED HEALTH CARE EDUCATION/TRAINING PROGRAM

## 2022-06-23 PROCEDURE — 1160F PR REVIEW ALL MEDS BY PRESCRIBER/CLIN PHARMACIST DOCUMENTED: ICD-10-PCS | Mod: CPTII,S$GLB,, | Performed by: STUDENT IN AN ORGANIZED HEALTH CARE EDUCATION/TRAINING PROGRAM

## 2022-06-23 PROCEDURE — 3046F PR MOST RECENT HEMOGLOBIN A1C LEVEL > 9.0%: ICD-10-PCS | Mod: CPTII,S$GLB,, | Performed by: STUDENT IN AN ORGANIZED HEALTH CARE EDUCATION/TRAINING PROGRAM

## 2022-06-23 PROCEDURE — 3008F BODY MASS INDEX DOCD: CPT | Mod: CPTII,S$GLB,, | Performed by: STUDENT IN AN ORGANIZED HEALTH CARE EDUCATION/TRAINING PROGRAM

## 2022-06-23 PROCEDURE — 1160F RVW MEDS BY RX/DR IN RCRD: CPT | Mod: CPTII,S$GLB,, | Performed by: STUDENT IN AN ORGANIZED HEALTH CARE EDUCATION/TRAINING PROGRAM

## 2022-06-23 PROCEDURE — 3061F NEG MICROALBUMINURIA REV: CPT | Mod: CPTII,S$GLB,, | Performed by: STUDENT IN AN ORGANIZED HEALTH CARE EDUCATION/TRAINING PROGRAM

## 2022-06-23 PROCEDURE — 3075F PR MOST RECENT SYSTOLIC BLOOD PRESS GE 130-139MM HG: ICD-10-PCS | Mod: CPTII,S$GLB,, | Performed by: STUDENT IN AN ORGANIZED HEALTH CARE EDUCATION/TRAINING PROGRAM

## 2022-06-23 PROCEDURE — 3046F HEMOGLOBIN A1C LEVEL >9.0%: CPT | Mod: CPTII,S$GLB,, | Performed by: STUDENT IN AN ORGANIZED HEALTH CARE EDUCATION/TRAINING PROGRAM

## 2022-06-23 PROCEDURE — 3008F PR BODY MASS INDEX (BMI) DOCUMENTED: ICD-10-PCS | Mod: CPTII,S$GLB,, | Performed by: STUDENT IN AN ORGANIZED HEALTH CARE EDUCATION/TRAINING PROGRAM

## 2022-06-23 PROCEDURE — 3075F SYST BP GE 130 - 139MM HG: CPT | Mod: CPTII,S$GLB,, | Performed by: STUDENT IN AN ORGANIZED HEALTH CARE EDUCATION/TRAINING PROGRAM

## 2022-06-23 PROCEDURE — 99214 OFFICE O/P EST MOD 30 MIN: CPT | Mod: S$GLB,,, | Performed by: STUDENT IN AN ORGANIZED HEALTH CARE EDUCATION/TRAINING PROGRAM

## 2022-06-23 PROCEDURE — 3078F DIAST BP <80 MM HG: CPT | Mod: CPTII,S$GLB,, | Performed by: STUDENT IN AN ORGANIZED HEALTH CARE EDUCATION/TRAINING PROGRAM

## 2022-06-23 PROCEDURE — 4010F PR ACE/ARB THEARPY RXD/TAKEN: ICD-10-PCS | Mod: CPTII,S$GLB,, | Performed by: STUDENT IN AN ORGANIZED HEALTH CARE EDUCATION/TRAINING PROGRAM

## 2022-06-23 PROCEDURE — 1159F PR MEDICATION LIST DOCUMENTED IN MEDICAL RECORD: ICD-10-PCS | Mod: CPTII,S$GLB,, | Performed by: STUDENT IN AN ORGANIZED HEALTH CARE EDUCATION/TRAINING PROGRAM

## 2022-06-23 PROCEDURE — 3066F NEPHROPATHY DOC TX: CPT | Mod: CPTII,S$GLB,, | Performed by: STUDENT IN AN ORGANIZED HEALTH CARE EDUCATION/TRAINING PROGRAM

## 2022-06-23 PROCEDURE — 4010F ACE/ARB THERAPY RXD/TAKEN: CPT | Mod: CPTII,S$GLB,, | Performed by: STUDENT IN AN ORGANIZED HEALTH CARE EDUCATION/TRAINING PROGRAM

## 2022-06-23 PROCEDURE — 99214 PR OFFICE/OUTPT VISIT, EST, LEVL IV, 30-39 MIN: ICD-10-PCS | Mod: S$GLB,,, | Performed by: STUDENT IN AN ORGANIZED HEALTH CARE EDUCATION/TRAINING PROGRAM

## 2022-06-23 PROCEDURE — 3066F PR DOCUMENTATION OF TREATMENT FOR NEPHROPATHY: ICD-10-PCS | Mod: CPTII,S$GLB,, | Performed by: STUDENT IN AN ORGANIZED HEALTH CARE EDUCATION/TRAINING PROGRAM

## 2022-06-23 PROCEDURE — 3061F PR NEG MICROALBUMINURIA RESULT DOCUMENTED/REVIEW: ICD-10-PCS | Mod: CPTII,S$GLB,, | Performed by: STUDENT IN AN ORGANIZED HEALTH CARE EDUCATION/TRAINING PROGRAM

## 2022-06-23 RX ORDER — PANTOPRAZOLE SODIUM 40 MG/1
40 TABLET, DELAYED RELEASE ORAL DAILY
Qty: 90 TABLET | Refills: 1 | Status: SHIPPED | OUTPATIENT
Start: 2022-06-23 | End: 2022-11-09

## 2022-06-23 RX ORDER — LACTULOSE 10 G/15ML
SOLUTION ORAL; RECTAL
COMMUNITY
Start: 2022-06-20 | End: 2022-07-09

## 2022-06-24 ENCOUNTER — TELEPHONE (OUTPATIENT)
Dept: FAMILY MEDICINE | Facility: CLINIC | Age: 57
End: 2022-06-24
Payer: COMMERCIAL

## 2022-06-24 NOTE — TELEPHONE ENCOUNTER
----- Message from Jacklyn Young sent at 6/24/2022  1:48 PM CDT -----  Regarding: appt  Type:  Patient Returning Call    Who Called:patient     Who Left Message for Patient:n/a    Does the patient know what this is regarding?:call back concerning appt with Physician.     Would the patient rather a call back or a response via [facility]? Call back   Best Call Back Number:[phone]  Additional Information: n/a

## 2022-07-05 ENCOUNTER — PATIENT OUTREACH (OUTPATIENT)
Dept: ADMINISTRATIVE | Facility: HOSPITAL | Age: 57
End: 2022-07-05
Payer: COMMERCIAL

## 2022-07-05 NOTE — PROGRESS NOTES
Non-compliant report chart audits HGBA1C.        Care Everywhere and media, updates requested and reviewed.      [facility] reviewed for tests needed.    Pt A1c scheduled 7/20/2022

## 2022-07-06 ENCOUNTER — OFFICE VISIT (OUTPATIENT)
Dept: GASTROENTEROLOGY | Facility: CLINIC | Age: 57
End: 2022-07-06
Payer: COMMERCIAL

## 2022-07-06 VITALS — HEIGHT: 69 IN | BODY MASS INDEX: 37.48 KG/M2 | WEIGHT: 253.06 LBS

## 2022-07-06 DIAGNOSIS — R10.9 CHRONIC ABDOMINAL PAIN: ICD-10-CM

## 2022-07-06 DIAGNOSIS — Z12.11 SCREENING FOR COLON CANCER: Primary | ICD-10-CM

## 2022-07-06 DIAGNOSIS — G89.29 CHRONIC ABDOMINAL PAIN: ICD-10-CM

## 2022-07-06 PROCEDURE — 99999 PR PBB SHADOW E&M-EST. PATIENT-LVL IV: CPT | Mod: PBBFAC,,, | Performed by: NURSE PRACTITIONER

## 2022-07-06 PROCEDURE — 3061F PR NEG MICROALBUMINURIA RESULT DOCUMENTED/REVIEW: ICD-10-PCS | Mod: CPTII,S$GLB,, | Performed by: NURSE PRACTITIONER

## 2022-07-06 PROCEDURE — 99204 PR OFFICE/OUTPT VISIT, NEW, LEVL IV, 45-59 MIN: ICD-10-PCS | Mod: S$GLB,,, | Performed by: NURSE PRACTITIONER

## 2022-07-06 PROCEDURE — 3066F NEPHROPATHY DOC TX: CPT | Mod: CPTII,S$GLB,, | Performed by: NURSE PRACTITIONER

## 2022-07-06 PROCEDURE — 1159F MED LIST DOCD IN RCRD: CPT | Mod: CPTII,S$GLB,, | Performed by: NURSE PRACTITIONER

## 2022-07-06 PROCEDURE — 3008F PR BODY MASS INDEX (BMI) DOCUMENTED: ICD-10-PCS | Mod: CPTII,S$GLB,, | Performed by: NURSE PRACTITIONER

## 2022-07-06 PROCEDURE — 3046F PR MOST RECENT HEMOGLOBIN A1C LEVEL > 9.0%: ICD-10-PCS | Mod: CPTII,S$GLB,, | Performed by: NURSE PRACTITIONER

## 2022-07-06 PROCEDURE — 4010F PR ACE/ARB THEARPY RXD/TAKEN: ICD-10-PCS | Mod: CPTII,S$GLB,, | Performed by: NURSE PRACTITIONER

## 2022-07-06 PROCEDURE — 3046F HEMOGLOBIN A1C LEVEL >9.0%: CPT | Mod: CPTII,S$GLB,, | Performed by: NURSE PRACTITIONER

## 2022-07-06 PROCEDURE — 3061F NEG MICROALBUMINURIA REV: CPT | Mod: CPTII,S$GLB,, | Performed by: NURSE PRACTITIONER

## 2022-07-06 PROCEDURE — 1159F PR MEDICATION LIST DOCUMENTED IN MEDICAL RECORD: ICD-10-PCS | Mod: CPTII,S$GLB,, | Performed by: NURSE PRACTITIONER

## 2022-07-06 PROCEDURE — 3066F PR DOCUMENTATION OF TREATMENT FOR NEPHROPATHY: ICD-10-PCS | Mod: CPTII,S$GLB,, | Performed by: NURSE PRACTITIONER

## 2022-07-06 PROCEDURE — 4010F ACE/ARB THERAPY RXD/TAKEN: CPT | Mod: CPTII,S$GLB,, | Performed by: NURSE PRACTITIONER

## 2022-07-06 PROCEDURE — 99999 PR PBB SHADOW E&M-EST. PATIENT-LVL IV: ICD-10-PCS | Mod: PBBFAC,,, | Performed by: NURSE PRACTITIONER

## 2022-07-06 PROCEDURE — 3008F BODY MASS INDEX DOCD: CPT | Mod: CPTII,S$GLB,, | Performed by: NURSE PRACTITIONER

## 2022-07-06 PROCEDURE — 99204 OFFICE O/P NEW MOD 45 MIN: CPT | Mod: S$GLB,,, | Performed by: NURSE PRACTITIONER

## 2022-07-06 NOTE — PATIENT INSTRUCTIONS
Start Metamucil daily to help promote daily bowel movements.     Trial IBgard for the discomfort and bloating.  You can purchase over the counter.       SUTAB Instructions    [facility] [address]  Clinic Office [phone]  Endoscopy Lab [phone]    You are scheduled for a Colonoscopy with    on 09/20/2022  at [facility] in [place].    Check in at the Hospital -1st floor, Information desk. A covid test will be required 3 days before your procedure.  Call [phone] to reschedule.    An adult friend/family member must come with you to drive you home.  You cannot drive, take a taxi, Uber/Lyft or bus to leave the Endoscopy Center alone.  If you do not have someone to drive you home, your test will be cancelled.     Please follow the directions of your doctor if you take any pills that thin your blood. If you take these meds: Aggrenox, Brilinta, Effient, Eliquis, Lovenox, Plavix, Pletal, Pradaxa, Ticilid, Xarelto or Coumadin, let the doctor's office know.    DON'T: On the morning of the test do not take insulin or pills for diabetes.     DO: On the morning of the test, do take any pills for blood pressure, heart, anti-rejection and or seizures with a small sip of water. Bring any inhalers with you.    To have a good prep, you must follow these instructions - please do not use the directions from the pharmacy.    The doctor will send a prescription for the SUTAB.    The Day Before the test:    You can only drink CLEAR LIQUIDS the whole day before your test.  You can't eat any food for the whole day.    You CAN have:  Water, Coffee or decaf coffee (no milk or cream)  Tea  Soft drinks - regular and sugar free  Jell-O (green or yellow)  Apple Juice, grape juice, white cranberry juice  Gatorade, Power Aid, Crystal Light, Rei Aid  Lemonade and Limeade  Bouillon, clear soup  Snowball, popsicles  YOU CAN'T DRINK ANYTHING RED  YOU CAN'T DRINK ALCOHOL  ONLY DRINK WHAT IS ON  THE LIST      At 5 pm the night before your test:    Open the bottle of 12 tablets. Fill the provided cup with water up to the line = 16 oz. Swallow each pill with water. Drink the rest of the water in the cup in 20 minutes.    At 6 pm:  Fill the cup with water up to the line = 16 oz. Drink the cup of water in 30 minutes.    At 7 pm:  Fill the cup with water up to the line = 16 oz. Drink the cup of water in 30 minutes.     Continue to drink water or clear liquids until you go to sleep.      The Day of the test - We will call you 2 days before your test to tell you what time to get to the hospital. We will also tell you when to do the next steps.    5 hours before you come to the hospital (this may be in the middle of the night): ___________________= time  Open the bottle of 12 tablets. Fill the cup with water up to the line = 16 oz. Swallow each pill with water. Drink the rest of the water in the cup in 20 minutes.    At 4 hours before you come to the hospital: ______________= time  Fill the cup with water up to the line = 16 oz. Drink the cup of water in 30 minutes.     At 3 hours before you come to the hospital: ______________= time    YOU CAN'T EAT OR DRINK ANYTHING ELSE ONCE YOU FINISH THE WATER AT _____________ = TIME    Leave all valuables and jewelry at home. You will be at the hospital for 2-4 hours.    Call the Endoscopy department at [phone] with any questions about your procedure.          Please give this Coupon Code to your pharmacist.    BIN: [id number]  PCN: [id number]  GROUP: [id number]  MEMBER ID: [id number]

## 2022-07-06 NOTE — PROGRESS NOTES
"     GASTROENTEROLOGY CLINIC NOTE    Chief Complaint: The primary encounter diagnosis was Screening for colon cancer. A diagnosis of Chronic abdominal pain was also pertinent to this visit.  Referring provider/PCP: Anson Blunt MD    HPI:  Saud Mayers is a 57 y.o. male who is a new patient to me with a PMH that's significant for asthma and DM2. He is here today to establish care for abdominal pain. This is a new problem that began about 7 to 8 weeks ago. He reports starting Januvia two days prior to start of symptoms.  Januvia was discontinued shortly after symptoms began but the abdominal pain continue to worsen over the next 3 to 4 weeks. He then began taking pain medication for the abdominal pain which led to constipation. He has since stopped taking the pain medicines. Abdominal pain is described as a generalized "upset stomach/empty feeling" with accompanied bloating. The intensity waxes and wanes. The pain occasionally wakes him while sleeping. Slightly improves for about a half hour to an hour following a bowel movement but then returns. Denies pyrosis, waterbrash, or reflux. No nausea, vomiting, fever, or chills reported. He has two bowel movements per day that are soft in consistency. No straining. States in order to have a bowel movement he hast to use his bidet. He will have the urge to have a bowel movement but will pass gas only until uses bidet which then promotes bowel movement. He has tried Colace and lactulose which has caused to frequent bowel movements. No Melena or hematochezia reported.      Treatments Tried: Colace, Lactulose  NSAIDs: No  Anticoagulation or Antiplatelet: No    Prior Upper Endoscopy: No  Prior Colonoscopy: 10 years ago. No abnormal findings per patient.  Family h/o Colon Cancer: No  Mother with gastric cancer.   Family h/o Crohn's Disease or Ulcerative Colitis: No  Family h/o Celiac Sprue: No  Abdominal Surgeries: Cholecystectomy        Review of Systems   Constitutional: " Patient contacted with INR results and instructions. See Ambulatory Anticoagulation Flowsheet. Dx: APLA,DVT      Goal range: 2.0-3.0  Last INR was 2.1 on 2/23/17 per lab. Dose maintained. Lab INR is 2.1 on 3/15/17. Dose maintained as per protocol. Recheck 2 wks as history of Effexor dose increased. Patient reminded to call with any missed doses or extra doses, unusual bleeding or bruising,changes in diet, medications, health or lifestyle. Pt reminded to call with any other questions or concerns regarding anticoagulation therapy. Patient verbalized understanding of instructions. Negative for weight loss.   HENT: Negative for sore throat.    Eyes: Negative for blurred vision.   Respiratory: Negative for cough.    Cardiovascular: Negative for chest pain.   Gastrointestinal: Positive for abdominal pain and constipation. Negative for blood in stool, diarrhea, heartburn, melena, nausea and vomiting.   Genitourinary: Negative for dysuria.   Musculoskeletal: Negative for myalgias.   Skin: Negative for rash.   Neurological: Negative for headaches.   Endo/Heme/Allergies: Negative for environmental allergies.   Psychiatric/Behavioral: Negative for suicidal ideas. The patient is not nervous/anxious.        Past Medical History: has a past medical history of Asthma, Hypertension, ANAMIKA on CPAP, and Testicular hypofunction.    Past Surgical History: has a past surgical history that includes Colonoscopy (01/01/2013); orthopedic surgery (Bilateral, 01/01/2007); orthopedic surgery (Right, 01/01/2006); Rotator cuff repair (Left, 01/01/1997); Cholecystectomy (01/01/1990); and Carpal tunnel release (Bilateral, 2020).    Family History:family history includes Heart attack in his father.    Allergies:   Review of patient's allergies indicates:   Allergen Reactions    Gabapentin Hallucinations    Tizanidine Other (See Comments)     somnolence       Social History: reports that he quit smoking about 30 years ago. His smoking use included cigarettes. He has never used smokeless tobacco.    Home medications:   Current Outpatient Medications on File Prior to Visit   Medication Sig Dispense Refill    atorvastatin (LIPITOR) 40 MG tablet Take 1 tablet (40 mg total) by mouth once daily. 90 tablet 3    betamethasone dipropionate (DIPROLENE) 0.05 % ointment Apply topically 2 (two) times daily. 1 Tube 3    dicyclomine (BENTYL) 20 mg tablet Take 20 mg by mouth 4 (four) times daily as needed.      glimepiride (AMARYL) 4 MG tablet Take 1 tablet (4 mg total) by mouth daily with breakfast. 90 tablet 3    lactulose  "(CHRONULAC) 10 gram/15 mL solution Take by mouth.      losartan (COZAAR) 100 MG tablet Take 1 tablet (100 mg total) by mouth once daily. 90 tablet 3    metFORMIN (GLUCOPHAGE) 1000 MG tablet Take 1 tablet (1,000 mg total) by mouth 2 (two) times daily with meals. 180 tablet 3    metoprolol succinate (TOPROL-XL) 50 MG 24 hr tablet Take 1 tablet (50 mg total) by mouth once daily. 90 tablet 3    pantoprazole (PROTONIX) 40 MG tablet Take 1 tablet (40 mg total) by mouth once daily. 90 tablet 1    sildenafiL (VIAGRA) 100 MG tablet Take 1 tablet (100 mg total) by mouth daily as needed for Erectile Dysfunction. 30 tablet 5     No current facility-administered medications on file prior to visit.       Vital signs:  Ht 5' 9" (1.753 m)   Wt 114.8 kg (253 lb 1.4 oz)   BMI 37.37 kg/m²     Physical Exam  Vitals reviewed.   Constitutional:       General: He is not in acute distress.     Appearance: Normal appearance. He is not ill-appearing.   HENT:      Head: Normocephalic.   Cardiovascular:      Rate and Rhythm: Normal rate and regular rhythm.      Heart sounds: Normal heart sounds. No murmur heard.  Pulmonary:      Effort: Pulmonary effort is normal. No respiratory distress.      Breath sounds: Normal breath sounds.   Chest:      Chest wall: No tenderness.   Abdominal:      General: Bowel sounds are normal. There is no distension.      Palpations: Abdomen is soft.      Tenderness: There is no abdominal tenderness. Negative signs include Byrd's sign.      Hernia: No hernia is present.   Skin:     General: Skin is warm.   Neurological:      Mental Status: He is alert and oriented to person, place, and time.   Psychiatric:         Mood and Affect: Mood normal.         Behavior: Behavior normal.         Routine labs:  Lab Results   Component Value Date    WBC 6.7 03/17/2010    HGB 14.8 03/17/2010    HCT 43.3 03/17/2010    MCV 97.0 (H) 03/17/2010     03/17/2010     No results found for: INR  No results found for: IRON, " FERRITIN, TIBC, FESATURATED  Lab Results   Component Value Date     04/08/2022    K 4.6 04/08/2022     04/08/2022    CO2 29 04/08/2022    BUN 18 04/08/2022    CREATININE 0.65 04/08/2022     Lab Results   Component Value Date    ALBUMIN 4.5 04/08/2022    ALT 83 (H) 04/08/2022    AST 62 (H) 04/08/2022    ALKPHOS 90 04/08/2022    BILITOT 0.6 04/08/2022     No results found for: GLUCOSE  Lab Results   Component Value Date    TSH 3.107 04/08/2015     Lab Results   Component Value Date    CALCIUM 9.5 04/08/2022       Imaging:  X-Ray Abdomen Flat And Erect  Narrative: EXAMINATION:  XR ABDOMEN FLAT AND ERECT    CLINICAL HISTORY:  Lower abdominal pain, unspecified    TECHNIQUE:  Flat and erect AP views of the abdomen were performed.    COMPARISON:  December 9, 2020    FINDINGS:  Appropriate stool burden.  Bowel gas pattern is otherwise normal.  Negative for free air.    Stable degenerative changes of the spine and pelvis, and cholecystectomy clips.  There are phleboliths versus ureteroliths some overlying the pelvis.  Impression: 1.  Negative for acute process.  Appropriate stool burden.    2.  Stable findings as noted above.    Electronically signed by: Teto Sandhu MD  Date:    06/13/2022  Time:    09:43      I have reviewed prior labs, imaging, and notes.      Assessment:  1. Screening for colon cancer    2. Chronic abdominal pain        Plan:  Orders Placed This Encounter    sod sulf-pot chloride-mag sulf (SUTAB) 1.479-0.188- 0.225 gram tablet    Case Request Endoscopy: COLONOSCOPY, EGD (ESOPHAGOGASTRODUODENOSCOPY)     EGD to further evaluate abdominal pain.   Colonoscopy for colon cancer screening. Sutab.   Start Metamucil daily.   Trial IB guard for bloating and abdominal discomfort.        Plan of care discussed with patient who is in agreement and verbalized understanding.     I have explained the planned procedures to the patient.The risks, benefits and alternatives of the procedure were also  explained in detail. Patient verbalized understanding, all questions were answered. The patient agrees to proceed as planned    Follow Up: As Needed Pending Above Workup          TERRY Jenkins,FNP-BC  [facility]

## 2022-07-13 ENCOUNTER — TELEPHONE (OUTPATIENT)
Dept: GASTROENTEROLOGY | Facility: CLINIC | Age: 57
End: 2022-07-13
Payer: COMMERCIAL

## 2022-07-13 NOTE — TELEPHONE ENCOUNTER
----- Message from Nydia Higuera sent at 7/13/2022 11:50 AM CDT -----  Regarding: rx expensive  Contact: [phone]  Patient is requesting a call back regarding the rx:  sod sulf-pot chloride-mag sulf (SUTAB) 1.479-0.188- 0.225 gram tablets costs $187.   Would the patient rather a call back or a response via [facility]?  Call   Best Call Back Number:  [phone]  Additional Information:

## 2022-07-13 NOTE — TELEPHONE ENCOUNTER
----- Message from Harsha Jacobs sent at 7/13/2022  2:32 PM CDT -----  Contact: [phone]  Who Called: PT  Regarding: speak with staff   Would the patient rather a call back or a response via [facility]? Call back  Best Call Back Number: [phone]   Additional Information: n/a

## 2022-07-13 NOTE — TELEPHONE ENCOUNTER
Spoke to patient he stated that the sutab was to expensive I told him if he gave the pharmacist the code that we told him that at the end of his sutab instruction was he said no but he will go today and give it to them verbal understanding.

## 2022-07-20 ENCOUNTER — PATIENT MESSAGE (OUTPATIENT)
Dept: ADMINISTRATIVE | Facility: HOSPITAL | Age: 57
End: 2022-07-20
Payer: COMMERCIAL

## 2022-07-20 ENCOUNTER — PATIENT OUTREACH (OUTPATIENT)
Dept: ADMINISTRATIVE | Facility: HOSPITAL | Age: 57
End: 2022-07-20
Payer: COMMERCIAL

## 2022-07-20 ENCOUNTER — LAB VISIT (OUTPATIENT)
Dept: LAB | Facility: HOSPITAL | Age: 57
End: 2022-07-20
Attending: STUDENT IN AN ORGANIZED HEALTH CARE EDUCATION/TRAINING PROGRAM
Payer: COMMERCIAL

## 2022-07-20 DIAGNOSIS — E11.65 TYPE 2 DIABETES MELLITUS WITH HYPERGLYCEMIA, WITHOUT LONG-TERM CURRENT USE OF INSULIN: ICD-10-CM

## 2022-07-20 DIAGNOSIS — E78.1 PURE HYPERTRIGLYCERIDEMIA: ICD-10-CM

## 2022-07-20 LAB
ALBUMIN SERPL BCP-MCNC: 4.4 G/DL (ref 3.5–5.2)
ALP SERPL-CCNC: 87 U/L (ref 38–126)
ALT SERPL W/O P-5'-P-CCNC: 24 U/L (ref 10–44)
ANION GAP SERPL CALC-SCNC: 11 MMOL/L (ref 8–16)
AST SERPL-CCNC: 36 U/L (ref 15–46)
BASOPHILS # BLD AUTO: 0.12 K/UL (ref 0–0.2)
BASOPHILS NFR BLD: 1.4 % (ref 0–1.9)
BILIRUB SERPL-MCNC: 0.7 MG/DL (ref 0.1–1)
CALCIUM SERPL-MCNC: 9.1 MG/DL (ref 8.7–10.5)
CHLORIDE SERPL-SCNC: 101 MMOL/L (ref 95–110)
CHOLEST SERPL-MCNC: 111 MG/DL (ref 120–199)
CHOLEST/HDLC SERPL: 3.7 {RATIO} (ref 2–5)
CO2 SERPL-SCNC: 25 MMOL/L (ref 23–29)
CREAT SERPL-MCNC: 0.87 MG/DL (ref 0.5–1.4)
DIFFERENTIAL METHOD: ABNORMAL
EOSINOPHIL # BLD AUTO: 0.3 K/UL (ref 0–0.5)
EOSINOPHIL NFR BLD: 3.2 % (ref 0–8)
ERYTHROCYTE [DISTWIDTH] IN BLOOD BY AUTOMATED COUNT: 13.8 % (ref 11.5–14.5)
EST. GFR  (AFRICAN AMERICAN): >60 ML/MIN/1.73 M^2
EST. GFR  (NON AFRICAN AMERICAN): >60 ML/MIN/1.73 M^2
ESTIMATED AVG GLUCOSE: 140 MG/DL (ref 68–131)
GLUCOSE SERPL-MCNC: 106 MG/DL (ref 70–110)
HBA1C MFR BLD: 6.5 % (ref 4–5.6)
HCT VFR BLD AUTO: 33.5 % (ref 40–54)
HDLC SERPL-MCNC: 30 MG/DL (ref 40–75)
HDLC SERPL: 27 % (ref 20–50)
HGB BLD-MCNC: 10.8 G/DL (ref 14–18)
IMM GRANULOCYTES # BLD AUTO: 0.05 K/UL (ref 0–0.04)
IMM GRANULOCYTES NFR BLD AUTO: 0.6 % (ref 0–0.5)
LDLC SERPL CALC-MCNC: 60 MG/DL (ref 63–159)
LYMPHOCYTES # BLD AUTO: 1.2 K/UL (ref 1–4.8)
LYMPHOCYTES NFR BLD: 14.3 % (ref 18–48)
MCH RBC QN AUTO: 29.6 PG (ref 27–31)
MCHC RBC AUTO-ENTMCNC: 32.2 G/DL (ref 32–36)
MCV RBC AUTO: 92 FL (ref 82–98)
MONOCYTES # BLD AUTO: 0.9 K/UL (ref 0.3–1)
MONOCYTES NFR BLD: 10.1 % (ref 4–15)
NEUTROPHILS # BLD AUTO: 6.1 K/UL (ref 1.8–7.7)
NEUTROPHILS NFR BLD: 70.4 % (ref 38–73)
NONHDLC SERPL-MCNC: 81 MG/DL
NRBC BLD-RTO: 0 /100 WBC
PLATELET # BLD AUTO: 317 K/UL (ref 150–450)
PMV BLD AUTO: 9 FL (ref 9.2–12.9)
POTASSIUM SERPL-SCNC: 4 MMOL/L (ref 3.5–5.1)
PROT SERPL-MCNC: 8.2 G/DL (ref 6–8.4)
RBC # BLD AUTO: 3.65 M/UL (ref 4.6–6.2)
SODIUM SERPL-SCNC: 137 MMOL/L (ref 136–145)
TRIGL SERPL-MCNC: 105 MG/DL (ref 30–150)
UUN UR-MCNC: 20 MG/DL (ref 2–20)
WBC # BLD AUTO: 8.65 K/UL (ref 3.9–12.7)

## 2022-07-20 PROCEDURE — 36415 COLL VENOUS BLD VENIPUNCTURE: CPT | Mod: PO | Performed by: STUDENT IN AN ORGANIZED HEALTH CARE EDUCATION/TRAINING PROGRAM

## 2022-07-20 PROCEDURE — 80061 LIPID PANEL: CPT | Performed by: STUDENT IN AN ORGANIZED HEALTH CARE EDUCATION/TRAINING PROGRAM

## 2022-07-20 PROCEDURE — 80053 COMPREHEN METABOLIC PANEL: CPT | Mod: PO | Performed by: STUDENT IN AN ORGANIZED HEALTH CARE EDUCATION/TRAINING PROGRAM

## 2022-07-20 PROCEDURE — 85025 COMPLETE CBC W/AUTO DIFF WBC: CPT | Mod: PO | Performed by: STUDENT IN AN ORGANIZED HEALTH CARE EDUCATION/TRAINING PROGRAM

## 2022-07-20 PROCEDURE — 83036 HEMOGLOBIN GLYCOSYLATED A1C: CPT | Performed by: STUDENT IN AN ORGANIZED HEALTH CARE EDUCATION/TRAINING PROGRAM

## 2022-07-20 NOTE — LETTER
July 28, 2022    Saud Mayers  [address] [place] [address]             [facility]  [address]  [place] [address]  Phone: [phone] [facility] is committed to your overall health.  To help you get the most out of each of your visits, we will review your information to make sure you are up to date on all of your recommended tests and/or procedures.       Anson Blunt MD  has found that your chart shows you may be due for:        Routine Dilated Eye Exam  (Diabetic Retinopathy screening)   Foot Exam     If you have had any of the above done at another facility, please bring the records or information with you so that your record at [facility] will be complete.  If you would like to schedule any of these, please contact me.     If you are currently taking medication, please bring it with you to your appointment for review.           Thank you for letting us care for you,        Shanika Whitney, Care Coordinator  [facility]  Phone:  [phone]  Fax: [phone]

## 2022-07-20 NOTE — PROGRESS NOTES
2022 Care Everywhere updates requested and reviewed.  Immunizations reconciled. Media reports reviewed.  Duplicate HM overrides and  orders removed.  Overdue HM topic chart audit and/or requested.  Overdue lab testing linked to upcoming lab appointments if applies.    Colonoscopy scheduled for 22.    Health Maintenance Due   Topic Date Due    Colorectal Cancer Screening  Never done    Pneumococcal Vaccines (Age 0-64) (2 - PCV) 2017    Eye Exam  2022    COVID-19 Vaccine (4 - Booster for Moderna series) 2022    Foot Exam  2022

## 2022-08-03 ENCOUNTER — OFFICE VISIT (OUTPATIENT)
Dept: FAMILY MEDICINE | Facility: CLINIC | Age: 57
End: 2022-08-03
Payer: COMMERCIAL

## 2022-08-03 ENCOUNTER — HOSPITAL ENCOUNTER (OUTPATIENT)
Dept: RADIOLOGY | Facility: HOSPITAL | Age: 57
Discharge: HOME | End: 2022-08-03
Attending: STUDENT IN AN ORGANIZED HEALTH CARE EDUCATION/TRAINING PROGRAM
Payer: COMMERCIAL

## 2022-08-03 ENCOUNTER — PATIENT MESSAGE (OUTPATIENT)
Dept: FAMILY MEDICINE | Facility: CLINIC | Age: 57
End: 2022-08-03

## 2022-08-03 VITALS
OXYGEN SATURATION: 95 % | HEIGHT: 69 IN | SYSTOLIC BLOOD PRESSURE: 126 MMHG | HEART RATE: 96 BPM | DIASTOLIC BLOOD PRESSURE: 82 MMHG | WEIGHT: 245.38 LBS | TEMPERATURE: 98 F | BODY MASS INDEX: 36.34 KG/M2

## 2022-08-03 DIAGNOSIS — E11.9 TYPE 2 DIABETES MELLITUS WITHOUT COMPLICATION, WITHOUT LONG-TERM CURRENT USE OF INSULIN: ICD-10-CM

## 2022-08-03 DIAGNOSIS — I10 ESSENTIAL HYPERTENSION: ICD-10-CM

## 2022-08-03 DIAGNOSIS — R63.4 WEIGHT LOSS: ICD-10-CM

## 2022-08-03 DIAGNOSIS — D64.9 ANEMIA, UNSPECIFIED TYPE: ICD-10-CM

## 2022-08-03 DIAGNOSIS — R63.4 WEIGHT LOSS: Primary | ICD-10-CM

## 2022-08-03 DIAGNOSIS — E78.00 PURE HYPERCHOLESTEROLEMIA: ICD-10-CM

## 2022-08-03 DIAGNOSIS — L30.9 DERMATITIS: ICD-10-CM

## 2022-08-03 PROCEDURE — 3066F PR DOCUMENTATION OF TREATMENT FOR NEPHROPATHY: ICD-10-PCS | Mod: CPTII,S$GLB,, | Performed by: STUDENT IN AN ORGANIZED HEALTH CARE EDUCATION/TRAINING PROGRAM

## 2022-08-03 PROCEDURE — 1160F RVW MEDS BY RX/DR IN RCRD: CPT | Mod: CPTII,S$GLB,, | Performed by: STUDENT IN AN ORGANIZED HEALTH CARE EDUCATION/TRAINING PROGRAM

## 2022-08-03 PROCEDURE — 3008F BODY MASS INDEX DOCD: CPT | Mod: CPTII,S$GLB,, | Performed by: STUDENT IN AN ORGANIZED HEALTH CARE EDUCATION/TRAINING PROGRAM

## 2022-08-03 PROCEDURE — 3074F PR MOST RECENT SYSTOLIC BLOOD PRESSURE < 130 MM HG: ICD-10-PCS | Mod: CPTII,S$GLB,, | Performed by: STUDENT IN AN ORGANIZED HEALTH CARE EDUCATION/TRAINING PROGRAM

## 2022-08-03 PROCEDURE — 3044F HG A1C LEVEL LT 7.0%: CPT | Mod: CPTII,S$GLB,, | Performed by: STUDENT IN AN ORGANIZED HEALTH CARE EDUCATION/TRAINING PROGRAM

## 2022-08-03 PROCEDURE — 1159F MED LIST DOCD IN RCRD: CPT | Mod: CPTII,S$GLB,, | Performed by: STUDENT IN AN ORGANIZED HEALTH CARE EDUCATION/TRAINING PROGRAM

## 2022-08-03 PROCEDURE — 3061F NEG MICROALBUMINURIA REV: CPT | Mod: CPTII,S$GLB,, | Performed by: STUDENT IN AN ORGANIZED HEALTH CARE EDUCATION/TRAINING PROGRAM

## 2022-08-03 PROCEDURE — 3079F DIAST BP 80-89 MM HG: CPT | Mod: CPTII,S$GLB,, | Performed by: STUDENT IN AN ORGANIZED HEALTH CARE EDUCATION/TRAINING PROGRAM

## 2022-08-03 PROCEDURE — 71046 X-RAY EXAM CHEST 2 VIEWS: CPT | Mod: TC,FY,PO

## 2022-08-03 PROCEDURE — 4010F PR ACE/ARB THEARPY RXD/TAKEN: ICD-10-PCS | Mod: CPTII,S$GLB,, | Performed by: STUDENT IN AN ORGANIZED HEALTH CARE EDUCATION/TRAINING PROGRAM

## 2022-08-03 PROCEDURE — 3008F PR BODY MASS INDEX (BMI) DOCUMENTED: ICD-10-PCS | Mod: CPTII,S$GLB,, | Performed by: STUDENT IN AN ORGANIZED HEALTH CARE EDUCATION/TRAINING PROGRAM

## 2022-08-03 PROCEDURE — 3044F PR MOST RECENT HEMOGLOBIN A1C LEVEL <7.0%: ICD-10-PCS | Mod: CPTII,S$GLB,, | Performed by: STUDENT IN AN ORGANIZED HEALTH CARE EDUCATION/TRAINING PROGRAM

## 2022-08-03 PROCEDURE — 3061F PR NEG MICROALBUMINURIA RESULT DOCUMENTED/REVIEW: ICD-10-PCS | Mod: CPTII,S$GLB,, | Performed by: STUDENT IN AN ORGANIZED HEALTH CARE EDUCATION/TRAINING PROGRAM

## 2022-08-03 PROCEDURE — 1160F PR REVIEW ALL MEDS BY PRESCRIBER/CLIN PHARMACIST DOCUMENTED: ICD-10-PCS | Mod: CPTII,S$GLB,, | Performed by: STUDENT IN AN ORGANIZED HEALTH CARE EDUCATION/TRAINING PROGRAM

## 2022-08-03 PROCEDURE — 4010F ACE/ARB THERAPY RXD/TAKEN: CPT | Mod: CPTII,S$GLB,, | Performed by: STUDENT IN AN ORGANIZED HEALTH CARE EDUCATION/TRAINING PROGRAM

## 2022-08-03 PROCEDURE — 99214 OFFICE O/P EST MOD 30 MIN: CPT | Mod: S$GLB,,, | Performed by: STUDENT IN AN ORGANIZED HEALTH CARE EDUCATION/TRAINING PROGRAM

## 2022-08-03 PROCEDURE — 99214 PR OFFICE/OUTPT VISIT, EST, LEVL IV, 30-39 MIN: ICD-10-PCS | Mod: S$GLB,,, | Performed by: STUDENT IN AN ORGANIZED HEALTH CARE EDUCATION/TRAINING PROGRAM

## 2022-08-03 PROCEDURE — 3079F PR MOST RECENT DIASTOLIC BLOOD PRESSURE 80-89 MM HG: ICD-10-PCS | Mod: CPTII,S$GLB,, | Performed by: STUDENT IN AN ORGANIZED HEALTH CARE EDUCATION/TRAINING PROGRAM

## 2022-08-03 PROCEDURE — 1159F PR MEDICATION LIST DOCUMENTED IN MEDICAL RECORD: ICD-10-PCS | Mod: CPTII,S$GLB,, | Performed by: STUDENT IN AN ORGANIZED HEALTH CARE EDUCATION/TRAINING PROGRAM

## 2022-08-03 PROCEDURE — 3066F NEPHROPATHY DOC TX: CPT | Mod: CPTII,S$GLB,, | Performed by: STUDENT IN AN ORGANIZED HEALTH CARE EDUCATION/TRAINING PROGRAM

## 2022-08-03 PROCEDURE — 3074F SYST BP LT 130 MM HG: CPT | Mod: CPTII,S$GLB,, | Performed by: STUDENT IN AN ORGANIZED HEALTH CARE EDUCATION/TRAINING PROGRAM

## 2022-08-03 RX ORDER — ATORVASTATIN CALCIUM 20 MG/1
20 TABLET, FILM COATED ORAL DAILY
Qty: 90 TABLET | Refills: 3 | Status: SHIPPED | OUTPATIENT
Start: 2022-08-03 | End: 2023-12-14 | Stop reason: SDUPTHER

## 2022-08-03 RX ORDER — LOSARTAN POTASSIUM 25 MG/1
100 TABLET ORAL DAILY
Qty: 90 TABLET | Refills: 3 | Status: SHIPPED | OUTPATIENT
Start: 2022-08-03 | End: 2022-08-04

## 2022-08-03 NOTE — PROGRESS NOTES
Patient ID: Saud Mayers is a 57 y.o. male.     Chief Complaint: Follow-up    HPI   patient here for follow up    Obesity- he has lost approximately 20 pounds in the last 2 months due to chronic abdominal pain. He denies the presence of blood in his stools    HLD- he stopped taking atorvastatin approx 1 week ago due to results of recent blood tests.     HTN- he stopped taking losartan because he did not think he needed if with recent weight loss.     Of note, his abdominal pain has significantly improved over the last week. He now has an appetite. He has lunch plans with his wife today.     Review of Systems  Review of Systems   Constitutional: Negative for fever.   HENT: Negative for ear pain and sinus pain.    Eyes: Negative for discharge.   Respiratory: Negative for cough and shortness of breath.    Cardiovascular: Negative for chest pain and leg swelling.   Gastrointestinal: Negative for diarrhea, nausea and vomiting.   Genitourinary: Negative for urgency.   Musculoskeletal: Negative for myalgias.   Skin: Negative for rash.   Neurological: Negative for weakness and headaches.   Psychiatric/Behavioral: Negative for depression.   All other systems reviewed and are negative.      Currently Medications  Current Outpatient Medications on File Prior to Visit   Medication Sig Dispense Refill    betamethasone dipropionate (DIPROLENE) 0.05 % ointment Apply topically 2 (two) times daily. 1 Tube 3    dicyclomine (BENTYL) 20 mg tablet Take 20 mg by mouth 4 (four) times daily as needed.      glimepiride (AMARYL) 4 MG tablet Take 1 tablet (4 mg total) by mouth daily with breakfast. 90 tablet 3    metFORMIN (GLUCOPHAGE) 1000 MG tablet Take 1 tablet (1,000 mg total) by mouth 2 (two) times daily with meals. 180 tablet 3    metoprolol succinate (TOPROL-XL) 50 MG 24 hr tablet Take 1 tablet (50 mg total) by mouth once daily. 90 tablet 3    pantoprazole (PROTONIX) 40 MG tablet Take 1 tablet (40 mg total) by mouth once  "daily. 90 tablet 1    sildenafiL (VIAGRA) 100 MG tablet Take 1 tablet (100 mg total) by mouth daily as needed for Erectile Dysfunction. 30 tablet 5    sod sulf-pot chloride-mag sulf (SUTAB) 1.479-0.188- 0.225 gram tablet Take 12 tablets by mouth once daily. Take according to package instructions with indicated amount of water. 24 tablet 0    [DISCONTINUED] atorvastatin (LIPITOR) 40 MG tablet Take 1 tablet (40 mg total) by mouth once daily. 90 tablet 3    [DISCONTINUED] losartan (COZAAR) 100 MG tablet Take 1 tablet (100 mg total) by mouth once daily. 90 tablet 3     No current facility-administered medications on file prior to visit.       Physical  Exam  Vitals:    08/03/22 0813   BP: 126/82   BP Location: Right arm   Patient Position: Sitting   Pulse: 96   Temp: 98.4 °F (36.9 °C)   SpO2: 95%   Weight: 111.3 kg (245 lb 6 oz)   Height: 5' 9" (1.753 m)      Body mass index is 36.24 kg/m².    Physical Exam  Vitals and nursing note reviewed.   Constitutional:       General: He is not in acute distress.     Appearance: He is not ill-appearing.   HENT:      Head: Normocephalic and atraumatic.      Right Ear: External ear normal.      Left Ear: External ear normal.      Nose: Nose normal.      Mouth/Throat:      Mouth: Mucous membranes are moist.   Eyes:      Extraocular Movements: Extraocular movements intact.      Conjunctiva/sclera: Conjunctivae normal.   Cardiovascular:      Rate and Rhythm: Normal rate and regular rhythm.      Pulses: Normal pulses.      Heart sounds: No murmur heard.  Pulmonary:      Effort: Pulmonary effort is normal. No respiratory distress.      Breath sounds: No wheezing.   Abdominal:      General: There is no distension.      Palpations: Abdomen is soft. There is no mass.      Tenderness: There is no abdominal tenderness.   Musculoskeletal:         General: No swelling.      Cervical back: Normal range of motion.   Skin:     Coloration: Skin is not jaundiced.      Findings: No rash. "   Neurological:      General: No focal deficit present.      Mental Status: He is alert and oriented to person, place, and time.   Psychiatric:         Mood and Affect: Mood normal.         Thought Content: Thought content normal.         Labs:    Complete Blood Count  Lab Results   Component Value Date    RBC 3.65 (L) 07/20/2022    HGB 10.8 (L) 07/20/2022    HCT 33.5 (L) 07/20/2022    MCV 92 07/20/2022    MCH 29.6 07/20/2022    MCHC 32.2 07/20/2022    RDW 13.8 07/20/2022     07/20/2022    MPV 9.0 (L) 07/20/2022    GRAN 6.1 07/20/2022    GRAN 70.4 07/20/2022    LYMPH 1.2 07/20/2022    LYMPH 14.3 (L) 07/20/2022    MONO 0.9 07/20/2022    MONO 10.1 07/20/2022    EOS 0.3 07/20/2022    BASO 0.12 07/20/2022    EOSINOPHIL 3.2 07/20/2022    BASOPHIL 1.4 07/20/2022    DIFFMETHOD Automated 07/20/2022       Comprehensive Metabolic Panel  Lab Results   Component Value Date     07/20/2022    BUN 20 07/20/2022    CREATININE 0.87 07/20/2022     07/20/2022    K 4.0 07/20/2022     07/20/2022    PROT 8.2 07/20/2022    ALBUMIN 4.4 07/20/2022    BILITOT 0.7 07/20/2022    AST 36 07/20/2022    ALKPHOS 87 07/20/2022    CO2 25 07/20/2022    ALT 24 07/20/2022    ANIONGAP 11 07/20/2022    EGFRNONAA >60.0 07/20/2022    ESTGFRAFRICA >60.0 07/20/2022       TSH  No results found for: TSH    Imaging:  X-Ray Abdomen Flat And Erect  Narrative: EXAMINATION:  XR ABDOMEN FLAT AND ERECT    CLINICAL HISTORY:  Lower abdominal pain, unspecified    TECHNIQUE:  Flat and erect AP views of the abdomen were performed.    COMPARISON:  December 9, 2020    FINDINGS:  Appropriate stool burden.  Bowel gas pattern is otherwise normal.  Negative for free air.    Stable degenerative changes of the spine and pelvis, and cholecystectomy clips.  There are phleboliths versus ureteroliths some overlying the pelvis.  Impression: 1.  Negative for acute process.  Appropriate stool burden.    2.  Stable findings as noted above.    Electronically  signed by: Teto Sandhu MD  Date:    06/13/2022  Time:    09:43      Assessment/Plan:    Problem List Items Addressed This Visit        Cardiac/Vascular    Essential hypertension    Relevant Medications    losartan (COZAAR) 25 MG tablet    Pure hypercholesterolemia    Relevant Medications    atorvastatin (LIPITOR) 20 MG tablet       Endocrine    Type 2 diabetes mellitus without complication, without long-term current use of insulin    Overview     - follows with [facility] eye [facility]  - restart ARB at low dose           Current Assessment & Plan     - Hgb A1c improved to 6.5             Other Visit Diagnoses     Weight loss    -  Primary    Relevant Orders    TSH    X-Ray Chest PA And Lateral    HIV 1/2 Ag/Ab (4th Gen)    Hepatitis C Antibody    Hepatitis B Core Antibody, Total    Hepatitis B Surface Ab, Qualitative    Hepatitis B Surface Antigen    Anemia, unspecified type        Relevant Orders    Ferritin    Iron and TIBC    Vitamin B12    Folate    TSH         Has colonoscopy and EGD scheduled for 9/20/22    Discussed how to stay healthy including: diet, exercise, refraining from smoking and discussed screening exams / tests needed for age, sex and family Hx.      Anson Blunt MD

## 2022-08-03 NOTE — TELEPHONE ENCOUNTER
Refill Routing Note   Medication(s) are not appropriate for processing by [facility] for the following reason(s):      - Medication requested has undergone a recent dosage adjustment (<3 months)    [facility] action(s):  Defer          Medication reconciliation completed: No     Appointments  past 12m or future 3m with PCP    Date Provider   Last Visit   8/3/2022 Anson Blunt MD   Next Visit   Visit date not found Anson Blunt MD   ED visits in past 90 days: 0        Note composed:5:56 PM 08/03/2022

## 2022-08-03 NOTE — TELEPHONE ENCOUNTER
No new care gaps identified.  [facility] Embedded Care Gaps. Reference number: [id number]. 8/03/2022   1:10:09 PM CDT

## 2022-08-04 RX ORDER — BETAMETHASONE DIPROPIONATE 0.5 MG/G
OINTMENT TOPICAL 2 TIMES DAILY
Qty: 1 EACH | Refills: 3 | Status: SHIPPED | OUTPATIENT
Start: 2022-08-04 | End: 2022-11-09

## 2022-08-04 RX ORDER — LOSARTAN POTASSIUM 25 MG/1
TABLET ORAL
Qty: 90 TABLET | Refills: 3 | Status: ON HOLD | OUTPATIENT
Start: 2022-08-04 | End: 2022-11-11 | Stop reason: HOSPADM

## 2022-08-07 ENCOUNTER — HOSPITAL ENCOUNTER (EMERGENCY)
Facility: HOSPITAL | Age: 57
Discharge: HOME | End: 2022-08-07
Payer: COMMERCIAL

## 2022-08-07 VITALS
RESPIRATION RATE: 19 BRPM | DIASTOLIC BLOOD PRESSURE: 72 MMHG | HEART RATE: 96 BPM | WEIGHT: 245 LBS | HEIGHT: 69 IN | SYSTOLIC BLOOD PRESSURE: 148 MMHG | TEMPERATURE: 100 F | BODY MASS INDEX: 36.29 KG/M2 | OXYGEN SATURATION: 96 %

## 2022-08-07 DIAGNOSIS — R10.30 LOWER ABDOMINAL PAIN: Primary | ICD-10-CM

## 2022-08-07 DIAGNOSIS — R50.9 FEVER, UNSPECIFIED FEVER CAUSE: ICD-10-CM

## 2022-08-07 DIAGNOSIS — R93.5 ABNORMAL ABDOMINAL CT SCAN: ICD-10-CM

## 2022-08-07 DIAGNOSIS — K57.90 DIVERTICULOSIS: ICD-10-CM

## 2022-08-07 LAB
ALBUMIN SERPL BCP-MCNC: 4.5 G/DL (ref 3.5–5.2)
ALP SERPL-CCNC: 99 U/L (ref 38–126)
ALT SERPL W/O P-5'-P-CCNC: 26 U/L (ref 10–44)
ANION GAP SERPL CALC-SCNC: 9 MMOL/L (ref 8–16)
AST SERPL-CCNC: 34 U/L (ref 15–46)
BASOPHILS # BLD AUTO: 0.07 K/UL (ref 0–0.2)
BASOPHILS NFR BLD: 0.7 % (ref 0–1.9)
BILIRUB SERPL-MCNC: 1.2 MG/DL (ref 0.1–1)
BILIRUB UR QL STRIP: NEGATIVE
CALCIUM SERPL-MCNC: 9.4 MG/DL (ref 8.7–10.5)
CHLORIDE SERPL-SCNC: 95 MMOL/L (ref 95–110)
CLARITY UR REFRACT.AUTO: CLEAR
CO2 SERPL-SCNC: 29 MMOL/L (ref 23–29)
COLOR UR AUTO: YELLOW
CREAT SERPL-MCNC: 0.78 MG/DL (ref 0.5–1.4)
DIFFERENTIAL METHOD: ABNORMAL
EOSINOPHIL # BLD AUTO: 0.1 K/UL (ref 0–0.5)
EOSINOPHIL NFR BLD: 0.8 % (ref 0–8)
ERYTHROCYTE [DISTWIDTH] IN BLOOD BY AUTOMATED COUNT: 14.4 % (ref 11.5–14.5)
EST. GFR  (NO RACE VARIABLE): >60 ML/MIN/1.73 M^2
GLUCOSE SERPL-MCNC: 134 MG/DL (ref 70–110)
GLUCOSE UR QL STRIP: NEGATIVE
HCT VFR BLD AUTO: 36.7 % (ref 40–54)
HGB BLD-MCNC: 12.1 G/DL (ref 14–18)
HGB UR QL STRIP: NEGATIVE
IMM GRANULOCYTES # BLD AUTO: 0.06 K/UL (ref 0–0.04)
IMM GRANULOCYTES NFR BLD AUTO: 0.6 % (ref 0–0.5)
KETONES UR QL STRIP: NEGATIVE
LEUKOCYTE ESTERASE UR QL STRIP: NEGATIVE
LIPASE SERPL-CCNC: 50 U/L (ref 23–300)
LYMPHOCYTES # BLD AUTO: 1.5 K/UL (ref 1–4.8)
LYMPHOCYTES NFR BLD: 14.8 % (ref 18–48)
MCH RBC QN AUTO: 29.4 PG (ref 27–31)
MCHC RBC AUTO-ENTMCNC: 33 G/DL (ref 32–36)
MCV RBC AUTO: 89 FL (ref 82–98)
MONOCYTES # BLD AUTO: 1.1 K/UL (ref 0.3–1)
MONOCYTES NFR BLD: 11.4 % (ref 4–15)
NEUTROPHILS # BLD AUTO: 7.1 K/UL (ref 1.8–7.7)
NEUTROPHILS NFR BLD: 71.7 % (ref 38–73)
NITRITE UR QL STRIP: NEGATIVE
NRBC BLD-RTO: 0 /100 WBC
PH UR STRIP: 7 [PH] (ref 5–8)
PLATELET # BLD AUTO: 275 K/UL (ref 150–450)
PMV BLD AUTO: 8.7 FL (ref 9.2–12.9)
POCT GLUCOSE: 130 MG/DL (ref 70–110)
POTASSIUM SERPL-SCNC: 4.3 MMOL/L (ref 3.5–5.1)
PROT SERPL-MCNC: 8.5 G/DL (ref 6–8.4)
PROT UR QL STRIP: NEGATIVE
RBC # BLD AUTO: 4.12 M/UL (ref 4.6–6.2)
SARS-COV-2 RDRP RESP QL NAA+PROBE: NEGATIVE
SODIUM SERPL-SCNC: 133 MMOL/L (ref 136–145)
SP GR UR STRIP: <=1.005 (ref 1–1.03)
URN SPEC COLLECT METH UR: ABNORMAL
UROBILINOGEN UR STRIP-ACNC: ABNORMAL EU/DL
UUN UR-MCNC: 12 MG/DL (ref 2–20)
WBC # BLD AUTO: 9.97 K/UL (ref 3.9–12.7)

## 2022-08-07 PROCEDURE — U0002 COVID-19 LAB TEST NON-CDC: HCPCS | Mod: ER | Performed by: PHYSICIAN ASSISTANT

## 2022-08-07 PROCEDURE — 83690 ASSAY OF LIPASE: CPT | Mod: ER | Performed by: PHYSICIAN ASSISTANT

## 2022-08-07 PROCEDURE — 96361 HYDRATE IV INFUSION ADD-ON: CPT | Mod: ER

## 2022-08-07 PROCEDURE — 96365 THER/PROPH/DIAG IV INF INIT: CPT | Mod: ER

## 2022-08-07 PROCEDURE — 82962 GLUCOSE BLOOD TEST: CPT | Mod: ER

## 2022-08-07 PROCEDURE — 99285 EMERGENCY DEPT VISIT HI MDM: CPT | Mod: 25,ER

## 2022-08-07 PROCEDURE — 81003 URINALYSIS AUTO W/O SCOPE: CPT | Mod: ER | Performed by: PHYSICIAN ASSISTANT

## 2022-08-07 PROCEDURE — 63600175 PHARM REV CODE 636 W HCPCS: Mod: ER | Performed by: PHYSICIAN ASSISTANT

## 2022-08-07 PROCEDURE — S0030 INJECTION, METRONIDAZOLE: HCPCS | Mod: ER | Performed by: PHYSICIAN ASSISTANT

## 2022-08-07 PROCEDURE — 25500020 PHARM REV CODE 255: Mod: ER | Performed by: PHYSICIAN ASSISTANT

## 2022-08-07 PROCEDURE — 25000003 PHARM REV CODE 250: Mod: ER | Performed by: PHYSICIAN ASSISTANT

## 2022-08-07 PROCEDURE — 85025 COMPLETE CBC W/AUTO DIFF WBC: CPT | Mod: ER | Performed by: PHYSICIAN ASSISTANT

## 2022-08-07 PROCEDURE — 96367 TX/PROPH/DG ADDL SEQ IV INF: CPT | Mod: ER

## 2022-08-07 PROCEDURE — 80053 COMPREHEN METABOLIC PANEL: CPT | Mod: ER | Performed by: PHYSICIAN ASSISTANT

## 2022-08-07 RX ORDER — CIPROFLOXACIN 2 MG/ML
400 INJECTION, SOLUTION INTRAVENOUS
Status: COMPLETED | OUTPATIENT
Start: 2022-08-07 | End: 2022-08-07

## 2022-08-07 RX ORDER — METRONIDAZOLE 500 MG/100ML
500 INJECTION, SOLUTION INTRAVENOUS
Status: COMPLETED | OUTPATIENT
Start: 2022-08-07 | End: 2022-08-07

## 2022-08-07 RX ORDER — CIPROFLOXACIN 500 MG/1
500 TABLET ORAL 2 TIMES DAILY
Qty: 20 TABLET | Refills: 0 | Status: SHIPPED | OUTPATIENT
Start: 2022-08-07 | End: 2022-08-16 | Stop reason: SDUPTHER

## 2022-08-07 RX ORDER — METRONIDAZOLE 500 MG/1
500 TABLET ORAL EVERY 8 HOURS
Qty: 21 TABLET | Refills: 0 | Status: SHIPPED | OUTPATIENT
Start: 2022-08-07 | End: 2022-08-16 | Stop reason: SDUPTHER

## 2022-08-07 RX ORDER — ACETAMINOPHEN 500 MG
500 TABLET ORAL
Status: COMPLETED | OUTPATIENT
Start: 2022-08-07 | End: 2022-08-07

## 2022-08-07 RX ADMIN — METRONIDAZOLE 500 MG: 500 INJECTION, SOLUTION INTRAVENOUS at 06:08

## 2022-08-07 RX ADMIN — SODIUM CHLORIDE 1000 ML: 0.9 INJECTION, SOLUTION INTRAVENOUS at 04:08

## 2022-08-07 RX ADMIN — CIPROFLOXACIN 400 MG: 2 INJECTION, SOLUTION INTRAVENOUS at 05:08

## 2022-08-07 RX ADMIN — IOHEXOL 100 ML: 350 INJECTION, SOLUTION INTRAVENOUS at 04:08

## 2022-08-07 RX ADMIN — ACETAMINOPHEN 500 MG: 500 TABLET ORAL at 04:08

## 2022-08-07 RX ADMIN — SODIUM CHLORIDE 1000 ML: 0.9 INJECTION, SOLUTION INTRAVENOUS at 05:08

## 2022-08-07 NOTE — ED PROVIDER NOTES
"Encounter Date: 2022       History     Chief Complaint   Patient presents with    Abdominal Pain     Pt c/o pain to left lower abd since Thursday and "I am just very tired". Denies nausea, vomiting or fever. c/o diarrhea     57-year-old male presenting to ED with complaints of left lower quadrant abdominal pain over last 3 days.  Patient denies any associated nausea, vomiting, diarrhea or fever.  Patient does admit to history of cholecystectomy with intermittent soft stools.  Patient denies any dark black tarry stools or bright red blood per rectum.  Patient admits to generalized fatigue associated.  No associated urinary symptoms.  No recent injury, trauma or heavy lifting.  No alleviating factors noted.  No other complaints at this time.    The history is provided by the patient. No  was used.     Review of patient's allergies indicates:   Allergen Reactions    Gabapentin Hallucinations    Tizanidine Other (See Comments)     somnolence     Past Medical History:   Diagnosis Date    Asthma     Hypertension     ANAMIKA on CPAP     Testicular hypofunction      Past Surgical History:   Procedure Laterality Date    CARPAL TUNNEL RELEASE Bilateral     CHOLECYSTECTOMY  1990    COLONOSCOPY  2013    ORTHOPEDIC SURGERY Bilateral 2007    feet plantar    ORTHOPEDIC SURGERY Right 2006    knee    ROTATOR CUFF REPAIR Left 1997     Family History   Problem Relation Age of Onset    Heart attack Father      Social History     Tobacco Use    Smoking status: Former Smoker     Types: Cigarettes     Quit date: 1/15/1992     Years since quittin.5    Smokeless tobacco: Never Used     Review of Systems   Constitutional: Negative for chills, diaphoresis, fatigue and fever.   HENT: Negative for congestion, ear pain, sinus pain, sore throat and trouble swallowing.    Eyes: Negative for photophobia, pain, redness and visual disturbance.   Respiratory: Negative for " cough, choking, chest tightness, shortness of breath, wheezing and stridor.    Cardiovascular: Negative for chest pain and palpitations.   Gastrointestinal: Positive for abdominal pain. Negative for diarrhea, nausea and vomiting.   Endocrine: Negative.    Genitourinary: Negative for decreased urine volume, dysuria, flank pain, hematuria, testicular pain and urgency.   Musculoskeletal: Negative for back pain, myalgias and neck pain.   Skin: Negative.    Allergic/Immunologic: Negative.    Neurological: Negative for dizziness, tremors, seizures, weakness, light-headedness, numbness and headaches.   Hematological: Negative.    Psychiatric/Behavioral: Negative.    All other systems reviewed and are negative.      Physical Exam     Initial Vitals [08/07/22 1537]   BP Pulse Resp Temp SpO2   (!) 190/89 107 20 (!) 100.9 °F (38.3 °C) 97 %      MAP       --         Physical Exam    Nursing note and vitals reviewed.  Constitutional: Vital signs are normal. He appears well-developed and well-nourished. He is not diaphoretic. No distress.   57-year-old male lying in bed in no acute distress, nontoxic, alert oriented, normal steady gait, breathing comfortably on room air   HENT:   Head: Normocephalic and atraumatic.   Right Ear: Hearing and external ear normal.   Left Ear: Hearing and external ear normal.   Nose: Nose normal.   Eyes: Conjunctivae and EOM are normal. Pupils are equal, round, and reactive to light.   Neck: Trachea normal.   Normal range of motion.  Cardiovascular: Normal rate, regular rhythm and normal pulses.   Pulmonary/Chest: Effort normal. No accessory muscle usage. No tachypnea. No respiratory distress.   Abdominal: Abdomen is soft. He exhibits no distension. There is abdominal tenderness in the right lower quadrant and left lower quadrant.   Abdomen soft throughout.  Obese.  No rigidity or distention.  Right lower quadrant with slight guarding noted.  Left lower quadrant with mild tenderness as well.  No signs  of an acute abdomen.  Bilateral flanks benign. There is guarding. There is no rigidity and no rebound.   Musculoskeletal:         General: Normal range of motion.      Cervical back: Normal range of motion.     Neurological: He is alert and oriented to person, place, and time. He has normal strength. He displays no tremor. He exhibits normal muscle tone. He displays no seizure activity. Coordination normal. GCS score is 15. GCS eye subscore is 4. GCS verbal subscore is 5. GCS motor subscore is 6.   Skin: Skin is warm and dry. Capillary refill takes less than 2 seconds.         ED Course   Procedures  Labs Reviewed   CBC W/ AUTO DIFFERENTIAL - Abnormal; Notable for the following components:       Result Value    RBC 4.12 (*)     Hemoglobin 12.1 (*)     Hematocrit 36.7 (*)     MPV 8.7 (*)     Immature Granulocytes 0.6 (*)     Immature Grans (Abs) 0.06 (*)     Mono # 1.1 (*)     Lymph % 14.8 (*)     All other components within normal limits   COMPREHENSIVE METABOLIC PANEL - Abnormal; Notable for the following components:    Sodium 133 (*)     Glucose 134 (*)     Total Protein 8.5 (*)     Total Bilirubin 1.2 (*)     All other components within normal limits   URINALYSIS - Abnormal; Notable for the following components:    Urobilinogen, UA 2.0-3.0 (*)     All other components within normal limits    Narrative:     Collection Type->Urine, Clean Catch   POCT GLUCOSE - Abnormal; Notable for the following components:    POCT Glucose 130 (*)     All other components within normal limits   LIPASE   SARS-COV-2 RNA AMPLIFICATION, QUAL    Narrative:     Is the patient symptomatic?->Yes          Imaging Results          CT Abdomen Pelvis With Contrast (Final result)  Result time 08/07/22 16:53:38    Final result by Meghna Brown MD (08/07/22 16:53:38)                 Impression:      Retroperitoneal adenopathy with fat stranding.  Correlate to history of malignancy.  Infectious inflammatory process however is not  excluded    Multiple hypodense lesions of the spleen.  The differential late diagnosis includes neoplastic, infectious, inflammatory process    Trace pleural effusions    Remaining findings above.    Recommend clinical correlation and follow-up    All CT scans at this facility use dose modulation, iterative reconstruction, and/or weight based dosing when appropriate to reduce radiation dose to as low as reasonably achievable.      Electronically signed by: Kevin Coffman  Date:    08/07/2022  Time:    16:53             Narrative:    EXAMINATION:  CT ABDOMEN PELVIS WITH CONTRAST    CLINICAL HISTORY:  LLQ abdominal pain;    TECHNIQUE:  Low dose axial images, sagittal and coronal reformations were obtained from the lung bases to the pubic symphysis following the IV administration of 100 mL of Omnipaque 350.    COMPARISON:  None    FINDINGS:  Heart: Normal size as far as seen. No effusion as far as seen.    Lung Bases: Trace pleural effusions    Liver: Fatty infiltration of the liver.  Mild hepatomegaly.  No focal lesions.    Gallbladder: Cholecystectomy    Bile Ducts: No dilatation.    Pancreas: No mass. No peripancreatic fat stranding.    Spleen: Multiple hypodense lesions of the spleen.  The top 3 lesions measures 6.7, 2.9 and 2.5 cm.  Mild splenomegaly    Adrenals: Normal.    Kidneys/Ureters: Normal enhancement.  No mass or  hydroureteronephrosis.    Bladder: Mild bladder wall thickening.    Reproductive organs: Normal.    GI Tract/Mesentery: No evidence of bowel obstruction or inflammation.  No evidence of acute appendicitis.  Mild colonic diverticulosis    Peritoneal Space: No ascites or free air.    Retroperitoneum: Retroperitoneal adenopathy with fat stranding.    Abdominal wall: Normal.    Vasculature: No aneurysm.    Bones: No acute fracture. No suspicious lytic or sclerotic lesions.                                 Medications   metronidazole IVPB 500 mg (500 mg Intravenous New Bag 8/7/22 9305)   sodium  chloride 0.9% bolus 1,000 mL (0 mLs Intravenous Stopped 8/7/22 1722)   acetaminophen tablet 500 mg (500 mg Oral Given 8/7/22 1608)   iohexoL (OMNIPAQUE 350) injection 100 mL (100 mLs Intravenous Given 8/7/22 1629)   ciprofloxacin (CIPRO)400mg/200ml D5W IVPB 400 mg (0 mg Intravenous Stopped 8/7/22 1827)   sodium chloride 0.9% bolus 1,000 mL (1,000 mLs Intravenous New Bag 8/7/22 1725)                 ED Course as of 08/07/22 1935   Sun Aug 07, 2022   1551 Temp(!): 100.9 °F (38.3 °C) []   1714 Patient with overall unremarkable lab workup.  CT imaging of abdomen does show some stranding, spleen lesions and diverticulosis without diverticulitis.  No obvious signs of intra-abdominal infection although inflammation noted multiple times in official read.  Discussed with patient and his wife at bedside that I would like to preemptively cover with Flagyl and Cipro IV antibiotics, IV hydration and close follow-up with Gastroenterology.  They are very agreeable and appreciative.  Patient actually is established with Gastroenterology and in approximately 6 weeks is scheduled for upper and lower scopes. [MC]      ED Course User Index  [MC] Macarena Murphy PA-C             Clinical Impression:   Final diagnoses:  [R10.30] Lower abdominal pain (Primary)  [R93.5] Abnormal abdominal CT scan  [K57.90] Diverticulosis  [R50.9] Fever, unspecified fever cause          ED Disposition Condition    Discharge Stable        ED Prescriptions     Medication Sig Dispense Start Date End Date Auth. Provider    ciprofloxacin HCl (CIPRO) 500 MG tablet Take 1 tablet (500 mg total) by mouth 2 (two) times daily. for 10 days 20 tablet 8/7/2022 8/17/2022 Macarena Murphy PA-C    metroNIDAZOLE (FLAGYL) 500 MG tablet Take 1 tablet (500 mg total) by mouth every 8 (eight) hours. for 7 days 21 tablet 8/7/2022 8/14/2022 Macarena Murphy PA-C        Follow-up Information     Follow up With Specialties Details Why Contact Info    Anson Blunt MD  Internal Medicine Schedule an appointment as soon as possible for a visit in 2 days If symptoms worsen [address] [address]  [phone]      [facility] - Emergency Dept Emergency Medicine Go to  If symptoms worsen [address] [facility] [address] [phone]        PATIENT SEEN BY AISHA ONLY.    Discussed care plan with patient and his wife at bedside.  Discussed unremarkable lab workup and urine.  Patient presented with left lower quadrant abdominal tenderness although on exam was focally tender to right lower quadrant in the setting of low-grade fevers well.  CT imaging with contrast of abdomen/pelvis done showing incidental finding of retroperitoneal adenopathy with fat stranding, spleen lesions, diverticulosis, although no acute source of infection.  Preemptively treating patient with Cipro and Flagyl IV antibiotic in the emergency department and discharging on oral antibiotics to which they were agreeable.  Patient with no associated nausea, vomiting or diarrhea and his pain is minimal at this time, no urgent need for admission with current clinical picture.  Patient is established with GI, I did send a message to Dr. Cisse whom the patient is scheduled to have upper and lower scopes done in approximately 5 weeks to see if patient can be seen sooner.  Patient and his wife also educated on calling the clinic this week to see from their standpoint if they could be seen sooner.  Patient is stable, vital signs stable and improve, pain control, all questions answered ready for discharge.     Macarena Murphy PA-C  08/07/22 8604

## 2022-08-07 NOTE — ED NOTES
Review of patient's allergies indicates:   Allergen Reactions    Gabapentin Hallucinations    Tizanidine Other (See Comments)     somnolence        Patient has verified the spelling of the name and  on armband.   APPEARANCE: Patient is alert, calm, oriented x 4, and does not appear distressed.  SKIN: Skin is normal for race, warm, and dry. Normal skin turgor and mucous membranes moist.  CARDIAC: Normal rate and rhythm, no murmur heard.   RESPIRATORY:Normal rate and effort. Breath sounds clear bilaterally throughout chest. Respirations are equal and unlabored.    GASTRO: Bowel sounds normal, abdomen is soft, c/o pain to left side of abdomen but c/o pain to RLQ with palpitation, no abdominal distention. Reports some loose stools.   MUSCLE: Full ROM. No bony tenderness or soft tissue tenderness. No obvious deformity.  PERIPHERAL VASCULAR: peripheral pulses present. Normal cap refill. No edema. Warm to touch.  NEURO: 5/5 strength major flexors/extensors bilaterally. Sensory intact to light touch bilaterally. Tere coma scale: eyes open spontaneously-4, oriented & converses-5, obeys commands-6. No neurological abnormalities.   MENTAL STATUS: awake, alert and aware of environment.  EYE: No overt deficits noted. No drainage. Sclera WNL  ENT: EARS: no obvious drainage. NOSE: no active bleeding. THROAT: no redness or swelling.  : Voids without complication

## 2022-08-08 ENCOUNTER — PATIENT MESSAGE (OUTPATIENT)
Dept: FAMILY MEDICINE | Facility: CLINIC | Age: 57
End: 2022-08-08
Payer: COMMERCIAL

## 2022-08-10 NOTE — TELEPHONE ENCOUNTER
----- Message from Jana Cantrell NP sent at 8/10/2022 12:51 PM CDT -----  Regarding: FW: continue weight loss  Can someone please take care of this?     I sent a message Monday to have this patient scheduled Tuesday for when I get back.  There's a patient scheduled with me on Tuesday next week that's coming in for gallstones. He needs a repeat ERCP and is being followed by [facility]. He needs to be informed to contact [facility] for scheduling the ERCP and his appointment with me needs to be canceled.      Please schedule Mr. Mayers in his spot. Dr. Cisse sent a message Monday to follow up with patient and now his PCP has sent me two messages to follow up and that no one has contacted him.       ----- Message -----  From: Anson Blunt MD  Sent: 8/10/2022   8:25 AM CDT  To: Jana Cantrell NP  Subject: RE: continue weight loss                         Thanks for getting back to me. I do not want this patient to fall through the cracks. It looks like no one has reached out to him yet.   ----- Message -----  From: Jana Cantrell NP  Sent: 8/8/2022  10:00 PM CDT  To: Anson Blunt MD  Subject: RE: continue weight loss                         I am out on vacation this week but someone should be reaching out to patient to schedule a follow up with me next week. Dr. Cisse is also aware; patient's procedures are scheduled with him. I will see if he recommends any additional imaging prior to procedures.   ----- Message -----  From: Anson Blunt MD  Sent: 8/8/2022   2:53 PM CDT  To: Jana Cantrell NP  Subject: continue weight loss                             Please see CT scan from recent ER visit which showed splenic lesions. Any further recommendations? He continues to lose weight. Any chance the endoscopy can be moved up?

## 2022-08-16 ENCOUNTER — OFFICE VISIT (OUTPATIENT)
Dept: GASTROENTEROLOGY | Facility: CLINIC | Age: 57
End: 2022-08-16
Payer: COMMERCIAL

## 2022-08-16 ENCOUNTER — PATIENT MESSAGE (OUTPATIENT)
Dept: GASTROENTEROLOGY | Facility: CLINIC | Age: 57
End: 2022-08-16
Payer: COMMERCIAL

## 2022-08-16 ENCOUNTER — LAB VISIT (OUTPATIENT)
Dept: LAB | Facility: HOSPITAL | Age: 57
End: 2022-08-16
Attending: NURSE PRACTITIONER
Payer: COMMERCIAL

## 2022-08-16 VITALS — BODY MASS INDEX: 34.87 KG/M2 | HEIGHT: 69 IN | WEIGHT: 235.44 LBS

## 2022-08-16 DIAGNOSIS — R19.7 DIARRHEA, UNSPECIFIED TYPE: ICD-10-CM

## 2022-08-16 DIAGNOSIS — R19.7 DIARRHEA, UNSPECIFIED TYPE: Primary | ICD-10-CM

## 2022-08-16 DIAGNOSIS — R53.83 FATIGUE, UNSPECIFIED TYPE: ICD-10-CM

## 2022-08-16 DIAGNOSIS — R10.32 LEFT LOWER QUADRANT ABDOMINAL PAIN: ICD-10-CM

## 2022-08-16 DIAGNOSIS — R63.4 WEIGHT LOSS, UNINTENTIONAL: ICD-10-CM

## 2022-08-16 LAB
ALBUMIN SERPL BCP-MCNC: 3.8 G/DL (ref 3.5–5.2)
ALP SERPL-CCNC: 92 U/L (ref 55–135)
ALT SERPL W/O P-5'-P-CCNC: 25 U/L (ref 10–44)
ANION GAP SERPL CALC-SCNC: 16 MMOL/L (ref 8–16)
AST SERPL-CCNC: 33 U/L (ref 10–40)
BASOPHILS # BLD AUTO: 0.1 K/UL (ref 0–0.2)
BASOPHILS NFR BLD: 1.1 % (ref 0–1.9)
BILIRUB SERPL-MCNC: 0.5 MG/DL (ref 0.1–1)
BUN SERPL-MCNC: 7 MG/DL (ref 6–20)
CALCIUM SERPL-MCNC: 9.9 MG/DL (ref 8.7–10.5)
CHLORIDE SERPL-SCNC: 100 MMOL/L (ref 95–110)
CO2 SERPL-SCNC: 22 MMOL/L (ref 23–29)
CREAT SERPL-MCNC: 0.8 MG/DL (ref 0.5–1.4)
DIFFERENTIAL METHOD: ABNORMAL
EOSINOPHIL # BLD AUTO: 0.2 K/UL (ref 0–0.5)
EOSINOPHIL NFR BLD: 1.9 % (ref 0–8)
ERYTHROCYTE [DISTWIDTH] IN BLOOD BY AUTOMATED COUNT: 14.5 % (ref 11.5–14.5)
EST. GFR  (NO RACE VARIABLE): >60 ML/MIN/1.73 M^2
GLUCOSE SERPL-MCNC: 100 MG/DL (ref 70–110)
HCT VFR BLD AUTO: 38.5 % (ref 40–54)
HGB BLD-MCNC: 12.5 G/DL (ref 14–18)
IGA SERPL-MCNC: 594 MG/DL (ref 40–350)
IMM GRANULOCYTES # BLD AUTO: 0.09 K/UL (ref 0–0.04)
IMM GRANULOCYTES NFR BLD AUTO: 1 % (ref 0–0.5)
LYMPHOCYTES # BLD AUTO: 1.1 K/UL (ref 1–4.8)
LYMPHOCYTES NFR BLD: 12 % (ref 18–48)
MCH RBC QN AUTO: 29.1 PG (ref 27–31)
MCHC RBC AUTO-ENTMCNC: 32.5 G/DL (ref 32–36)
MCV RBC AUTO: 90 FL (ref 82–98)
MONOCYTES # BLD AUTO: 1 K/UL (ref 0.3–1)
MONOCYTES NFR BLD: 10.6 % (ref 4–15)
NEUTROPHILS # BLD AUTO: 6.6 K/UL (ref 1.8–7.7)
NEUTROPHILS NFR BLD: 73.4 % (ref 38–73)
NRBC BLD-RTO: 0 /100 WBC
PLATELET # BLD AUTO: 373 K/UL (ref 150–450)
PMV BLD AUTO: 8.5 FL (ref 9.2–12.9)
POTASSIUM SERPL-SCNC: 4.4 MMOL/L (ref 3.5–5.1)
PROT SERPL-MCNC: 8.8 G/DL (ref 6–8.4)
RBC # BLD AUTO: 4.29 M/UL (ref 4.6–6.2)
SODIUM SERPL-SCNC: 138 MMOL/L (ref 136–145)
WBC # BLD AUTO: 8.99 K/UL (ref 3.9–12.7)

## 2022-08-16 PROCEDURE — 4010F ACE/ARB THERAPY RXD/TAKEN: CPT | Mod: CPTII,S$GLB,, | Performed by: NURSE PRACTITIONER

## 2022-08-16 PROCEDURE — 4010F PR ACE/ARB THEARPY RXD/TAKEN: ICD-10-PCS | Mod: CPTII,S$GLB,, | Performed by: NURSE PRACTITIONER

## 2022-08-16 PROCEDURE — 99215 OFFICE O/P EST HI 40 MIN: CPT | Mod: S$GLB,,, | Performed by: NURSE PRACTITIONER

## 2022-08-16 PROCEDURE — 86140 C-REACTIVE PROTEIN: CPT | Performed by: NURSE PRACTITIONER

## 2022-08-16 PROCEDURE — 3044F PR MOST RECENT HEMOGLOBIN A1C LEVEL <7.0%: ICD-10-PCS | Mod: CPTII,S$GLB,, | Performed by: NURSE PRACTITIONER

## 2022-08-16 PROCEDURE — 85025 COMPLETE CBC W/AUTO DIFF WBC: CPT | Performed by: NURSE PRACTITIONER

## 2022-08-16 PROCEDURE — 99999 PR PBB SHADOW E&M-EST. PATIENT-LVL III: CPT | Mod: PBBFAC,,, | Performed by: NURSE PRACTITIONER

## 2022-08-16 PROCEDURE — 99215 PR OFFICE/OUTPT VISIT, EST, LEVL V, 40-54 MIN: ICD-10-PCS | Mod: S$GLB,,, | Performed by: NURSE PRACTITIONER

## 2022-08-16 PROCEDURE — 3066F PR DOCUMENTATION OF TREATMENT FOR NEPHROPATHY: ICD-10-PCS | Mod: CPTII,S$GLB,, | Performed by: NURSE PRACTITIONER

## 2022-08-16 PROCEDURE — 3061F PR NEG MICROALBUMINURIA RESULT DOCUMENTED/REVIEW: ICD-10-PCS | Mod: CPTII,S$GLB,, | Performed by: NURSE PRACTITIONER

## 2022-08-16 PROCEDURE — 99417 PROLNG OP E/M EACH 15 MIN: CPT | Mod: S$GLB,,, | Performed by: NURSE PRACTITIONER

## 2022-08-16 PROCEDURE — 99999 PR PBB SHADOW E&M-EST. PATIENT-LVL III: ICD-10-PCS | Mod: PBBFAC,,, | Performed by: NURSE PRACTITIONER

## 2022-08-16 PROCEDURE — 3061F NEG MICROALBUMINURIA REV: CPT | Mod: CPTII,S$GLB,, | Performed by: NURSE PRACTITIONER

## 2022-08-16 PROCEDURE — 1159F PR MEDICATION LIST DOCUMENTED IN MEDICAL RECORD: ICD-10-PCS | Mod: CPTII,S$GLB,, | Performed by: NURSE PRACTITIONER

## 2022-08-16 PROCEDURE — 3044F HG A1C LEVEL LT 7.0%: CPT | Mod: CPTII,S$GLB,, | Performed by: NURSE PRACTITIONER

## 2022-08-16 PROCEDURE — 3008F PR BODY MASS INDEX (BMI) DOCUMENTED: ICD-10-PCS | Mod: CPTII,S$GLB,, | Performed by: NURSE PRACTITIONER

## 2022-08-16 PROCEDURE — 36415 COLL VENOUS BLD VENIPUNCTURE: CPT | Performed by: NURSE PRACTITIONER

## 2022-08-16 PROCEDURE — 99417 PR PROLONGED SVC, OUTPT, W/WO DIRECT PT CONTACT,  EA ADDTL 15 MIN: ICD-10-PCS | Mod: S$GLB,,, | Performed by: NURSE PRACTITIONER

## 2022-08-16 PROCEDURE — 82784 ASSAY IGA/IGD/IGG/IGM EACH: CPT | Performed by: NURSE PRACTITIONER

## 2022-08-16 PROCEDURE — 3066F NEPHROPATHY DOC TX: CPT | Mod: CPTII,S$GLB,, | Performed by: NURSE PRACTITIONER

## 2022-08-16 PROCEDURE — 1159F MED LIST DOCD IN RCRD: CPT | Mod: CPTII,S$GLB,, | Performed by: NURSE PRACTITIONER

## 2022-08-16 PROCEDURE — 83516 IMMUNOASSAY NONANTIBODY: CPT | Performed by: NURSE PRACTITIONER

## 2022-08-16 PROCEDURE — 80053 COMPREHEN METABOLIC PANEL: CPT | Performed by: NURSE PRACTITIONER

## 2022-08-16 PROCEDURE — 3008F BODY MASS INDEX DOCD: CPT | Mod: CPTII,S$GLB,, | Performed by: NURSE PRACTITIONER

## 2022-08-16 RX ORDER — DICYCLOMINE HYDROCHLORIDE 20 MG/1
20 TABLET ORAL 4 TIMES DAILY PRN
Qty: 30 TABLET | Refills: 0 | Status: SHIPPED | OUTPATIENT
Start: 2022-08-16 | End: 2022-09-05

## 2022-08-16 RX ORDER — CIPROFLOXACIN 500 MG/1
500 TABLET ORAL 2 TIMES DAILY
Qty: 8 TABLET | Refills: 0 | Status: SHIPPED | OUTPATIENT
Start: 2022-08-16 | End: 2022-08-20

## 2022-08-16 RX ORDER — METRONIDAZOLE 500 MG/1
500 TABLET ORAL EVERY 8 HOURS
Qty: 12 TABLET | Refills: 0 | Status: SHIPPED | OUTPATIENT
Start: 2022-08-16 | End: 2022-08-20

## 2022-08-16 NOTE — PATIENT INSTRUCTIONS
It's hard to tell exactly what caused the diverticulitis to flare up again.  The last colonoscopy showed that there are diverticula in the colon.  Either or stool or some type of food particle most likely irritated the diverticula and caused irritation and inflammation.  There's not a special diet but we do recommend increasing fiber intake.  It doesn't prevent diverticulitis but it can help reduce flare ups.  Fruits, oats, beans, peas, and green leafy vegetables are high in fiber.  Also, I recommend starting Metamucil daily.  This will also help.       The diverticula are what becomes inflamed and causes diverticulitis.  The inflammation healed from what was able to be seen with the colonoscopy.  Generally, whenever someone has diverticula or suffers from diverticulitis we do recommend adding fiber into the diet as there is some evidence this may decrease flares.  We also recommend trying to avoid excess amounts of red meat or high animal fat.  Foods like nuts, seeds, and popcorn are not necessarily correlated with increasing the risk of diverticulitis.  Medications such as NSAIDs (ibuprofen, Aleve, Motrin), Aspirin, Opiates, and steroids can also increase the risk of diverticulitis.

## 2022-08-17 ENCOUNTER — TELEPHONE (OUTPATIENT)
Dept: GASTROENTEROLOGY | Facility: CLINIC | Age: 57
End: 2022-08-17
Payer: COMMERCIAL

## 2022-08-17 LAB — CRP SERPL-MCNC: 31.1 MG/L (ref 0–8.2)

## 2022-08-17 NOTE — TELEPHONE ENCOUNTER
----- Message from Jeovany Cisse MD sent at 8/17/2022  4:52 PM CDT -----  Regarding: RE: Patient came to ER  Jana, we will move up his procedures...    Livier, in the slot that dixon ricks is delaying his procedures, please offer this patient to come in on that day....    For now jana, lets just proceed with moving up the procedures...    ----- Message -----  From: Jana Cantrell NP  Sent: 8/16/2022  10:30 PM CDT  To: Jeovany Cisse MD  Subject: RE: Patient came to ER                           Saw patient today in clinic.    Originally presented with generalized abdominal discomfort, bloating, and constipation. Symptoms began after starting Januvia which was discontinued but symptoms did not improve.  KUB in 6/2022 stool on right and gas on left  Scheduled him for double, recommended Metamucil and IBgard    Went to ER 8/7 severe LLQ and left flank pain with diarrhea, fatigue, low grade fever, and weight loss.  CT: multiple hypodense lesions on spleen, hepatic steatosis (LFTs stable), Mild bladder wall thickening (UA WNL), retroperitoneal adenopathy with fat stranding  Discharged with 7 days cipro and flagyl    Today abdominal pain improving but continues. Pain is constant but intensity worsens with deep inspiration or certain movements. Describes it as (bruised rib feeling) Negative Carnett's on exam. Tenderness flank > LLQ  Loose bowel movements continue; occur shortly after eating (this is a change); decreased appetite  No melena, hematochezia, mucus, oily stools, foul smelling stool  Has lost 18# since I first saw him in July    Ordered CBC, CMP, CRP, ttg, iga, (probably should have ordered sed rate) (lipase WNL at ER)  Stool studies  Continued Cipro and Flagyl for 4 more days to make total of 10 days  Bentyl  Continue Metamucil  Double scheduled with you 9/20    CBC, CMP stable; Hct 12.5, Hgb 38.5  IgA elevated  Do you recommend any additional imaging (MRI) to further look at spleen or hematology referral?  Do  you want to try and push up his procedures?   He's worrying me. I have that gut feeling.       ----- Message -----  From: Jeovany Cisse MD  Sent: 8/8/2022   7:54 AM CDT  To: Jana Cnatrell NP, Dolores Bates Staff  Subject: FW: Patient came to ER                           Jana  See message below I received from ER provider..  His CT showing some enlarged lymph nodes..  Unclear if infectious / inflammatory / cancerous..  Just fyi, may see if we can see him back and discuss infectious workup / stool studies, TB test etc  ----- Message -----  From: Macarena Murphy PA-C  Sent: 8/7/2022   6:26 PM CDT  To: Jeovany Cisse MD  Subject: Patient came to ER                               Dr. Cisse,    Mr. Mayers presented to the ER this evening with c/o left lower quadrant abdominal discomfort, was also tender in his right lower quadrant.  Lab workup was overall unremarkable although his CT scan had several abnormal findings including spleen lesions, retroperitoneal adenopathy with fat stranding.  He is being treated for colitis preemptively with no definitive infectious origin on imaging or labs.  Urine also clean.  He informed me that he does have scopes scheduled later in September.  I wanted to inform you of his visit tonight in hopes that he may be able to be seen sooner in clinic or to move his scopes sooner.    Thank you    Macarena Murphy PA-C  [facility]

## 2022-08-17 NOTE — TELEPHONE ENCOUNTER
----- Message from Jeovany Cisse MD sent at 8/17/2022  4:52 PM CDT -----  Regarding: RE: Patient came to ER  Jana, we will move up his procedures...    Livier, in the slot that dixon ricks is delaying his procedures, please offer this patient to come in on that day....    For now jana, lets just proceed with moving up the procedures...    ----- Message -----  From: Jana Cantrell NP  Sent: 8/16/2022  10:30 PM CDT  To: Jeovany Cisse MD  Subject: RE: Patient came to ER                           Saw patient today in clinic.    Originally presented with generalized abdominal discomfort, bloating, and constipation. Symptoms began after starting Januvia which was discontinued but symptoms did not improve.  KUB in 6/2022 stool on right and gas on left  Scheduled him for double, recommended Metamucil and IBgard    Went to ER 8/7 severe LLQ and left flank pain with diarrhea, fatigue, low grade fever, and weight loss.  CT: multiple hypodense lesions on spleen, hepatic steatosis (LFTs stable), Mild bladder wall thickening (UA WNL), retroperitoneal adenopathy with fat stranding  Discharged with 7 days cipro and flagyl    Today abdominal pain improving but continues. Pain is constant but intensity worsens with deep inspiration or certain movements. Describes it as (bruised rib feeling) Negative Carnett's on exam. Tenderness flank > LLQ  Loose bowel movements continue; occur shortly after eating (this is a change); decreased appetite  No melena, hematochezia, mucus, oily stools, foul smelling stool  Has lost 18# since I first saw him in July    Ordered CBC, CMP, CRP, ttg, iga, (probably should have ordered sed rate) (lipase WNL at ER)  Stool studies  Continued Cipro and Flagyl for 4 more days to make total of 10 days  Bentyl  Continue Metamucil  Double scheduled with you 9/20    CBC, CMP stable; Hct 12.5, Hgb 38.5  IgA elevated  Do you recommend any additional imaging (MRI) to further look at spleen or hematology referral?  Do  you want to try and push up his procedures?   He's worrying me. I have that gut feeling.       ----- Message -----  From: Jeovany Cisse MD  Sent: 8/8/2022   7:54 AM CDT  To: Jana Cantrell NP, Dolores Bates Staff  Subject: FW: Patient came to ER                           Jana  See message below I received from ER provider..  His CT showing some enlarged lymph nodes..  Unclear if infectious / inflammatory / cancerous..  Just fyi, may see if we can see him back and discuss infectious workup / stool studies, TB test etc  ----- Message -----  From: Macarena Murphy PA-C  Sent: 8/7/2022   6:26 PM CDT  To: Jeovany Cisse MD  Subject: Patient came to ER                               Dr. Cisse,    Mr. Mayers presented to the ER this evening with c/o left lower quadrant abdominal discomfort, was also tender in his right lower quadrant.  Lab workup was overall unremarkable although his CT scan had several abnormal findings including spleen lesions, retroperitoneal adenopathy with fat stranding.  He is being treated for colitis preemptively with no definitive infectious origin on imaging or labs.  Urine also clean.  He informed me that he does have scopes scheduled later in September.  I wanted to inform you of his visit tonight in hopes that he may be able to be seen sooner in clinic or to move his scopes sooner.    Thank you    Macarena Murphy PA-C  [facility]

## 2022-08-17 NOTE — TELEPHONE ENCOUNTER
Spoke with patient and was able to reschedule the Colonoscopy to 9/2/22. Patient has to work the night before the procedures and is worried about completing the 1st part of the bowel prep. Patient stated that he would take off, but he would need a note stating that his procedure would need to be done sooner because of the severity of his symptoms. I informed patient that we would provide that letter and have it available via [facility].

## 2022-08-18 ENCOUNTER — PATIENT MESSAGE (OUTPATIENT)
Dept: GASTROENTEROLOGY | Facility: CLINIC | Age: 57
End: 2022-08-18
Payer: COMMERCIAL

## 2022-08-19 LAB — TTG IGA SER-ACNC: 12 UNITS

## 2022-08-24 ENCOUNTER — PATIENT MESSAGE (OUTPATIENT)
Dept: ADMINISTRATIVE | Facility: HOSPITAL | Age: 57
End: 2022-08-24
Payer: COMMERCIAL

## 2022-08-28 DIAGNOSIS — E11.9 TYPE 2 DIABETES MELLITUS WITHOUT COMPLICATION, WITHOUT LONG-TERM CURRENT USE OF INSULIN: ICD-10-CM

## 2022-08-28 NOTE — TELEPHONE ENCOUNTER
No new care gaps identified.  [facility] Embedded Care Gaps. Reference number: [id number]. 8/28/2022   7:15:07 AM CDT

## 2022-08-30 RX ORDER — SILDENAFIL 100 MG/1
100 TABLET, FILM COATED ORAL DAILY PRN
Qty: 30 TABLET | Refills: 5 | Status: ON HOLD | OUTPATIENT
Start: 2022-08-30 | End: 2022-11-23 | Stop reason: HOSPADM

## 2022-08-31 ENCOUNTER — TELEPHONE (OUTPATIENT)
Dept: ENDOSCOPY | Facility: HOSPITAL | Age: 57
End: 2022-08-31
Payer: COMMERCIAL

## 2022-08-31 NOTE — TELEPHONE ENCOUNTER
Spoke with patient about arrival time @ 1230.   EGD/Colon  Covid test = Boosted    Prep instructions reviewed: the day before the procedure, follow a clear liquid diet all day, then start the first 1/2 of prep at 5pm and take 2nd 1/2 of prep @ 0730.  Pt must be completely NPO when prep completed @ 0930.              Medications: Do not take Insulin or oral diabetic medications the day of the procedure.  Take as prescribed: heart, seizure and blood pressure medication in the morning with a sip of water (less than an ounce).  Take any breathing medications and bring inhalers to hospital with you Leave all valuables and jewelry at home.     Wear comfortable clothes to procedure to change into hospital gown You cannot drive for 24 hours after your procedure because you will receive sedation for your procedure to make you comfortable.  A ride must be provided at discharge.

## 2022-09-02 ENCOUNTER — HOSPITAL ENCOUNTER (OUTPATIENT)
Facility: HOSPITAL | Age: 57
Discharge: HOME | End: 2022-09-02
Attending: INTERNAL MEDICINE | Admitting: INTERNAL MEDICINE
Payer: COMMERCIAL

## 2022-09-02 ENCOUNTER — ANESTHESIA EVENT (OUTPATIENT)
Dept: ENDOSCOPY | Facility: HOSPITAL | Age: 57
End: 2022-09-02
Payer: COMMERCIAL

## 2022-09-02 ENCOUNTER — ANESTHESIA (OUTPATIENT)
Dept: ENDOSCOPY | Facility: HOSPITAL | Age: 57
End: 2022-09-02
Payer: COMMERCIAL

## 2022-09-02 VITALS
HEART RATE: 90 BPM | RESPIRATION RATE: 20 BRPM | DIASTOLIC BLOOD PRESSURE: 69 MMHG | BODY MASS INDEX: 34.8 KG/M2 | TEMPERATURE: 98 F | SYSTOLIC BLOOD PRESSURE: 131 MMHG | WEIGHT: 235 LBS | OXYGEN SATURATION: 100 % | HEIGHT: 69 IN

## 2022-09-02 DIAGNOSIS — Z12.11 SCREEN FOR COLON CANCER: ICD-10-CM

## 2022-09-02 PROCEDURE — 88305 TISSUE EXAM BY PATHOLOGIST: CPT | Mod: 26,,, | Performed by: PATHOLOGY

## 2022-09-02 PROCEDURE — 27201012 HC FORCEPS, HOT/COLD, DISP: Performed by: INTERNAL MEDICINE

## 2022-09-02 PROCEDURE — 37000008 HC ANESTHESIA 1ST 15 MINUTES: Performed by: INTERNAL MEDICINE

## 2022-09-02 PROCEDURE — 88342 CHG IMMUNOCYTOCHEMISTRY: ICD-10-PCS | Mod: 26,,, | Performed by: PATHOLOGY

## 2022-09-02 PROCEDURE — 88305 TISSUE EXAM BY PATHOLOGIST: ICD-10-PCS | Mod: 26,,, | Performed by: PATHOLOGY

## 2022-09-02 PROCEDURE — 88342 IMHCHEM/IMCYTCHM 1ST ANTB: CPT | Mod: 26,,, | Performed by: PATHOLOGY

## 2022-09-02 PROCEDURE — 43239 EGD BIOPSY SINGLE/MULTIPLE: CPT | Mod: 51,,, | Performed by: INTERNAL MEDICINE

## 2022-09-02 PROCEDURE — 88305 TISSUE EXAM BY PATHOLOGIST: CPT | Performed by: PATHOLOGY

## 2022-09-02 PROCEDURE — 37000009 HC ANESTHESIA EA ADD 15 MINS: Performed by: INTERNAL MEDICINE

## 2022-09-02 PROCEDURE — 45378 DIAGNOSTIC COLONOSCOPY: CPT | Mod: ,,, | Performed by: INTERNAL MEDICINE

## 2022-09-02 PROCEDURE — 63600175 PHARM REV CODE 636 W HCPCS: Performed by: NURSE ANESTHETIST, CERTIFIED REGISTERED

## 2022-09-02 PROCEDURE — 43239 EGD BIOPSY SINGLE/MULTIPLE: CPT | Performed by: INTERNAL MEDICINE

## 2022-09-02 PROCEDURE — 45378 PR COLONOSCOPY,DIAGNOSTIC: ICD-10-PCS | Mod: ,,, | Performed by: INTERNAL MEDICINE

## 2022-09-02 PROCEDURE — 25000003 PHARM REV CODE 250: Performed by: NURSE ANESTHETIST, CERTIFIED REGISTERED

## 2022-09-02 PROCEDURE — 43239 PR EGD, FLEX, W/BIOPSY, SGL/MULTI: ICD-10-PCS | Mod: 51,,, | Performed by: INTERNAL MEDICINE

## 2022-09-02 PROCEDURE — 25000003 PHARM REV CODE 250: Performed by: INTERNAL MEDICINE

## 2022-09-02 PROCEDURE — 88342 IMHCHEM/IMCYTCHM 1ST ANTB: CPT | Performed by: PATHOLOGY

## 2022-09-02 RX ORDER — PROPOFOL 10 MG/ML
VIAL (ML) INTRAVENOUS
Status: DISCONTINUED | OUTPATIENT
Start: 2022-09-02 | End: 2022-09-02

## 2022-09-02 RX ORDER — PROPOFOL 10 MG/ML
VIAL (ML) INTRAVENOUS CONTINUOUS PRN
Status: DISCONTINUED | OUTPATIENT
Start: 2022-09-02 | End: 2022-09-02

## 2022-09-02 RX ORDER — SODIUM CHLORIDE 0.9 % (FLUSH) 0.9 %
10 SYRINGE (ML) INJECTION
Status: DISCONTINUED | OUTPATIENT
Start: 2022-09-02 | End: 2022-09-02 | Stop reason: HOSPADM

## 2022-09-02 RX ORDER — LIDOCAINE HYDROCHLORIDE 20 MG/ML
INJECTION INTRAVENOUS
Status: DISCONTINUED | OUTPATIENT
Start: 2022-09-02 | End: 2022-09-02

## 2022-09-02 RX ORDER — DEXTROMETHORPHAN/PSEUDOEPHED 2.5-7.5/.8
DROPS ORAL
Status: DISCONTINUED | OUTPATIENT
Start: 2022-09-02 | End: 2022-09-02 | Stop reason: HOSPADM

## 2022-09-02 RX ORDER — SODIUM CHLORIDE 9 MG/ML
INJECTION, SOLUTION INTRAVENOUS CONTINUOUS
Status: DISCONTINUED | OUTPATIENT
Start: 2022-09-02 | End: 2022-09-02 | Stop reason: HOSPADM

## 2022-09-02 RX ADMIN — LIDOCAINE HYDROCHLORIDE 100 MG: 20 INJECTION, SOLUTION INTRAVENOUS at 02:09

## 2022-09-02 RX ADMIN — SODIUM CHLORIDE: 0.9 INJECTION, SOLUTION INTRAVENOUS at 01:09

## 2022-09-02 RX ADMIN — PROPOFOL 100 MG: 10 INJECTION, EMULSION INTRAVENOUS at 02:09

## 2022-09-02 RX ADMIN — PROPOFOL 150 MCG/KG/MIN: 10 INJECTION, EMULSION INTRAVENOUS at 02:09

## 2022-09-02 NOTE — PROVATION PATIENT INSTRUCTIONS
Discharge Summary/Instructions after an Endoscopic Procedure  Patient Name: Saud Mayers  Patient MRN: [id number]  Patient YOB: 1965 Friday, September 2, 2022  Jeovany Cisse MD  Dear patient,  As a result of recent federal legislation (The Federal Cures Act), you may   receive lab or pathology results from your procedure in your [facility]   account before your physician is able to contact you. Your physician or   their representative will relay the results to you with their   recommendations at their soonest availability.  Thank you,  Your health is very important to us during the Covid Crisis. Following your   procedure today, you will receive a daily text for 2 weeks asking about   signs or symptoms of Covid 19.  Please respond to this text when you   receive it so we can follow up and keep you as safe as possible.   RESTRICTIONS:  During your procedure today, you received medications for sedation.  These   medications may affect your judgment, balance and coordination.  Therefore,   for 24 hours, you have the following restrictions:   - DO NOT drive a car, operate machinery, make legal/financial decisions,   sign important papers or drink alcohol.    ACTIVITY:  Today: no heavy lifting, straining or running due to procedural   sedation/anesthesia.  The following day: return to full activity including work.  DIET:  Eat and drink normally unless instructed otherwise.     TREATMENT FOR COMMON SIDE EFFECTS:  - Mild abdominal pain, nausea, belching, bloating or excessive gas:  rest,   eat lightly and use a heating pad.  - Sore Throat: treat with throat lozenges and/or gargle with warm salt   water.  - Because air was used during the procedure, expelling large amounts of air   from your rectum or belching is normal.  - If a bowel prep was taken, you may not have a bowel movement for 1-3 days.    This is normal.  SYMPTOMS TO WATCH FOR AND REPORT TO YOUR PHYSICIAN:  1. Abdominal pain or bloating, other  than gas cramps.  2. Chest pain.  3. Back pain.  4. Signs of infection such as: chills or fever occurring within 24 hours   after the procedure.  5. Rectal bleeding, which would show as bright red, maroon, or black stools.   (A tablespoon of blood from the rectum is not serious, especially if   hemorrhoids are present.)  6. Vomiting.  7. Weakness or dizziness.  GO DIRECTLY TO THE NEAREST EMERGENCY ROOM IF YOU HAVE ANY OF THE FOLLOWING:      Difficulty breathing              Chills and/or fever over 101 F   Persistent vomiting and/or vomiting blood   Severe abdominal pain   Severe chest pain   Black, tarry stools   Bleeding- more than one tablespoon   Any other symptom or condition that you feel may need urgent attention  Your doctor recommends these additional instructions:  If any biopsies were taken, your doctors clinic will contact you in 1 to 2   weeks with any results.  - Discharge patient to home.   - Patient has a contact number available for emergencies.  The signs and   symptoms of potential delayed complications were discussed with the   patient.  Return to normal activities tomorrow.  Written discharge   instructions were provided to the patient.   - Continue present medications.   - Resume previous diet.   - Repeat colonoscopy in 10 years for screening purposes.   - Return to nurse practitioner as previously scheduled.   - Consider repeat CT enterography protocol, in approx 3 month interval.   Further workup for wt loss and other symptoms, but no obvious explanation   on todays EGD  / colon, there is mild gastritis with erosion, awaiting   path. See dermatology for wart like lesion on buttock.  For questions, problems or results please call your physician - Jeovany Cisse MD.  EMERGENCY PHONE NUMBER: [phone],  LAB RESULTS: [phone]  IF A COMPLICATION OR EMERGENCY SITUATION ARISES AND YOU ARE UNABLE TO REACH   YOUR PHYSICIAN - GO DIRECTLY TO THE EMERGENCY ROOM.  Jeovany Cisse  MD  9/2/2022 3:33:01 PM  This report has been verified and signed electronically.  Dear patient,  As a result of recent federal legislation (The Federal Cures Act), you may   receive lab or pathology results from your procedure in your [facility]   account before your physician is able to contact you. Your physician or   their representative will relay the results to you with their   recommendations at their soonest availability.  Thank you,  PROVATION

## 2022-09-02 NOTE — PLAN OF CARE
Pt procedure completed with no complications.Pt. V/S are stable. No distress noted at this time.Will continue to monitor.

## 2022-09-02 NOTE — ANESTHESIA POSTPROCEDURE EVALUATION
Anesthesia Post Evaluation    Patient: Saud Mayers    Procedure(s) Performed: Procedure(s) (LRB):  COLONOSCOPY sutab (N/A)  EGD (ESOPHAGOGASTRODUODENOSCOPY) (N/A)    Final Anesthesia Type: MAC      Patient location during evaluation: GI PACU  Patient participation: Yes- Able to Participate  Level of consciousness: awake and alert and oriented  Post-procedure vital signs: reviewed and stable  Pain management: adequate  Airway patency: patent    PONV status at discharge: No PONV  Anesthetic complications: no      Cardiovascular status: blood pressure returned to baseline  Respiratory status: unassisted, spontaneous ventilation and room air  Hydration status: euvolemic  Follow-up not needed.          Vitals Value Taken Time   /67 09/02/22 1554   Temp 36.7 °C (98 °F) 09/02/22 1544   Pulse 83 09/02/22 1554   Resp 19 09/02/22 1554   SpO2 100 % 09/02/22 1554         No case tracking events are documented in the log.      Pain/Tyrone Score: Tyrone Score: 10 (9/2/2022  3:54 PM)

## 2022-09-02 NOTE — PROVATION PATIENT INSTRUCTIONS
Discharge Summary/Instructions after an Endoscopic Procedure  Patient Name: Saud Mayers  Patient MRN: [id number]  Patient YOB: 1965 Friday, September 2, 2022  Jeovany Cisse MD  Dear patient,  As a result of recent federal legislation (The Federal Cures Act), you may   receive lab or pathology results from your procedure in your [facility]   account before your physician is able to contact you. Your physician or   their representative will relay the results to you with their   recommendations at their soonest availability.  Thank you,  Your health is very important to us during the Covid Crisis. Following your   procedure today, you will receive a daily text for 2 weeks asking about   signs or symptoms of Covid 19.  Please respond to this text when you   receive it so we can follow up and keep you as safe as possible.   RESTRICTIONS:  During your procedure today, you received medications for sedation.  These   medications may affect your judgment, balance and coordination.  Therefore,   for 24 hours, you have the following restrictions:   - DO NOT drive a car, operate machinery, make legal/financial decisions,   sign important papers or drink alcohol.    ACTIVITY:  Today: no heavy lifting, straining or running due to procedural   sedation/anesthesia.  The following day: return to full activity including work.  DIET:  Eat and drink normally unless instructed otherwise.     TREATMENT FOR COMMON SIDE EFFECTS:  - Mild abdominal pain, nausea, belching, bloating or excessive gas:  rest,   eat lightly and use a heating pad.  - Sore Throat: treat with throat lozenges and/or gargle with warm salt   water.  - Because air was used during the procedure, expelling large amounts of air   from your rectum or belching is normal.  - If a bowel prep was taken, you may not have a bowel movement for 1-3 days.    This is normal.  SYMPTOMS TO WATCH FOR AND REPORT TO YOUR PHYSICIAN:  1. Abdominal pain or bloating, other  than gas cramps.  2. Chest pain.  3. Back pain.  4. Signs of infection such as: chills or fever occurring within 24 hours   after the procedure.  5. Rectal bleeding, which would show as bright red, maroon, or black stools.   (A tablespoon of blood from the rectum is not serious, especially if   hemorrhoids are present.)  6. Vomiting.  7. Weakness or dizziness.  GO DIRECTLY TO THE NEAREST EMERGENCY ROOM IF YOU HAVE ANY OF THE FOLLOWING:      Difficulty breathing              Chills and/or fever over 101 F   Persistent vomiting and/or vomiting blood   Severe abdominal pain   Severe chest pain   Black, tarry stools   Bleeding- more than one tablespoon   Any other symptom or condition that you feel may need urgent attention  Your doctor recommends these additional instructions:  If any biopsies were taken, your doctors clinic will contact you in 1 to 2   weeks with any results.  - Discharge patient to home.   - Patient has a contact number available for emergencies.  The signs and   symptoms of potential delayed complications were discussed with the   patient.  Return to normal activities tomorrow.  Written discharge   instructions were provided to the patient.   - Resume previous diet.   - Continue present medications.   - Await pathology results.   - See the other procedure note for documentation of additional   recommendations.   - Perform a colonoscopy today.  For questions, problems or results please call your physician - Jeovany Cisse MD.  EMERGENCY PHONE NUMBER: [phone],  LAB RESULTS: [phone]  IF A COMPLICATION OR EMERGENCY SITUATION ARISES AND YOU ARE UNABLE TO REACH   YOUR PHYSICIAN - GO DIRECTLY TO THE EMERGENCY ROOM.  Jeovany Cisse MD  9/2/2022 3:11:24 PM  This report has been verified and signed electronically.  Dear patient,  As a result of recent federal legislation (The Federal Cures Act), you may   receive lab or pathology results from your procedure in your [facility]   account  before your physician is able to contact you. Your physician or   their representative will relay the results to you with their   recommendations at their soonest availability.  Thank you,  PROVATION

## 2022-09-02 NOTE — ANESTHESIA PREPROCEDURE EVALUATION
09/02/2022  Saud Mayers is a 57 y.o., male for EGD and colonoscopy under MAC. Obese, sleep apnea    Past Medical History:   Diagnosis Date    Asthma     Hypertension     ANAMIKA on CPAP     Testicular hypofunction      Past Surgical History:   Procedure Laterality Date    CARPAL TUNNEL RELEASE Bilateral 2020    CHOLECYSTECTOMY  01/01/1990    COLONOSCOPY  01/01/2013    ORTHOPEDIC SURGERY Bilateral 01/01/2007    feet plantar    ORTHOPEDIC SURGERY Right 01/01/2006    knee    ROTATOR CUFF REPAIR Left 01/01/1997           Pre-op Assessment    I have reviewed the Patient Summary Reports.    I have reviewed the NPO Status.   I have reviewed the Medications.     Review of Systems  Social:  Former Smoker    Endocrine:  Obesity / BMI > 30      Physical Exam  General: Well nourished    Airway:  Mallampati: II           Anesthesia Plan  Type of Anesthesia, risks & benefits discussed:    Anesthesia Type: MAC  Intra-op Monitoring Plan: Standard ASA Monitors  Informed Consent: Informed consent signed with the Patient and all parties understand the risks and agree with anesthesia plan.  All questions answered.   ASA Score: 2    Ready For Surgery From Anesthesia Perspective.     .

## 2022-09-02 NOTE — ANESTHESIA POSTPROCEDURE EVALUATION
Anesthesia Post Evaluation    Patient: Saud Mayers    Procedure(s) Performed: Procedure(s) (LRB):  COLONOSCOPY sutab (N/A)  EGD (ESOPHAGOGASTRODUODENOSCOPY) (N/A)    Final Anesthesia Type: MAC      Patient location during evaluation: OPS  Patient participation: Yes- Able to Participate  Level of consciousness: awake and alert  Post-procedure vital signs: reviewed and stable  Pain management: adequate  Airway patency: patent  ANAMIKA mitigation strategies: Multimodal analgesia  PONV status at discharge: No PONV  Anesthetic complications: no      Cardiovascular status: blood pressure returned to baseline and hemodynamically stable  Respiratory status: room air  Hydration status: euvolemic            Vitals Value Taken Time   /72 09/02/22 1544   Temp 36.7 °C (98 °F) 09/02/22 1544   Pulse 91 09/02/22 1544   Resp 18 09/02/22 1544   SpO2 98 % 09/02/22 1544         No case tracking events are documented in the log.      Pain/Tyrone Score: Tyrone Score: 9 (9/2/2022  3:34 PM)

## 2022-09-02 NOTE — TRANSFER OF CARE
"Anesthesia Transfer of Care Note    Patient: Saud Mayers    Procedure(s) Performed: Procedure(s) (LRB):  COLONOSCOPY sutab (N/A)  EGD (ESOPHAGOGASTRODUODENOSCOPY) (N/A)    Patient location: GI    Anesthesia Type: MAC    Transport from OR: Transported from OR on room air with adequate spontaneous ventilation    Post pain: adequate analgesia    Post assessment: no apparent anesthetic complications    Post vital signs: stable    Level of consciousness: awake    Nausea/Vomiting: no nausea/vomiting    Complications: none    Transfer of care protocol was followed      Last vitals:   Visit Vitals  BP (!) 167/81   Pulse 108   Temp 36.8 °C (98.2 °F)   Resp 16   Ht 5' 9" (1.753 m)   Wt 106.6 kg (235 lb)   SpO2 96%   BMI 34.70 kg/m²     "

## 2022-09-06 LAB — POCT GLUCOSE: 121 MG/DL (ref 70–110)

## 2022-09-12 LAB
FINAL PATHOLOGIC DIAGNOSIS: NORMAL
GROSS: NORMAL
Lab: NORMAL

## 2022-09-13 ENCOUNTER — TELEPHONE (OUTPATIENT)
Dept: GASTROENTEROLOGY | Facility: CLINIC | Age: 57
End: 2022-09-13
Payer: COMMERCIAL

## 2022-09-13 NOTE — TELEPHONE ENCOUNTER
Spoke to patient  to help him schedule a follow up from his procedure  with CHRISTOPHER Bates I told him next virtual appointment available was on 10/18/2022 he stated that wasn't a good day for him I told him we can schedule it and put him on a waiting list for a sooner appointment he still denied and stated he will call back. Verbal understanding.

## 2022-09-14 ENCOUNTER — TELEPHONE (OUTPATIENT)
Dept: GASTROENTEROLOGY | Facility: CLINIC | Age: 57
End: 2022-09-14
Payer: COMMERCIAL

## 2022-09-14 NOTE — TELEPHONE ENCOUNTER
Spoke to patient and schedule him a follow up visit to see CHRISTOPHER Bates on 10/19/2022 at 9:30 am, verbal understanding.

## 2022-09-14 NOTE — TELEPHONE ENCOUNTER
----- Message from Vj Thompson sent at 9/14/2022  9:48 AM CDT -----  Type:  Patient Returning Call    Who Called:pt   Who Left Message for Patient:leyla  Does the patient know what this is regarding?:no  Would the patient rather a call back or a response via [facility]? call  Best Call Back Number:[phone]  Additional Information:     Call back

## 2022-10-19 ENCOUNTER — OFFICE VISIT (OUTPATIENT)
Dept: GASTROENTEROLOGY | Facility: CLINIC | Age: 57
End: 2022-10-19
Payer: COMMERCIAL

## 2022-10-19 VITALS — BODY MASS INDEX: 33.44 KG/M2 | HEIGHT: 69 IN | WEIGHT: 225.75 LBS

## 2022-10-19 DIAGNOSIS — R63.4 WEIGHT LOSS, UNINTENTIONAL: ICD-10-CM

## 2022-10-19 DIAGNOSIS — R10.30 LOWER ABDOMINAL PAIN: ICD-10-CM

## 2022-10-19 DIAGNOSIS — K59.04 CHRONIC IDIOPATHIC CONSTIPATION: Primary | ICD-10-CM

## 2022-10-19 PROCEDURE — 99999 PR PBB SHADOW E&M-EST. PATIENT-LVL III: ICD-10-PCS | Mod: PBBFAC,,, | Performed by: NURSE PRACTITIONER

## 2022-10-19 PROCEDURE — 4010F ACE/ARB THERAPY RXD/TAKEN: CPT | Mod: CPTII,S$GLB,, | Performed by: NURSE PRACTITIONER

## 2022-10-19 PROCEDURE — 3066F PR DOCUMENTATION OF TREATMENT FOR NEPHROPATHY: ICD-10-PCS | Mod: CPTII,S$GLB,, | Performed by: NURSE PRACTITIONER

## 2022-10-19 PROCEDURE — 3044F HG A1C LEVEL LT 7.0%: CPT | Mod: CPTII,S$GLB,, | Performed by: NURSE PRACTITIONER

## 2022-10-19 PROCEDURE — 99999 PR PBB SHADOW E&M-EST. PATIENT-LVL III: CPT | Mod: PBBFAC,,, | Performed by: NURSE PRACTITIONER

## 2022-10-19 PROCEDURE — 99214 OFFICE O/P EST MOD 30 MIN: CPT | Mod: S$GLB,,, | Performed by: NURSE PRACTITIONER

## 2022-10-19 PROCEDURE — 3044F PR MOST RECENT HEMOGLOBIN A1C LEVEL <7.0%: ICD-10-PCS | Mod: CPTII,S$GLB,, | Performed by: NURSE PRACTITIONER

## 2022-10-19 PROCEDURE — 4010F PR ACE/ARB THEARPY RXD/TAKEN: ICD-10-PCS | Mod: CPTII,S$GLB,, | Performed by: NURSE PRACTITIONER

## 2022-10-19 PROCEDURE — 1159F PR MEDICATION LIST DOCUMENTED IN MEDICAL RECORD: ICD-10-PCS | Mod: CPTII,S$GLB,, | Performed by: NURSE PRACTITIONER

## 2022-10-19 PROCEDURE — 3066F NEPHROPATHY DOC TX: CPT | Mod: CPTII,S$GLB,, | Performed by: NURSE PRACTITIONER

## 2022-10-19 PROCEDURE — 3061F NEG MICROALBUMINURIA REV: CPT | Mod: CPTII,S$GLB,, | Performed by: NURSE PRACTITIONER

## 2022-10-19 PROCEDURE — 3061F PR NEG MICROALBUMINURIA RESULT DOCUMENTED/REVIEW: ICD-10-PCS | Mod: CPTII,S$GLB,, | Performed by: NURSE PRACTITIONER

## 2022-10-19 PROCEDURE — 99214 PR OFFICE/OUTPT VISIT, EST, LEVL IV, 30-39 MIN: ICD-10-PCS | Mod: S$GLB,,, | Performed by: NURSE PRACTITIONER

## 2022-10-19 PROCEDURE — 1159F MED LIST DOCD IN RCRD: CPT | Mod: CPTII,S$GLB,, | Performed by: NURSE PRACTITIONER

## 2022-10-19 NOTE — PROGRESS NOTES
"     GASTROENTEROLOGY CLINIC NOTE    Chief Complaint: The primary encounter diagnosis was Chronic idiopathic constipation. Diagnoses of Weight loss, unintentional and Lower abdominal pain were also pertinent to this visit.  Referring provider/PCP: Anson Blunt MD    HPI:  Saud Mayers is a 57 y.o. male who is a new patient to me with a PMH that's significant for asthma and DM2. He is here today to establish care for abdominal pain. This is a new problem that began about 7 to 8 weeks ago. He reports starting Januvia two days prior to start of symptoms.  Januvia was discontinued shortly after symptoms began but the abdominal pain continue to worsen over the next 3 to 4 weeks. He then began taking pain medication for the abdominal pain which led to constipation. He has since stopped taking the pain medicines. Abdominal pain is described as a generalized "upset stomach/empty feeling" with accompanied bloating. The intensity waxes and wanes. The pain occasionally wakes him while sleeping. Slightly improves for about a half hour to an hour following a bowel movement but then returns. Denies pyrosis, waterbrash, or reflux. No nausea, vomiting, fever, or chills reported. He has two bowel movements per day that are soft in consistency. No straining. States in order to have a bowel movement he hast to use his bidet. He will have the urge to have a bowel movement but will pass gas only until uses bidet which then promotes bowel movement. He has tried Colace and lactulose which has caused to frequent bowel movements. No Melena or hematochezia reported.    Interval Note 8/16/2022  Mr. Nito Mayers who is known to me presents to clinic for follow up visit regarding abdominal pain and change in bowel habits.  Following his last office visit, he was scheduled for EGD and Colonoscopy with Dr. Cisse on 9/20 and it was recommended he start daily Metamucil (taking) and IBgard.  Followed up with PCP 8/3/2022 and reported " "abdominal pain improved and appetite was also improving.  Four days later, he began experiencing severe LLQ abdominal pain accompanied by fatigue, fever, and loose bowel movements.  This prompted him to seek care in ER where CT was obtained which revealed "multiple hypodense lesions on spleen, mild splenomegaly, Mild hepatomegaly, hepatic steatosis, mild bladder wall thickening, and retroperitoneal adenopathy with fat stranding"  He was discharged with Cipro and Flagyl x 7 days.  He just completed course of antibiotics. Continues to have LLQ and left flank pain but is improving.  Currently describes pain as "bruising/bruised rib."  Pain is constant but intensity worsens with deep inspiration or movement. No improvement with bowel movements or eating. Appetite is decreased and states it "comes and goes." Continues to have early satiety.   Following bland diet for most part but ate sausage and potatoes last night which made pain worse.  Continues with weight loss; lost 18# since previous office visit with me.  Began having loose bowel movements about 2-3 weeks ago. Bowel movements occur shortly after eating; does not matter what he eats.  No foul odor to stool and no undigested food noted in stool. No mucus, oily stools, melena, or hematochezia.  No nocturnal symptoms.     Interval Note 10/19/2022  Mr. Nito Mayers who is known to me presents to clinic for follow up regarding abdominal pain and change in bowel habits.  Following last office visit, he underwent stool studies d/t c/o diarrhea and EGD/Colonoscopy also performed by Dr. Cisse.  EGD/Colon essentially without any concerning findings.  He continues with decreased appetite and weight loss.  Is eating b/c he has to eat not because feels hungry. States he ate lunch yesterday and did not eat dinner or breakfast b/c he is not getting hungry.  Continues to experience lower abdominal bloating and discomfort.  Alternates between constipation and loose bowel " movements but constipation predominates.  Discomfort and bloating improves following bowel movements.  Has one bowel movement about every 2-3 days with accompanied straining. No nausea, vomiting, reflux, pyrosis, belching, passing gas.      Currently taking Metformin and glimepiride for diabetes. Not taking GLP-1.  No NSAIDs, opiates, ETOH use.       Treatments Tried: Colace, Lactulose, Metamucil, Cipro, Flagyl, Tylenol  NSAIDs: No  Anticoagulation or Antiplatelet: No    Prior Upper Endoscopy: No  Prior Colonoscopy: 10 years ago. No abnormal findings per patient.  Family h/o Colon Cancer: No  Mother with gastric cancer.   Family h/o Crohn's Disease or Ulcerative Colitis: No  Family h/o Celiac Sprue: No  Abdominal Surgeries: Cholecystectomy        Review of Systems   Constitutional: Positive for weight loss, fatigue.   HENT: Negative for sore throat.    Eyes: Negative for blurred vision.   Respiratory: Negative for cough.    Cardiovascular: Negative for chest pain.   Gastrointestinal: Positive for abdominal pain and constipation. Negative for blood in stool, diarrhea, heartburn, melena, nausea and vomiting.   Genitourinary: Negative for dysuria.   Musculoskeletal: Negative for myalgias.   Skin: Negative for rash.   Neurological: Negative for headaches.   Endo/Heme/Allergies: Negative for environmental allergies.   Psychiatric/Behavioral: Negative for suicidal ideas. The patient is not nervous/anxious.        Past Medical History: has a past medical history of Asthma, Hypertension, ANAMIKA on CPAP, and Testicular hypofunction.    Past Surgical History: has a past surgical history that includes Colonoscopy (01/01/2013); orthopedic surgery (Bilateral, 01/01/2007); orthopedic surgery (Right, 01/01/2006); Rotator cuff repair (Left, 01/01/1997); Cholecystectomy (01/01/1990); Carpal tunnel release (Bilateral, 2020); Colonoscopy (N/A, 9/2/2022); and Esophagogastroduodenoscopy (N/A, 9/2/2022).    Family History:family history  "includes Heart attack in his father.    Allergies:   Review of patient's allergies indicates:   Allergen Reactions    Gabapentin Hallucinations    Tizanidine Other (See Comments)     somnolence       Social History: reports that he quit smoking about 30 years ago. His smoking use included cigarettes. He has never used smokeless tobacco.    Home medications:   Current Outpatient Medications on File Prior to Visit   Medication Sig Dispense Refill    atorvastatin (LIPITOR) 20 MG tablet Take 1 tablet (20 mg total) by mouth once daily. 90 tablet 3    betamethasone dipropionate (DIPROLENE) 0.05 % ointment Apply topically 2 (two) times daily. 1 each 3    glimepiride (AMARYL) 4 MG tablet Take 1 tablet (4 mg total) by mouth daily with breakfast. 90 tablet 3    losartan (COZAAR) 25 MG tablet TAKE 4 TABLETS(100MG) BY MOUTH ONCE DAILY (Patient taking differently: 25 mg once daily.) 90 tablet 3    metFORMIN (GLUCOPHAGE) 1000 MG tablet Take 1 tablet (1,000 mg total) by mouth 2 (two) times daily with meals. 180 tablet 3    metoprolol succinate (TOPROL-XL) 50 MG 24 hr tablet Take 1 tablet (50 mg total) by mouth once daily. 90 tablet 3    pantoprazole (PROTONIX) 40 MG tablet Take 1 tablet (40 mg total) by mouth once daily. 90 tablet 1    sildenafiL (VIAGRA) 100 MG tablet Take 1 tablet (100 mg total) by mouth daily as needed for Erectile Dysfunction. 30 tablet 5    sod sulf-pot chloride-mag sulf (SUTAB) 1.479-0.188- 0.225 gram tablet Take 12 tablets by mouth once daily. Take according to package instructions with indicated amount of water. 24 tablet 0     No current facility-administered medications on file prior to visit.       Vital signs:  Ht 5' 9" (1.753 m)   Wt 102.4 kg (225 lb 12 oz)   BMI 33.34 kg/m²     Physical Exam  Vitals reviewed.   Constitutional:       General: He is not in acute distress.     Appearance: Normal appearance. He is not ill-appearing; noticeable weight loss    HENT:      Head: Normocephalic. "   Cardiovascular:      Rate and Rhythm: Normal rate and regular rhythm.      Heart sounds: Normal heart sounds. No murmur heard.  Pulmonary:      Effort: Pulmonary effort is normal. No respiratory distress.      Breath sounds: Normal breath sounds.   Chest:      Chest wall: No tenderness.   Abdominal:      General: Bowel sounds are normal. There is no distension.      Palpations: Abdomen is soft.      Tenderness: no abdominal tenderness     Hernia: No hernia is present. Diastasis Recti   Skin:     General: Skin is warm.   Neurological:      Mental Status: He is alert and oriented to person, place, and time.   Psychiatric:         Mood and Affect: Mood normal.         Behavior: Behavior normal.         Routine labs:  Lab Results   Component Value Date    WBC 8.99 08/16/2022    HGB 12.5 (L) 08/16/2022    HCT 38.5 (L) 08/16/2022    MCV 90 08/16/2022     08/16/2022     No results found for: INR  Lab Results   Component Value Date    IRON 44 (L) 08/03/2022    FERRITIN 594 (H) 08/03/2022    TIBC 334 08/03/2022    FESATURATED 13 (L) 08/03/2022     Lab Results   Component Value Date     08/16/2022    K 4.4 08/16/2022     08/16/2022    CO2 22 (L) 08/16/2022    BUN 7 08/16/2022    CREATININE 0.8 08/16/2022     Lab Results   Component Value Date    ALBUMIN 3.8 08/16/2022    ALT 25 08/16/2022    AST 33 08/16/2022    ALKPHOS 92 08/16/2022    BILITOT 0.5 08/16/2022     No results found for: GLUCOSE  Lab Results   Component Value Date    TSH 2.250 08/03/2022     Lab Results   Component Value Date    CALCIUM 9.9 08/16/2022      Latest Reference Range & Units 08/16/22 13:41   Calprotectin <50 mcg/g <27.1   C. diff Antigen Negative  Negative   C difficile Toxins A+B, EIA Negative  Negative   Elastase 1, Fecal >200 (Normal) mcg/g >500   Giardia Antigen - EIA Negative  Negative   Cryptosporidium Antigen Negative  Negative   H. Pylori Antigen, Stool NOT DETECTED  NOT DETECTED   Rotavirus Negative  Negative   pH, Stool  5.0 - 8.5  7.0   Stool Exam-Ova,Cysts,Parasites  FINAL 08/22/2022 2222   Stool WBC No neutrophils seen  No neutrophils seen   H. pylori Antigen Source  1      Latest Reference Range & Units 08/16/22 10:51   TTG IgA <20 UNITS 12   IgA 40 - 350 mg/dL 594 (H)       Imaging:  CT Abdomen Pelvis With Contrast  Narrative: EXAMINATION:  CT ABDOMEN PELVIS WITH CONTRAST    CLINICAL HISTORY:  LLQ abdominal pain;    TECHNIQUE:  Low dose axial images, sagittal and coronal reformations were obtained from the lung bases to the pubic symphysis following the IV administration of 100 mL of Omnipaque 350.    COMPARISON:  None    FINDINGS:  Heart: Normal size as far as seen. No effusion as far as seen.    Lung Bases: Trace pleural effusions    Liver: Fatty infiltration of the liver.  Mild hepatomegaly.  No focal lesions.    Gallbladder: Cholecystectomy    Bile Ducts: No dilatation.    Pancreas: No mass. No peripancreatic fat stranding.    Spleen: Multiple hypodense lesions of the spleen.  The top 3 lesions measures 6.7, 2.9 and 2.5 cm.  Mild splenomegaly    Adrenals: Normal.    Kidneys/Ureters: Normal enhancement.  No mass or  hydroureteronephrosis.    Bladder: Mild bladder wall thickening.    Reproductive organs: Normal.    GI Tract/Mesentery: No evidence of bowel obstruction or inflammation.  No evidence of acute appendicitis.  Mild colonic diverticulosis    Peritoneal Space: No ascites or free air.    Retroperitoneum: Retroperitoneal adenopathy with fat stranding.    Abdominal wall: Normal.    Vasculature: No aneurysm.    Bones: No acute fracture. No suspicious lytic or sclerotic lesions.  Impression: Retroperitoneal adenopathy with fat stranding.  Correlate to history of malignancy.  Infectious inflammatory process however is not excluded    Multiple hypodense lesions of the spleen.  The differential late diagnosis includes neoplastic, infectious, inflammatory process    Trace pleural effusions    Remaining findings  above.    Recommend clinical correlation and follow-up    All CT scans at this facility use dose modulation, iterative reconstruction, and/or weight based dosing when appropriate to reduce radiation dose to as low as reasonably achievable.    Electronically signed by: Kevin Coffman  Date:    08/07/2022  Time:    16:53      I have reviewed prior labs, imaging, and notes.      Assessment:  1. Chronic idiopathic constipation    2. Weight loss, unintentional    3. Lower abdominal pain          Plan:  Orders Placed This Encounter    CT Abdomen Pelvis W Wo Contrast     Repeat CT in one month  Continue Metamucil  Trial Linzess 72mcg.  Will start at lower dose at lactulose and colace caused too frequent bowel movements.         Plan of care discussed with patient who is in agreement and verbalized understanding.     I have explained the planned procedures to the patient.The risks, benefits and alternatives of the procedure were also explained in detail. Patient verbalized understanding, all questions were answered. The patient agrees to proceed as planned    Follow Up: As Needed Pending Above Workup          Jana Bernal, TERRY,FNP-BC  [facility]

## 2022-11-04 ENCOUNTER — PATIENT MESSAGE (OUTPATIENT)
Dept: GASTROENTEROLOGY | Facility: CLINIC | Age: 57
End: 2022-11-04
Payer: COMMERCIAL

## 2022-11-04 DIAGNOSIS — K59.04 CHRONIC IDIOPATHIC CONSTIPATION: Primary | ICD-10-CM

## 2022-11-07 ENCOUNTER — HOSPITAL ENCOUNTER (EMERGENCY)
Facility: HOSPITAL | Age: 57
Discharge: HOME | End: 2022-11-07
Attending: EMERGENCY MEDICINE
Payer: COMMERCIAL

## 2022-11-07 VITALS
TEMPERATURE: 99 F | SYSTOLIC BLOOD PRESSURE: 132 MMHG | HEIGHT: 69 IN | DIASTOLIC BLOOD PRESSURE: 61 MMHG | WEIGHT: 225 LBS | BODY MASS INDEX: 33.33 KG/M2 | HEART RATE: 88 BPM | OXYGEN SATURATION: 98 % | RESPIRATION RATE: 16 BRPM

## 2022-11-07 DIAGNOSIS — R59.9 ADENOPATHY: ICD-10-CM

## 2022-11-07 DIAGNOSIS — R59.1 LYMPHADENOPATHY: Primary | ICD-10-CM

## 2022-11-07 DIAGNOSIS — R22.1 NECK SWELLING: ICD-10-CM

## 2022-11-07 LAB
ALBUMIN SERPL BCP-MCNC: 3.3 G/DL (ref 3.5–5.2)
ALP SERPL-CCNC: 112 U/L (ref 55–135)
ALT SERPL W/O P-5'-P-CCNC: 23 U/L (ref 10–44)
ANION GAP SERPL CALC-SCNC: 11 MMOL/L (ref 8–16)
AST SERPL-CCNC: 31 U/L (ref 10–40)
BASOPHILS # BLD AUTO: 0.04 K/UL (ref 0–0.2)
BASOPHILS NFR BLD: 0.5 % (ref 0–1.9)
BILIRUB SERPL-MCNC: 1.8 MG/DL (ref 0.1–1)
BUN SERPL-MCNC: 18 MG/DL (ref 6–20)
CALCIUM SERPL-MCNC: 10 MG/DL (ref 8.7–10.5)
CHLORIDE SERPL-SCNC: 97 MMOL/L (ref 95–110)
CO2 SERPL-SCNC: 24 MMOL/L (ref 23–29)
CREAT SERPL-MCNC: 0.6 MG/DL (ref 0.5–1.4)
DIFFERENTIAL METHOD: ABNORMAL
EOSINOPHIL # BLD AUTO: 0.1 K/UL (ref 0–0.5)
EOSINOPHIL NFR BLD: 0.6 % (ref 0–8)
ERYTHROCYTE [DISTWIDTH] IN BLOOD BY AUTOMATED COUNT: 15.8 % (ref 11.5–14.5)
EST. GFR  (NO RACE VARIABLE): >60 ML/MIN/1.73 M^2
GLUCOSE SERPL-MCNC: 80 MG/DL (ref 70–110)
HCT VFR BLD AUTO: 34.8 % (ref 40–54)
HGB BLD-MCNC: 11.1 G/DL (ref 14–18)
IMM GRANULOCYTES # BLD AUTO: 0.11 K/UL (ref 0–0.04)
IMM GRANULOCYTES NFR BLD AUTO: 1.3 % (ref 0–0.5)
INR PPP: 1.1 (ref 0.8–1.2)
LYMPHOCYTES # BLD AUTO: 0.6 K/UL (ref 1–4.8)
LYMPHOCYTES NFR BLD: 7.6 % (ref 18–48)
MCH RBC QN AUTO: 30.4 PG (ref 27–31)
MCHC RBC AUTO-ENTMCNC: 31.9 G/DL (ref 32–36)
MCV RBC AUTO: 95 FL (ref 82–98)
MONOCYTES # BLD AUTO: 1 K/UL (ref 0.3–1)
MONOCYTES NFR BLD: 12.3 % (ref 4–15)
NEUTROPHILS # BLD AUTO: 6.4 K/UL (ref 1.8–7.7)
NEUTROPHILS NFR BLD: 77.7 % (ref 38–73)
NRBC BLD-RTO: 0 /100 WBC
PLATELET # BLD AUTO: 223 K/UL (ref 150–450)
PMV BLD AUTO: 9.3 FL (ref 9.2–12.9)
POTASSIUM SERPL-SCNC: 4.1 MMOL/L (ref 3.5–5.1)
PROT SERPL-MCNC: 8.3 G/DL (ref 6–8.4)
PROTHROMBIN TIME: 11.5 SEC (ref 9–12.5)
RBC # BLD AUTO: 3.65 M/UL (ref 4.6–6.2)
SODIUM SERPL-SCNC: 132 MMOL/L (ref 136–145)
WBC # BLD AUTO: 8.18 K/UL (ref 3.9–12.7)

## 2022-11-07 PROCEDURE — 85610 PROTHROMBIN TIME: CPT | Performed by: EMERGENCY MEDICINE

## 2022-11-07 PROCEDURE — 99284 PR EMERGENCY DEPT VISIT,LEVEL IV: ICD-10-PCS | Mod: ,,, | Performed by: EMERGENCY MEDICINE

## 2022-11-07 PROCEDURE — 80053 COMPREHEN METABOLIC PANEL: CPT | Performed by: EMERGENCY MEDICINE

## 2022-11-07 PROCEDURE — 99285 EMERGENCY DEPT VISIT HI MDM: CPT | Mod: 25

## 2022-11-07 PROCEDURE — 85025 COMPLETE CBC W/AUTO DIFF WBC: CPT | Performed by: EMERGENCY MEDICINE

## 2022-11-07 PROCEDURE — 99284 EMERGENCY DEPT VISIT MOD MDM: CPT | Mod: ,,, | Performed by: EMERGENCY MEDICINE

## 2022-11-07 PROCEDURE — 25500020 PHARM REV CODE 255: Performed by: EMERGENCY MEDICINE

## 2022-11-07 RX ADMIN — IOHEXOL 75 ML: 350 INJECTION, SOLUTION INTRAVENOUS at 02:11

## 2022-11-07 NOTE — DISCHARGE INSTRUCTIONS
Please follow up with oncology referral.    Diagnosis:  Lymphadenopathy      Follow-Up Plan:  - Follow-up with primary care doctor within 3 - 5 days  - Additional testing and/or evaluation as directed by your primary doctor    Return to the Emergency Department for symptoms including but not limited to: worsening symptoms, shortness of breath or chest pain, vomiting with inability to hold down fluids, fevers greater than 100.4°F, passing out/fainting/unconsciousness, or other concerning symptoms.

## 2022-11-07 NOTE — ED PROVIDER NOTES
Encounter Date: 11/7/2022       History     Chief Complaint   Patient presents with    Lymphadenopathy     Large swelling to r side of neck     Saud Mayers is a 57 y.o. male with PMH of type 2 diabetes, hypertension, asthma presenting to [facility] ED for neck swelling..  States that approximately 1 month ago he experienced swelling on the left side of his neck was sure resolved in 1 week.  Two and half weeks ago he started experiencing swelling on the right side of his neck with associated cough.  Patient has been afebrile, denies feeling like his throat is closing, shortness of breath, chest pain, abdominal pain, urinary symptoms.  States that he has lost 50 lb in the last year but has attributed to his diverticulosis diagnosis.  patient was seen at urgent care approximately 7 days ago, prescribed Augmentin and prednisone.  Patient states that neck swelling has progressed in is now tender and sore.  States that he has not been seen by his primary care physician in the wait is 3 weeks.      Review of patient's allergies indicates:   Allergen Reactions    Gabapentin Hallucinations    Tizanidine Other (See Comments)     somnolence     Past Medical History:   Diagnosis Date    Asthma     Hypertension     ANAMIKA on CPAP     Testicular hypofunction      Past Surgical History:   Procedure Laterality Date    CARPAL TUNNEL RELEASE Bilateral 2020    CHOLECYSTECTOMY  01/01/1990    COLONOSCOPY  01/01/2013    COLONOSCOPY N/A 9/2/2022    Procedure: COLONOSCOPY sutab;  Surgeon: Jeovany Cisse MD;  Location: [facility];  Service: Endoscopy;  Laterality: N/A;    ESOPHAGOGASTRODUODENOSCOPY N/A 9/2/2022    Procedure: EGD (ESOPHAGOGASTRODUODENOSCOPY);  Surgeon: Jeovany Cisse MD;  Location: [facility];  Service: Endoscopy;  Laterality: N/A;    ORTHOPEDIC SURGERY Bilateral 01/01/2007    feet plantar    ORTHOPEDIC SURGERY Right 01/01/2006    knee    ROTATOR CUFF REPAIR Left 01/01/1997     Family History   Problem Relation Age of Onset     Heart attack Father      Social History     Tobacco Use    Smoking status: Former     Types: Cigarettes     Quit date: 1/15/1992     Years since quittin.8    Smokeless tobacco: Never     Review of Systems   Constitutional:  Negative for chills and fever.   HENT:  Negative for congestion, drooling, ear pain, rhinorrhea and sore throat.    Eyes:  Negative for visual disturbance.   Respiratory:  Positive for cough. Negative for chest tightness and shortness of breath.    Cardiovascular:  Negative for chest pain and leg swelling.   Gastrointestinal:  Negative for abdominal distention, abdominal pain, constipation, diarrhea, nausea and vomiting.   Endocrine: Negative for polyuria.   Genitourinary:  Negative for dysuria and hematuria.   Musculoskeletal:  Positive for neck pain. Negative for myalgias.   Skin:  Negative for color change and rash.   Neurological:  Negative for light-headedness and headaches.   Psychiatric/Behavioral:  Negative for decreased concentration. The patient is not nervous/anxious.      Physical Exam     Initial Vitals [22 1031]   BP Pulse Resp Temp SpO2   136/66 101 18 99 °F (37.2 °C) 100 %      MAP       --         Physical Exam    Nursing note and vitals reviewed.  Constitutional: Vital signs are normal. He appears well-developed and well-nourished. He is cooperative.   HENT:   Head: Normocephalic and atraumatic.   Eyes: Conjunctivae and EOM are normal. Pupils are equal, round, and reactive to light.   Neck: Trachea normal and phonation normal. Neck supple. No tracheal deviation present.       Cardiovascular:  Normal rate, regular rhythm, normal heart sounds, intact distal pulses and normal pulses.     Exam reveals no gallop, no S3, no S4 and no friction rub.       No murmur heard.  Pulmonary/Chest: Breath sounds normal. No respiratory distress. He has no wheezes. He has no rhonchi. He has no rales.   Abdominal: Abdomen is soft. He exhibits no distension. There is no abdominal  tenderness. There is no rebound.   Musculoskeletal:      Cervical back: Neck supple.      Comments: Extremities atraumatic x4.  Normal gait.  No clubbing, cyanosis, edema.     Neurological: He is alert and oriented to person, place, and time. No cranial nerve deficit or sensory deficit. GCS eye subscore is 4. GCS verbal subscore is 5. GCS motor subscore is 6.   Skin: Skin is warm, dry and intact. Capillary refill takes less than 2 seconds.   Psychiatric: He has a normal mood and affect. His speech is normal and behavior is normal. Thought content normal.       ED Course   Procedures  Labs Reviewed   CBC W/ AUTO DIFFERENTIAL - Abnormal; Notable for the following components:       Result Value    RBC 3.65 (*)     Hemoglobin 11.1 (*)     Hematocrit 34.8 (*)     MCHC 31.9 (*)     RDW 15.8 (*)     Immature Granulocytes 1.3 (*)     Immature Grans (Abs) 0.11 (*)     Lymph # 0.6 (*)     Gran % 77.7 (*)     Lymph % 7.6 (*)     All other components within normal limits   COMPREHENSIVE METABOLIC PANEL - Abnormal; Notable for the following components:    Sodium 132 (*)     Albumin 3.3 (*)     Total Bilirubin 1.8 (*)     All other components within normal limits   PROTIME-INR          Imaging Results              CT Soft Tissue Neck With Contrast (Final result)  Result time 11/07/22 14:32:22      Final result by Zohaib Tracey MD (11/07/22 14:32:22)                   Impression:      Extensive adenopathy within the right greater than left neck with also adenopathy within the mediastinum and left axilla.  The largest lymph node in the right posterior triangle/spinal accessory region demonstrates internal necrosis as does a right paratracheal lymph node within the mediastinum..  There is inflammatory change surrounding the necrotic lymph nodes and although an infectious/inflammatory process is not excluded, a neoplastic process including lymphoma to be considered.      Electronically signed by: Zohaib  Alexy  Date:    11/07/2022  Time:    14:32               Narrative:    EXAMINATION:  CT SOFT TISSUE NECK WITH CONTRAST    CLINICAL HISTORY:  Lymphadenopathy, neck;    TECHNIQUE:  Low dose axial images as well as sagittal and coronal reconstructions were performed from the skull base to the clavicles following the intravenous administration of 75 mL of Omnipaque 350.    COMPARISON:  None    FINDINGS:  There is extensive adenopathy identified involving the right greater than left jugular and spinal accessory chains.  Prominent stranding/infiltration of the adjacent fascial planes is noted.  The largest lymph node is located in the upper spinal accessory chain measuring 2.8 cm with central necrosis.  Mediastinal and left axillary adenopathy is also identified with a partially necrotic right paratracheal lymph node measuring 18 mm.    No mucosal based soft tissue mass is identified within the pharynx and larynx.  The thyroid gland maintains normal attenuation.    The major vascular structures are patent with atherosclerotic calcifications but without advanced stenosis suggested at the carotid bifurcations bilaterally.    No osseous destructive process.  The visualized sinuses and mastoid air cells are clear.                                       X-Ray Neck Soft Tissue (Final result)  Result time 11/07/22 12:46:45      Final result by Jefferson Bustos MD (11/07/22 12:46:45)                   Impression:      Patent airway.  No radiopaque foreign body.      Electronically signed by: Jefferson Bustos  Date:    11/07/2022  Time:    12:46               Narrative:    EXAMINATION:  XR NECK SOFT TISSUE    CLINICAL HISTORY:  Localized swelling, mass and lump, neck    TECHNIQUE:  AP and lateral soft tissue views the neck were performed.    COMPARISON:  None.    FINDINGS:  Airway appears patent.  No significant prevertebral soft tissue swelling.    Degenerative loss of disc space height and adjoining endplate sclerosis in the  cervical spine.    No definite evidence of radiopaque foreign body.  No acute findings in the visualized portions of the chest.                                       Medications   iohexoL (OMNIPAQUE 350) injection 75 mL (75 mLs Intravenous Given 11/7/22 1422)     Medical Decision Making:   Initial Assessment:   Saud Mayers is a 57 y.o. male with PMH of type 2 diabetes, hypertension, asthma presenting to [facility] ED for neck swelling. initially, patient is presenting hemodynamically stable and clinically well appearing.  Differential Diagnosis:   Reactive lymphadenopathy, lymphoma  ED Management:  Patient presents with symptoms consistent with reactive lymphadenopathy due to cough, tenderness to palpation. It is concerning for other pathology since patient failed outpatient management with antibiotics.    Workup as detailed below in ED course.  Workup significant for mild hyponatremia 132, mild normocytic anemia, no interventions indicated at this time.  CT soft tissue neck showing extensive adenopathy, lymph node in the right posterior triangle/spinal accessory region and paratracheal/mediastinal lymph node with internal necrosis.  Infectious/inflammatory versus neoplastic process.    Discussed patient's case with ENT to determine who is the most appropriate service for biopsy, recommend consulting Hematology/Oncology Service for biopsy.  Discussed patient's case with Oncology Service, they states that they will contact our care coordinator to setup an appointment with this patient as long as we provide outpatient referral.    Patient is hemodynamically stable and very clinically well-appearing.  No concern for airway protection, no systemic signs of symptoms such as fever, nausea/vomiting, diarrhea.  Discussed strict return precautions, patient voices understanding.  I feel the patient is reliable and able to follow-up with outpatient referral.  Patient has capacity to make his own decisions and is happy with the  plan for outpatient follow-up and return to the ED for any additional symptoms.  Patient is stable and appropriate for discharge at this time.  Provided referral to Hematology-Oncology at discharge.            Attending Attestation:   Physician Attestation Statement for Resident:  As the supervising MD   Physician Attestation Statement: I have personally seen and examined this patient.   I agree with the above history.  -:   As the supervising MD I agree with the above PE.     As the supervising MD I agree with the above treatment, course, plan, and disposition.                  ED Course as of 11/08/22 0733   Mon Nov 07, 2022   1354 Hemoglobin(!): 11.1  Normocytic anemia, no transfusion indicated [ES]   1354 Protime: 11.5 [ES]   1354 INR: 1.1 [ES]   1354 Sodium(!): 132 [ES]   1354 X-Ray Neck Soft Tissue  Patent airway.  No radiopaque foreign body. [ES]   1507 CT Soft Tissue Neck With Contrast  Extensive adenopathy within the right greater than left neck with also adenopathy within the mediastinum and left axilla.  The largest lymph node in the right posterior triangle/spinal accessory region demonstrates internal necrosis as does a right paratracheal lymph node within the mediastinum..  There is inflammatory change surrounding the necrotic lymph nodes and although an infectious/inflammatory process is not excluded, a neoplastic process including lymphoma to be considered. [ES]      ED Course User Index  [ES] Evelin Taylor MD                 Clinical Impression:   Final diagnoses:  [R22.1] Neck swelling  [R59.9] Adenopathy  [R59.1] Lymphadenopathy (Primary)        ED Disposition Condition    Discharge Stable          ED Prescriptions    None       Follow-up Information    None          Evelin Taylor MD  Resident  11/07/22 2036       James Cooley MD  11/08/22 0733

## 2022-11-08 ENCOUNTER — PATIENT MESSAGE (OUTPATIENT)
Dept: GASTROENTEROLOGY | Facility: CLINIC | Age: 57
End: 2022-11-08
Payer: COMMERCIAL

## 2022-11-09 ENCOUNTER — HOSPITAL ENCOUNTER (OUTPATIENT)
Facility: HOSPITAL | Age: 57
Discharge: HOME | End: 2022-11-11
Attending: EMERGENCY MEDICINE | Admitting: HOSPITALIST
Payer: COMMERCIAL

## 2022-11-09 ENCOUNTER — TELEPHONE (OUTPATIENT)
Dept: GASTROENTEROLOGY | Facility: CLINIC | Age: 57
End: 2022-11-09
Payer: COMMERCIAL

## 2022-11-09 DIAGNOSIS — R93.89 ABNORMAL FINDING ON CT SCAN: ICD-10-CM

## 2022-11-09 DIAGNOSIS — R07.9 CHEST PAIN: ICD-10-CM

## 2022-11-09 DIAGNOSIS — R59.1 LYMPHADENOPATHY: ICD-10-CM

## 2022-11-09 DIAGNOSIS — R16.1 SPLENIC MASS: Primary | ICD-10-CM

## 2022-11-09 DIAGNOSIS — E86.0 DEHYDRATION: ICD-10-CM

## 2022-11-09 DIAGNOSIS — I25.10 CORONARY ARTERY DISEASE, UNSPECIFIED VESSEL OR LESION TYPE, UNSPECIFIED WHETHER ANGINA PRESENT, UNSPECIFIED WHETHER NATIVE OR TRANSPLANTED HEART: ICD-10-CM

## 2022-11-09 DIAGNOSIS — R06.02 SOB (SHORTNESS OF BREATH): ICD-10-CM

## 2022-11-09 LAB
ALBUMIN SERPL BCP-MCNC: 3 G/DL (ref 3.5–5.2)
ALP SERPL-CCNC: 107 U/L (ref 55–135)
ALT SERPL W/O P-5'-P-CCNC: 21 U/L (ref 10–44)
ANION GAP SERPL CALC-SCNC: 19 MMOL/L (ref 8–16)
AST SERPL-CCNC: 36 U/L (ref 10–40)
BASOPHILS # BLD AUTO: 0.06 K/UL (ref 0–0.2)
BASOPHILS NFR BLD: 0.6 % (ref 0–1.9)
BILIRUB SERPL-MCNC: 1.1 MG/DL (ref 0.1–1)
BNP SERPL-MCNC: <10 PG/ML (ref 0–99)
BUN SERPL-MCNC: 23 MG/DL (ref 6–20)
CALCIUM SERPL-MCNC: 9.5 MG/DL (ref 8.7–10.5)
CHLORIDE SERPL-SCNC: 98 MMOL/L (ref 95–110)
CO2 SERPL-SCNC: 15 MMOL/L (ref 23–29)
CREAT SERPL-MCNC: 1.5 MG/DL (ref 0.5–1.4)
DIFFERENTIAL METHOD: ABNORMAL
EOSINOPHIL # BLD AUTO: 0 K/UL (ref 0–0.5)
EOSINOPHIL NFR BLD: 0.4 % (ref 0–8)
ERYTHROCYTE [DISTWIDTH] IN BLOOD BY AUTOMATED COUNT: 15.6 % (ref 11.5–14.5)
EST. GFR  (NO RACE VARIABLE): 53 ML/MIN/1.73 M^2
GLUCOSE SERPL-MCNC: 139 MG/DL (ref 70–110)
HCT VFR BLD AUTO: 27.9 % (ref 40–54)
HGB BLD-MCNC: 8.9 G/DL (ref 14–18)
IMM GRANULOCYTES # BLD AUTO: 0.09 K/UL (ref 0–0.04)
IMM GRANULOCYTES NFR BLD AUTO: 0.9 % (ref 0–0.5)
LIPASE SERPL-CCNC: 19 U/L (ref 4–60)
LYMPHOCYTES # BLD AUTO: 0.7 K/UL (ref 1–4.8)
LYMPHOCYTES NFR BLD: 7.3 % (ref 18–48)
MAGNESIUM SERPL-MCNC: 1.7 MG/DL (ref 1.6–2.6)
MCH RBC QN AUTO: 29.9 PG (ref 27–31)
MCHC RBC AUTO-ENTMCNC: 31.9 G/DL (ref 32–36)
MCV RBC AUTO: 94 FL (ref 82–98)
MONOCYTES # BLD AUTO: 1.2 K/UL (ref 0.3–1)
MONOCYTES NFR BLD: 12.6 % (ref 4–15)
NEUTROPHILS # BLD AUTO: 7.6 K/UL (ref 1.8–7.7)
NEUTROPHILS NFR BLD: 78.2 % (ref 38–73)
NRBC BLD-RTO: 0 /100 WBC
PLATELET # BLD AUTO: 204 K/UL (ref 150–450)
PMV BLD AUTO: 9.6 FL (ref 9.2–12.9)
POCT GLUCOSE: 146 MG/DL (ref 70–110)
POCT GLUCOSE: 150 MG/DL (ref 70–110)
POCT GLUCOSE: 66 MG/DL (ref 70–110)
POTASSIUM SERPL-SCNC: 4.6 MMOL/L (ref 3.5–5.1)
PROT SERPL-MCNC: 7.5 G/DL (ref 6–8.4)
RBC # BLD AUTO: 2.98 M/UL (ref 4.6–6.2)
SODIUM SERPL-SCNC: 132 MMOL/L (ref 136–145)
TROPONIN I SERPL DL<=0.01 NG/ML-MCNC: 0.01 NG/ML (ref 0–0.03)
WBC # BLD AUTO: 9.74 K/UL (ref 3.9–12.7)

## 2022-11-09 PROCEDURE — 80053 COMPREHEN METABOLIC PANEL: CPT | Performed by: EMERGENCY MEDICINE

## 2022-11-09 PROCEDURE — 84484 ASSAY OF TROPONIN QUANT: CPT | Performed by: EMERGENCY MEDICINE

## 2022-11-09 PROCEDURE — 86592 SYPHILIS TEST NON-TREP QUAL: CPT | Performed by: EMERGENCY MEDICINE

## 2022-11-09 PROCEDURE — 96360 HYDRATION IV INFUSION INIT: CPT

## 2022-11-09 PROCEDURE — 83735 ASSAY OF MAGNESIUM: CPT | Performed by: EMERGENCY MEDICINE

## 2022-11-09 PROCEDURE — 25000003 PHARM REV CODE 250: Performed by: EMERGENCY MEDICINE

## 2022-11-09 PROCEDURE — 93005 ELECTROCARDIOGRAM TRACING: CPT

## 2022-11-09 PROCEDURE — 99285 EMERGENCY DEPT VISIT HI MDM: CPT | Mod: 25

## 2022-11-09 PROCEDURE — 93010 EKG 12-LEAD: ICD-10-PCS | Mod: ,,, | Performed by: INTERNAL MEDICINE

## 2022-11-09 PROCEDURE — G0378 HOSPITAL OBSERVATION PER HR: HCPCS

## 2022-11-09 PROCEDURE — 25500020 PHARM REV CODE 255: Performed by: EMERGENCY MEDICINE

## 2022-11-09 PROCEDURE — 82962 GLUCOSE BLOOD TEST: CPT

## 2022-11-09 PROCEDURE — 93010 ELECTROCARDIOGRAM REPORT: CPT | Mod: ,,, | Performed by: INTERNAL MEDICINE

## 2022-11-09 PROCEDURE — 85025 COMPLETE CBC W/AUTO DIFF WBC: CPT | Performed by: EMERGENCY MEDICINE

## 2022-11-09 PROCEDURE — 83880 ASSAY OF NATRIURETIC PEPTIDE: CPT | Performed by: EMERGENCY MEDICINE

## 2022-11-09 PROCEDURE — 83690 ASSAY OF LIPASE: CPT | Performed by: EMERGENCY MEDICINE

## 2022-11-09 RX ORDER — NALOXONE HCL 0.4 MG/ML
0.02 VIAL (ML) INJECTION
Status: DISCONTINUED | OUTPATIENT
Start: 2022-11-10 | End: 2022-11-11 | Stop reason: HOSPADM

## 2022-11-09 RX ORDER — ASCORBIC ACID 500 MG
500 TABLET ORAL DAILY
Status: ON HOLD | COMMUNITY
End: 2022-11-11 | Stop reason: HOSPADM

## 2022-11-09 RX ORDER — BROMPHENIRAMINE MALEATE, PSEUDOEPHEDRINE HYDROCHLORIDE, AND DEXTROMETHORPHAN HYDROBROMIDE 2; 30; 10 MG/5ML; MG/5ML; MG/5ML
SYRUP ORAL
COMMUNITY
Start: 2022-10-28 | End: 2022-11-17

## 2022-11-09 RX ORDER — PROCHLORPERAZINE EDISYLATE 5 MG/ML
5 INJECTION INTRAMUSCULAR; INTRAVENOUS EVERY 6 HOURS PRN
Status: DISCONTINUED | OUTPATIENT
Start: 2022-11-10 | End: 2022-11-11 | Stop reason: HOSPADM

## 2022-11-09 RX ORDER — GLUCAGON 1 MG
1 KIT INJECTION
Status: DISCONTINUED | OUTPATIENT
Start: 2022-11-10 | End: 2022-11-11 | Stop reason: HOSPADM

## 2022-11-09 RX ORDER — PREDNISONE 20 MG/1
40 TABLET ORAL DAILY
COMMUNITY
Start: 2022-10-28 | End: 2022-11-09

## 2022-11-09 RX ORDER — AMOXICILLIN 875 MG/1
875 TABLET, FILM COATED ORAL 2 TIMES DAILY
COMMUNITY
Start: 2022-10-28 | End: 2022-11-09

## 2022-11-09 RX ORDER — SODIUM CHLORIDE 0.9 % (FLUSH) 0.9 %
10 SYRINGE (ML) INJECTION EVERY 8 HOURS
Status: DISCONTINUED | OUTPATIENT
Start: 2022-11-10 | End: 2022-11-11 | Stop reason: HOSPADM

## 2022-11-09 RX ORDER — IBUPROFEN 200 MG
16 TABLET ORAL
Status: DISCONTINUED | OUTPATIENT
Start: 2022-11-10 | End: 2022-11-11 | Stop reason: HOSPADM

## 2022-11-09 RX ORDER — MULTIVIT-MIN/FOLIC/VIT K/LYCOP 400-20-370
1 TABLET ORAL DAILY
COMMUNITY

## 2022-11-09 RX ORDER — ACETAMINOPHEN 500 MG
500 TABLET ORAL EVERY 6 HOURS PRN
COMMUNITY

## 2022-11-09 RX ORDER — HEPARIN SODIUM 5000 [USP'U]/ML
5000 INJECTION, SOLUTION INTRAVENOUS; SUBCUTANEOUS EVERY 8 HOURS
Status: DISCONTINUED | OUTPATIENT
Start: 2022-11-10 | End: 2022-11-11 | Stop reason: HOSPADM

## 2022-11-09 RX ORDER — PNV NO.95/FERROUS FUM/FOLIC AC 28MG-0.8MG
100 TABLET ORAL DAILY
COMMUNITY

## 2022-11-09 RX ORDER — POLYETHYLENE GLYCOL 3350 17 G/17G
17 POWDER, FOR SOLUTION ORAL DAILY PRN
Status: DISCONTINUED | OUTPATIENT
Start: 2022-11-10 | End: 2022-11-11 | Stop reason: HOSPADM

## 2022-11-09 RX ORDER — TALC
6 POWDER (GRAM) TOPICAL NIGHTLY PRN
Status: DISCONTINUED | OUTPATIENT
Start: 2022-11-10 | End: 2022-11-11 | Stop reason: HOSPADM

## 2022-11-09 RX ORDER — IBUPROFEN 200 MG
24 TABLET ORAL
Status: DISCONTINUED | OUTPATIENT
Start: 2022-11-10 | End: 2022-11-11 | Stop reason: HOSPADM

## 2022-11-09 RX ORDER — ONDANSETRON 2 MG/ML
4 INJECTION INTRAMUSCULAR; INTRAVENOUS EVERY 8 HOURS PRN
Status: DISCONTINUED | OUTPATIENT
Start: 2022-11-10 | End: 2022-11-11 | Stop reason: HOSPADM

## 2022-11-09 RX ORDER — INSULIN ASPART 100 [IU]/ML
0-5 INJECTION, SOLUTION INTRAVENOUS; SUBCUTANEOUS
Status: DISCONTINUED | OUTPATIENT
Start: 2022-11-10 | End: 2022-11-11 | Stop reason: HOSPADM

## 2022-11-09 RX ORDER — ACETAMINOPHEN 325 MG/1
650 TABLET ORAL EVERY 8 HOURS PRN
Status: DISCONTINUED | OUTPATIENT
Start: 2022-11-10 | End: 2022-11-11 | Stop reason: HOSPADM

## 2022-11-09 RX ADMIN — SODIUM CHLORIDE 1000 ML: 0.9 INJECTION, SOLUTION INTRAVENOUS at 06:11

## 2022-11-09 RX ADMIN — IOHEXOL 100 ML: 350 INJECTION, SOLUTION INTRAVENOUS at 08:11

## 2022-11-09 NOTE — Clinical Note
Diagnosis: SOB (shortness of breath) [113618]   Future Attending Provider: MARGE ARAGON [[id number]]   Admitting Provider:: MARGE ARAGON [[id number]]

## 2022-11-09 NOTE — LETTER
November 14, 2022    Saud Mayers  [address] [address]            [address]  [place] [address]  Phone: [phone]  Fax: [phone] November 14, 2022     Patient: Saud Mayers   YOB: 1965   Date of Visit: 11/9/2022       To Whom It May Concern:     Saud Mayers was hospitalized and under my care at this hospital from 11/9-11/11. .Please excuse him from work from 11/9/2022-11/11/2022. He is medically cleared to return to work without restrictions on 11/14/22.    If you have any questions or concerns, please don't hesitate to call.    Sincerely,        Maggy Mckinney NP

## 2022-11-09 NOTE — ED NOTES
Provided pt with stacey cracker and juice x 2 pt reports he has not had anything to eat since this morning. Hx of DM takes 1000mg of metformin twice daily

## 2022-11-10 PROBLEM — R07.9 CHEST PAIN: Status: ACTIVE | Noted: 2022-11-10

## 2022-11-10 PROBLEM — E86.0 DEHYDRATION: Status: ACTIVE | Noted: 2022-11-10

## 2022-11-10 PROBLEM — R06.02 SOB (SHORTNESS OF BREATH): Status: ACTIVE | Noted: 2022-11-10

## 2022-11-10 PROBLEM — R59.1 LYMPHADENOPATHY: Status: ACTIVE | Noted: 2022-11-10

## 2022-11-10 PROBLEM — R93.89 ABNORMAL SCREENING COMPUTED TOMOGRAPHY (CT) OF CHEST: Status: ACTIVE | Noted: 2022-11-10

## 2022-11-10 PROBLEM — R16.1 SPLENIC MASS: Status: ACTIVE | Noted: 2022-11-10

## 2022-11-10 LAB
ALBUMIN SERPL BCP-MCNC: 2.8 G/DL (ref 3.5–5.2)
ALP SERPL-CCNC: 96 U/L (ref 55–135)
ALT SERPL W/O P-5'-P-CCNC: 17 U/L (ref 10–44)
ANION GAP SERPL CALC-SCNC: 17 MMOL/L (ref 8–16)
AST SERPL-CCNC: 27 U/L (ref 10–40)
B2 MICROGLOB SERPL-MCNC: 3.5 UG/ML (ref 0–2.5)
BASOPHILS # BLD AUTO: 0.02 K/UL (ref 0–0.2)
BASOPHILS NFR BLD: 0.3 % (ref 0–1.9)
BILIRUB SERPL-MCNC: 0.8 MG/DL (ref 0.1–1)
BUN SERPL-MCNC: 18 MG/DL (ref 6–20)
CALCIUM SERPL-MCNC: 9.5 MG/DL (ref 8.7–10.5)
CHLORIDE SERPL-SCNC: 99 MMOL/L (ref 95–110)
CO2 SERPL-SCNC: 20 MMOL/L (ref 23–29)
CREAT SERPL-MCNC: 0.8 MG/DL (ref 0.5–1.4)
CRP SERPL-MCNC: 123.9 MG/L (ref 0–8.2)
DIFFERENTIAL METHOD: ABNORMAL
EOSINOPHIL # BLD AUTO: 0.1 K/UL (ref 0–0.5)
EOSINOPHIL NFR BLD: 1.3 % (ref 0–8)
ERYTHROCYTE [DISTWIDTH] IN BLOOD BY AUTOMATED COUNT: 15.4 % (ref 11.5–14.5)
ERYTHROCYTE [SEDIMENTATION RATE] IN BLOOD BY PHOTOMETRIC METHOD: 86 MM/HR (ref 0–23)
EST. GFR  (NO RACE VARIABLE): >60 ML/MIN/1.73 M^2
GLUCOSE SERPL-MCNC: 112 MG/DL (ref 70–110)
HCT VFR BLD AUTO: 28.9 % (ref 40–54)
HGB BLD-MCNC: 9.2 G/DL (ref 14–18)
IGA SERPL-MCNC: 437 MG/DL (ref 40–350)
IGG SERPL-MCNC: 1150 MG/DL (ref 650–1600)
IGM SERPL-MCNC: 102 MG/DL (ref 50–300)
IMM GRANULOCYTES # BLD AUTO: 0.06 K/UL (ref 0–0.04)
IMM GRANULOCYTES NFR BLD AUTO: 0.9 % (ref 0–0.5)
LDH SERPL L TO P-CCNC: 459 U/L (ref 110–260)
LYMPHOCYTES # BLD AUTO: 0.6 K/UL (ref 1–4.8)
LYMPHOCYTES NFR BLD: 8.5 % (ref 18–48)
MCH RBC QN AUTO: 30.2 PG (ref 27–31)
MCHC RBC AUTO-ENTMCNC: 31.8 G/DL (ref 32–36)
MCV RBC AUTO: 95 FL (ref 82–98)
MONOCYTES # BLD AUTO: 0.8 K/UL (ref 0.3–1)
MONOCYTES NFR BLD: 12.2 % (ref 4–15)
NEUTROPHILS # BLD AUTO: 5.1 K/UL (ref 1.8–7.7)
NEUTROPHILS NFR BLD: 76.8 % (ref 38–73)
NRBC BLD-RTO: 0 /100 WBC
PLATELET # BLD AUTO: 182 K/UL (ref 150–450)
PMV BLD AUTO: 9.2 FL (ref 9.2–12.9)
POCT GLUCOSE: 116 MG/DL (ref 70–110)
POCT GLUCOSE: 123 MG/DL (ref 70–110)
POCT GLUCOSE: 125 MG/DL (ref 70–110)
POCT GLUCOSE: 130 MG/DL (ref 70–110)
POTASSIUM SERPL-SCNC: 3.9 MMOL/L (ref 3.5–5.1)
PROT SERPL-MCNC: 7 G/DL (ref 6–8.4)
RBC # BLD AUTO: 3.05 M/UL (ref 4.6–6.2)
RETICS/RBC NFR AUTO: 1.5 % (ref 0.4–2)
RPR SER QL: NORMAL
SODIUM SERPL-SCNC: 136 MMOL/L (ref 136–145)
URATE SERPL-MCNC: 5 MG/DL (ref 3.4–7)
WBC # BLD AUTO: 6.7 K/UL (ref 3.9–12.7)

## 2022-11-10 PROCEDURE — 85025 COMPLETE CBC W/AUTO DIFF WBC: CPT | Performed by: NURSE PRACTITIONER

## 2022-11-10 PROCEDURE — 99204 PR OFFICE/OUTPT VISIT, NEW, LEVL IV, 45-59 MIN: ICD-10-PCS | Mod: GT,,, | Performed by: INTERNAL MEDICINE

## 2022-11-10 PROCEDURE — 84165 PROTEIN E-PHORESIS SERUM: CPT | Performed by: INTERNAL MEDICINE

## 2022-11-10 PROCEDURE — 94761 N-INVAS EAR/PLS OXIMETRY MLT: CPT

## 2022-11-10 PROCEDURE — 25000003 PHARM REV CODE 250: Performed by: NURSE PRACTITIONER

## 2022-11-10 PROCEDURE — 86140 C-REACTIVE PROTEIN: CPT | Performed by: INTERNAL MEDICINE

## 2022-11-10 PROCEDURE — 36415 COLL VENOUS BLD VENIPUNCTURE: CPT | Performed by: INTERNAL MEDICINE

## 2022-11-10 PROCEDURE — 82232 ASSAY OF BETA-2 PROTEIN: CPT | Performed by: INTERNAL MEDICINE

## 2022-11-10 PROCEDURE — 86334 IMMUNOFIX E-PHORESIS SERUM: CPT | Performed by: INTERNAL MEDICINE

## 2022-11-10 PROCEDURE — 84550 ASSAY OF BLOOD/URIC ACID: CPT | Performed by: INTERNAL MEDICINE

## 2022-11-10 PROCEDURE — 80053 COMPREHEN METABOLIC PANEL: CPT | Performed by: NURSE PRACTITIONER

## 2022-11-10 PROCEDURE — 99900035 HC TECH TIME PER 15 MIN (STAT)

## 2022-11-10 PROCEDURE — 86334 PATHOLOGIST INTERPRETATION IFE: ICD-10-PCS | Mod: 26,,, | Performed by: PATHOLOGY

## 2022-11-10 PROCEDURE — 82784 ASSAY IGA/IGD/IGG/IGM EACH: CPT | Performed by: INTERNAL MEDICINE

## 2022-11-10 PROCEDURE — 63600175 PHARM REV CODE 636 W HCPCS: Performed by: NURSE PRACTITIONER

## 2022-11-10 PROCEDURE — 83521 IG LIGHT CHAINS FREE EACH: CPT | Mod: 59 | Performed by: INTERNAL MEDICINE

## 2022-11-10 PROCEDURE — G0378 HOSPITAL OBSERVATION PER HR: HCPCS

## 2022-11-10 PROCEDURE — 84165 PROTEIN E-PHORESIS SERUM: CPT | Mod: 26,,, | Performed by: PATHOLOGY

## 2022-11-10 PROCEDURE — 84165 PATHOLOGIST INTERPRETATION SPE: ICD-10-PCS | Mod: 26,,, | Performed by: PATHOLOGY

## 2022-11-10 PROCEDURE — 85045 AUTOMATED RETICULOCYTE COUNT: CPT | Performed by: INTERNAL MEDICINE

## 2022-11-10 PROCEDURE — 86334 IMMUNOFIX E-PHORESIS SERUM: CPT | Mod: 26,,, | Performed by: PATHOLOGY

## 2022-11-10 PROCEDURE — 96372 THER/PROPH/DIAG INJ SC/IM: CPT | Performed by: NURSE PRACTITIONER

## 2022-11-10 PROCEDURE — A4216 STERILE WATER/SALINE, 10 ML: HCPCS | Performed by: NURSE PRACTITIONER

## 2022-11-10 PROCEDURE — 99204 OFFICE O/P NEW MOD 45 MIN: CPT | Mod: GT,,, | Performed by: INTERNAL MEDICINE

## 2022-11-10 PROCEDURE — 83615 LACTATE (LD) (LDH) ENZYME: CPT | Performed by: INTERNAL MEDICINE

## 2022-11-10 PROCEDURE — 85652 RBC SED RATE AUTOMATED: CPT | Performed by: INTERNAL MEDICINE

## 2022-11-10 RX ORDER — ASCORBIC ACID 500 MG
500 TABLET ORAL DAILY
Status: DISCONTINUED | OUTPATIENT
Start: 2022-11-10 | End: 2022-11-11 | Stop reason: HOSPADM

## 2022-11-10 RX ORDER — METOPROLOL SUCCINATE 50 MG/1
50 TABLET, EXTENDED RELEASE ORAL DAILY
Status: DISCONTINUED | OUTPATIENT
Start: 2022-11-10 | End: 2022-11-11 | Stop reason: HOSPADM

## 2022-11-10 RX ORDER — GUAIFENESIN/DEXTROMETHORPHAN 100-10MG/5
10 SYRUP ORAL EVERY 6 HOURS PRN
Status: DISCONTINUED | OUTPATIENT
Start: 2022-11-10 | End: 2022-11-11 | Stop reason: HOSPADM

## 2022-11-10 RX ORDER — PNV NO.95/FERROUS FUM/FOLIC AC 28MG-0.8MG
100 TABLET ORAL DAILY
Status: DISCONTINUED | OUTPATIENT
Start: 2022-11-10 | End: 2022-11-11 | Stop reason: HOSPADM

## 2022-11-10 RX ORDER — LOSARTAN POTASSIUM 25 MG/1
25 TABLET ORAL DAILY
Status: DISCONTINUED | OUTPATIENT
Start: 2022-11-10 | End: 2022-11-11 | Stop reason: HOSPADM

## 2022-11-10 RX ORDER — ATORVASTATIN CALCIUM 20 MG/1
20 TABLET, FILM COATED ORAL DAILY
Status: DISCONTINUED | OUTPATIENT
Start: 2022-11-10 | End: 2022-11-11 | Stop reason: HOSPADM

## 2022-11-10 RX ADMIN — GUAIFENESIN AND DEXTROMETHORPHAN 10 ML: 100; 10 SYRUP ORAL at 08:11

## 2022-11-10 RX ADMIN — HEPARIN SODIUM 5000 UNITS: 5000 INJECTION, SOLUTION INTRAVENOUS; SUBCUTANEOUS at 09:11

## 2022-11-10 RX ADMIN — OXYCODONE HYDROCHLORIDE AND ACETAMINOPHEN 500 MG: 500 TABLET ORAL at 10:11

## 2022-11-10 RX ADMIN — ATORVASTATIN CALCIUM 20 MG: 20 TABLET, FILM COATED ORAL at 10:11

## 2022-11-10 RX ADMIN — METOPROLOL SUCCINATE 50 MG: 50 TABLET, EXTENDED RELEASE ORAL at 10:11

## 2022-11-10 RX ADMIN — HEPARIN SODIUM 5000 UNITS: 5000 INJECTION, SOLUTION INTRAVENOUS; SUBCUTANEOUS at 01:11

## 2022-11-10 RX ADMIN — Medication 10 ML: at 09:11

## 2022-11-10 RX ADMIN — VITAM B12 100 MCG: 100 TAB at 10:11

## 2022-11-10 RX ADMIN — LOSARTAN POTASSIUM 25 MG: 25 TABLET, FILM COATED ORAL at 10:11

## 2022-11-10 RX ADMIN — ACETAMINOPHEN 650 MG: 325 TABLET ORAL at 08:11

## 2022-11-10 RX ADMIN — GUAIFENESIN AND DEXTROMETHORPHAN 10 ML: 100; 10 SYRUP ORAL at 02:11

## 2022-11-10 RX ADMIN — HEPARIN SODIUM 5000 UNITS: 5000 INJECTION, SOLUTION INTRAVENOUS; SUBCUTANEOUS at 05:11

## 2022-11-10 RX ADMIN — Medication 10 ML: at 01:11

## 2022-11-10 RX ADMIN — Medication 10 ML: at 05:11

## 2022-11-10 NOTE — SUBJECTIVE & OBJECTIVE
VIRTUAL TELENOTE    Start time: 8:20am  Chief complaint: lymphadenopathy  The patient location is: [facility]  The patient arrived at: 8:20pm  Present with the patient at the time of the telemed/virtual assessment: wife    End time: 8:36pm    Total time spent with patient: 16 minutes    The attending portion of this evaluation, treatment, and documentation was performed per Sandip Vickers MD via Telemedicine AudioVisual using the secure NEBOTRADE software platform with 2 way audio/video. The provider was located off-site and the patient is located in the hospital. The aforementioned video software was utilized to document the relevant history and physical exam.    He endorses fatigue, 60-lb weight loss (which he lost earlier this year related to diverticulosis/diverticulitis), adenopathy, night sweats. He denies fever. He states the lymphadenopathy is waxing/waning. Onset was about 3-4 weeks ago.      Oncology Treatment Plan:   [No matching plan found]    Medications:  Continuous Infusions:  Scheduled Meds:   ascorbic acid (vitamin C)  500 mg Oral Daily    atorvastatin  20 mg Oral Daily    cyanocobalamin  100 mcg Oral Daily    heparin (porcine)  5,000 Units Subcutaneous Q8H    losartan  25 mg Oral Daily    metoprolol succinate  50 mg Oral Daily    sodium chloride 0.9%  10 mL Intravenous Q8H     PRN Meds:acetaminophen, dextrose 10%, dextrose 10%, glucagon (human recombinant), glucose, glucose, influenza, insulin aspart U-100, melatonin, naloxone, ondansetron, polyethylene glycol, prochlorperazine     Review of patient's allergies indicates:   Allergen Reactions    Gabapentin Hallucinations    Tizanidine Other (See Comments)     somnolence        Past Medical History:   Diagnosis Date    Asthma     Hypertension     ANAMIKA on CPAP     Testicular hypofunction      Past Surgical History:   Procedure Laterality Date    CARPAL TUNNEL RELEASE Bilateral 2020    CHOLECYSTECTOMY  01/01/1990    COLONOSCOPY  01/01/2013    COLONOSCOPY N/A  2022    Procedure: COLONOSCOPY sutab;  Surgeon: Jeovany Cisse MD;  Location: [facility] END[facility];  Service: Endoscopy;  Laterality: N/A;    ESOPHAGOGASTRODUODENOSCOPY N/A 2022    Procedure: EGD (ESOPHAGOGASTRODUODENOSCOPY);  Surgeon: Jeovany Cisse MD;  Location: [facility] END[facility];  Service: Endoscopy;  Laterality: N/A;    ORTHOPEDIC SURGERY Bilateral 2007    feet plantar    ORTHOPEDIC SURGERY Right 2006    knee    ROTATOR CUFF REPAIR Left 1997     Family History       Problem Relation (Age of Onset)    Heart attack Father          Tobacco Use    Smoking status: Former     Types: Cigarettes     Quit date: 1/15/1992     Years since quittin.8    Smokeless tobacco: Never   Substance and Sexual Activity    Alcohol use: Not on file    Drug use: Not on file    Sexual activity: Yes     Partners: Female       Review of Systems   Constitutional:  Positive for fatigue and unexpected weight change.   HENT:  Negative for sore throat.    Eyes:  Negative for visual disturbance.   Respiratory:  Negative for shortness of breath.    Cardiovascular:  Negative for chest pain.   Gastrointestinal:  Negative for abdominal pain.   Genitourinary:  Negative for dysuria.   Musculoskeletal:  Negative for back pain.   Skin:  Negative for rash.   Neurological:  Negative for headaches.   Hematological:  Positive for adenopathy.   Psychiatric/Behavioral:  The patient is not nervous/anxious.    Objective:     Vital Signs (Most Recent):  Temp: 98.2 °F (36.8 °C) (11/10/22 043)  Pulse: 92 (11/10/22 0430)  Resp: 17 (11/10/22 0430)  BP: (!) 130/58 (11/10/22 0430)  SpO2: 98 % (11/10/22 0807)   Vital Signs (24h Range):  Temp:  [97.6 °F (36.4 °C)-98.2 °F (36.8 °C)] 98.2 °F (36.8 °C)  Pulse:  [73-92] 92  Resp:  [17-20] 17  SpO2:  [95 %-99 %] 98 %  BP: ()/(51-61) 130/58        Body mass index is 33.23 kg/m².  Body surface area is 2.23 meters squared.      Intake/Output Summary (Last 24 hours) at 11/10/2022 0836  Last data filed at  11/10/2022 0612  Gross per 24 hour   Intake 240 ml   Output --   Net 240 ml       Physical Exam  Deferred due to telemedicine visit.      Significant Labs:   CBC:   Recent Labs   Lab 11/09/22  1729 11/10/22  0338   WBC 9.74 6.70   HGB 8.9* 9.2*   HCT 27.9* 28.9*    182    and CMP:   Recent Labs   Lab 11/09/22  1729 11/10/22  0338   * 136   K 4.6 3.9   CL 98 99   CO2 15* 20*   * 112*   BUN 23* 18   CREATININE 1.5* 0.8   CALCIUM 9.5 9.5   PROT 7.5 7.0   ALBUMIN 3.0* 2.8*   BILITOT 1.1* 0.8   ALKPHOS 107 96   AST 36 27   ALT 21 17   ANIONGAP 19* 17*       Diagnostic Results:  CT chest/abdomen/pelvis (11/7/22): I have personally reviewed the images    Innumerable adenopathy above and below the diaphragm and throughout the neck, LEFT axilla and chest wall, retroperitoneum and mesentery as well as in the iliac chain, right greater than left.     Findings are suspicious for lymphoma/leukemia.     Multiple splenic lesions appearing to have increased in number and size since the prior exam.      CT soft tissue neck (11/7/22): I have personally reviewed the images  Extensive adenopathy within the right greater than left neck with also adenopathy within the mediastinum and left axilla.  The largest lymph node in the right posterior triangle/spinal accessory region demonstrates internal necrosis as does a right paratracheal lymph node within the mediastinum..  There is inflammatory change surrounding the necrotic lymph nodes and although an infectious/inflammatory process is not excluded, a neoplastic process including lymphoma to be considered.

## 2022-11-10 NOTE — SUBJECTIVE & OBJECTIVE
Past Medical History:   Diagnosis Date    Asthma     Hypertension     ANAMIKA on CPAP     Testicular hypofunction        Past Surgical History:   Procedure Laterality Date    CARPAL TUNNEL RELEASE Bilateral     CHOLECYSTECTOMY  1990    COLONOSCOPY  2013    COLONOSCOPY N/A 2022    Procedure: COLONOSCOPY sutab;  Surgeon: Jeovany Cisse MD;  Location: [facility] END[facility];  Service: Endoscopy;  Laterality: N/A;    ESOPHAGOGASTRODUODENOSCOPY N/A 2022    Procedure: EGD (ESOPHAGOGASTRODUODENOSCOPY);  Surgeon: Jeovany Cisse MD;  Location: [facility] ENDO;  Service: Endoscopy;  Laterality: N/A;    ORTHOPEDIC SURGERY Bilateral 2007    feet plantar    ORTHOPEDIC SURGERY Right 2006    knee    ROTATOR CUFF REPAIR Left 1997       Review of patient's allergies indicates:   Allergen Reactions    Gabapentin Hallucinations    Tizanidine Other (See Comments)     somnolence       No current facility-administered medications on file prior to encounter.     Current Outpatient Medications on File Prior to Encounter   Medication Sig    acetaminophen (TYLENOL) 500 MG tablet Take 500 mg by mouth every 6 (six) hours as needed for Pain.    ascorbic acid, vitamin C, (VITAMIN C) 500 MG tablet Take 500 mg by mouth once daily.    atorvastatin (LIPITOR) 20 MG tablet Take 1 tablet (20 mg total) by mouth once daily.    brompheniramine-pseudoeph-DM (BROMFED DM) 2-30-10 mg/5 mL Syrp SMARTSI-10 Milliliter(s) By Mouth Every 6 Hours PRN    cyanocobalamin (VITAMIN B-12) 100 MCG tablet Take 100 mcg by mouth once daily.    glimepiride (AMARYL) 4 MG tablet Take 1 tablet (4 mg total) by mouth daily with breakfast.    losartan (COZAAR) 25 MG tablet TAKE 4 TABLETS(100MG) BY MOUTH ONCE DAILY (Patient taking differently: Take 25 mg by mouth once daily.)    metoprolol succinate (TOPROL-XL) 50 MG 24 hr tablet Take 1 tablet (50 mg total) by mouth once daily.    multivit-min-folic-vit K-lycop (ONE-A-DAY MEN'S 50 PLUS) 400- mcg Tab  Take 1 tablet by mouth once daily.    linaCLOtide (LINZESS) 72 mcg Cap capsule Take 1 capsule (72 mcg total) by mouth before breakfast.    metFORMIN (GLUCOPHAGE) 1000 MG tablet Take 1 tablet (1,000 mg total) by mouth 2 (two) times daily with meals. (Patient taking differently: Take 2,000 mg by mouth daily with breakfast. Take 2 tablets by mouth every morning)    sildenafiL (VIAGRA) 100 MG tablet Take 1 tablet (100 mg total) by mouth daily as needed for Erectile Dysfunction.     Family History       Problem Relation (Age of Onset)    Heart attack Father          Tobacco Use    Smoking status: Former     Types: Cigarettes     Quit date: 1/15/1992     Years since quittin.8    Smokeless tobacco: Never   Substance and Sexual Activity    Alcohol use: Not on file    Drug use: Not on file    Sexual activity: Yes     Partners: Female     Review of Systems   Constitutional:  Positive for fatigue and unexpected weight change.        Night sweats   HENT: Negative.     Eyes: Negative.    Respiratory:  Positive for shortness of breath. Negative for cough.    Cardiovascular: Negative.    Gastrointestinal: Negative.    Genitourinary: Negative.    Musculoskeletal: Negative.    Skin: Negative.    Neurological:  Positive for weakness.   Hematological:  Positive for adenopathy.   Psychiatric/Behavioral: Negative.     Objective:     Vital Signs (Most Recent):  Temp: 98.2 °F (36.8 °C) (11/10/22 0430)  Pulse: 92 (11/10/22 0430)  Resp: 17 (11/10/22 0430)  BP: (!) 130/58 (11/10/22 0430)  SpO2: 97 % (11/10/22 0430)   Vital Signs (24h Range):  Temp:  [97.6 °F (36.4 °C)-98.2 °F (36.8 °C)] 98.2 °F (36.8 °C)  Pulse:  [73-92] 92  Resp:  [17-20] 17  SpO2:  [95 %-99 %] 97 %  BP: ()/(51-61) 130/58        Body mass index is 33.23 kg/m².    Physical Exam  Vitals and nursing note reviewed.   Constitutional:       General: He is not in acute distress.     Appearance: Normal appearance. He is obese. He is not ill-appearing, toxic-appearing or  diaphoretic.   HENT:      Head: Normocephalic and atraumatic.      Mouth/Throat:      Mouth: Mucous membranes are moist.   Eyes:      Pupils: Pupils are equal, round, and reactive to light.   Cardiovascular:      Rate and Rhythm: Normal rate and regular rhythm.      Pulses: Normal pulses.      Heart sounds: Normal heart sounds.   Pulmonary:      Effort: Pulmonary effort is normal. No respiratory distress.      Breath sounds: Normal breath sounds.   Abdominal:      General: There is no distension.      Palpations: Abdomen is soft.      Tenderness: There is no abdominal tenderness.   Musculoskeletal:         General: No swelling. Normal range of motion.      Cervical back: Normal range of motion.   Skin:     General: Skin is warm and dry.      Capillary Refill: Capillary refill takes 2 to 3 seconds.   Neurological:      General: No focal deficit present.      Mental Status: He is alert and oriented to person, place, and time. Mental status is at baseline.   Psychiatric:         Mood and Affect: Mood normal.         Behavior: Behavior normal.         Thought Content: Thought content normal.         Judgment: Judgment normal.         CRANIAL NERVES     CN III, IV, VI   Pupils are equal, round, and reactive to light.     Significant Labs: All pertinent labs within the past 24 hours have been reviewed.    Significant Imaging: I have reviewed all pertinent imaging results/findings within the past 24 hours.

## 2022-11-10 NOTE — PLAN OF CARE
Recommendations  1) Continue Diabetic diet as tolerated   2) If PO intake < 50% meals, add Boost Glucose Control TID   3) RD to complete NFPE at follow up    Goals: Pt to meet > 75% EEN by RD follow up  Nutrition Goal Status: new  Communication of RD Recs: other (comment) (POC)    Assessment and Plan    Nutrition Problem  Unintentional wt loss    Related to (etiology):   Unknown (testing continues)    Signs and Symptoms (as evidenced by):   20% wt loss x 1 year, 8.1% wt loss x 3 months    Interventions(treatment strategy):  Collaboration with other providers  Carbohydrate modified diet    Nutrition Diagnosis Status:   New

## 2022-11-10 NOTE — PHARMACY MED REC
"Admission Medication History     The home medication history was taken by Lily Monreal CPhT.    Medication history obtained from, Patient Verified    You may go to "Admission" then "Reconcile Home Medications" tabs to review and/or act upon these items.     The home medication list has been updated by the Pharmacy department.   Please read ALL comments highlighted in yellow.   Please address this information as you see fit.    Feel free to contact us if you have any questions or require assistance.      The medications listed below were removed from the home medication list.  Please reorder if appropriate:  Patient reports no longer taking the following medication(s):  Amoxicillin 875 mg  Diprolene 0.05% ointment  Protonix 40 mg  Prednisone 20 mg  Sutab 1.479-0.188-0.225 gram        Lily Monreal CPhT.  Ext [phone]               .        "

## 2022-11-10 NOTE — PROGRESS NOTES
Care assumed. EMR reviewed. Overnight notes and vitals reviewed.     Dyspnea refractory but saturating well on RA  Reports a distant uncle with cancer but no close family members and he is unaware of what type of cancer   Reports a 60lb weight loss over the last several months which he contributed to anorexia related to acute diverticulitis flare  Reports recent EGD and C scope since that time    Hematology oncology has evaluated the patient and requested pertinent serology studies  Surgeon consulted per heme recommendations for excisional biopsy which is superior in samples for leukemia or lymphoma  Awaiting surgeon evaluation  Anticipate possible excisional biopsy in am; NPO after MN    Will require OP Bone marrow biopsy and PET scan OP, appointments for such requested now    No complaints  Patient and spouse updated on plan of care and heme/oncology recommendations    Recent Labs   Lab 11/07/22  1210 11/09/22  1729 11/10/22  0338   WBC 8.18 9.74 6.70   HGB 11.1* 8.9* 9.2*   HCT 34.8* 27.9* 28.9*    204 182   MONO 12.3  1.0 12.6  1.2* 12.2  0.8     Recent Labs   Lab 11/07/22  1210 11/09/22  1729 11/10/22  0338   * 132* 136   K 4.1 4.6 3.9   CL 97 98 99   CO2 24 15* 20*   BUN 18 23* 18   CREATININE 0.6 1.5* 0.8   CALCIUM 10.0 9.5 9.5   PROT 8.3 7.5 7.0   BILITOT 1.8* 1.1* 0.8   ALKPHOS 112 107 96   ALT 23 21 17   AST 31 36 27     Microbiology Results (last 7 days)       ** No results found for the last 168 hours. **          VTE ppx: Hepain SQ TID

## 2022-11-10 NOTE — ED NOTES
Review of patient's allergies indicates:   Allergen Reactions    Gabapentin Hallucinations    Tizanidine Other (See Comments)     somnolence        Patient has verified the spelling of their name and  on armband.   APPEARANCE: Patient is alert, calm, oriented x 4, and does not appear distressed.  SKIN: Skin is normal for race, warm, and dry. Normal skin turgor and mucous membranes moist.pale.  CARDIAC: Normal rate and rhythm, no murmur heard.   RESPIRATORY:Normal rate and effort. Breath sounds clear bilaterally throughout chest. Respirations are equal and unlabored.  +sob pta. No sob at this time.  GASTRO: Bowel sounds normal, abdomen is soft, no tenderness, and no abdominal distention.  MUSCLE: Full ROM. No bony tenderness or soft tissue tenderness. No obvious deformity.  PERIPHERAL VASCULAR: peripheral pulses present. Normal cap refill. No edema. Warm to touch.  NEURO: 5/5 strength major flexors/extensors bilaterally. Sensory intact to light touch bilaterally. Cory coma scale: eyes open spontaneously-4, oriented & converses-5, obeys commands-6. No neurological abnormalities.   MENTAL STATUS: awake, alert and aware of environment.  EYE: No overt deficits noted. No drainage. Sclera WNL  ENT: EARS: no obvious drainage. NOSE: no active bleeding. THROAT: no redness or swelling.

## 2022-11-10 NOTE — PLAN OF CARE
" went to meet with patient. Patient's spouse Ernestine at bedside. Patient reports he is independent and lives at home with his spouse. He does not use any DME (besides a glucometer) or have home health. He still drives and works, but family will transport home at discharge. Patient reports he has a "swing schedule" for work so to speak with him to determine his days off prior to scheduling appointments. Patient refuses PCP follow-up to be scheduled. Patient/Wife encouraged to call with any questions or concerns.  will continue to follow patient through transitions of care and assist with any discharge needs.     Patient requested that I cancel his ENT follow-up.               Patient Contacts    Name Relation Home Work Mobile   Ernestine Mayers Spouse [phone]     Future Appointments   Date Time Provider Department Center   11/23/2022  9:15 AM Boyd Ndiaye MD [facility] ENT [place] [facility]   11/30/2022  8:45 AM [facility] CT1 LIMIT 400 LBS [facility] CT [facility]      MD Anson Stein MD PCP - General Internal Medicine [phone] [address] [place] [id number]      Next Steps: Follow up  Appointment:   Instructions: Patient to schedule own Hospital Follow-Up. Primary Care Physician     MD Sandip Mustafa MD  Hematology and Oncology [phone] [address] [facility] [address] [place] [id number]      Next Steps: Follow up  Appointment:   Instructions: Hematology/Oncology Follow-Up        11/10/22 1002   Discharge Assessment   Assessment Type Discharge Planning Assessment   Confirmed/corrected address, phone number and insurance Yes   Confirmed Demographics Correct on Facesheet   Source of Information patient;family   Lives With significant other;spouse   Facility Arrived From: Home   Do you expect to return to your current living situation? Yes   Do you have help at home or someone to help you manage your care at home? Yes   Who " are your caregiver(s) and their phone number(s)? Ernestine (Spouse) [phone]   Prior to hospitilization cognitive status: Alert/Oriented   Current cognitive status: Alert/Oriented   Walking or Climbing Stairs Difficulty none   Dressing/Bathing Difficulty none   Equipment Currently Used at Home glucometer   Do you take prescription medications? Yes   Do you have prescription coverage? Yes   Do you have any problems affording any of your prescribed medications? No   Is the patient taking medications as prescribed? yes   Who is going to help you get home at discharge? Ernestine (Spouse) [phone]   How do you get to doctors appointments? car, drives self   Are you on dialysis? No   Do you take coumadin? No   Discharge Plan A Home   Discharge Plan B Home with family   DME Needed Upon Discharge  none   Discharge Plan discussed with: Patient;Spouse/sig other   Discharge Barriers Identified None   Physical Activity   On average, how many days per week do you engage in moderate to strenuous exercise (like a brisk walk)? Patient refu   On average, how many minutes do you engage in exercise at this level? Patient refu   Financial Resource Strain   How hard is it for you to pay for the very basics like food, housing, medical care, and heating? Not hard   Housing Stability   In the last 12 months, was there a time when you were not able to pay the mortgage or rent on time? N   In the last 12 months, was there a time when you did not have a steady place to sleep or slept in a shelter (including now)? N   Transportation Needs   In the past 12 months, has lack of transportation kept you from medical appointments or from getting medications? no   In the past 12 months, has lack of transportation kept you from meetings, work, or from getting things needed for daily living? No   Food Insecurity   Within the past 12 months, you worried that your food would run out before you got the money to buy more. Never true   Within the past 12  months, the food you bought just didn't last and you didn't have money to get more. Never true   Stress   Do you feel stress - tense, restless, nervous, or anxious, or unable to sleep at night because your mind is troubled all the time - these days? Patient refu   Social Connections   In a typical week, how many times do you talk on the phone with family, friends, or neighbors? More than 3   How often do you get together with friends or relatives? More than 3   How often do you attend Synagogue or Jehovah's witness services? Patient refu   Do you belong to any clubs or organizations such as Synagogue groups, unions, fraternal or athletic groups, or school groups? Patient refu   How often do you attend meetings of the clubs or organizations you belong to? Patient refu   Are you , , , , never , or living with a partner?    Alcohol Use   Q1: How often do you have a drink containing alcohol? Patient refu   Q2: How many drinks containing alcohol do you have on a typical day when you are drinking? Patient refu   Q3: How often do you have six or more drinks on one occasion? Patient refu     Mae López RN    [phone]

## 2022-11-10 NOTE — ASSESSMENT & PLAN NOTE
- CT neck and CT chest/abdomen/pelvis reveal lymphadenopathy above/below the diaphragm as well as splenic lesions.  - clinical symptoms and CT findings are concerning for hematologic malignancy.  - recommend surgery consult for excisional lymph node biopsy. Would prefer an excisional biopsy rather than fine needle aspiration or core biopsy, as architecture of lymph node helps in specific diagnosis of the lymphoma. Please send tissue for flow cytometry.  - hepatitis B/C/HIV testing was negative.  - I will order labs for further workup.  - will arrange for outpatient follow-up.

## 2022-11-10 NOTE — PLAN OF CARE
Problem: Adult Inpatient Plan of Care  Goal: Plan of Care Review  Outcome: Ongoing, Progressing     VN note: progress notes, labs and vital signs reviewed. Will be available to intervene if needed.

## 2022-11-10 NOTE — ASSESSMENT & PLAN NOTE
Patient's FSGs are uncontrolled due to hypoglycemia on current medication regimen.  Last A1c reviewed-   Lab Results   Component Value Date    HGBA1C 6.5 (H) 07/20/2022     Most recent fingerstick glucose reviewed-   Recent Labs   Lab 11/09/22  1634 11/09/22  1727 11/10/22  0603 11/10/22  1210   POCTGLUCOSE 146* 150* 125* 130*     Current correctional scale  Low  Maintain anti-hyperglycemic dose as follows-   Antihyperglycemics (From admission, onward)    Start     Stop Route Frequency Ordered    11/10/22 0056  insulin aspart U-100 pen 0-5 Units         -- SubQ Before meals & nightly PRN 11/09/22 3727        Patient present to the ED hypoglycemic. Reports taking home metformin without eating much after. CBG 66. Treated. Hold Oral hypoglycemics while patient is in the hospital.

## 2022-11-10 NOTE — CONSULTS
[facility] - Telemetry  Hematology/Oncology  Consult Note    Patient Name: Saud Mayers  MRN: [id number]  Admission Date: 11/9/2022  Hospital Length of Stay: 0 days  Code Status: Full Code   Attending Provider: Danay Hewitt MD  Consulting Provider: Sandip Vickers MD  Primary Care Physician: Anson Blunt MD  Principal Problem:SOB (shortness of breath)    Inpatient consult to Hematology/Oncology  Consult performed by: Sandip Vickers MD  Consult ordered by: Maggy Mckinney NP  Reason for consult: lymphadenopathy        Subjective:     HPI:  57 year-old male was admitted on 11/9/22 for generalized lymphadenopathy. Consult is for possible malignancy.      VIRTUAL TELENOTE    Start time: 8:20am  Chief complaint: lymphadenopathy  The patient location is: [facility]  The patient arrived at: 8:20pm  Present with the patient at the time of the telemed/virtual assessment: wife    End time: 8:36pm    Total time spent with patient: 16 minutes    The attending portion of this evaluation, treatment, and documentation was performed per Sandip Vickers MD via Telemedicine AudioVisual using the secure [employer] software platform with 2 way audio/video. The provider was located off-site and the patient is located in the hospital. The aforementioned video software was utilized to document the relevant history and physical exam.    He endorses fatigue, 60-lb weight loss (which he lost earlier this year related to diverticulosis/diverticulitis), adenopathy, night sweats. He denies fever. He states the lymphadenopathy is waxing/waning. Onset was about 3-4 weeks ago.      Oncology Treatment Plan:   [No matching plan found]    Medications:  Continuous Infusions:  Scheduled Meds:   ascorbic acid (vitamin C)  500 mg Oral Daily    atorvastatin  20 mg Oral Daily    cyanocobalamin  100 mcg Oral Daily    heparin (porcine)  5,000 Units Subcutaneous Q8H    losartan  25 mg Oral Daily    metoprolol succinate  50 mg Oral Daily    sodium chloride 0.9%  10 mL  Intravenous Q8H     PRN Meds:acetaminophen, dextrose 10%, dextrose 10%, glucagon (human recombinant), glucose, glucose, influenza, insulin aspart U-100, melatonin, naloxone, ondansetron, polyethylene glycol, prochlorperazine     Review of patient's allergies indicates:   Allergen Reactions    Gabapentin Hallucinations    Tizanidine Other (See Comments)     somnolence        Past Medical History:   Diagnosis Date    Asthma     Hypertension     ANAMIKA on CPAP     Testicular hypofunction      Past Surgical History:   Procedure Laterality Date    CARPAL TUNNEL RELEASE Bilateral     CHOLECYSTECTOMY  1990    COLONOSCOPY  2013    COLONOSCOPY N/A 2022    Procedure: COLONOSCOPY sutab;  Surgeon: Jeovany Cisse MD;  Location: [facility];  Service: Endoscopy;  Laterality: N/A;    ESOPHAGOGASTRODUODENOSCOPY N/A 2022    Procedure: EGD (ESOPHAGOGASTRODUODENOSCOPY);  Surgeon: Jeovany Cisse MD;  Location: [facility];  Service: Endoscopy;  Laterality: N/A;    ORTHOPEDIC SURGERY Bilateral 2007    feet plantar    ORTHOPEDIC SURGERY Right 2006    knee    ROTATOR CUFF REPAIR Left 1997     Family History       Problem Relation (Age of Onset)    Heart attack Father          Tobacco Use    Smoking status: Former     Types: Cigarettes     Quit date: 1/15/1992     Years since quittin.8    Smokeless tobacco: Never   Substance and Sexual Activity    Alcohol use: Not on file    Drug use: Not on file    Sexual activity: Yes     Partners: Female       Review of Systems   Constitutional:  Positive for fatigue and unexpected weight change.   HENT:  Negative for sore throat.    Eyes:  Negative for visual disturbance.   Respiratory:  Negative for shortness of breath.    Cardiovascular:  Negative for chest pain.   Gastrointestinal:  Negative for abdominal pain.   Genitourinary:  Negative for dysuria.   Musculoskeletal:  Negative for back pain.   Skin:  Negative for rash.   Neurological:  Negative for  headaches.   Hematological:  Positive for adenopathy.   Psychiatric/Behavioral:  The patient is not nervous/anxious.    Objective:     Vital Signs (Most Recent):  Temp: 98.2 °F (36.8 °C) (11/10/22 0430)  Pulse: 92 (11/10/22 0430)  Resp: 17 (11/10/22 0430)  BP: (!) 130/58 (11/10/22 0430)  SpO2: 98 % (11/10/22 0807)   Vital Signs (24h Range):  Temp:  [97.6 °F (36.4 °C)-98.2 °F (36.8 °C)] 98.2 °F (36.8 °C)  Pulse:  [73-92] 92  Resp:  [17-20] 17  SpO2:  [95 %-99 %] 98 %  BP: ()/(51-61) 130/58        Body mass index is 33.23 kg/m².  Body surface area is 2.23 meters squared.      Intake/Output Summary (Last 24 hours) at 11/10/2022 0836  Last data filed at 11/10/2022 0612  Gross per 24 hour   Intake 240 ml   Output --   Net 240 ml       Physical Exam  Deferred due to telemedicine visit.      Significant Labs:   CBC:   Recent Labs   Lab 11/09/22  1729 11/10/22  0338   WBC 9.74 6.70   HGB 8.9* 9.2*   HCT 27.9* 28.9*    182    and CMP:   Recent Labs   Lab 11/09/22  1729 11/10/22  0338   * 136   K 4.6 3.9   CL 98 99   CO2 15* 20*   * 112*   BUN 23* 18   CREATININE 1.5* 0.8   CALCIUM 9.5 9.5   PROT 7.5 7.0   ALBUMIN 3.0* 2.8*   BILITOT 1.1* 0.8   ALKPHOS 107 96   AST 36 27   ALT 21 17   ANIONGAP 19* 17*       Diagnostic Results:  CT chest/abdomen/pelvis (11/7/22): I have personally reviewed the images    Innumerable adenopathy above and below the diaphragm and throughout the neck, LEFT axilla and chest wall, retroperitoneum and mesentery as well as in the iliac chain, right greater than left.     Findings are suspicious for lymphoma/leukemia.     Multiple splenic lesions appearing to have increased in number and size since the prior exam.      CT soft tissue neck (11/7/22): I have personally reviewed the images  Extensive adenopathy within the right greater than left neck with also adenopathy within the mediastinum and left axilla.  The largest lymph node in the right posterior triangle/spinal  accessory region demonstrates internal necrosis as does a right paratracheal lymph node within the mediastinum..  There is inflammatory change surrounding the necrotic lymph nodes and although an infectious/inflammatory process is not excluded, a neoplastic process including lymphoma to be considered.           Assessment/Plan:     Lymphadenopathy  - CT neck and CT chest/abdomen/pelvis reveal lymphadenopathy above/below the diaphragm as well as splenic lesions.  - clinical symptoms and CT findings are concerning for hematologic malignancy.  - recommend surgery consult for excisional lymph node biopsy. Would prefer an excisional biopsy rather than fine needle aspiration or core biopsy, as architecture of lymph node helps in specific diagnosis of the lymphoma. Please send tissue for flow cytometry.  - hepatitis B/C/HIV testing was negative.  - I will order labs for further workup.  - will arrange for outpatient follow-up.    Splenic mass  - see above.    Can consider bone marrow biopsy and PET scan in outpatient setting.    Thank you for your consult.     Sandip Vickers MD  Hematology/Oncology  [facility]

## 2022-11-10 NOTE — CONSULTS
Consult received and chart reviewed  Neither myself or [facility] have availability to do LN biopsy tomorrow  Can schedule as outpatient or do next week if pt is still admitted  Alternatively could ask ENT or Dr Daniel Velazquez M.D., F.A.C.S.  [facility]If[facility] and General Surgery  [facility]

## 2022-11-10 NOTE — ASSESSMENT & PLAN NOTE
Lymphadenopathy  Splenic mass  -CT chest/abd/pelv: Innumerable adenopathy above and below the diaphragm and throughout the neck, LEFT axilla and chest wall, retroperitoneum and mesentery as well as in the iliac chain, right greater than left. Findings are suspicious for lymphoma/leukemia. Multiple splenic lesions appearing to have increased in number and size since the prior exam.  -Consulted heme/onc

## 2022-11-10 NOTE — ASSESSMENT & PLAN NOTE
Body mass index is 33.23 kg/m². Morbid obesity complicates all aspects of disease management from diagnostic modalities to treatment. Weight loss encouraged and health benefits explained to patient.   -Reports unintentional weight loss of about 60 lbs or more over the last few months

## 2022-11-10 NOTE — HPI
57 year-old male was admitted on 11/9/22 for generalized lymphadenopathy. Consult is for possible malignancy.

## 2022-11-10 NOTE — ASSESSMENT & PLAN NOTE
Chronic, Latest blood pressure and vitals reviewed-   Temp:  [97.6 °F (36.4 °C)-99.6 °F (37.6 °C)]   Pulse:  [73-97]   Resp:  [17-20]   BP: ()/(51-64)   SpO2:  [95 %-99 %] .   Home meds for hypertension were reviewed and noted below.   Hypertension Medications             losartan (COZAAR) 25 MG tablet TAKE 4 TABLETS(100MG) BY MOUTH ONCE DAILY    metoprolol succinate (TOPROL-XL) 50 MG 24 hr tablet Take 1 tablet (50 mg total) by mouth once daily.        -Resume home BP medications  -Monitor and trend vital signs q4hr  -Will utilize p.r.n. blood pressure medication only if patient's blood pressure greater than  180/110 and he develops symptoms such as worsening chest pain or shortness of breath.

## 2022-11-10 NOTE — ED PROVIDER NOTES
Encounter Date: 11/9/2022       History     Chief Complaint   Patient presents with    Shortness of Breath     Pt presents to ED today c/o SOB and generalized weakness while at work, reports medial staff at work placed him on o2 with some relief     Pt reports he was seen in ED x 2 days ago for swollen lymph nodes   Pt cool and clammy at point of triage glucose 66 pt provided with juice x 2 reports some relief in sx      56 yo man with a complicated recent PMH including diverticulitis some months prior with identified splenic masses. He therafter has lost 60 lbs over 3 months. He has had swelling and shrinking lymph nodes most notably around the neck causing him to undergo testing 2 days prior with identified with multiple nodes. He endorses some low grade fever and while at work today has had dyspnea with a near syncopal episode while walking. He says at this time he feels okay.     Review of patient's allergies indicates:   Allergen Reactions    Gabapentin Hallucinations    Tizanidine Other (See Comments)     somnolence     Past Medical History:   Diagnosis Date    Asthma     Hypertension     ANAMIKA on CPAP     Testicular hypofunction      Past Surgical History:   Procedure Laterality Date    CARPAL TUNNEL RELEASE Bilateral 2020    CHOLECYSTECTOMY  01/01/1990    COLONOSCOPY  01/01/2013    COLONOSCOPY N/A 9/2/2022    Procedure: COLONOSCOPY sutab;  Surgeon: Jeovany Cisse MD;  Location: [facility];  Service: Endoscopy;  Laterality: N/A;    ESOPHAGOGASTRODUODENOSCOPY N/A 9/2/2022    Procedure: EGD (ESOPHAGOGASTRODUODENOSCOPY);  Surgeon: Jeovany Cisse MD;  Location: [facility];  Service: Endoscopy;  Laterality: N/A;    ORTHOPEDIC SURGERY Bilateral 01/01/2007    feet plantar    ORTHOPEDIC SURGERY Right 01/01/2006    knee    ROTATOR CUFF REPAIR Left 01/01/1997     Family History   Problem Relation Age of Onset    Heart attack Father      Social History     Tobacco Use    Smoking status: Former     Types:  Cigarettes     Quit date: 1/15/1992     Years since quittin.8    Smokeless tobacco: Never     Review of Systems  Constitutional-no fever + fatigue  HEENT-no congestion  Eyes-no redness  Respiratory-+ shortness of breath  Cardio-no chest pain  GI-no abdominal pain  Endocrine-no cold intolerance + low sugar  -no difficulty urinating  MSK-no myalgias  Skin-no rashes  Allergy-no environmental allergy  Neurologic-, no headache  Hematology-+swollen nodes  Behavioral-no confusion   Physical Exam     Initial Vitals [22 1604]   BP Pulse Resp Temp SpO2   (!) 95/51 91 20 98.2 °F (36.8 °C) 98 %      MAP       --         Physical Exam  Constitutional: generally well appearing 56 yo man in no obvious distress  Eyes: Conjunctivae normal.  ENT       Head: Normocephalic, atraumatic.       Nose: Normal external appearance        Mouth/Throat: no strigulous respirations   Hematological/Lymphatic/Immunilogical: visible lymphadenopathy most prominent along the right lower neck, palpable nodes along cervical chains bilaterally  Cardiovascular: Normal rate,   Respiratory: Normal respiratory effort.   Gastrointestinal: non distended   Musculoskeletal: Normal range of motion in all extremities. No obvious deformities or swelling.  Neurologic: Alert, oriented. Normal speech and language. No gross focal neurologic deficits are appreciated.  Skin: Skin is warm, dry. No rash noted.  Psychiatric: Mood and affect are normal.    ED Course   Procedures  Labs Reviewed   CBC W/ AUTO DIFFERENTIAL - Abnormal; Notable for the following components:       Result Value    RBC 2.98 (*)     Hemoglobin 8.9 (*)     Hematocrit 27.9 (*)     MCHC 31.9 (*)     RDW 15.6 (*)     Immature Granulocytes 0.9 (*)     Immature Grans (Abs) 0.09 (*)     Lymph # 0.7 (*)     Mono # 1.2 (*)     Gran % 78.2 (*)     Lymph % 7.3 (*)     All other components within normal limits   COMPREHENSIVE METABOLIC PANEL - Abnormal; Notable for the following components:     Sodium 132 (*)     CO2 15 (*)     Glucose 139 (*)     BUN 23 (*)     Creatinine 1.5 (*)     Albumin 3.0 (*)     Total Bilirubin 1.1 (*)     Anion Gap 19 (*)     eGFR 53 (*)     All other components within normal limits   POCT GLUCOSE - Abnormal; Notable for the following components:    POCT Glucose 66 (*)     All other components within normal limits   POCT GLUCOSE - Abnormal; Notable for the following components:    POCT Glucose 146 (*)     All other components within normal limits   POCT GLUCOSE - Abnormal; Notable for the following components:    POCT Glucose 150 (*)     All other components within normal limits   B-TYPE NATRIURETIC PEPTIDE   LIPASE   MAGNESIUM   TROPONIN I          Imaging Results               CT Chest Abdomen Pelvis With Contrast (xpd) (Final result)  Result time 11/09/22 23:02:03      Final result by Kaden Cooper MD (11/09/22 23:02:03)                   Impression:      Innumerable adenopathy above and below the diaphragm and throughout the neck, LEFT axilla and chest wall, retroperitoneum and mesentery as well as in the iliac chain, right greater than left.    Findings are suspicious for lymphoma/leukemia.    Multiple splenic lesions appearing to have increased in number and size since the prior exam.    Findings were discussed on the phone with Kanu Flores MD at the time of discovery approximately 21:53.    This report was flagged in Epic as abnormal.      Electronically signed by: Kaden Cooper  Date:    11/09/2022  Time:    23:02               Narrative:    EXAMINATION:  CT CHEST ABDOMEN PELVIS WITH CONTRAST (XPD)    CLINICAL HISTORY:  Lymphadenopathy, chest or axilla;Lymphadenopathy, groin;Weight loss, unintended;    TECHNIQUE:  Arterial phase axial CT images were obtained through the chest, abdomen and pelvis following IV administration of 100 mL of Omnipaque 350 contrast. Coronal, sagittal and 3D reconstructions were submitted and interpreted. Dose reduction  techniques including automatic exposure control (AEC) were utilized.    COMPARISON:  CT abdomen and pelvis, 08/07/2022    FINDINGS:  The aorta is normal in caliber with minimal scattered atherosclerotic disease.    Base of Neck: Adenopathy throughout the base of the neck incompletely imaged due to overlying metallic artifact on the right.  This extends into the superior mediastinum and supraclavicular region    Thoracic soft tissues: Chest wall adenopathy is most severe in the left axilla and long thoracic lymph rui chain.    Heart: Normal size. No effusion.  Atherosclerotic disease mainly in the LAD proximally as well as in the aortic valve plane.    Pulmonary vasculature:  Pulmonary arteries distribute normally.  There are four pulmonary veins.    Kirstie/Mediastinum: Enlarged lymph nodes are seen in the pre-vascular space, paratracheal space and subcarinal space with the subcarinal lymph nodes enlarged up to 5.4 cm in greatest dimension.  Minimal retrocrural adenopathy is noted.    Esophagus: Unremarkable.    Airways: Patent.    Lungs/Pleura: Clear lungs. No pleural effusion or thickening.    Liver: Stable in size and attenuation, with no focal hepatic lesions.    Gallbladder: Resected    Bile Ducts: No evidence of dilated ducts.    Pancreas: No mass or peripancreatic fat stranding.    Spleen: Enlarged with multiple low-density masses possibly increased in number and size since the prior CT.  The largest measures 8.4 cm in long axis..    Adrenals: Unremarkable.    Kidneys/ Ureters: No radiodense stones or hydronephrosis.  The ureters are normal in course and caliber.    Bladder: No evidence of wall thickening.    Reproductive organs: Unremarkable.    GI Tract/Mesentery: No evidence of bowel obstruction or inflammation.  Sigmoid diverticulosis without diverticulitis.    Peritoneal Space: No ascites. No free air.    Retroperitoneum: Innumerable lymph nodes in the Amanda aortic and pericaval region with extension into  the bola hepatis and throughout the mesentery especially on the left.  Largest mesenteric lymph node measures nearly 3 cm.  Lymph node enlargement extends into the iliac chain right greater than left with lymph nodes just below 4 cm.  Adenopathy in the groin is mild.    Abdominal wall: Unremarkable.    Bones: No acute fracture or aggressive-appearing lytic or blastic lesion.                                       Medications   sodium chloride 0.9% flush 10 mL (10 mLs Intravenous Given 11/10/22 0550)   melatonin tablet 6 mg (has no administration in time range)   ondansetron injection 4 mg (has no administration in time range)   prochlorperazine injection Soln 5 mg (has no administration in time range)   polyethylene glycol packet 17 g (has no administration in time range)   acetaminophen tablet 650 mg (has no administration in time range)   naloxone 0.4 mg/mL injection 0.02 mg (has no administration in time range)   glucose chewable tablet 24 g (has no administration in time range)   glucagon (human recombinant) injection 1 mg (has no administration in time range)   dextrose 10% bolus 125 mL (has no administration in time range)   dextrose 10% bolus 250 mL (has no administration in time range)   heparin (porcine) injection 5,000 Units (5,000 Units Subcutaneous Given 11/10/22 0549)   glucose chewable tablet 16 g (has no administration in time range)   insulin aspart U-100 pen 0-5 Units (has no administration in time range)   influenza (QUADRIVALENT PF) vaccine 0.5 mL (has no administration in time range)   ascorbic acid (vitamin C) tablet 500 mg (500 mg Oral Given 11/10/22 1003)   atorvastatin tablet 20 mg (20 mg Oral Given 11/10/22 1003)   cyanocobalamin tablet 100 mcg (100 mcg Oral Given 11/10/22 1003)   losartan tablet 25 mg (25 mg Oral Given 11/10/22 1003)   metoprolol succinate (TOPROL-XL) 24 hr tablet 50 mg (50 mg Oral Given 11/10/22 1002)   sodium chloride 0.9% bolus 1,000 mL (0 mLs Intravenous Stopped 11/9/22  1905)   iohexoL (OMNIPAQUE 350) injection 100 mL (100 mLs Intravenous Given 11/9/22 2058)     Medical Decision Making:   History:   I obtained history from: someone other than patient.       <> Summary of History: Wife notes worsening fatigue, weight loss  Old Medical Records: I decided to obtain old medical records.  Old Records Summarized: records from clinic visits and records from previous admission(s).  Differential Diagnosis:   Lymphoma, leukemia, Sepsis, dehydration, MI, cardiac arrythmia  Clinical Tests:   Lab Tests: Ordered and Reviewed  Radiological Study: Ordered and Reviewed  Medical Tests: Ordered and Reviewed  ED Management:  Significant concern for neoplasm at this time.  Overt SHARI, CBC downtrended in short order.  EKG nondiagnostic.  CT imaging shows progression of lesions as well as CAD.  Given complexity, significant symptoms believe observation monitoring hydration will be beneficial.  Discussed extensively with patient and wife at the bedside the concern and potential for neoplasm.  Discussed with hospitalist for observation and care planning.                         Clinical Impression:   Final diagnoses:  [R06.02] SOB (shortness of breath)  [R16.1] Splenic mass (Primary)  [R59.1] Lymphadenopathy  [E86.0] Dehydration  [I25.10] Coronary artery disease, unspecified vessel or lesion type, unspecified whether angina present, unspecified whether native or transplanted heart        ED Disposition Condition    Observation Stable                Kanu Flores MD  11/10/22 4677

## 2022-11-10 NOTE — PLAN OF CARE
11/10/22 0209   Admission   Initial VN Admission Questions Complete   Communication Issues? None   Shift   Virtual Nurse - Rounding Complete   Pain Management Interventions pain management plan reviewed with patient/caregiver   Virtual Nurse - Patient Verbalized Approval Of Camera Use;VN Rounding   Type of Frequent Check   Type Patient Rounds   Safety/Activity   Patient Rounds bed in low position;call light in patient/parent reach;clutter free environment maintained;placement of personal items at bedside;visualized patient   Safety Promotion/Fall Prevention Fall Risk reviewed with patient/family;medications reviewed;nonskid shoes/socks when out of bed;side rails raised x 2   Positioning   Body Position sitting up in bed;position changed independently   Pain/Comfort/Sleep   Pain Rating (0-10): Rest 0       Pt arrived to unit. Introduced self as VN for this shift. Admission questions completed by VN. Educated pt and family on VTE risk, safety precautions, and VN's role in pt care. Opportunity given for pt's questions. All questions answered.

## 2022-11-10 NOTE — PROGRESS NOTES
"[facility]  Adult [facility]  Progress Note    SUMMARY     Recommendations  1) Continue Diabetic diet as tolerated   2) If PO intake < 50% meals, add Boost Glucose Control TID   3) RD to complete NFPE at follow up    Goals: Pt to meet > 75% EEN by RD follow up  Nutrition Goal Status: new  Communication of RD Recs: other (comment) (POC)    Assessment and Plan    Nutrition Problem  Unintentional wt loss    Related to (etiology):   Unknown (testing continues)    Signs and Symptoms (as evidenced by):   20% wt loss x 1 year, 8.1% wt loss x 3 months    Interventions(treatment strategy):  Collaboration with other providers  Carbohydrate modified diet    Nutrition Diagnosis Status:   New    Malnutrition Assessment   Weight Loss (Malnutrition): greater than 7.5% in 3 months       Reason for Assessment  Reason For Assessment: identified at risk by screening criteria (MST 5)  Relevant Medical History: HTN  Interdisciplinary Rounds: did not attend  General Information Comments: RD remote for coverage, MST score 5, pt endorses recent significant wt loss and reports it is a result of anorexia from diverticular flare up. Chart reflects significant wt loss (20% x 1 year and 8% x 3 months). Suspect malnutrition.  75% intake meals during admit, LBM 11/10. CROW NFPE due to remote assessment. NPO at midnight for possible biopsy in am.  Nutrition Discharge Planning: Discharge on diabetic diet    Nutrition Risk Screen  Nutrition Risk Screen: no indicators present    Nutrition/Diet History  Factors Affecting Nutritional Intake: NPO    Anthropometrics  Temp: 99.6 °F (37.6 °C)  Height Method: Stated  Height: 5' 9" (175.3 cm)  Height (inches): 69 in  Ideal Body Weight (IBW), Male: 160 lb       Lab/Procedures/Meds  Pertinent Labs Reviewed: reviewed  Pertinent Labs Comments: Glu 112, Alb 2.8, , Hgb A1c 6.5  Pertinent Medications Reviewed: reviewed  Pertinent Medications Comments: Vitamin C, atorvastatin, cyanocobalamin, " heparin    Estimated/Assessed Needs  Weight Used For Calorie Calculations: 102.1 kg (225 lb)  Energy Calorie Requirements (kcal): 1825 kcal/day based on MSJ x no AF for BMI > 30  Energy Need Method: Tyrrell-St Jeor  Protein Requirements: 102 g/day based on 1 g/kg  Weight Used For Protein Calculations: 102.1 kg (225 lb)  Fluid Requirements (mL): 1 mL/kcal or per MD  Estimated Fluid Requirement Method: RDA Method  RDA Method (mL): 1825  CHO Requirement: 228 g CHO/day based on 50% kcal from CHO    Nutrition Prescription Ordered  Current Diet Order: Diabetic  Nutrition Order Comments: NPO at midnight for procedure    Evaluation of Received Nutrient/Fluid Intake    Intake/Output Summary (Last 24 hours) at 11/10/2022 1640  Last data filed at 11/10/2022 1505  Gross per 24 hour   Intake 720 ml   Output --   Net 720 ml       I/O: +0.24 L since admit  Energy Calories Required: meeting needs  Protein Required: meeting needs  Fluid Required: meeting needs  Comments: LBMN 11/10  Tolerance: tolerating  % Intake of Estimated Energy Needs: 75 - 100 %  % Meal Intake: 75 - 100 %    Nutrition Risk  Level of Risk/Frequency of Follow-up: low (1x weekly)     Monitor and Evaluation  Food and Nutrient Intake: energy intake, food and beverage intake  Food and Nutrient Adminstration: diet order  Knowledge/Beliefs/Attitudes: food and nutrition knowledge/skill  Physical Activity and Function: nutrition-related ADLs and IADLs  Anthropometric Measurements: weight change, body mass index, weight  Biochemical Data, Medical Tests and Procedures: electrolyte and renal panel, lipid profile, gastrointestinal profile, glucose/endocrine profile, inflammatory profile  Nutrition-Focused Physical Findings: overall appearance, extremities, muscles and bones, skin     Nutrition Follow-Up  Yes

## 2022-11-10 NOTE — NURSING
Pt arrived from ER in stable condition. Tele monitor in place SR noted. Pt AAOx4. Denies any complaints. See graphics for VS.

## 2022-11-10 NOTE — ASSESSMENT & PLAN NOTE
Dehydration  -Reports onset of about 4 weeks ago  -recent visit to the ED with complaint of lymphadenopathy  -complaints found to be possibly associated for suspicion of lukemia/lymphoma  -currently stable on RA  -Hgb 8.9; monitor  -PRN O2 use  -work up in process with heme/onc

## 2022-11-10 NOTE — PLAN OF CARE
Problem: Adult Inpatient Plan of Care  Goal: Plan of Care Review  Outcome: Ongoing, Progressing

## 2022-11-10 NOTE — H&P
[facility]  History & Physical    Patient Name: Saud Mayers  MRN: [id number]  Patient Class: OP- Observation  Admission Date: 11/9/2022  Attending Physician: Danay Hewitt MD   Primary Care Provider: Anson Blunt MD         Patient information was obtained from patient, spouse/SO, past medical records, and ER records.     Subjective:     Principal Problem:SOB (shortness of breath)    Chief Complaint:   Chief Complaint   Patient presents with    Shortness of Breath     Pt presents to ED today c/o SOB and generalized weakness while at work, reports medial staff at work placed him on o2 with some relief     Pt reports he was seen in ED x 2 days ago for swollen lymph nodes   Pt cool and clammy at point of triage glucose 66 pt provided with juice x 2 reports some relief in sx         HPI: Saud Mayers is a 57-year-old male with a history of who presented to the ED with his wife at the bedside for evaluation for SOB and generalized weakness while at work. He reports the onset of shortness of breath, fatigue and night sweats with onset about 4 weeks ago however worsened while at work prompting his visit. He reports medical staff at his job had to place him on oxygen to provide him some relief. He was seen in the ED x 2 days ago for swollen lymph nodes. He reports losing about 60 or more pounds over the last several months in which he related it a diverticular flare up. He denies chest pain, abdominal pain, N/V/D, palpitations, or sick contacts. Denies any personal medical history of cancer but does report suspected cancer in a relative but not sure of the specifics.The ED workup included labs noting Hgb 8.9>9.2, Na 132, BUN 23, Cr1.5, t bili 1.1, albumin 3.0, anion gap 17, lipase 19. RPR non reactive. Hemodynamically stable on RA. CBG 66, he was cold and clammy treated for his hypoglycemia. Reports he took his home metformin however he did not eat much.CT chest/abd/pelvis: Innumerable  adenopathy above and below the diaphragm and throughout the neck, LEFT axilla and chest wall, retroperitoneum and mesentery as well as in the iliac chain, right greater than left. Findings are suspicious for lymphoma/leukemia. Multiple splenic lesions appearing to have increased in number and size since the prior exam. He was given a liter NS bolus. Admitted to [facility] for further management.      Past Medical History:   Diagnosis Date    Asthma     Hypertension     ANAMIKA on CPAP     Testicular hypofunction        Past Surgical History:   Procedure Laterality Date    CARPAL TUNNEL RELEASE Bilateral     CHOLECYSTECTOMY  1990    COLONOSCOPY  2013    COLONOSCOPY N/A 2022    Procedure: COLONOSCOPY sutab;  Surgeon: Jeovany Cisse MD;  Location: [facility] ENDO;  Service: Endoscopy;  Laterality: N/A;    ESOPHAGOGASTRODUODENOSCOPY N/A 2022    Procedure: EGD (ESOPHAGOGASTRODUODENOSCOPY);  Surgeon: Jeovany Cisse MD;  Location: [facility] ENDO;  Service: Endoscopy;  Laterality: N/A;    ORTHOPEDIC SURGERY Bilateral 2007    feet plantar    ORTHOPEDIC SURGERY Right 2006    knee    ROTATOR CUFF REPAIR Left 1997       Review of patient's allergies indicates:   Allergen Reactions    Gabapentin Hallucinations    Tizanidine Other (See Comments)     somnolence       No current facility-administered medications on file prior to encounter.     Current Outpatient Medications on File Prior to Encounter   Medication Sig    acetaminophen (TYLENOL) 500 MG tablet Take 500 mg by mouth every 6 (six) hours as needed for Pain.    ascorbic acid, vitamin C, (VITAMIN C) 500 MG tablet Take 500 mg by mouth once daily.    atorvastatin (LIPITOR) 20 MG tablet Take 1 tablet (20 mg total) by mouth once daily.    brompheniramine-pseudoeph-DM (BROMFED DM) 2-30-10 mg/5 mL Syrp SMARTSI-10 Milliliter(s) By Mouth Every 6 Hours PRN    cyanocobalamin (VITAMIN B-12) 100 MCG tablet Take 100 mcg by mouth once  daily.    glimepiride (AMARYL) 4 MG tablet Take 1 tablet (4 mg total) by mouth daily with breakfast.    losartan (COZAAR) 25 MG tablet TAKE 4 TABLETS(100MG) BY MOUTH ONCE DAILY (Patient taking differently: Take 25 mg by mouth once daily.)    metoprolol succinate (TOPROL-XL) 50 MG 24 hr tablet Take 1 tablet (50 mg total) by mouth once daily.    multivit-min-folic-vit K-lycop (ONE-A-DAY MEN'S 50 PLUS) 400- mcg Tab Take 1 tablet by mouth once daily.    linaCLOtide (LINZESS) 72 mcg Cap capsule Take 1 capsule (72 mcg total) by mouth before breakfast.    metFORMIN (GLUCOPHAGE) 1000 MG tablet Take 1 tablet (1,000 mg total) by mouth 2 (two) times daily with meals. (Patient taking differently: Take 2,000 mg by mouth daily with breakfast. Take 2 tablets by mouth every morning)    sildenafiL (VIAGRA) 100 MG tablet Take 1 tablet (100 mg total) by mouth daily as needed for Erectile Dysfunction.     Family History       Problem Relation (Age of Onset)    Heart attack Father          Tobacco Use    Smoking status: Former     Types: Cigarettes     Quit date: 1/15/1992     Years since quittin.8    Smokeless tobacco: Never   Substance and Sexual Activity    Alcohol use: Not on file    Drug use: Not on file    Sexual activity: Yes     Partners: Female     Review of Systems   Constitutional:  Positive for fatigue and unexpected weight change.        Night sweats   HENT: Negative.     Eyes: Negative.    Respiratory:  Positive for shortness of breath. Negative for cough.    Cardiovascular: Negative.    Gastrointestinal: Negative.    Genitourinary: Negative.    Musculoskeletal: Negative.    Skin: Negative.    Neurological:  Positive for weakness.   Hematological:  Positive for adenopathy.   Psychiatric/Behavioral: Negative.     Objective:     Vital Signs (Most Recent):  Temp: 98.2 °F (36.8 °C) (11/10/22 0430)  Pulse: 92 (11/10/22 0430)  Resp: 17 (11/10/22 0430)  BP: (!) 130/58 (11/10/22 0430)  SpO2: 97 % (11/10/22 0430)   Vital  Signs (24h Range):  Temp:  [97.6 °F (36.4 °C)-98.2 °F (36.8 °C)] 98.2 °F (36.8 °C)  Pulse:  [73-92] 92  Resp:  [17-20] 17  SpO2:  [95 %-99 %] 97 %  BP: ()/(51-61) 130/58        Body mass index is 33.23 kg/m².    Physical Exam  Vitals and nursing note reviewed.   Constitutional:       General: He is not in acute distress.     Appearance: Normal appearance. He is obese. He is not ill-appearing, toxic-appearing or diaphoretic.   HENT:      Head: Normocephalic and atraumatic.      Mouth/Throat:      Mouth: Mucous membranes are moist.   Eyes:      Pupils: Pupils are equal, round, and reactive to light.   Cardiovascular:      Rate and Rhythm: Normal rate and regular rhythm.      Pulses: Normal pulses.      Heart sounds: Normal heart sounds.   Pulmonary:      Effort: Pulmonary effort is normal. No respiratory distress.      Breath sounds: Normal breath sounds.   Abdominal:      General: There is no distension.      Palpations: Abdomen is soft.      Tenderness: There is no abdominal tenderness.   Musculoskeletal:         General: No swelling. Normal range of motion.      Cervical back: Normal range of motion.   Skin:     General: Skin is warm and dry.      Capillary Refill: Capillary refill takes 2 to 3 seconds.   Neurological:      General: No focal deficit present.      Mental Status: He is alert and oriented to person, place, and time. Mental status is at baseline.   Psychiatric:         Mood and Affect: Mood normal.         Behavior: Behavior normal.         Thought Content: Thought content normal.         Judgment: Judgment normal.         CRANIAL NERVES     CN III, IV, VI   Pupils are equal, round, and reactive to light.     Significant Labs: All pertinent labs within the past 24 hours have been reviewed.    Significant Imaging: I have reviewed all pertinent imaging results/findings within the past 24 hours.  Assessment/Plan:     * SOB (shortness of breath)  Dehydration  -Reports onset of about 4 weeks  ago  -recent visit to the ED with complaint of lymphadenopathy  -complaints found to be possibly associated for suspicion of lukemia/lymphoma  -currently stable on RA  -Hgb 8.9; monitor  -PRN O2 use  -work up in process with heme/onc    Abnormal finding on CT scan  Lymphadenopathy  Splenic mass  -CT chest/abd/pelv: Innumerable adenopathy above and below the diaphragm and throughout the neck, LEFT axilla and chest wall, retroperitoneum and mesentery as well as in the iliac chain, right greater than left. Findings are suspicious for lymphoma/leukemia. Multiple splenic lesions appearing to have increased in number and size since the prior exam.  -Consulted heme/onc    Pure hypertriglyceridemia  -Resume home statin    Nonrheumatic aortic valve stenosis  -No reports of chest pain  -Resume home meds  -Telemetry    Type 2 diabetes mellitus without complication, without long-term current use of insulin  Patient's FSGs are uncontrolled due to hypoglycemia on current medication regimen.  Last A1c reviewed-   Lab Results   Component Value Date    HGBA1C 6.5 (H) 07/20/2022     Most recent fingerstick glucose reviewed-   Recent Labs   Lab 11/09/22  1634 11/09/22  1727 11/10/22  0603 11/10/22  1210   POCTGLUCOSE 146* 150* 125* 130*     Current correctional scale  Low  Maintain anti-hyperglycemic dose as follows-   Antihyperglycemics (From admission, onward)      Start     Stop Route Frequency Ordered    11/10/22 0056  insulin aspart U-100 pen 0-5 Units         -- SubQ Before meals & nightly PRN 11/09/22 4867          Patient present to the ED hypoglycemic. Reports taking home metformin without eating much after. CBG 66. Treated. Hold Oral hypoglycemics while patient is in the hospital.    Essential hypertension  Chronic, Latest blood pressure and vitals reviewed-   Temp:  [97.6 °F (36.4 °C)-99.6 °F (37.6 °C)]   Pulse:  [73-97]   Resp:  [17-20]   BP: ()/(51-64)   SpO2:  [95 %-99 %] .   Home meds for hypertension were  reviewed and noted below.   Hypertension Medications               losartan (COZAAR) 25 MG tablet TAKE 4 TABLETS(100MG) BY MOUTH ONCE DAILY    metoprolol succinate (TOPROL-XL) 50 MG 24 hr tablet Take 1 tablet (50 mg total) by mouth once daily.          -Resume home BP medications  -Monitor and trend vital signs q4hr  -Will utilize p.r.n. blood pressure medication only if patient's blood pressure greater than  180/110 and he develops symptoms such as worsening chest pain or shortness of breath.    Class 2 severe obesity due to excess calories with serious comorbidity and body mass index (BMI) of 36.0 to 36.9 in adult  Body mass index is 33.23 kg/m². Morbid obesity complicates all aspects of disease management from diagnostic modalities to treatment. Weight loss encouraged and health benefits explained to patient.   -Reports unintentional weight loss of about 60 lbs or more over the last few months    ANAMIKA (obstructive sleep apnea)  -CPAP QHS    VTE Risk Mitigation (From admission, onward)           Ordered     heparin (porcine) injection 5,000 Units  Every 8 hours         11/09/22 2357     IP VTE HIGH RISK PATIENT  Once         11/09/22 2357     Place sequential compression device  Until discontinued         11/09/22 2357                    As clarification, on [11/10/22], patient should be placed into observation services under my care in collaboration with Danay Hewitt MD. [aTmmie Gan, NP]       Tammie Gan DNP, AGACN-BC  Hospitalist   Department of Hospital Medicine   [facility]   Office #: [phone]

## 2022-11-10 NOTE — HPI
Saud Mayers is a 57-year-old male with a history of who presented to the ED with his wife at the bedside for evaluation for SOB and generalized weakness while at work. He reports the onset of shortness of breath, fatigue and night sweats with onset about 4 weeks ago however worsened while at work prompting his visit. He reports medical staff at his job had to place him on oxygen to provide him some relief. He was seen in the ED x 2 days ago for swollen lymph nodes. He reports losing about 60 or more pounds over the last several months in which he related it a diverticular flare up. He denies chest pain, abdominal pain, N/V/D, palpitations, or sick contacts. Denies any personal medical history of cancer but does report suspected cancer in a relative but not sure of the specifics.The ED workup included labs noting Hgb 8.9>9.2, Na 132, BUN 23, Cr1.5, t bili 1.1, albumin 3.0, anion gap 17, lipase 19. RPR non reactive. Hemodynamically stable on RA. CBG 66, he was cold and clammy treated for his hypoglycemia. Reports he took his home metformin however he did not eat much.CT chest/abd/pelvis: Innumerable adenopathy above and below the diaphragm and throughout the neck, LEFT axilla and chest wall, retroperitoneum and mesentery as well as in the iliac chain, right greater than left. Findings are suspicious for lymphoma/leukemia. Multiple splenic lesions appearing to have increased in number and size since the prior exam. He was given a liter NS bolus. Admitted to [facility] for further management.

## 2022-11-11 VITALS
OXYGEN SATURATION: 97 % | TEMPERATURE: 98 F | RESPIRATION RATE: 18 BRPM | SYSTOLIC BLOOD PRESSURE: 125 MMHG | BODY MASS INDEX: 30.51 KG/M2 | WEIGHT: 206 LBS | HEART RATE: 95 BPM | HEIGHT: 69 IN | DIASTOLIC BLOOD PRESSURE: 66 MMHG

## 2022-11-11 PROBLEM — R06.02 SOB (SHORTNESS OF BREATH): Status: RESOLVED | Noted: 2022-11-10 | Resolved: 2022-11-11

## 2022-11-11 PROBLEM — E86.0 DEHYDRATION: Status: RESOLVED | Noted: 2022-11-10 | Resolved: 2022-11-11

## 2022-11-11 LAB
ALBUMIN SERPL BCP-MCNC: 3 G/DL (ref 3.5–5.2)
ALBUMIN SERPL ELPH-MCNC: 2.89 G/DL (ref 3.35–5.55)
ALP SERPL-CCNC: 96 U/L (ref 55–135)
ALPHA1 GLOB SERPL ELPH-MCNC: 0.56 G/DL (ref 0.17–0.41)
ALPHA2 GLOB SERPL ELPH-MCNC: 1.16 G/DL (ref 0.43–0.99)
ALT SERPL W/O P-5'-P-CCNC: 19 U/L (ref 10–44)
ANION GAP SERPL CALC-SCNC: 15 MMOL/L (ref 8–16)
AST SERPL-CCNC: 26 U/L (ref 10–40)
B-GLOBULIN SERPL ELPH-MCNC: 0.84 G/DL (ref 0.5–1.1)
BASOPHILS # BLD AUTO: 0.03 K/UL (ref 0–0.2)
BASOPHILS NFR BLD: 0.4 % (ref 0–1.9)
BILIRUB SERPL-MCNC: 1 MG/DL (ref 0.1–1)
BUN SERPL-MCNC: 14 MG/DL (ref 6–20)
CALCIUM SERPL-MCNC: 9.3 MG/DL (ref 8.7–10.5)
CHLORIDE SERPL-SCNC: 96 MMOL/L (ref 95–110)
CO2 SERPL-SCNC: 23 MMOL/L (ref 23–29)
CREAT SERPL-MCNC: 0.7 MG/DL (ref 0.5–1.4)
DIFFERENTIAL METHOD: ABNORMAL
EOSINOPHIL # BLD AUTO: 0.1 K/UL (ref 0–0.5)
EOSINOPHIL NFR BLD: 0.8 % (ref 0–8)
ERYTHROCYTE [DISTWIDTH] IN BLOOD BY AUTOMATED COUNT: 15.3 % (ref 11.5–14.5)
EST. GFR  (NO RACE VARIABLE): >60 ML/MIN/1.73 M^2
GAMMA GLOB SERPL ELPH-MCNC: 1.15 G/DL (ref 0.67–1.58)
GLUCOSE SERPL-MCNC: 108 MG/DL (ref 70–110)
HCT VFR BLD AUTO: 27.8 % (ref 40–54)
HGB BLD-MCNC: 8.9 G/DL (ref 14–18)
IMM GRANULOCYTES # BLD AUTO: 0.06 K/UL (ref 0–0.04)
IMM GRANULOCYTES NFR BLD AUTO: 0.7 % (ref 0–0.5)
INTERPRETATION SERPL IFE-IMP: NORMAL
KAPPA LC SER QL IA: 3.72 MG/DL (ref 0.33–1.94)
KAPPA LC/LAMBDA SER IA: 1.08 (ref 0.26–1.65)
LAMBDA LC SER QL IA: 3.43 MG/DL (ref 0.57–2.63)
LYMPHOCYTES # BLD AUTO: 0.7 K/UL (ref 1–4.8)
LYMPHOCYTES NFR BLD: 8.9 % (ref 18–48)
MCH RBC QN AUTO: 29.9 PG (ref 27–31)
MCHC RBC AUTO-ENTMCNC: 32 G/DL (ref 32–36)
MCV RBC AUTO: 93 FL (ref 82–98)
MONOCYTES # BLD AUTO: 1 K/UL (ref 0.3–1)
MONOCYTES NFR BLD: 12.2 % (ref 4–15)
NEUTROPHILS # BLD AUTO: 6.4 K/UL (ref 1.8–7.7)
NEUTROPHILS NFR BLD: 77 % (ref 38–73)
NRBC BLD-RTO: 0 /100 WBC
PLATELET # BLD AUTO: 204 K/UL (ref 150–450)
PMV BLD AUTO: 9.2 FL (ref 9.2–12.9)
POCT GLUCOSE: 123 MG/DL (ref 70–110)
POTASSIUM SERPL-SCNC: 3.9 MMOL/L (ref 3.5–5.1)
PROT SERPL-MCNC: 6.6 G/DL (ref 6–8.4)
PROT SERPL-MCNC: 6.7 G/DL (ref 6–8.4)
RBC # BLD AUTO: 2.98 M/UL (ref 4.6–6.2)
SODIUM SERPL-SCNC: 134 MMOL/L (ref 136–145)
WBC # BLD AUTO: 8.31 K/UL (ref 3.9–12.7)

## 2022-11-11 PROCEDURE — 25000003 PHARM REV CODE 250: Performed by: NURSE PRACTITIONER

## 2022-11-11 PROCEDURE — 80053 COMPREHEN METABOLIC PANEL: CPT | Performed by: NURSE PRACTITIONER

## 2022-11-11 PROCEDURE — G0378 HOSPITAL OBSERVATION PER HR: HCPCS

## 2022-11-11 PROCEDURE — 94761 N-INVAS EAR/PLS OXIMETRY MLT: CPT

## 2022-11-11 PROCEDURE — 96372 THER/PROPH/DIAG INJ SC/IM: CPT | Performed by: NURSE PRACTITIONER

## 2022-11-11 PROCEDURE — 85025 COMPLETE CBC W/AUTO DIFF WBC: CPT | Performed by: NURSE PRACTITIONER

## 2022-11-11 PROCEDURE — 90471 IMMUNIZATION ADMIN: CPT | Performed by: HOSPITALIST

## 2022-11-11 PROCEDURE — 36415 COLL VENOUS BLD VENIPUNCTURE: CPT | Performed by: NURSE PRACTITIONER

## 2022-11-11 PROCEDURE — 90686 IIV4 VACC NO PRSV 0.5 ML IM: CPT | Performed by: HOSPITALIST

## 2022-11-11 PROCEDURE — 87040 BLOOD CULTURE FOR BACTERIA: CPT | Performed by: NURSE PRACTITIONER

## 2022-11-11 PROCEDURE — A4216 STERILE WATER/SALINE, 10 ML: HCPCS | Performed by: NURSE PRACTITIONER

## 2022-11-11 PROCEDURE — 63600175 PHARM REV CODE 636 W HCPCS: Performed by: NURSE PRACTITIONER

## 2022-11-11 PROCEDURE — 63600175 PHARM REV CODE 636 W HCPCS: Performed by: HOSPITALIST

## 2022-11-11 RX ORDER — DOXYCYCLINE HYCLATE 100 MG
100 TABLET ORAL EVERY 12 HOURS
Qty: 20 TABLET | Refills: 0 | Status: SHIPPED | OUTPATIENT
Start: 2022-11-11 | End: 2022-11-21

## 2022-11-11 RX ORDER — LOSARTAN POTASSIUM 25 MG/1
25 TABLET ORAL DAILY
Qty: 90 TABLET | Refills: 3 | Status: SHIPPED | OUTPATIENT
Start: 2022-11-11 | End: 2023-06-01 | Stop reason: SDUPTHER

## 2022-11-11 RX ORDER — CEFUROXIME AXETIL 250 MG/1
250 TABLET ORAL EVERY 12 HOURS
Qty: 20 TABLET | Refills: 0 | Status: SHIPPED | OUTPATIENT
Start: 2022-11-11 | End: 2022-11-21

## 2022-11-11 RX ORDER — CEFUROXIME AXETIL 250 MG/1
250 TABLET ORAL EVERY 12 HOURS
Status: DISCONTINUED | OUTPATIENT
Start: 2022-11-11 | End: 2022-11-11 | Stop reason: HOSPADM

## 2022-11-11 RX ORDER — DOXYCYCLINE HYCLATE 100 MG
100 TABLET ORAL EVERY 12 HOURS
Status: DISCONTINUED | OUTPATIENT
Start: 2022-11-11 | End: 2022-11-11 | Stop reason: HOSPADM

## 2022-11-11 RX ADMIN — METOPROLOL SUCCINATE 50 MG: 50 TABLET, EXTENDED RELEASE ORAL at 08:11

## 2022-11-11 RX ADMIN — DOXYCYCLINE HYCLATE 100 MG: 100 TABLET, COATED ORAL at 08:11

## 2022-11-11 RX ADMIN — CEFUROXIME AXETIL 250 MG: 250 TABLET ORAL at 10:11

## 2022-11-11 RX ADMIN — LOSARTAN POTASSIUM 25 MG: 25 TABLET, FILM COATED ORAL at 08:11

## 2022-11-11 RX ADMIN — GUAIFENESIN AND DEXTROMETHORPHAN 10 ML: 100; 10 SYRUP ORAL at 08:11

## 2022-11-11 RX ADMIN — VITAM B12 100 MCG: 100 TAB at 08:11

## 2022-11-11 RX ADMIN — OXYCODONE HYDROCHLORIDE AND ACETAMINOPHEN 500 MG: 500 TABLET ORAL at 08:11

## 2022-11-11 RX ADMIN — INFLUENZA VIRUS VACCINE 0.5 ML: 15; 15; 15; 15 SUSPENSION INTRAMUSCULAR at 10:11

## 2022-11-11 RX ADMIN — ATORVASTATIN CALCIUM 20 MG: 20 TABLET, FILM COATED ORAL at 08:11

## 2022-11-11 RX ADMIN — HEPARIN SODIUM 5000 UNITS: 5000 INJECTION, SOLUTION INTRAVENOUS; SUBCUTANEOUS at 05:11

## 2022-11-11 RX ADMIN — Medication 10 ML: at 05:11

## 2022-11-11 NOTE — HOSPITAL COURSE
Admitted with dyspnea after CT chest showed diffuse adenopathy concerning for lymphoma or leukemia  Hematology oncology consulted who recommended lymph node biopsy  Surgeon consulted for LN biopsy  Surgeon reported no ability to perform in patient with current schedule and deferred for OP arrangement    11/11: Breathing stable. RA sats stable. He spiked a fever of 101F which he reported having chills and night sweats often PTA. I suspect he has been spiking temps nocturnally for a while. He does not have leukocytosis. He is not ill appearing. CXR did not show focal consolidation or abnormalities. I suspected the fever to be more reactive. For completeness, will empirically place on oral doxycycline and Ceftin. Blood cultures collected for completeness. He desires to discharge since he cannot get LN biopsy. I see no reason he cant discharge on empiric antibiotics as above. If blood cultures yield positive results, will reach out to patient. Case management is working with the patient on OP surgeon and heme/oncology appointments. Recommended Bone marrow biopsy and PET scan also requested; patient will be called for all appointments.

## 2022-11-11 NOTE — NURSING
Patient in bed visiting with wife. Denies any discomfort or pain. Awake and alert X4 and able to make needs known. Bed in lowest position and call light in reach.

## 2022-11-11 NOTE — ASSESSMENT & PLAN NOTE
Chronic, Latest blood pressure and vitals reviewed-   Temp:  [98.6 °F (37 °C)-101.1 °F (38.4 °C)]   Pulse:  []   Resp:  [16-18]   BP: (112-133)/(56-66)   SpO2:  [94 %-99 %] .   Home meds for hypertension were reviewed and noted below.   Hypertension Medications             losartan (COZAAR) 25 MG tablet TAKE 4 TABLETS(100MG) BY MOUTH ONCE DAILY    metoprolol succinate (TOPROL-XL) 50 MG 24 hr tablet Take 1 tablet (50 mg total) by mouth once daily.        Resume home meds

## 2022-11-11 NOTE — ASSESSMENT & PLAN NOTE
Lymphadenopathy  Splenic mass  -CT chest/abd/pelv: Innumerable adenopathy above and below the diaphragm and throughout the neck, LEFT axilla and chest wall, retroperitoneum and mesentery as well as in the iliac chain, right greater than left. Findings are suspicious for lymphoma/leukemia. Multiple splenic lesions appearing to have increased in number and size since the prior exam.  -Consulted heme/onc    See hospital course

## 2022-11-11 NOTE — NURSING
"Temp 101.1. Wide awake, in NAD.pt c/o "coughing", non productive at present yet stated it was productive earlier today.  JAbigailChairs,NP notified of all above written and prn tylenol and robitussin will be given. No orders given, suggested BCs/IVABx p.    2122  new order portable CXR PA in the morning ordered.  "

## 2022-11-11 NOTE — DISCHARGE SUMMARY
[facility]  Discharge Summary      Patient Name: Saud Mayers  MRN: [id number]  [place]: [id number]  Patient Class: OP- Observation  Admission Date: 11/9/2022  Hospital Length of Stay: 0 days  Discharge Date and Time:  11/11/2022 11:25 AM  Attending Physician: Danay Hewitt MD   Discharging Provider: Maggy Mckinney NP  Primary Care Provider: Anson Blunt MD    Primary Care Team: Networked reference to record PCT     HPI:   Saud Mayers is a 57-year-old male with a history of who presented to the ED with his wife at the bedside for evaluation for SOB and generalized weakness while at work. He reports the onset of shortness of breath, fatigue and night sweats with onset about 4 weeks ago however worsened while at work prompting his visit. He reports medical staff at his job had to place him on oxygen to provide him some relief. He was seen in the ED x 2 days ago for swollen lymph nodes. He reports losing about 60 or more pounds over the last several months in which he related it a diverticular flare up. He denies chest pain, abdominal pain, N/V/D, palpitations, or sick contacts. Denies any personal medical history of cancer but does report suspected cancer in a relative but not sure of the specifics.The ED workup included labs noting Hgb 8.9>9.2, Na 132, BUN 23, Cr1.5, t bili 1.1, albumin 3.0, anion gap 17, lipase 19. RPR non reactive. Hemodynamically stable on RA. CBG 66, he was cold and clammy treated for his hypoglycemia. Reports he took his home metformin however he did not eat much.CT chest/abd/pelvis: Innumerable adenopathy above and below the diaphragm and throughout the neck, LEFT axilla and chest wall, retroperitoneum and mesentery as well as in the iliac chain, right greater than left. Findings are suspicious for lymphoma/leukemia. Multiple splenic lesions appearing to have increased in number and size since the prior exam. He was given a liter NS bolus. Admitted to [facility] for further management.      * No surgery found *      Hospital Course:   Admitted with dyspnea after CT chest showed diffuse adenopathy concerning for lymphoma or leukemia  Hematology oncology consulted who recommended lymph node biopsy  Surgeon consulted for LN biopsy  Surgeon reported no ability to perform in patient with current schedule and deferred for OP arrangement    11/11: Breathing stable. RA sats stable. He spiked a fever of 101F which he reported having chills and night sweats often PTA. I suspect he has been spiking temps nocturnally for a while. He does not have leukocytosis. He is not ill appearing. CXR did not show focal consolidation or abnormalities. I suspected the fever to be more reactive. For completeness, will empirically place on oral doxycycline and Ceftin. Blood cultures collected for completeness. He desires to discharge since he cannot get LN biopsy. I see no reason he cant discharge on empiric antibiotics as above. If blood cultures yield positive results, will reach out to patient. Case management is working with the patient on OP surgeon and heme/oncology appointments. Recommended Bone marrow biopsy and PET scan also requested; patient will be called for all appointments.          Goals of Care Treatment Preferences:  Code Status: Full Code      Consults:   Consults (From admission, onward)        Status Ordering Provider     Inpatient consult to General Surgery  Once        Provider:  Francisco Velazquez MD    Completed CAYETANO GONZALEZ     Inpatient consult to Hematology/Oncology  Once        Provider:  Sandip Vickers MD    Completed CAYETANO GONZALEZ          Abnormal finding on CT scan  Lymphadenopathy  Splenic mass  -CT chest/abd/pelv: Innumerable adenopathy above and below the diaphragm and throughout the neck, LEFT axilla and chest wall, retroperitoneum and mesentery as well as in the iliac chain, right greater than left. Findings are suspicious for  lymphoma/leukemia. Multiple splenic lesions appearing to have increased in number and size since the prior exam.  -Consulted heme/onc    See hospital course    Lymphadenopathy  See hospital course      Type 2 diabetes mellitus without complication, without long-term current use of insulin  Patient's FSGs are uncontrolled due to hypoglycemia on current medication regimen.  Last A1c reviewed-   Lab Results   Component Value Date    HGBA1C 6.5 (H) 07/20/2022     Most recent fingerstick glucose reviewed-   Recent Labs   Lab 11/10/22  1210 11/10/22  1651 11/10/22  2004 11/11/22  0521   POCTGLUCOSE 130* 116* 123* 123*     Current correctional scale  Low  Maintain anti-hyperglycemic dose as follows-   Antihyperglycemics (From admission, onward)    Start     Stop Route Frequency Ordered    11/10/22 0056  insulin aspart U-100 pen 0-5 Units         -- SubQ Before meals & nightly PRN 11/09/22 0137        Resume home meds    Essential hypertension  Chronic, Latest blood pressure and vitals reviewed-   Temp:  [98.6 °F (37 °C)-101.1 °F (38.4 °C)]   Pulse:  []   Resp:  [16-18]   BP: (112-133)/(56-66)   SpO2:  [94 %-99 %] .   Home meds for hypertension were reviewed and noted below.   Hypertension Medications             losartan (COZAAR) 25 MG tablet TAKE 4 TABLETS(100MG) BY MOUTH ONCE DAILY    metoprolol succinate (TOPROL-XL) 50 MG 24 hr tablet Take 1 tablet (50 mg total) by mouth once daily.        Resume home meds    ANAMIKA (obstructive sleep apnea)  -CPAP QHS    He does not wear a CPAP at home per patient      Final Active Diagnoses:    Diagnosis Date Noted POA    Lymphadenopathy [R59.1] 11/10/2022 Yes    Abnormal finding on CT scan [R93.89] 11/10/2022 Yes    Splenic mass [R16.1] 11/10/2022 Yes    Pure hypertriglyceridemia [E78.1] 04/14/2022 Yes    Nonrheumatic aortic valve stenosis [I35.0] 07/07/2021 Yes    Type 2 diabetes mellitus without complication, without long-term current use of insulin [E11.9] 03/24/2016 Yes     ANAMIKA (obstructive sleep apnea) [G47.33] 04/27/2015 Yes    Essential hypertension [I10] 04/27/2015 Yes    Class 2 severe obesity due to excess calories with serious comorbidity and body mass index (BMI) of 36.0 to 36.9 in adult [E66.01, Z68.36] 04/27/2015 Not Applicable      Problems Resolved During this Admission:    Diagnosis Date Noted Date Resolved POA    PRINCIPAL PROBLEM:  SOB (shortness of breath) [R06.02] 11/10/2022 11/11/2022 Yes    Dehydration [E86.0] 11/10/2022 11/11/2022 Yes       Discharged Condition: stable    Disposition: Home or Self Care    Follow Up:   Follow-up Information     Anson Blunt MD Follow up.    Specialty: Internal Medicine  Why: Patient to schedule own Hospital Follow-Up. Primary Care Physician  Contact information:  [address]  [place] [id number] [phone]             Sandip Vickers MD Follow up.    Specialty: Hematology and Oncology  Why: Hematology/Oncology Follow-Up  Nurse sent message to office, please contact office if they have not contacted you.  Contact information:  [address]  [address]  [facility] [address]  [phone]             Francisco Velazquez MD Follow up.    Specialty: General Surgery  Why: General Surgery Follow-Up  Nurse sent message to office, please contact office if they have not contacted you.  Contact information:  [address]  [address]  [place] [address]  [phone]                       Patient Instructions:      Bone marrow   Standing Status: Future Standing Exp. Date: 11/10/23     NM PET CT Whole Body   Standing Status: Future Standing Exp. Date: 11/10/23     Order Specific Question Answer Comments   May the Radiologist modify the order per protocol to meet the clinical needs of the patient? Yes      Leukemia/Lymphoma Screen - Bone Marrow Right Anterior Iliac Crest   Standing Status: Future Standing Exp. Date: 11/10/23     Order Specific Question Answer Comments   Specimen Source Right Anterior Iliac Crest    Indication:  C85.90 Lymphoma      Chromosome Analysis, Bone Marrow Right Anterior Iliac Crest   Standing Status: Future Standing Exp. Date: 11/10/23     Order Specific Question Answer Comments   Reason For Referral lympona or leukemia work up    Specimen Source Right Anterior Iliac Crest    Release to patient Immediate      Ambulatory referral/consult to General Surgery   Standing Status: Future   Referral Priority: Routine Referral Type: Consultation   Referral Reason: Specialty Services Required   Referred to Provider: DEEP SHEIKH Requested Specialty: General Surgery   Number of Visits Requested: 1     Specimen to Pathology, Bone Marrow Aspiration/Biopsy   Standing Status: Future Standing Exp. Date: 11/10/23     Order Specific Question Answer Comments   Specimen Source: Bone Marrow Aspirate, Right Iliac Crest    Clinical Information: 57 year old with abnormal CT concerning for lymphoma or leukemia, excisional biopsy pending. heme/oncology recommends BMB abd PET Scan    Release to patient Immediate      Activity as tolerated       Pending Diagnostic Studies:     Procedure Component Value Units Date/Time    Immunofixation electrophoresis [[id number]] Collected: 11/10/22 0917    Order Status: Sent Lab Status: In process Updated: 11/10/22 6701    Specimen: Blood          Medications:  Reconciled Home Medications:      Medication List      START taking these medications    cefUROXime 250 MG tablet  Commonly known as: CEFTIN  Take 1 tablet (250 mg total) by mouth every 12 (twelve) hours. for 10 days     doxycycline 100 MG tablet  Commonly known as: VIBRA-TABS  Take 1 tablet (100 mg total) by mouth every 12 (twelve) hours. for 10 days        CHANGE how you take these medications    losartan 25 MG tablet  Commonly known as: COZAAR  Take 1 tablet (25 mg total) by mouth once daily.  What changed: See the new instructions.     metFORMIN 1000 MG tablet  Commonly known as: GLUCOPHAGE  Take 1 tablet (1,000 mg total) by mouth 2 (two)  times daily with meals.  What changed:   · how much to take  · when to take this  · additional instructions        CONTINUE taking these medications    acetaminophen 500 MG tablet  Commonly known as: TYLENOL  Take 500 mg by mouth every 6 (six) hours as needed for Pain.     atorvastatin 20 MG tablet  Commonly known as: LIPITOR  Take 1 tablet (20 mg total) by mouth once daily.     brompheniramine-pseudoeph-DM 2-30-10 mg/5 mL Syrp  Commonly known as: BROMFED DM  SMARTSI-10 Milliliter(s) By Mouth Every 6 Hours PRN     linaCLOtide 72 mcg Cap capsule  Commonly known as: LINZESS  Take 1 capsule (72 mcg total) by mouth before breakfast.     metoprolol succinate 50 MG 24 hr tablet  Commonly known as: TOPROL-XL  Take 1 tablet (50 mg total) by mouth once daily.     ONE-A-DAY MEN'S 50 PLUS 400- mcg Tab  Generic drug: multivit-min-folic-vit K-lycop  Take 1 tablet by mouth once daily.     sildenafiL 100 MG tablet  Commonly known as: VIAGRA  Take 1 tablet (100 mg total) by mouth daily as needed for Erectile Dysfunction.     VITAMIN B-12 100 MCG tablet  Generic drug: cyanocobalamin  Take 100 mcg by mouth once daily.        STOP taking these medications    glimepiride 4 MG tablet  Commonly known as: AMARYL     VITAMIN C 500 MG tablet  Generic drug: ascorbic acid (vitamin C)            Indwelling Lines/Drains at time of discharge:   Lines/Drains/Airways     None                 Time spent on the discharge of patient: 45 minutes         Maggy Mckinney NP  Department of Hospital Medicine  [facility]

## 2022-11-11 NOTE — NURSING
Pt and spouse reminded of NPO status after MN. Wide awake, AAOx4. Pt stated he will comply.assigned PCT informed.

## 2022-11-11 NOTE — PROGRESS NOTES
[facility]                    Pharmacy       Discharge Medication Education    Patient ACCEPTED medication education. Pharmacy has provided education on the name, indication, and possible side effects of the medication(s) prescribed, using teach-back method.     The following medications have also been discussed, during this admission.        Medication List        START taking these medications      cefUROXime 250 MG tablet  Commonly known as: CEFTIN  Take 1 tablet (250 mg total) by mouth every 12 (twelve) hours. for 10 days     doxycycline 100 MG tablet  Commonly known as: VIBRA-TABS  Take 1 tablet (100 mg total) by mouth every 12 (twelve) hours. for 10 days            CHANGE how you take these medications      losartan 25 MG tablet  Commonly known as: COZAAR  Take 1 tablet (25 mg total) by mouth once daily.  What changed: See the new instructions.     metFORMIN 1000 MG tablet  Commonly known as: GLUCOPHAGE  Take 1 tablet (1,000 mg total) by mouth 2 (two) times daily with meals.  What changed:   how much to take  when to take this  additional instructions            CONTINUE taking these medications      acetaminophen 500 MG tablet  Commonly known as: TYLENOL     atorvastatin 20 MG tablet  Commonly known as: LIPITOR  Take 1 tablet (20 mg total) by mouth once daily.     brompheniramine-pseudoeph-DM 2-30-10 mg/5 mL Syrp  Commonly known as: BROMFED DM     linaCLOtide 72 mcg Cap capsule  Commonly known as: LINZESS  Take 1 capsule (72 mcg total) by mouth before breakfast.     metoprolol succinate 50 MG 24 hr tablet  Commonly known as: TOPROL-XL  Take 1 tablet (50 mg total) by mouth once daily.     ONE-A-DAY MEN'S 50 PLUS 400- mcg Tab  Generic drug: multivit-min-folic-vit K-lycop     sildenafiL 100 MG tablet  Commonly known as: VIAGRA  Take 1 tablet (100 mg total) by mouth daily as needed for Erectile Dysfunction.     VITAMIN B-12 100 MCG tablet  Generic drug: cyanocobalamin            STOP  taking these medications      glimepiride 4 MG tablet  Commonly known as: AMARYL     VITAMIN C 500 MG tablet  Generic drug: ascorbic acid (vitamin C)               Where to Get Your Medications        These medications were sent to [facility] [address], [place] [address]      Hours: Mon-Fri, 8a-5:30p Phone: [phone]   cefUROXime 250 MG tablet  doxycycline 100 MG tablet  losartan 25 MG tablet          Thank you  Bonita Ruiz, PharmD  [phone]

## 2022-11-11 NOTE — ASSESSMENT & PLAN NOTE
Patient's FSGs are uncontrolled due to hypoglycemia on current medication regimen.  Last A1c reviewed-   Lab Results   Component Value Date    HGBA1C 6.5 (H) 07/20/2022     Most recent fingerstick glucose reviewed-   Recent Labs   Lab 11/10/22  1210 11/10/22  1651 11/10/22  2004 11/11/22  0521   POCTGLUCOSE 130* 116* 123* 123*     Current correctional scale  Low  Maintain anti-hyperglycemic dose as follows-   Antihyperglycemics (From admission, onward)    Start     Stop Route Frequency Ordered    11/10/22 0056  insulin aspart U-100 pen 0-5 Units         -- SubQ Before meals & nightly PRN 11/09/22 5230        Resume home meds

## 2022-11-11 NOTE — RESPIRATORY THERAPY
Brought CPAP to room. Pt  was sleep, but family member in room stated that he does not wear CPAP, and not to put on.  CPAP is in room though.

## 2022-11-13 LAB
PATHOLOGIST INTERPRETATION IFE: NORMAL
PATHOLOGIST INTERPRETATION SPE: NORMAL

## 2022-11-14 ENCOUNTER — TELEPHONE (OUTPATIENT)
Dept: INFUSION THERAPY | Facility: HOSPITAL | Age: 57
End: 2022-11-14
Payer: COMMERCIAL

## 2022-11-14 ENCOUNTER — TELEPHONE (OUTPATIENT)
Dept: SURGERY | Facility: CLINIC | Age: 57
End: 2022-11-14
Payer: COMMERCIAL

## 2022-11-14 NOTE — TELEPHONE ENCOUNTER
----- Message from Carrie Casey sent at 11/14/2022 10:51 AM CST -----  Type:  Needs Medical Advice    Who Called:  pt   Symptoms (please be specific):  would like to get a call back to discuss upcoming appt      Would the patient rather a call back or a response via [facility]? Call   Best Call Back Number:  [phone]  Additional Information:           11/14/2022            1403  Spoke to patient regarding the above message. Informed patient that his visit on 11/16/2022 is an appointment to discuss treatment options. Patient verbalized understanding.

## 2022-11-15 ENCOUNTER — TELEPHONE (OUTPATIENT)
Dept: FAMILY MEDICINE | Facility: CLINIC | Age: 57
End: 2022-11-15
Payer: COMMERCIAL

## 2022-11-15 NOTE — TELEPHONE ENCOUNTER
Pt recently in hospital  Going out of short term disability due to recent dx    Scheduled to see dr joe Bennett

## 2022-11-15 NOTE — TELEPHONE ENCOUNTER
----- Message from Theresa Kahn sent at 11/15/2022  2:14 PM CST -----  Regarding: call back  Contact: [phone]  Who Called: PT     Patient called in requesting to speak with you. Did not specify why just said needs a letter. Please advise.

## 2022-11-16 ENCOUNTER — OFFICE VISIT (OUTPATIENT)
Dept: SURGERY | Facility: CLINIC | Age: 57
End: 2022-11-16
Payer: COMMERCIAL

## 2022-11-16 VITALS
SYSTOLIC BLOOD PRESSURE: 132 MMHG | OXYGEN SATURATION: 96 % | WEIGHT: 217.13 LBS | BODY MASS INDEX: 32.16 KG/M2 | HEIGHT: 69 IN | HEART RATE: 96 BPM | DIASTOLIC BLOOD PRESSURE: 64 MMHG

## 2022-11-16 DIAGNOSIS — R59.1 LYMPHADENOPATHY: Primary | ICD-10-CM

## 2022-11-16 DIAGNOSIS — R93.89 ABNORMAL FINDING ON CT SCAN: ICD-10-CM

## 2022-11-16 LAB
BACTERIA BLD CULT: NORMAL
BACTERIA BLD CULT: NORMAL

## 2022-11-16 PROCEDURE — 3008F BODY MASS INDEX DOCD: CPT | Mod: CPTII,S$GLB,, | Performed by: SURGERY

## 2022-11-16 PROCEDURE — 99999 PR PBB SHADOW E&M-EST. PATIENT-LVL V: ICD-10-PCS | Mod: PBBFAC,,, | Performed by: SURGERY

## 2022-11-16 PROCEDURE — 3008F PR BODY MASS INDEX (BMI) DOCUMENTED: ICD-10-PCS | Mod: CPTII,S$GLB,, | Performed by: SURGERY

## 2022-11-16 PROCEDURE — 3044F PR MOST RECENT HEMOGLOBIN A1C LEVEL <7.0%: ICD-10-PCS | Mod: CPTII,S$GLB,, | Performed by: SURGERY

## 2022-11-16 PROCEDURE — 4010F PR ACE/ARB THEARPY RXD/TAKEN: ICD-10-PCS | Mod: CPTII,S$GLB,, | Performed by: SURGERY

## 2022-11-16 PROCEDURE — 1159F PR MEDICATION LIST DOCUMENTED IN MEDICAL RECORD: ICD-10-PCS | Mod: CPTII,S$GLB,, | Performed by: SURGERY

## 2022-11-16 PROCEDURE — 3061F PR NEG MICROALBUMINURIA RESULT DOCUMENTED/REVIEW: ICD-10-PCS | Mod: CPTII,S$GLB,, | Performed by: SURGERY

## 2022-11-16 PROCEDURE — 3061F NEG MICROALBUMINURIA REV: CPT | Mod: CPTII,S$GLB,, | Performed by: SURGERY

## 2022-11-16 PROCEDURE — 3044F HG A1C LEVEL LT 7.0%: CPT | Mod: CPTII,S$GLB,, | Performed by: SURGERY

## 2022-11-16 PROCEDURE — 3066F PR DOCUMENTATION OF TREATMENT FOR NEPHROPATHY: ICD-10-PCS | Mod: CPTII,S$GLB,, | Performed by: SURGERY

## 2022-11-16 PROCEDURE — 3066F NEPHROPATHY DOC TX: CPT | Mod: CPTII,S$GLB,, | Performed by: SURGERY

## 2022-11-16 PROCEDURE — 3078F DIAST BP <80 MM HG: CPT | Mod: CPTII,S$GLB,, | Performed by: SURGERY

## 2022-11-16 PROCEDURE — 3075F SYST BP GE 130 - 139MM HG: CPT | Mod: CPTII,S$GLB,, | Performed by: SURGERY

## 2022-11-16 PROCEDURE — 4010F ACE/ARB THERAPY RXD/TAKEN: CPT | Mod: CPTII,S$GLB,, | Performed by: SURGERY

## 2022-11-16 PROCEDURE — 99999 PR PBB SHADOW E&M-EST. PATIENT-LVL V: CPT | Mod: PBBFAC,,, | Performed by: SURGERY

## 2022-11-16 PROCEDURE — 99214 PR OFFICE/OUTPT VISIT, EST, LEVL IV, 30-39 MIN: ICD-10-PCS | Mod: S$GLB,,, | Performed by: SURGERY

## 2022-11-16 PROCEDURE — 1160F PR REVIEW ALL MEDS BY PRESCRIBER/CLIN PHARMACIST DOCUMENTED: ICD-10-PCS | Mod: CPTII,S$GLB,, | Performed by: SURGERY

## 2022-11-16 PROCEDURE — 1159F MED LIST DOCD IN RCRD: CPT | Mod: CPTII,S$GLB,, | Performed by: SURGERY

## 2022-11-16 PROCEDURE — 99214 OFFICE O/P EST MOD 30 MIN: CPT | Mod: S$GLB,,, | Performed by: SURGERY

## 2022-11-16 PROCEDURE — 3075F PR MOST RECENT SYSTOLIC BLOOD PRESS GE 130-139MM HG: ICD-10-PCS | Mod: CPTII,S$GLB,, | Performed by: SURGERY

## 2022-11-16 PROCEDURE — 1160F RVW MEDS BY RX/DR IN RCRD: CPT | Mod: CPTII,S$GLB,, | Performed by: SURGERY

## 2022-11-16 PROCEDURE — 3078F PR MOST RECENT DIASTOLIC BLOOD PRESSURE < 80 MM HG: ICD-10-PCS | Mod: CPTII,S$GLB,, | Performed by: SURGERY

## 2022-11-16 RX ORDER — CEFAZOLIN SODIUM 2 G/50ML
2 SOLUTION INTRAVENOUS
Status: CANCELLED | OUTPATIENT
Start: 2022-11-16

## 2022-11-16 RX ORDER — SODIUM CHLORIDE 9 MG/ML
INJECTION, SOLUTION INTRAVENOUS CONTINUOUS
Status: CANCELLED | OUTPATIENT
Start: 2022-11-16

## 2022-11-16 NOTE — PROGRESS NOTES
[facility]  OUTPATIENT H&P    REASON FOR VISIT/CC: Hospital F/U    HPI: Saud Mayers is a 57 y.o. male with lymphadenopathy in need of biopsy.  Most prominent node is at right lateral/posterior neck.  It waxes and wanes in size.    I have reviewed the patient's chart including prior progress notes, procedures and testing.     ROS:   Review of Systems   Constitutional:  Positive for activity change, appetite change, fatigue and unexpected weight change.   All other systems reviewed and are negative.    PROBLEM LIST:  Patient Active Problem List   Diagnosis    ANAMIKA (obstructive sleep apnea)    Class 2 severe obesity due to excess calories with serious comorbidity and body mass index (BMI) of 36.0 to 36.9 in adult    Essential hypertension    Asthma, mild intermittent    Type 2 diabetes mellitus without complication, without long-term current use of insulin    Obesity, diabetes, and hypertension syndrome    Pure hypercholesterolemia    Eczema    Impotence    Systolic murmur    Nonrheumatic aortic valve stenosis    Pure hypertriglyceridemia    Lymphadenopathy    Splenic mass    Abnormal finding on CT scan         HISTORY  Past Medical History:   Diagnosis Date    Asthma     Hypertension     ANAMIKA on CPAP     Testicular hypofunction        Past Surgical History:   Procedure Laterality Date    CARPAL TUNNEL RELEASE Bilateral 2020    CHOLECYSTECTOMY  01/01/1990    COLONOSCOPY  01/01/2013    COLONOSCOPY N/A 9/2/2022    Procedure: COLONOSCOPY sutab;  Surgeon: Jeovany Cisse MD;  Location: [facility];  Service: Endoscopy;  Laterality: N/A;    ESOPHAGOGASTRODUODENOSCOPY N/A 9/2/2022    Procedure: EGD (ESOPHAGOGASTRODUODENOSCOPY);  Surgeon: Jeovany Cisse MD;  Location: [facility];  Service: Endoscopy;  Laterality: N/A;    ORTHOPEDIC SURGERY Bilateral 01/01/2007    feet plantar    ORTHOPEDIC SURGERY Right 01/01/2006    knee    ROTATOR CUFF REPAIR Left 01/01/1997       Social History     Tobacco Use    Smoking  status: Former     Types: Cigarettes     Quit date: 1/15/1992     Years since quittin.8    Smokeless tobacco: Never       Family History   Problem Relation Age of Onset    Heart attack Father          MEDS:  Current Outpatient Medications on File Prior to Visit   Medication Sig Dispense Refill    acetaminophen (TYLENOL) 500 MG tablet Take 500 mg by mouth every 6 (six) hours as needed for Pain.      atorvastatin (LIPITOR) 20 MG tablet Take 1 tablet (20 mg total) by mouth once daily. 90 tablet 3    cefUROXime (CEFTIN) 250 MG tablet Take 1 tablet (250 mg total) by mouth every 12 (twelve) hours. for 10 days 20 tablet 0    cyanocobalamin (VITAMIN B-12) 100 MCG tablet Take 100 mcg by mouth once daily.      doxycycline (VIBRA-TABS) 100 MG tablet Take 1 tablet (100 mg total) by mouth every 12 (twelve) hours. for 10 days 20 tablet 0    linaCLOtide (LINZESS) 72 mcg Cap capsule Take 1 capsule (72 mcg total) by mouth before breakfast. 90 capsule 3    losartan (COZAAR) 25 MG tablet Take 1 tablet (25 mg total) by mouth once daily. 90 tablet 3    metFORMIN (GLUCOPHAGE) 1000 MG tablet Take 1 tablet (1,000 mg total) by mouth 2 (two) times daily with meals. 180 tablet 3    metoprolol succinate (TOPROL-XL) 50 MG 24 hr tablet Take 1 tablet (50 mg total) by mouth once daily. 90 tablet 3    multivit-min-folic-vit K-lycop (ONE-A-DAY MEN'S 50 PLUS) 400- mcg Tab Take 1 tablet by mouth once daily.      brompheniramine-pseudoeph-DM (BROMFED DM) 2-30-10 mg/5 mL Syrp SMARTSI-10 Milliliter(s) By Mouth Every 6 Hours PRN      sildenafiL (VIAGRA) 100 MG tablet Take 1 tablet (100 mg total) by mouth daily as needed for Erectile Dysfunction. (Patient not taking: Reported on 2022) 30 tablet 5     No current facility-administered medications on file prior to visit.       ALLERGIES:  Review of patient's allergies indicates:   Allergen Reactions    Gabapentin Hallucinations    Tizanidine Other (See Comments)     somnolence          VITALS:  Vitals:    11/16/22 1306   BP: 132/64   Pulse: 96         PHYSICAL EXAM:  Physical Exam  Constitutional:       Appearance: Normal appearance.   HENT:      Head:      Comments: Enlarged node right posterior/lateral neck, +TTP        LABS:  Lab Results   Component Value Date    WBC 8.31 11/11/2022    RBC 2.98 (L) 11/11/2022    HGB 8.9 (L) 11/11/2022    HCT 27.8 (L) 11/11/2022     11/11/2022     Lab Results   Component Value Date     11/11/2022     (L) 11/11/2022    K 3.9 11/11/2022    CL 96 11/11/2022    CO2 23 11/11/2022    BUN 14 11/11/2022    CREATININE 0.7 11/11/2022    CALCIUM 9.3 11/11/2022     Lab Results   Component Value Date    ALT 19 11/11/2022    AST 26 11/11/2022    ALKPHOS 96 11/11/2022    BILITOT 1.0 11/11/2022     Lab Results   Component Value Date    MG 1.7 11/09/2022       STUDIES:  CT head/neck images and reports were personally reviewed.        ASSESSMENT & PLAN:  57 y.o. male, plan for excisional biopsy in OR next week.  Risks, benefits, and alternatives to the procedure were explained to the patient in detail.  All questions were answered and the patient has requested the procedure be done.  Informed consent was obtained.        Francisco Velazquez M.D., F.A.C.S.  [facility] and General Surgery  [facility]

## 2022-11-17 ENCOUNTER — TELEPHONE (OUTPATIENT)
Dept: HEMATOLOGY/ONCOLOGY | Facility: CLINIC | Age: 57
End: 2022-11-17
Payer: COMMERCIAL

## 2022-11-17 ENCOUNTER — OFFICE VISIT (OUTPATIENT)
Dept: FAMILY MEDICINE | Facility: CLINIC | Age: 57
End: 2022-11-17
Payer: COMMERCIAL

## 2022-11-17 VITALS
HEART RATE: 112 BPM | SYSTOLIC BLOOD PRESSURE: 130 MMHG | HEIGHT: 69 IN | DIASTOLIC BLOOD PRESSURE: 80 MMHG | BODY MASS INDEX: 32 KG/M2 | TEMPERATURE: 99 F | OXYGEN SATURATION: 99 % | WEIGHT: 216.06 LBS

## 2022-11-17 DIAGNOSIS — R51.9 CHRONIC INTRACTABLE HEADACHE, UNSPECIFIED HEADACHE TYPE: ICD-10-CM

## 2022-11-17 DIAGNOSIS — F40.240 CLAUSTROPHOBIA: ICD-10-CM

## 2022-11-17 DIAGNOSIS — R11.2 NAUSEA AND VOMITING, UNSPECIFIED VOMITING TYPE: Primary | ICD-10-CM

## 2022-11-17 DIAGNOSIS — G89.29 CHRONIC INTRACTABLE HEADACHE, UNSPECIFIED HEADACHE TYPE: ICD-10-CM

## 2022-11-17 PROCEDURE — 1160F PR REVIEW ALL MEDS BY PRESCRIBER/CLIN PHARMACIST DOCUMENTED: ICD-10-PCS | Mod: CPTII,S$GLB,, | Performed by: STUDENT IN AN ORGANIZED HEALTH CARE EDUCATION/TRAINING PROGRAM

## 2022-11-17 PROCEDURE — 99213 OFFICE O/P EST LOW 20 MIN: CPT | Mod: S$GLB,,, | Performed by: STUDENT IN AN ORGANIZED HEALTH CARE EDUCATION/TRAINING PROGRAM

## 2022-11-17 PROCEDURE — 3075F SYST BP GE 130 - 139MM HG: CPT | Mod: CPTII,S$GLB,, | Performed by: STUDENT IN AN ORGANIZED HEALTH CARE EDUCATION/TRAINING PROGRAM

## 2022-11-17 PROCEDURE — 3066F NEPHROPATHY DOC TX: CPT | Mod: CPTII,S$GLB,, | Performed by: STUDENT IN AN ORGANIZED HEALTH CARE EDUCATION/TRAINING PROGRAM

## 2022-11-17 PROCEDURE — 1159F PR MEDICATION LIST DOCUMENTED IN MEDICAL RECORD: ICD-10-PCS | Mod: CPTII,S$GLB,, | Performed by: STUDENT IN AN ORGANIZED HEALTH CARE EDUCATION/TRAINING PROGRAM

## 2022-11-17 PROCEDURE — 3061F NEG MICROALBUMINURIA REV: CPT | Mod: CPTII,S$GLB,, | Performed by: STUDENT IN AN ORGANIZED HEALTH CARE EDUCATION/TRAINING PROGRAM

## 2022-11-17 PROCEDURE — 3079F DIAST BP 80-89 MM HG: CPT | Mod: CPTII,S$GLB,, | Performed by: STUDENT IN AN ORGANIZED HEALTH CARE EDUCATION/TRAINING PROGRAM

## 2022-11-17 PROCEDURE — 1159F MED LIST DOCD IN RCRD: CPT | Mod: CPTII,S$GLB,, | Performed by: STUDENT IN AN ORGANIZED HEALTH CARE EDUCATION/TRAINING PROGRAM

## 2022-11-17 PROCEDURE — 4010F ACE/ARB THERAPY RXD/TAKEN: CPT | Mod: CPTII,S$GLB,, | Performed by: STUDENT IN AN ORGANIZED HEALTH CARE EDUCATION/TRAINING PROGRAM

## 2022-11-17 PROCEDURE — 3044F PR MOST RECENT HEMOGLOBIN A1C LEVEL <7.0%: ICD-10-PCS | Mod: CPTII,S$GLB,, | Performed by: STUDENT IN AN ORGANIZED HEALTH CARE EDUCATION/TRAINING PROGRAM

## 2022-11-17 PROCEDURE — 1160F RVW MEDS BY RX/DR IN RCRD: CPT | Mod: CPTII,S$GLB,, | Performed by: STUDENT IN AN ORGANIZED HEALTH CARE EDUCATION/TRAINING PROGRAM

## 2022-11-17 PROCEDURE — 3008F PR BODY MASS INDEX (BMI) DOCUMENTED: ICD-10-PCS | Mod: CPTII,S$GLB,, | Performed by: STUDENT IN AN ORGANIZED HEALTH CARE EDUCATION/TRAINING PROGRAM

## 2022-11-17 PROCEDURE — 99213 PR OFFICE/OUTPT VISIT, EST, LEVL III, 20-29 MIN: ICD-10-PCS | Mod: S$GLB,,, | Performed by: STUDENT IN AN ORGANIZED HEALTH CARE EDUCATION/TRAINING PROGRAM

## 2022-11-17 PROCEDURE — 3066F PR DOCUMENTATION OF TREATMENT FOR NEPHROPATHY: ICD-10-PCS | Mod: CPTII,S$GLB,, | Performed by: STUDENT IN AN ORGANIZED HEALTH CARE EDUCATION/TRAINING PROGRAM

## 2022-11-17 PROCEDURE — 3061F PR NEG MICROALBUMINURIA RESULT DOCUMENTED/REVIEW: ICD-10-PCS | Mod: CPTII,S$GLB,, | Performed by: STUDENT IN AN ORGANIZED HEALTH CARE EDUCATION/TRAINING PROGRAM

## 2022-11-17 PROCEDURE — 4010F PR ACE/ARB THEARPY RXD/TAKEN: ICD-10-PCS | Mod: CPTII,S$GLB,, | Performed by: STUDENT IN AN ORGANIZED HEALTH CARE EDUCATION/TRAINING PROGRAM

## 2022-11-17 PROCEDURE — 3075F PR MOST RECENT SYSTOLIC BLOOD PRESS GE 130-139MM HG: ICD-10-PCS | Mod: CPTII,S$GLB,, | Performed by: STUDENT IN AN ORGANIZED HEALTH CARE EDUCATION/TRAINING PROGRAM

## 2022-11-17 PROCEDURE — 3079F PR MOST RECENT DIASTOLIC BLOOD PRESSURE 80-89 MM HG: ICD-10-PCS | Mod: CPTII,S$GLB,, | Performed by: STUDENT IN AN ORGANIZED HEALTH CARE EDUCATION/TRAINING PROGRAM

## 2022-11-17 PROCEDURE — 3044F HG A1C LEVEL LT 7.0%: CPT | Mod: CPTII,S$GLB,, | Performed by: STUDENT IN AN ORGANIZED HEALTH CARE EDUCATION/TRAINING PROGRAM

## 2022-11-17 PROCEDURE — 3008F BODY MASS INDEX DOCD: CPT | Mod: CPTII,S$GLB,, | Performed by: STUDENT IN AN ORGANIZED HEALTH CARE EDUCATION/TRAINING PROGRAM

## 2022-11-17 RX ORDER — ONDANSETRON 4 MG/1
4 TABLET, FILM COATED ORAL EVERY 8 HOURS PRN
Qty: 90 TABLET | Refills: 1 | Status: SHIPPED | OUTPATIENT
Start: 2022-11-17 | End: 2023-05-05

## 2022-11-17 NOTE — TELEPHONE ENCOUNTER
I attempt to reach pt  to return hs phone call to find out  what was his need ,but unfortunately I was  unable to  so i left both a voice mail and call back number.

## 2022-11-17 NOTE — TELEPHONE ENCOUNTER
----- Message from Kim Quiñonez sent at 11/17/2022 12:15 PM CST -----  Type:  Needs Medical Advice    Who Called: pt  Symptoms (please be specific): pt is following up on the scheduling of a procedure that he needs to have    #:    Would the patient rather a call back or a response via [facility]? call  Best Call Back Number: [phone]  Additional Information:

## 2022-11-17 NOTE — TELEPHONE ENCOUNTER
----- Message from Maggie Flaherty sent at 11/17/2022  3:13 PM CST -----  Type:  Patient Returning Call    Who Called:pt  Who Left Message for Patient:Hilda Donaldson  Does the patient know what this is regarding?:surgery up coming  Would the patient rather a call back or a response via [facility]? call  Best Call Back Number:[phone]  Additional Information:

## 2022-11-17 NOTE — TELEPHONE ENCOUNTER
I return pt phone call after leaving him a voice mail he explain that he had a bon marrow test schedule,but I couldn't find information on the test he is asking about.

## 2022-11-17 NOTE — LETTER
11/17/2022                 [address] [address]  Phone: [phone]  Fax: [phone]   11/17/2022    Patient: Saud Mayers   YOB: 1965   Date of Visit: 11/17/2022       To Whom it May Concern:    Saud Mayers was seen in my clinic on 11/17/2022. He may return to work 1/13/23.    If you have any questions or concerns, please don't hesitate to call.    Sincerely,         Anson Blunt MD

## 2022-11-18 NOTE — PROGRESS NOTES
Transitional Care Note  Subjective:       Patient ID: Saud Mayers is a 57 y.o. male.  Chief Complaint: Hospital Follow Up    Family and/or Caretaker present at visit?  No.  Diagnostic tests reviewed/disposition: No diagnosic tests pending after this hospitalization.  Disease/illness education: yes  Home health/community services discussion/referrals: Patient does not have home health established from hospital visit.  They do not need home health.  If needed, we will set up home health for the patient.   Establishment or re-establishment of referral orders for community resources: No other necessary community resources.   Discussion with other health care providers: No discussion with other health care providers necessary.   HPI  Patient here for hospital follow up. He was recently seen due to shortness of breath. Imaging during admission revealed multiple enlarged lymph nodes, concerning for lymphoma. He has biopsy scheduled for next week. Over the last few weeks, he has been having headaches. They are front and getting for frequent. They are also worsening in intensity. He takes tylenol multiple times per day for this. He has also been having vomiting and dry heaving. This is a daily occurrence for the last 2 months. He has been evaluated by GI and heme/onc both of whom could not find a cause for vomiting.     Review of Systems   Constitutional:  Negative for fever.   HENT:  Negative for sneezing and sore throat.    Eyes:  Negative for photophobia.   Respiratory:  Negative for cough, shortness of breath and wheezing.    Cardiovascular:  Negative for chest pain.   Gastrointestinal:  Negative for diarrhea, nausea and vomiting.   Genitourinary:  Negative for frequency.   Musculoskeletal:  Negative for joint swelling.   Skin:  Negative for rash.   Neurological:  Negative for dizziness and headaches.   Psychiatric/Behavioral:  Negative for hallucinations.      Objective:      Physical Exam  Vitals and nursing note  reviewed.   Constitutional:       Appearance: He is ill-appearing.      Comments: Dry heaving   HENT:      Head: Normocephalic and atraumatic.      Right Ear: External ear normal.      Left Ear: External ear normal.      Nose: Nose normal.      Mouth/Throat:      Mouth: Mucous membranes are moist.   Eyes:      Extraocular Movements: Extraocular movements intact.      Conjunctiva/sclera: Conjunctivae normal.   Cardiovascular:      Rate and Rhythm: Normal rate and regular rhythm.      Pulses: Normal pulses.      Heart sounds: No murmur heard.  Pulmonary:      Effort: Pulmonary effort is normal. No respiratory distress.      Breath sounds: No wheezing.   Abdominal:      General: There is no distension.      Palpations: Abdomen is soft. There is no mass.      Tenderness: There is no abdominal tenderness.   Musculoskeletal:         General: No swelling.      Cervical back: Normal range of motion.   Lymphadenopathy:      Cervical: Cervical adenopathy present.   Skin:     Coloration: Skin is not jaundiced.      Findings: No rash.   Neurological:      General: No focal deficit present.      Mental Status: He is alert and oriented to person, place, and time.   Psychiatric:         Mood and Affect: Mood normal.         Thought Content: Thought content normal.       Assessment:       1. Nausea and vomiting, unspecified vomiting type    2. Claustrophobia    3. Chronic intractable headache, unspecified headache type          Plan:       MRi brain ordered to rule out intracranial cause of headache and vomiting.

## 2022-11-28 ENCOUNTER — TELEPHONE (OUTPATIENT)
Dept: FAMILY MEDICINE | Facility: CLINIC | Age: 57
End: 2022-11-28
Payer: COMMERCIAL

## 2022-11-28 NOTE — TELEPHONE ENCOUNTER
Spoke with pt he stated that he would like to have placed for open mri but one that you would sit up in instead of laying down

## 2022-11-28 NOTE — TELEPHONE ENCOUNTER
----- Message from Vj Thompson sent at 11/28/2022  1:46 PM CST -----  Type: call back     Who Called:pt  requested to speak with office  Does the patient know what this is regarding?:did not go into details   Would the patient rather a call back or a response via [facility]? Call back   Best Call Back Number:[phone]  Additional Information:

## 2022-11-28 NOTE — TELEPHONE ENCOUNTER
Please fax previous order for MRI to    [facility] [address], [address]  [place], [place] [address] [phone]  Fax: [phone]

## 2022-11-29 ENCOUNTER — TELEPHONE (OUTPATIENT)
Dept: HEMATOLOGY/ONCOLOGY | Facility: CLINIC | Age: 57
End: 2022-11-29
Payer: COMMERCIAL

## 2022-11-29 NOTE — TELEPHONE ENCOUNTER
----- Message from Tiffanie Khan, Patient Care Assistant sent at 11/28/2022  9:35 AM CST -----  Type:  Needs Medical Advice    Who Called:  pt   Symptoms (please be specific):  Patient would like a call back regarding a bone density test    Would the patient rather a call back or a response via [facility]?  Call   Best Call Back Number:  [phone]  Additional Information:

## 2022-11-29 NOTE — TELEPHONE ENCOUNTER
----- Message from Kim Quiñonez sent at 11/29/2022  9:25 AM CST -----  Type:  Patient Returning Call    Who Called:pt  Who Left Message for Patient:office  Does the patient know what this is regarding?:questions about a bone density test  Would the patient rather a call back or a response via [facility]? call  Best Call Back Number:[phone]  Additional Information:

## 2022-11-29 NOTE — TELEPHONE ENCOUNTER
Pt called asking about a bone density test then clarified it was a BM test. I told him there appears to be a bone marrow  biopsy ordered but not scheduled. He has an appt with Dr. Vickers this Thursday 12/1. I told him that Dr. Vickers would discuss this option fully at his appt and would schedule it if he deems it necessary.

## 2022-11-30 ENCOUNTER — HOSPITAL ENCOUNTER (OUTPATIENT)
Dept: RADIOLOGY | Facility: HOSPITAL | Age: 57
Discharge: HOME | End: 2022-11-30
Attending: NURSE PRACTITIONER
Payer: COMMERCIAL

## 2022-11-30 DIAGNOSIS — R63.4 WEIGHT LOSS, UNINTENTIONAL: ICD-10-CM

## 2022-11-30 DIAGNOSIS — R10.30 LOWER ABDOMINAL PAIN: ICD-10-CM

## 2022-11-30 DIAGNOSIS — K59.04 CHRONIC IDIOPATHIC CONSTIPATION: ICD-10-CM

## 2022-11-30 PROCEDURE — 74178 CT ABD&PLV WO CNTR FLWD CNTR: CPT | Mod: TC,PO

## 2022-11-30 PROCEDURE — 25500020 PHARM REV CODE 255: Mod: PO | Performed by: NURSE PRACTITIONER

## 2022-11-30 RX ADMIN — IOHEXOL 100 ML: 350 INJECTION, SOLUTION INTRAVENOUS at 08:11

## 2022-12-01 ENCOUNTER — TELEPHONE (OUTPATIENT)
Dept: HEMATOLOGY/ONCOLOGY | Facility: CLINIC | Age: 57
End: 2022-12-01
Payer: COMMERCIAL

## 2022-12-01 ENCOUNTER — TELEPHONE (OUTPATIENT)
Dept: SURGERY | Facility: HOSPITAL | Age: 57
End: 2022-12-01
Payer: COMMERCIAL

## 2022-12-01 ENCOUNTER — TELEPHONE (OUTPATIENT)
Dept: FAMILY MEDICINE | Facility: CLINIC | Age: 57
End: 2022-12-01
Payer: COMMERCIAL

## 2022-12-01 DIAGNOSIS — C83.31 DIFFUSE LARGE B-CELL LYMPHOMA OF LYMPH NODES OF NECK: Primary | ICD-10-CM

## 2022-12-01 DIAGNOSIS — C85.90 LYMPHOMA: ICD-10-CM

## 2022-12-01 DIAGNOSIS — C83.38 DIFFUSE LARGE B-CELL LYMPHOMA OF LYMPH NODES OF MULTIPLE REGIONS: Primary | ICD-10-CM

## 2022-12-01 DIAGNOSIS — C83.38 DIFFUSE LARGE B-CELL LYMPHOMA OF LYMPH NODES OF MULTIPLE REGIONS: ICD-10-CM

## 2022-12-01 RX ORDER — CEFAZOLIN SODIUM 2 G/50ML
2 SOLUTION INTRAVENOUS
Status: CANCELLED | OUTPATIENT
Start: 2022-12-01

## 2022-12-01 RX ORDER — SODIUM CHLORIDE 9 MG/ML
INJECTION, SOLUTION INTRAVENOUS CONTINUOUS
Status: CANCELLED | OUTPATIENT
Start: 2022-12-01

## 2022-12-01 NOTE — Clinical Note
1. Schedule pet scan next week. 2. Schedule 2D echocardiogram next week. 3. Schedule appt with me next week (week of 12/5/22)  Thanks!

## 2022-12-01 NOTE — PLAN OF CARE
START ON PATHWAY REGIMEN - Lymphoma and CLL    [id number]        Prednisone       Rituximab-xxxx       Cyclophosphamide       Doxorubicin       Vincristine           Additional Orders: Per committee, hepatitis B and C screening   recommended prior to initiation of rituximab. Assess LVEF prior to initiation of   doxorubicin and as clinically indicated during and after treatment. Doxorubicin   can cause myocardial damage, including acute left ventricular failure. Risk of   cardiomyopathy is generally proportional to cumulative   anthracycline/anthracenedione exposure. Use of concomitant cardiotoxic agents,   previous radiotherapy to the mediastinum, or presence/history of cardiovascular   disease can additionally increase cardiomyopathy risk. See PI for details.   Infuse vincristine via minibag to reduce the risk of fatal medication errors due   to incorrect route of administration.    **Always confirm dose/schedule in your pharmacy ordering system**    Patient Characteristics:  Diffuse Large B-Cell Lymphoma or Follicular Lymphoma, Grade 3B, First Line,   Stage III and IV  Disease Type: Not Applicable  Disease Type: Diffuse Large B-Cell Lymphoma  Disease Type: Not Applicable  Line of therapy: First Line  Intent of Therapy:  Non-Curative / Palliative Intent, Discussed with Patient

## 2022-12-01 NOTE — TELEPHONE ENCOUNTER
----- Message from Philip Rodríguez sent at 12/1/2022  1:14 PM CST -----  Contact: pt  Type: Requesting to speak with nurse        Who Called: PT  Regarding: would like a call back   Would the patient rather a call back or a response via [facility]? Call back  Best Call Back Number: [phone]  Additional Information:

## 2022-12-01 NOTE — TELEPHONE ENCOUNTER
I called and spoke to him and his wife about his results and workup/management plan.    Sandip Vickers M.D.  Hematology/Oncology  [facility] - [address], [address]  [place] [address]  Phone: [phone]  Fax: [phone]

## 2022-12-01 NOTE — TELEPHONE ENCOUNTER
----- Message from Bonita Luna sent at 12/1/2022  1:44 PM CST -----  Type:  Patient Returning Call    Who Called: Christie /Anuja   Would the patient rather a call back or a response via [facility]? Call back   Best Call Back Number: [phone]   Additional Information: please resend [facility] paperwork .. the fax is compressed and can not be read ..

## 2022-12-01 NOTE — TELEPHONE ENCOUNTER
Path noted and reviewed with the patient.  He reports to be recovering well from surgery.   Path results forwarded to Dr Vickers.

## 2022-12-05 ENCOUNTER — LAB VISIT (OUTPATIENT)
Dept: LAB | Facility: HOSPITAL | Age: 57
End: 2022-12-05
Attending: INTERNAL MEDICINE
Payer: COMMERCIAL

## 2022-12-05 DIAGNOSIS — C83.38 DIFFUSE LARGE B-CELL LYMPHOMA OF LYMPH NODES OF MULTIPLE REGIONS: ICD-10-CM

## 2022-12-05 LAB
ALBUMIN SERPL BCP-MCNC: 3.8 G/DL (ref 3.5–5.2)
ALP SERPL-CCNC: 134 U/L (ref 38–126)
ALT SERPL W/O P-5'-P-CCNC: 25 U/L (ref 10–44)
ANION GAP SERPL CALC-SCNC: 11 MMOL/L (ref 8–16)
AST SERPL-CCNC: 53 U/L (ref 15–46)
B2 MICROGLOB SERPL-MCNC: 3.6 UG/ML (ref 0–2.5)
BASOPHILS # BLD AUTO: 0.05 K/UL (ref 0–0.2)
BASOPHILS NFR BLD: 0.5 % (ref 0–1.9)
BILIRUB SERPL-MCNC: 1 MG/DL (ref 0.1–1)
CALCIUM SERPL-MCNC: 9 MG/DL (ref 8.7–10.5)
CHLORIDE SERPL-SCNC: 97 MMOL/L (ref 95–110)
CO2 SERPL-SCNC: 24 MMOL/L (ref 23–29)
CREAT SERPL-MCNC: 0.51 MG/DL (ref 0.5–1.4)
DIFFERENTIAL METHOD: ABNORMAL
EOSINOPHIL # BLD AUTO: 0 K/UL (ref 0–0.5)
EOSINOPHIL NFR BLD: 0.3 % (ref 0–8)
ERYTHROCYTE [DISTWIDTH] IN BLOOD BY AUTOMATED COUNT: 16.1 % (ref 11.5–14.5)
EST. GFR  (NO RACE VARIABLE): >60 ML/MIN/1.73 M^2
GLUCOSE SERPL-MCNC: 177 MG/DL (ref 70–110)
HBV CORE AB SERPL QL IA: NORMAL
HBV SURFACE AG SERPL QL IA: NORMAL
HCT VFR BLD AUTO: 26.6 % (ref 40–54)
HCV AB SERPL QL IA: NORMAL
HGB BLD-MCNC: 8.3 G/DL (ref 14–18)
HIV 1+2 AB+HIV1 P24 AG SERPL QL IA: NORMAL
IMM GRANULOCYTES # BLD AUTO: 0.09 K/UL (ref 0–0.04)
IMM GRANULOCYTES NFR BLD AUTO: 1 % (ref 0–0.5)
LDH SERPL L TO P-CCNC: 928 U/L (ref 110–260)
LYMPHOCYTES # BLD AUTO: 0.8 K/UL (ref 1–4.8)
LYMPHOCYTES NFR BLD: 8.4 % (ref 18–48)
MCH RBC QN AUTO: 30.5 PG (ref 27–31)
MCHC RBC AUTO-ENTMCNC: 31.2 G/DL (ref 32–36)
MCV RBC AUTO: 98 FL (ref 82–98)
MONOCYTES # BLD AUTO: 0.7 K/UL (ref 0.3–1)
MONOCYTES NFR BLD: 7.9 % (ref 4–15)
NEUTROPHILS # BLD AUTO: 7.5 K/UL (ref 1.8–7.7)
NEUTROPHILS NFR BLD: 81.9 % (ref 38–73)
NRBC BLD-RTO: 0 /100 WBC
PLATELET # BLD AUTO: 317 K/UL (ref 150–450)
PMV BLD AUTO: 9.1 FL (ref 9.2–12.9)
POTASSIUM SERPL-SCNC: 4.5 MMOL/L (ref 3.5–5.1)
PROT SERPL-MCNC: 7.5 G/DL (ref 6–8.4)
RBC # BLD AUTO: 2.72 M/UL (ref 4.6–6.2)
SODIUM SERPL-SCNC: 132 MMOL/L (ref 136–145)
URATE SERPL-MCNC: 4.6 MG/DL (ref 3.4–7)
UUN UR-MCNC: 9 MG/DL (ref 2–20)
WBC # BLD AUTO: 9.13 K/UL (ref 3.9–12.7)

## 2022-12-05 PROCEDURE — 83615 LACTATE (LD) (LDH) ENZYME: CPT | Performed by: INTERNAL MEDICINE

## 2022-12-05 PROCEDURE — 85025 COMPLETE CBC W/AUTO DIFF WBC: CPT | Mod: PO | Performed by: INTERNAL MEDICINE

## 2022-12-05 PROCEDURE — 36415 COLL VENOUS BLD VENIPUNCTURE: CPT | Mod: PO | Performed by: INTERNAL MEDICINE

## 2022-12-05 PROCEDURE — 87389 HIV-1 AG W/HIV-1&-2 AB AG IA: CPT | Mod: PO | Performed by: INTERNAL MEDICINE

## 2022-12-05 PROCEDURE — 87340 HEPATITIS B SURFACE AG IA: CPT | Mod: PO | Performed by: INTERNAL MEDICINE

## 2022-12-05 PROCEDURE — 86803 HEPATITIS C AB TEST: CPT | Mod: PO | Performed by: INTERNAL MEDICINE

## 2022-12-05 PROCEDURE — 82232 ASSAY OF BETA-2 PROTEIN: CPT | Mod: PO | Performed by: INTERNAL MEDICINE

## 2022-12-05 PROCEDURE — 80053 COMPREHEN METABOLIC PANEL: CPT | Mod: PO | Performed by: INTERNAL MEDICINE

## 2022-12-05 PROCEDURE — 86704 HEP B CORE ANTIBODY TOTAL: CPT | Mod: PO | Performed by: INTERNAL MEDICINE

## 2022-12-05 PROCEDURE — 84550 ASSAY OF BLOOD/URIC ACID: CPT | Performed by: INTERNAL MEDICINE

## 2022-12-06 ENCOUNTER — OFFICE VISIT (OUTPATIENT)
Dept: HEMATOLOGY/ONCOLOGY | Facility: CLINIC | Age: 57
End: 2022-12-06
Payer: COMMERCIAL

## 2022-12-06 VITALS
OXYGEN SATURATION: 96 % | HEIGHT: 69 IN | DIASTOLIC BLOOD PRESSURE: 65 MMHG | WEIGHT: 209.44 LBS | SYSTOLIC BLOOD PRESSURE: 135 MMHG | TEMPERATURE: 98 F | HEART RATE: 120 BPM | RESPIRATION RATE: 23 BRPM | BODY MASS INDEX: 31.02 KG/M2

## 2022-12-06 DIAGNOSIS — D63.0 ANEMIA IN NEOPLASTIC DISEASE: ICD-10-CM

## 2022-12-06 DIAGNOSIS — E11.65 TYPE 2 DIABETES MELLITUS WITH HYPERGLYCEMIA, WITHOUT LONG-TERM CURRENT USE OF INSULIN: ICD-10-CM

## 2022-12-06 DIAGNOSIS — T45.1X5A CHEMOTHERAPY-INDUCED NAUSEA AND VOMITING: ICD-10-CM

## 2022-12-06 DIAGNOSIS — E88.3 TUMOR LYSIS SYNDROME: ICD-10-CM

## 2022-12-06 DIAGNOSIS — E53.8 FOLATE DEFICIENCY: ICD-10-CM

## 2022-12-06 DIAGNOSIS — C83.38 DIFFUSE LARGE B-CELL LYMPHOMA OF LYMPH NODES OF MULTIPLE REGIONS: Primary | ICD-10-CM

## 2022-12-06 DIAGNOSIS — R11.2 CHEMOTHERAPY-INDUCED NAUSEA AND VOMITING: ICD-10-CM

## 2022-12-06 DIAGNOSIS — E66.01 SEVERE OBESITY (BMI 35.0-39.9) WITH COMORBIDITY: ICD-10-CM

## 2022-12-06 PROCEDURE — 4010F ACE/ARB THERAPY RXD/TAKEN: CPT | Mod: CPTII,S$GLB,, | Performed by: INTERNAL MEDICINE

## 2022-12-06 PROCEDURE — 3078F PR MOST RECENT DIASTOLIC BLOOD PRESSURE < 80 MM HG: ICD-10-PCS | Mod: CPTII,S$GLB,, | Performed by: INTERNAL MEDICINE

## 2022-12-06 PROCEDURE — 3078F DIAST BP <80 MM HG: CPT | Mod: CPTII,S$GLB,, | Performed by: INTERNAL MEDICINE

## 2022-12-06 PROCEDURE — 3044F HG A1C LEVEL LT 7.0%: CPT | Mod: CPTII,S$GLB,, | Performed by: INTERNAL MEDICINE

## 2022-12-06 PROCEDURE — 99214 PR OFFICE/OUTPT VISIT, EST, LEVL IV, 30-39 MIN: ICD-10-PCS | Mod: S$GLB,,, | Performed by: INTERNAL MEDICINE

## 2022-12-06 PROCEDURE — 1160F RVW MEDS BY RX/DR IN RCRD: CPT | Mod: CPTII,S$GLB,, | Performed by: INTERNAL MEDICINE

## 2022-12-06 PROCEDURE — 3075F SYST BP GE 130 - 139MM HG: CPT | Mod: CPTII,S$GLB,, | Performed by: INTERNAL MEDICINE

## 2022-12-06 PROCEDURE — 3075F PR MOST RECENT SYSTOLIC BLOOD PRESS GE 130-139MM HG: ICD-10-PCS | Mod: CPTII,S$GLB,, | Performed by: INTERNAL MEDICINE

## 2022-12-06 PROCEDURE — 3061F NEG MICROALBUMINURIA REV: CPT | Mod: CPTII,S$GLB,, | Performed by: INTERNAL MEDICINE

## 2022-12-06 PROCEDURE — 3066F PR DOCUMENTATION OF TREATMENT FOR NEPHROPATHY: ICD-10-PCS | Mod: CPTII,S$GLB,, | Performed by: INTERNAL MEDICINE

## 2022-12-06 PROCEDURE — 3066F NEPHROPATHY DOC TX: CPT | Mod: CPTII,S$GLB,, | Performed by: INTERNAL MEDICINE

## 2022-12-06 PROCEDURE — 1159F PR MEDICATION LIST DOCUMENTED IN MEDICAL RECORD: ICD-10-PCS | Mod: CPTII,S$GLB,, | Performed by: INTERNAL MEDICINE

## 2022-12-06 PROCEDURE — 1160F PR REVIEW ALL MEDS BY PRESCRIBER/CLIN PHARMACIST DOCUMENTED: ICD-10-PCS | Mod: CPTII,S$GLB,, | Performed by: INTERNAL MEDICINE

## 2022-12-06 PROCEDURE — 3008F PR BODY MASS INDEX (BMI) DOCUMENTED: ICD-10-PCS | Mod: CPTII,S$GLB,, | Performed by: INTERNAL MEDICINE

## 2022-12-06 PROCEDURE — 3061F PR NEG MICROALBUMINURIA RESULT DOCUMENTED/REVIEW: ICD-10-PCS | Mod: CPTII,S$GLB,, | Performed by: INTERNAL MEDICINE

## 2022-12-06 PROCEDURE — 99999 PR PBB SHADOW E&M-EST. PATIENT-LVL IV: ICD-10-PCS | Mod: PBBFAC,,, | Performed by: INTERNAL MEDICINE

## 2022-12-06 PROCEDURE — 1159F MED LIST DOCD IN RCRD: CPT | Mod: CPTII,S$GLB,, | Performed by: INTERNAL MEDICINE

## 2022-12-06 PROCEDURE — 3044F PR MOST RECENT HEMOGLOBIN A1C LEVEL <7.0%: ICD-10-PCS | Mod: CPTII,S$GLB,, | Performed by: INTERNAL MEDICINE

## 2022-12-06 PROCEDURE — 3008F BODY MASS INDEX DOCD: CPT | Mod: CPTII,S$GLB,, | Performed by: INTERNAL MEDICINE

## 2022-12-06 PROCEDURE — 4010F PR ACE/ARB THEARPY RXD/TAKEN: ICD-10-PCS | Mod: CPTII,S$GLB,, | Performed by: INTERNAL MEDICINE

## 2022-12-06 PROCEDURE — 99999 PR PBB SHADOW E&M-EST. PATIENT-LVL IV: CPT | Mod: PBBFAC,,, | Performed by: INTERNAL MEDICINE

## 2022-12-06 PROCEDURE — 99214 OFFICE O/P EST MOD 30 MIN: CPT | Mod: S$GLB,,, | Performed by: INTERNAL MEDICINE

## 2022-12-06 RX ORDER — ALLOPURINOL 300 MG/1
300 TABLET ORAL DAILY
Qty: 30 TABLET | Refills: 11 | Status: SHIPPED | OUTPATIENT
Start: 2022-12-06

## 2022-12-06 RX ORDER — LIDOCAINE AND PRILOCAINE 25; 25 MG/G; MG/G
CREAM TOPICAL
Qty: 30 G | Refills: 1 | Status: SHIPPED | OUTPATIENT
Start: 2022-12-06

## 2022-12-06 RX ORDER — FOLIC ACID 1 MG/1
1 TABLET ORAL DAILY
Qty: 100 TABLET | Refills: 3 | Status: SHIPPED | OUTPATIENT
Start: 2022-12-06 | End: 2023-12-14 | Stop reason: SDUPTHER

## 2022-12-06 RX ORDER — PREDNISONE 50 MG/1
100 TABLET ORAL SEE ADMIN INSTRUCTIONS
Qty: 48 TABLET | Refills: 1 | Status: SHIPPED | OUTPATIENT
Start: 2022-12-06 | End: 2023-04-12 | Stop reason: SDUPTHER

## 2022-12-06 RX ORDER — ONDANSETRON 8 MG/1
8 TABLET, ORALLY DISINTEGRATING ORAL EVERY 8 HOURS PRN
Qty: 40 TABLET | Refills: 5 | Status: SHIPPED | OUTPATIENT
Start: 2022-12-06 | End: 2023-05-05

## 2022-12-06 NOTE — PROGRESS NOTES
"PATIENT: Saud Mayers  MRN: [id number]  DATE: 12/6/2022    Diagnosis:   1. Diffuse large B-cell lymphoma of lymph nodes of multiple regions    2. Anemia in neoplastic disease    3. Tumor lysis syndrome    4. Chemotherapy-induced nausea and vomiting    5. Type 2 diabetes mellitus with hyperglycemia, without long-term current use of insulin    6. Severe obesity (BMI 35.0-39.9) with comorbidity    7. Folate deficiency        Chief Complaint: Lymphoma      Oncologic History:      Oncologic History Diffuse large B-cell lymphoma      Oncologic Treatment Planned therapy: R-CHOP      Pathology 11/23/22:  "LYMPH NODE", RIGHT CERVICAL, EXCISION:   -- DIFFUSE LARGE B-CELL LYMPHOMA, NON-GCB SUBTYPE (SEE COMMENT).     Flow cytometric analysis of right cervical lymph node detects a kappa light   chain restricted B lymphocyte population expressing CD19 and CD20, and CD5   (partial and dim).    CD10 is negative.  T lymphocytes are   immunophenotypically unremarkable.  Correlation with tissue morphology is   required. Flow differential: Lymphocytes 35.8%, Monocytes 0.0%, Granulocytes   4.4%, Blast  0.0%, Debris/nRBC 60.0%,  Viability 43.5%.   Immunohistochemical stains are performed with adequate c ontrols.  The large   lymphoid cells are positive for CD20, BCL6 (>30%), MUM1 (>30%), and BCL2   (>50%); negative for CD5, CD10, CD30, cyclin D1, ALK1 and EBV (VICKY in-situ   hybridization).  They display high proliferation index (Ki-67 90%).   CD3-positive small mature T-cells are seen.   Taken together, the overall findings consistent with diffuse large B-cell   lymphoma, non-GCB subtype.  Fluorescence in-situ hybridization for   MYC/BCL2/BCL6 rearrangement is pending.       Flow cytometry - lymph node (11/23/22):  Flow cytometric analysis of lymph node detects a kappa light chain restricted   B lymphocyte population expressing CD19 and CD20, and CD5 (partial and dim).     CD10 is negative.  T lymphocytes are immunophenotypically " unremarkable.   Correlation with tissue morphology is required.   Flow differential:  Lymphocytes 35.8%, Monocytes 0.0%, Granulocytes  4.4%,   Blast  0.0%, Debris/nRBC 60.0%,  Viability 43.5%.           Subjective:    History of Present Illness: Mr. Mayers is a 57 y.o. male who presents for evaluation and management of diffuse large B-cell lymphoma. I had seen him during a hospitalization in November 2022.    - he was admitted for lymphadenopathy/weight loss.  - he underwent excisional lymph node biopsy on 11/23/22. Pathology revealed diffuse large B-cell lymphoma, non-GCB type.  - he is scheduled for port-a-cath placement on 12/7/22 with Dr. Velazquez.  - he is scheduled for pet scan on 12/9/22.    - today, he endorses fatigue, weakness, dyspnea upon exertion, low-grade temperature. He denies chest pain, nausea, vomiting, diarrhea, constipation.      Past medical, surgical, family, and social histories have been reviewed and updated below.    Past Medical History:   Past Medical History:   Diagnosis Date    Asthma     Diabetes mellitus     High cholesterol     Hypertension     ANAMIKA on CPAP     Testicular hypofunction        Past Surgical History:   Past Surgical History:   Procedure Laterality Date    CARPAL TUNNEL RELEASE Bilateral 2020    CHOLECYSTECTOMY  01/01/1990    COLONOSCOPY  01/01/2013    COLONOSCOPY N/A 9/2/2022    Procedure: COLONOSCOPY sutab;  Surgeon: Jeovany Cisse MD;  Location: [facility];  Service: Endoscopy;  Laterality: N/A;    ESOPHAGOGASTRODUODENOSCOPY N/A 9/2/2022    Procedure: EGD (ESOPHAGOGASTRODUODENOSCOPY);  Surgeon: Jeovany Cisse MD;  Location: [facility];  Service: Endoscopy;  Laterality: N/A;    ORTHOPEDIC SURGERY Bilateral 01/01/2007    feet plantar    ORTHOPEDIC SURGERY Right 01/01/2006    knee    ROTATOR CUFF REPAIR Left 01/01/1997    SURGICAL REMOVAL OF LYMPH NODE Right 11/23/2022    Procedure: EXCISION, LYMPH NODE;  Surgeon: Francisco Velazquez MD;  Location: [facility];  Service:  General;  Laterality: Right;   (right neck)       Family History:   Family History   Problem Relation Age of Onset    Heart attack Father        Social History:  reports that he quit smoking about 30 years ago. His smoking use included cigarettes. He has never used smokeless tobacco. He reports that he does not drink alcohol and does not use drugs.    Allergies:  Review of patient's allergies indicates:   Allergen Reactions    Gabapentin Hallucinations    Tizanidine Other (See Comments)     somnolence       Medications:  Current Outpatient Medications   Medication Sig Dispense Refill    acetaminophen (TYLENOL) 500 MG tablet Take 500 mg by mouth every 6 (six) hours as needed for Pain.      atorvastatin (LIPITOR) 20 MG tablet Take 1 tablet (20 mg total) by mouth once daily. 90 tablet 3    cyanocobalamin (VITAMIN B-12) 100 MCG tablet Take 100 mcg by mouth once daily.      linaCLOtide (LINZESS) 72 mcg Cap capsule Take 1 capsule (72 mcg total) by mouth before breakfast. 90 capsule 3    losartan (COZAAR) 25 MG tablet Take 1 tablet (25 mg total) by mouth once daily. 90 tablet 3    metFORMIN (GLUCOPHAGE) 1000 MG tablet Take 1 tablet (1,000 mg total) by mouth 2 (two) times daily with meals. 180 tablet 3    metoprolol succinate (TOPROL-XL) 50 MG 24 hr tablet Take 1 tablet (50 mg total) by mouth once daily. 90 tablet 3    multivit-min-folic-vit K-lycop (ONE-A-DAY MEN'S 50 PLUS) 400- mcg Tab Take 1 tablet by mouth once daily.      ondansetron (ZOFRAN) 4 MG tablet Take 1 tablet (4 mg total) by mouth every 8 (eight) hours as needed for Nausea. 90 tablet 1     No current facility-administered medications for this visit.       Review of Systems   Constitutional:  Positive for fatigue, fever and unexpected weight change.   HENT:  Negative for sore throat.    Eyes:  Negative for visual disturbance.   Respiratory:  Positive for shortness of breath.    Cardiovascular:  Negative for chest pain.   Gastrointestinal:  Negative for  abdominal pain.   Genitourinary:  Negative for dysuria.   Musculoskeletal:  Negative for back pain.   Skin:  Negative for rash.   Neurological:  Positive for weakness. Negative for headaches.   Hematological:  Negative for adenopathy.   Psychiatric/Behavioral:  The patient is not nervous/anxious.      ECOG Performance Status:   ECOG SCORE    1 - Restricted in strenuous activity-ambulatory and able to carry out work of a light nature         Objective:      Vitals: There were no vitals filed for this visit.  BMI: There is no height or weight on file to calculate BMI.    Physical Exam  Vitals and nursing note reviewed.   Constitutional:       Appearance: He is well-developed.   HENT:      Head: Normocephalic and atraumatic.   Eyes:      Pupils: Pupils are equal, round, and reactive to light.   Cardiovascular:      Rate and Rhythm: Normal rate and regular rhythm.   Pulmonary:      Effort: Pulmonary effort is normal.      Breath sounds: Normal breath sounds.   Abdominal:      General: Bowel sounds are normal.      Palpations: Abdomen is soft.   Musculoskeletal:         General: Normal range of motion.      Cervical back: Normal range of motion and neck supple.   Lymphadenopathy:      Cervical: Cervical adenopathy present.      Upper Body:      Right upper body: Axillary adenopathy present.      Left upper body: Axillary adenopathy present.   Skin:     General: Skin is warm and dry.   Neurological:      Mental Status: He is alert and oriented to person, place, and time.   Psychiatric:         Behavior: Behavior normal.         Thought Content: Thought content normal.         Judgment: Judgment normal.       Laboratory Data:  Labs have been reviewed.    Lab Results   Component Value Date    WBC 9.13 12/05/2022    HGB 8.3 (L) 12/05/2022    HCT 26.6 (L) 12/05/2022    MCV 98 12/05/2022     12/05/2022           Imaging:    CT chest/abdomen/pelvis (11/7/22): I have personally reviewed the images     Innumerable  adenopathy above and below the diaphragm and throughout the neck, LEFT axilla and chest wall, retroperitoneum and mesentery as well as in the iliac chain, right greater than left.     Findings are suspicious for lymphoma/leukemia.     Multiple splenic lesions appearing to have increased in number and size since the prior exam.        CT soft tissue neck (11/7/22): I have personally reviewed the images  Extensive adenopathy within the right greater than left neck with also adenopathy within the mediastinum and left axilla.  The largest lymph node in the right posterior triangle/spinal accessory region demonstrates internal necrosis as does a right paratracheal lymph node within the mediastinum..  There is inflammatory change surrounding the necrotic lymph nodes and although an infectious/inflammatory process is not excluded, a neoplastic process including lymphoma to be considered.      Assessment:       1. Diffuse large B-cell lymphoma of lymph nodes of multiple regions    2. Anemia in neoplastic disease    3. Tumor lysis syndrome    4. Chemotherapy-induced nausea and vomiting    5. Type 2 diabetes mellitus with hyperglycemia, without long-term current use of insulin    6. Severe obesity (BMI 35.0-39.9) with comorbidity    7. Folate deficiency           Plan:     Diffuse large B-cell lymphoma  - I have reviewed his chart  - CT scans (11/7/22) revealed extensive adenopathy.  - excisional biopsy (11/23/22) revealed diffuse large B-cell lymphoma, non-GCB subtype.  - follow up fluorescence in-situ hybridization for MYC/BCL2/BCL6 rearrangement  - he is scheduled for port-a-cath placement on 12/7/22 with Dr. Velazquez.  - he is scheduled for pet scan on 12/9/22  - I and via oncology recommend R-CHOP x 6 cycles.  - The risks and benefits of chemotherapy were discussed, written information was given, and informed consent was obtained.  - hepatitis B core antibody/surface antigen and hepatitis C antibody were negative.  - I  sent emla cream to his pharmacy  - I sent anti-emetics to his pharmacy.  - I sent prednisone to his pharmacy  - check echocardiogram  - return to clinic in 1-2 weeks in preparation for cycle #1 of R-CHOP.    2. Anemia in neoplastic disease  - hemoglobin is 8.3 g/dL.    3. Tumor lysis syndrome  - uric acid was 4.6 mg/dL  - start allopurinol     4. Chemotherapy-induced nausea/vomiting  - I sent zofran to his pharmacy.    5. Type 2 diabetes  - last hemoglobin A1c (7/20/22) was 6.5%  - continue metformin  - will monitor closely as R-CHOP includes prednisone which can worsen hyperglycemia.  - defer management to primary care    6. Folate deficiency  - start folic acid    7. Advance Care Planning   - will address at next visit    - return to clinic in 1-2 weeks in preparation for cycle #1 of R-CHOP.      Sandip Vickers M.D.  Hematology/Oncology  [facility] - [address], [address]  [place] [address]  Phone: [phone]  Fax: [phone]

## 2022-12-06 NOTE — PLAN OF CARE
DISCONTINUE ON PATHWAY REGIMEN - Lymphoma and CLL    [id number]        Prednisone       Rituximab-xxxx       Cyclophosphamide       Doxorubicin       Vincristine           Additional Orders: Per committee, hepatitis B and C screening   recommended prior to initiation of rituximab. Assess LVEF prior to initiation of   doxorubicin and as clinically indicated during and after treatment. Doxorubicin   can cause myocardial damage, including acute left ventricular failure. Risk of   cardiomyopathy is generally proportional to cumulative   anthracycline/anthracenedione exposure. Use of concomitant cardiotoxic agents,   previous radiotherapy to the mediastinum, or presence/history of cardiovascular   disease can additionally increase cardiomyopathy risk. See PI for details.   Infuse vincristine via minibag to reduce the risk of fatal medication errors due   to incorrect route of administration.    **Always confirm dose/schedule in your pharmacy ordering system**    REASON: Other Reason  PRIOR TREATMENT: [id number]  TREATMENT RESPONSE: Unable to Evaluate    START ON PATHWAY REGIMEN - Lymphoma and CLL    [id number]        Prednisone       Rituximab-xxxx       Cyclophosphamide       Doxorubicin       Vincristine           Additional Orders: Per committee, hepatitis B and C screening   recommended prior to initiation of rituximab. Assess LVEF prior to initiation of   doxorubicin and as clinically indicated during and after treatment. Doxorubicin   can cause myocardial damage, including acute left ventricular failure. Risk of   cardiomyopathy is generally proportional to cumulative   anthracycline/anthracenedione exposure. Use of concomitant cardiotoxic agents,   previous radiotherapy to the mediastinum, or presence/history of cardiovascular   disease can additionally increase cardiomyopathy risk. See PI for details.   Infuse vincristine via minibag to reduce the risk of fatal medication errors due   to incorrect route of  administration.    **Always confirm dose/schedule in your pharmacy ordering system**    Patient Characteristics:  Diffuse Large B-Cell Lymphoma or Follicular Lymphoma, Grade 3B, First Line,   Stage III and IV  Disease Type: Not Applicable  Disease Type: Diffuse Large B-Cell Lymphoma  Disease Type: Not Applicable  Line of therapy: First Line  Intent of Therapy:  Non-Curative / Palliative Intent, Discussed with Patient

## 2022-12-07 PROBLEM — C83.31 DIFFUSE LARGE B-CELL LYMPHOMA OF LYMPH NODES OF NECK: Status: ACTIVE | Noted: 2022-12-01

## 2022-12-09 ENCOUNTER — HOSPITAL ENCOUNTER (OUTPATIENT)
Dept: RADIOLOGY | Facility: HOSPITAL | Age: 57
Discharge: HOME | End: 2022-12-09
Attending: INTERNAL MEDICINE
Payer: COMMERCIAL

## 2022-12-09 DIAGNOSIS — C83.38 DIFFUSE LARGE B-CELL LYMPHOMA OF LYMPH NODES OF MULTIPLE REGIONS: ICD-10-CM

## 2022-12-09 PROCEDURE — 78815 PET IMAGE W/CT SKULL-THIGH: CPT | Mod: 26,PI,, | Performed by: RADIOLOGY

## 2022-12-09 PROCEDURE — 78815 NM PET CT ROUTINE: ICD-10-PCS | Mod: 26,PI,, | Performed by: RADIOLOGY

## 2022-12-09 PROCEDURE — 78815 PET IMAGE W/CT SKULL-THIGH: CPT | Mod: TC

## 2022-12-13 ENCOUNTER — HOSPITAL ENCOUNTER (OUTPATIENT)
Dept: CARDIOLOGY | Facility: HOSPITAL | Age: 57
Discharge: HOME | End: 2022-12-13
Attending: INTERNAL MEDICINE
Payer: COMMERCIAL

## 2022-12-13 VITALS — HEIGHT: 69 IN | WEIGHT: 204 LBS | BODY MASS INDEX: 30.21 KG/M2

## 2022-12-13 DIAGNOSIS — C83.38 DIFFUSE LARGE B-CELL LYMPHOMA OF LYMPH NODES OF MULTIPLE REGIONS: ICD-10-CM

## 2022-12-13 LAB
AV INDEX (PROSTH): 0.48
AV MEAN GRADIENT: 26 MMHG
AV PEAK GRADIENT: 40 MMHG
AV VALVE AREA: 2.04 CM2
AV VELOCITY RATIO: 0.47
BSA FOR ECHO PROCEDURE: 2.12 M2
CV ECHO LV RWT: 0.53 CM
DOP CALC AO PEAK VEL: 3.17 M/S
DOP CALC AO VTI: 48.8 CM
DOP CALC LVOT AREA: 4.2 CM2
DOP CALC LVOT DIAMETER: 2.32 CM
DOP CALC LVOT PEAK VEL: 1.49 M/S
DOP CALC LVOT STROKE VOLUME: 99.71 CM3
DOP CALC MV VTI: 20.3 CM
DOP CALCLVOT PEAK VEL VTI: 23.6 CM
E WAVE DECELERATION TIME: 108.67 MSEC
E/A RATIO: 0.91
ECHO LV POSTERIOR WALL: 1.18 CM (ref 0.6–1.1)
EJECTION FRACTION: 65 %
FRACTIONAL SHORTENING: 29 % (ref 28–44)
INTERVENTRICULAR SEPTUM: 0.97 CM (ref 0.6–1.1)
IVC DIAMETER: 1.81 CM
LA MAJOR: 5.35 CM
LA MINOR: 5.26 CM
LA WIDTH: 3.9 CM
LEFT ATRIUM SIZE: 3.68 CM
LEFT ATRIUM VOLUME INDEX: 31.1 ML/M2
LEFT ATRIUM VOLUME: 64.71 CM3
LEFT INTERNAL DIMENSION IN SYSTOLE: 3.16 CM (ref 2.1–4)
LEFT VENTRICLE DIASTOLIC VOLUME INDEX: 36.31 ML/M2
LEFT VENTRICLE DIASTOLIC VOLUME: 75.52 ML
LEFT VENTRICLE MASS INDEX: 79 G/M2
LEFT VENTRICLE SYSTOLIC VOLUME INDEX: 17.7 ML/M2
LEFT VENTRICLE SYSTOLIC VOLUME: 36.81 ML
LEFT VENTRICULAR INTERNAL DIMENSION IN DIASTOLE: 4.42 CM (ref 3.5–6)
LEFT VENTRICULAR MASS: 164.71 G
LVOT MG: 5.9 MMHG
LVOT MV: 1.19 CM/S
MV PEAK A VEL: 0.75 M/S
MV PEAK E VEL: 0.68 M/S
MV PEAK GRADIENT: 4 MMHG
MV STENOSIS PRESSURE HALF TIME: 31.52 MS
MV VALVE AREA BY CONTINUITY EQUATION: 4.91 CM2
MV VALVE AREA P 1/2 METHOD: 6.98 CM2
PULM VEIN S/D RATIO: 1.4
PV PEAK D VEL: 0.57 M/S
PV PEAK S VEL: 0.8 M/S
RA MAJOR: 4.41 CM
RA PRESSURE: 3 MMHG
RIGHT VENTRICULAR END-DIASTOLIC DIMENSION: 2.63 CM

## 2022-12-13 PROCEDURE — 93306 TTE W/DOPPLER COMPLETE: CPT | Mod: 26,,, | Performed by: INTERNAL MEDICINE

## 2022-12-13 PROCEDURE — 93356 MYOCRD STRAIN IMG SPCKL TRCK: CPT | Mod: ,,, | Performed by: INTERNAL MEDICINE

## 2022-12-13 PROCEDURE — 93356 MYOCRD STRAIN IMG SPCKL TRCK: CPT | Mod: PO

## 2022-12-13 PROCEDURE — 93306 ECHO (CUPID ONLY): ICD-10-PCS | Mod: 26,,, | Performed by: INTERNAL MEDICINE

## 2022-12-13 PROCEDURE — 93356 ECHO (CUPID ONLY): ICD-10-PCS | Mod: ,,, | Performed by: INTERNAL MEDICINE

## 2022-12-13 NOTE — PROGRESS NOTES
"PATIENT: Saud Mayers  MRN: [id number]  DATE: 12/14/2022    Diagnosis:   1. Diffuse large B-cell lymphoma of lymph nodes of multiple regions    2. Anemia in neoplastic disease    3. Tumor lysis syndrome    4. Chemotherapy-induced nausea and vomiting    5. Type 2 diabetes mellitus with hyperglycemia, without long-term current use of insulin    6. Severe obesity (BMI 35.0-39.9) with comorbidity    7. Folate deficiency        Chief Complaint: Lymphoma        Oncologic History:      Oncologic History Diffuse large B-cell lymphoma      Oncologic Treatment Planned therapy: R-CHOP      Pathology 11/23/22:  "LYMPH NODE", RIGHT CERVICAL, EXCISION:   -- DIFFUSE LARGE B-CELL LYMPHOMA, NON-GCB SUBTYPE (SEE COMMENT).     Flow cytometric analysis of right cervical lymph node detects a kappa light   chain restricted B lymphocyte population expressing CD19 and CD20, and CD5   (partial and dim).    CD10 is negative.  T lymphocytes are   immunophenotypically unremarkable.  Correlation with tissue morphology is   required. Flow differential: Lymphocytes 35.8%, Monocytes 0.0%, Granulocytes   4.4%, Blast  0.0%, Debris/nRBC 60.0%,  Viability 43.5%.   Immunohistochemical stains are performed with adequate c ontrols.  The large   lymphoid cells are positive for CD20, BCL6 (>30%), MUM1 (>30%), and BCL2   (>50%); negative for CD5, CD10, CD30, cyclin D1, ALK1 and EBV (VICKY in-situ   hybridization).  They display high proliferation index (Ki-67 90%).   CD3-positive small mature T-cells are seen.   Taken together, the overall findings consistent with diffuse large B-cell   lymphoma, non-GCB subtype.  Fluorescence in-situ hybridization for   MYC/BCL2/BCL6 rearrangement is pending.       Flow cytometry - lymph node (11/23/22):  Flow cytometric analysis of lymph node detects a kappa light chain restricted   B lymphocyte population expressing CD19 and CD20, and CD5 (partial and dim).     CD10 is negative.  T lymphocytes are immunophenotypically " unremarkable.   Correlation with tissue morphology is required.   Flow differential:  Lymphocytes 35.8%, Monocytes 0.0%, Granulocytes  4.4%,   Blast  0.0%, Debris/nRBC 60.0%,  Viability 43.5%.           Subjective:    History of Present Illness: Mr. Mayers is a 57 y.o. male who presents for evaluation and management of diffuse large B-cell lymphoma. I had seen him during a hospitalization in November 2022.    - he was admitted for lymphadenopathy/weight loss.  - he underwent excisional lymph node biopsy on 11/23/22. Pathology revealed diffuse large B-cell lymphoma, non-GCB type.  - he underwent port-a-cath placement on 12/7/22 with Dr. Velazquez.  - he underwent pet scan on 12/9/22.    Interval history:  - he presents for a follow-up appointment for his diffuse large B-cell lymphoma.  - he underwent port-a-cath placement on 12/7/22 with Dr. Velazquez.  - he underwent pet scan on 12/9/22.  - today, he endorses fatigue, weakness, worsening dyspnea upon exertion. He denies chest pain, nausea, vomiting, diarrhea, constipation.      Past medical, surgical, family, and social histories have been reviewed and updated below.    Past Medical History:   Past Medical History:   Diagnosis Date    Asthma     Diabetes mellitus     High cholesterol     Hypertension     ANAMIKA on CPAP     Testicular hypofunction        Past Surgical History:   Past Surgical History:   Procedure Laterality Date    CARPAL TUNNEL RELEASE Bilateral 2020    CHOLECYSTECTOMY  01/01/1990    COLONOSCOPY  01/01/2013    COLONOSCOPY N/A 9/2/2022    Procedure: COLONOSCOPY sutab;  Surgeon: Jeovany Cisse MD;  Location: [facility];  Service: Endoscopy;  Laterality: N/A;    ESOPHAGOGASTRODUODENOSCOPY N/A 9/2/2022    Procedure: EGD (ESOPHAGOGASTRODUODENOSCOPY);  Surgeon: Jeovany Cisse MD;  Location: [facility];  Service: Endoscopy;  Laterality: N/A;    INSERTION OF TUNNELED CENTRAL VENOUS CATHETER (CVC) WITH SUBCUTANEOUS PORT N/A 12/7/2022    Procedure:  [id number];  Surgeon: Francisco Velazquez MD;  Location: [facility] OR;  Service: General;  Laterality: N/A;    ORTHOPEDIC SURGERY Bilateral 01/01/2007    feet plantar    ORTHOPEDIC SURGERY Right 01/01/2006    knee    ROTATOR CUFF REPAIR Left 01/01/1997    SURGICAL REMOVAL OF LYMPH NODE Right 11/23/2022    Procedure: EXCISION, LYMPH NODE;  Surgeon: Francisco Velazquez MD;  Location: [facility] OR;  Service: General;  Laterality: Right;   (right neck)       Family History:   Family History   Problem Relation Age of Onset    Heart attack Father        Social History:  reports that he quit smoking about 30 years ago. His smoking use included cigarettes. He has never used smokeless tobacco. He reports that he does not drink alcohol and does not use drugs.    Allergies:  Review of patient's allergies indicates:   Allergen Reactions    Gabapentin Hallucinations    Tizanidine Other (See Comments)     somnolence       Medications:  Current Outpatient Medications   Medication Sig Dispense Refill    acetaminophen (TYLENOL) 500 MG tablet Take 500 mg by mouth every 6 (six) hours as needed for Pain.      allopurinoL (ZYLOPRIM) 300 MG tablet Take 1 tablet (300 mg total) by mouth once daily. 30 tablet 11    atorvastatin (LIPITOR) 20 MG tablet Take 1 tablet (20 mg total) by mouth once daily. 90 tablet 3    cyanocobalamin (VITAMIN B-12) 100 MCG tablet Take 100 mcg by mouth once daily.      folic acid (FOLVITE) 1 MG tablet Take 1 tablet (1 mg total) by mouth once daily. 100 tablet 3    LIDOcaine-prilocaine (EMLA) cream Apply topically as needed. Apply to skin overlying port site 30 minutes before chemotherapy. 30 g 1    linaCLOtide (LINZESS) 72 mcg Cap capsule Take 1 capsule (72 mcg total) by mouth before breakfast. 90 capsule 3    losartan (COZAAR) 25 MG tablet Take 1 tablet (25 mg total) by mouth once daily. 90 tablet 3    metFORMIN (GLUCOPHAGE) 1000 MG tablet Take 1 tablet (1,000 mg total) by mouth 2 (two) times daily with meals. 180  tablet 3    metoprolol succinate (TOPROL-XL) 50 MG 24 hr tablet Take 1 tablet (50 mg total) by mouth once daily. 90 tablet 3    multivit-min-folic-vit K-lycop (ONE-A-DAY MEN'S 50 PLUS) 400- mcg Tab Take 1 tablet by mouth once daily.      ondansetron (ZOFRAN) 4 MG tablet Take 1 tablet (4 mg total) by mouth every 8 (eight) hours as needed for Nausea. 90 tablet 1    ondansetron (ZOFRAN-ODT) 8 MG TbDL Take 1 tablet (8 mg total) by mouth every 8 (eight) hours as needed (chemotherapy-induced nausea and vomiting). 40 tablet 5    predniSONE (DELTASONE) 50 MG Tab Take 2 tablets (100 mg total) by mouth As instructed (take 2 tablets (100mg ) by mouth daily on days 2-5 of each cycle of chemotherapy). 48 tablet 1     No current facility-administered medications for this visit.       Review of Systems   Constitutional:  Positive for fatigue, fever and unexpected weight change.   HENT:  Negative for sore throat.    Eyes:  Negative for visual disturbance.   Respiratory:  Positive for shortness of breath.    Cardiovascular:  Negative for chest pain.   Gastrointestinal:  Negative for abdominal pain.   Genitourinary:  Negative for dysuria.   Musculoskeletal:  Negative for back pain.   Skin:  Negative for rash.   Neurological:  Positive for weakness. Negative for headaches.   Hematological:  Negative for adenopathy.   Psychiatric/Behavioral:  The patient is not nervous/anxious.      ECOG Performance Status:   ECOG SCORE    1 - Restricted in strenuous activity-ambulatory and able to carry out work of a light nature         Objective:      Vitals:   Vitals:    12/14/22 1133   BP: (!) 146/68   Pulse: (!) 116   SpO2: 99%   Weight: 94.5 kg (208 lb 5.4 oz)     BMI: Body mass index is 30.77 kg/m².    Physical Exam  Vitals and nursing note reviewed.   Constitutional:       Appearance: He is well-developed.   HENT:      Head: Normocephalic and atraumatic.   Eyes:      Pupils: Pupils are equal, round, and reactive to light.    Cardiovascular:      Rate and Rhythm: Regular rhythm. Tachycardia present.   Pulmonary:      Breath sounds: Normal breath sounds.      Comments: tachypnea  Abdominal:      General: Bowel sounds are normal.      Palpations: Abdomen is soft.   Musculoskeletal:         General: Normal range of motion.      Cervical back: Normal range of motion and neck supple.   Lymphadenopathy:      Cervical: Cervical adenopathy present.      Upper Body:      Right upper body: Axillary adenopathy present.      Left upper body: Axillary adenopathy present.   Skin:     General: Skin is warm and dry.   Neurological:      Mental Status: He is alert and oriented to person, place, and time.   Psychiatric:         Behavior: Behavior normal.         Thought Content: Thought content normal.         Judgment: Judgment normal.       Laboratory Data:  Labs have been reviewed.    Lab Results   Component Value Date    WBC 7.89 12/13/2022    HGB 7.6 (L) 12/13/2022    HCT 24.4 (L) 12/13/2022    MCV 96 12/13/2022     12/13/2022           Imaging:    Echocardiogram (12/13/22):  The left ventricle is normal in size with concentric remodeling and normal systolic function.  The estimated ejection fraction is 65%.  Normal left ventricular diastolic function.  Normal right ventricular size with normal right ventricular systolic function.  Normal central venous pressure (3 mmHg).  The left ventricular global longitudinal strain is -17.6%.  There is moderate aortic valve stenosis.  Aortic valve area is 2.04 cm2; peak velocity is 3.17 m/s; mean gradient is 26 mmHg.      PET/CT scan (12/9/22): I have personally reviewed the images  Quality of the study: Adequate.     Reference values:     Mediastinal blood pool maximum SUV: 2.9     Normal liver background maximum SUV: 3.0     In the head and neck, numerous enlarged hypermetabolic lymph nodes within bilateral cervical periclavicular lesions.  For example, Conglomerate of right cervical and  supraclavicular lymph nodes with SUV 14.  Left infraclavicular lymph node measuring 1.8 cm (image 95), with SUV max 10.  There is fluid and gas within the level 2 B measuring 2.1 cm, likely related to recent biopsy.     In the chest, numerous enlarged hypermetabolic mediastinal, hilar, and axilla lymph nodes.  For example subcarinal lymph node measuring 3.2 x 6.5 cm demonstrating SUV max of 9.8 (image 129).     In the abdomen and pelvis, there is physiologic tracer distribution within the abdominal organs and excretion into the genitourinary system.     Numerous enlarged hypermetabolic lymph nodes.  For example, conglomerate of lymph nodes within the anterior abdomen demonstrating SUV max of 18 (fused image 205).  Posterior right pararenal space hypermetabolic node measuring 3.0 x 2.4 cm with SUV max of 16 (image 218).  Conglomerate of right pelvic sidewall nodes measuring 8.0 AP x 4.4 TV x 11.1 CC cm demonstrating SUV max of 19 (image 251).     The spleen has multifocal hypermetabolic uptake within SUV max of 13 and is enlarged at 19.0 cm in craniocaudal dimension.  There are multiple areas of hypoattenuation, better characterized on prior CTs.     In the bones, there are no hypermetabolic lesions worrisome for malignancy.     Additional CT findings     Right-sided chest port tip terminating in the cavoatrial junction.  Trace left pleural fluid, prior cholecystectomy.     Impression:     Multiple hypermetabolic lymph nodes involving both sides of the diaphragm including bulky right iliac conglomerate in keeping with known large B-cell lymphoma.     Splenomegaly and multifocal hypermetabolic foci within the spleen.     The Deauville Score is: 5     Reference values:     Mediastinal blood pool maximum SUV: 2.9     Normal liver maximum SUV: 3.0      CT chest/abdomen/pelvis (11/7/22): I have personally reviewed the images     Innumerable adenopathy above and below the diaphragm and throughout the neck, LEFT axilla and  "chest wall, retroperitoneum and mesentery as well as in the iliac chain, right greater than left.     Findings are suspicious for lymphoma/leukemia.     Multiple splenic lesions appearing to have increased in number and size since the prior exam.        CT soft tissue neck (11/7/22): I have personally reviewed the images  Extensive adenopathy within the right greater than left neck with also adenopathy within the mediastinum and left axilla.  The largest lymph node in the right posterior triangle/spinal accessory region demonstrates internal necrosis as does a right paratracheal lymph node within the mediastinum..  There is inflammatory change surrounding the necrotic lymph nodes and although an infectious/inflammatory process is not excluded, a neoplastic process including lymphoma to be considered.      Assessment:       1. Diffuse large B-cell lymphoma of lymph nodes of multiple regions    2. Anemia in neoplastic disease    3. Tumor lysis syndrome    4. Chemotherapy-induced nausea and vomiting    5. Type 2 diabetes mellitus with hyperglycemia, without long-term current use of insulin    6. Severe obesity (BMI 35.0-39.9) with comorbidity    7. Folate deficiency           Plan:     Diffuse large B-cell lymphoma  - I have reviewed his chart  - CT scans (11/7/22) revealed extensive adenopathy.  - excisional biopsy (11/23/22) revealed diffuse large B-cell lymphoma, non-GCB subtype.  - follow up fluorescence in-situ hybridization for MYC/BCL2/BCL6 rearrangement  - I and via oncology recommend R-CHOP x 6 cycles.  - The risks and benefits of chemotherapy were discussed, written information was given, and informed consent was obtained.  - hepatitis B core antibody/surface antigen and hepatitis C antibody were negative.    - he underwent port-a-cath placement on 12/7/22 with Dr. Velazquez.  - PET/CT scan (12/9/22) revealed "Multiple hypermetabolic lymph nodes involving both sides of the diaphragm including bulky right iliac " "conglomerate in keeping with known large B-cell lymphoma."  - I sent emla cream to his pharmacy  - I sent anti-emetics to his pharmacy.  - I sent prednisone to his pharmacy  - echocardiogram (12/13/22) revealed an ejection fraction of 65%  - we will arrange for a blood transfusion tomorrow for symptomatic anemia  - Labs have been reviewed. Okay to proceed with cycle #1 of R-CHOP once approved.  - return to clinic in 3 weeks in preparation for cycle #2 of R-CHOP.    2. Anemia in neoplastic disease  - Labs have been reviewed. Hemoglobin is 7.6 g/dL.  - we will arrange for a blood transfusion tomorrow for symptomatic anemia. Blood consent was signed.    3. Tumor lysis syndrome  - uric acid was 4.4 mg/dL  - start allopurinol     4. Chemotherapy-induced nausea/vomiting  - I sent zofran to his pharmacy at previous visit.    5. Type 2 diabetes  - last hemoglobin A1c (7/20/22) was 6.5%  - continue metformin  - will monitor closely as R-CHOP includes prednisone which can worsen hyperglycemia.  - defer management to primary care    6. Folate deficiency  - continue folic acid    7. Advance Care Planning   - will address at next visit    - return to clinic in 3 weeks in preparation for cycle #2 of R-CHOP.      Sandip Vickers M.D.  Hematology/Oncology  [facility] - [address], [address]  [place] [address]  Phone: [phone]  Fax: [phone]  "

## 2022-12-14 ENCOUNTER — LAB VISIT (OUTPATIENT)
Dept: LAB | Facility: HOSPITAL | Age: 57
End: 2022-12-14
Payer: COMMERCIAL

## 2022-12-14 ENCOUNTER — OFFICE VISIT (OUTPATIENT)
Dept: HEMATOLOGY/ONCOLOGY | Facility: CLINIC | Age: 57
End: 2022-12-14
Payer: COMMERCIAL

## 2022-12-14 VITALS
BODY MASS INDEX: 30.77 KG/M2 | WEIGHT: 208.31 LBS | HEART RATE: 116 BPM | DIASTOLIC BLOOD PRESSURE: 68 MMHG | OXYGEN SATURATION: 99 % | SYSTOLIC BLOOD PRESSURE: 146 MMHG

## 2022-12-14 DIAGNOSIS — C83.38 DIFFUSE LARGE B-CELL LYMPHOMA OF LYMPH NODES OF MULTIPLE REGIONS: ICD-10-CM

## 2022-12-14 DIAGNOSIS — D64.9 SYMPTOMATIC ANEMIA: ICD-10-CM

## 2022-12-14 DIAGNOSIS — D63.0 ANEMIA IN NEOPLASTIC DISEASE: ICD-10-CM

## 2022-12-14 DIAGNOSIS — E66.01 SEVERE OBESITY (BMI 35.0-39.9) WITH COMORBIDITY: ICD-10-CM

## 2022-12-14 DIAGNOSIS — C83.38 DIFFUSE LARGE B-CELL LYMPHOMA OF LYMPH NODES OF MULTIPLE REGIONS: Primary | ICD-10-CM

## 2022-12-14 DIAGNOSIS — R11.2 CHEMOTHERAPY-INDUCED NAUSEA AND VOMITING: ICD-10-CM

## 2022-12-14 DIAGNOSIS — T45.1X5A CHEMOTHERAPY-INDUCED NAUSEA AND VOMITING: ICD-10-CM

## 2022-12-14 DIAGNOSIS — E88.3 TUMOR LYSIS SYNDROME: ICD-10-CM

## 2022-12-14 DIAGNOSIS — E11.65 TYPE 2 DIABETES MELLITUS WITH HYPERGLYCEMIA, WITHOUT LONG-TERM CURRENT USE OF INSULIN: ICD-10-CM

## 2022-12-14 DIAGNOSIS — E53.8 FOLATE DEFICIENCY: ICD-10-CM

## 2022-12-14 LAB
ABO + RH BLD: NORMAL
BLD GP AB SCN CELLS X3 SERPL QL: NORMAL

## 2022-12-14 PROCEDURE — 3008F BODY MASS INDEX DOCD: CPT | Mod: CPTII,S$GLB,, | Performed by: INTERNAL MEDICINE

## 2022-12-14 PROCEDURE — 3078F PR MOST RECENT DIASTOLIC BLOOD PRESSURE < 80 MM HG: ICD-10-PCS | Mod: CPTII,S$GLB,, | Performed by: INTERNAL MEDICINE

## 2022-12-14 PROCEDURE — 3044F PR MOST RECENT HEMOGLOBIN A1C LEVEL <7.0%: ICD-10-PCS | Mod: CPTII,S$GLB,, | Performed by: INTERNAL MEDICINE

## 2022-12-14 PROCEDURE — 3066F PR DOCUMENTATION OF TREATMENT FOR NEPHROPATHY: ICD-10-PCS | Mod: CPTII,S$GLB,, | Performed by: INTERNAL MEDICINE

## 2022-12-14 PROCEDURE — 1159F PR MEDICATION LIST DOCUMENTED IN MEDICAL RECORD: ICD-10-PCS | Mod: CPTII,S$GLB,, | Performed by: INTERNAL MEDICINE

## 2022-12-14 PROCEDURE — 3061F PR NEG MICROALBUMINURIA RESULT DOCUMENTED/REVIEW: ICD-10-PCS | Mod: CPTII,S$GLB,, | Performed by: INTERNAL MEDICINE

## 2022-12-14 PROCEDURE — 4010F PR ACE/ARB THEARPY RXD/TAKEN: ICD-10-PCS | Mod: CPTII,S$GLB,, | Performed by: INTERNAL MEDICINE

## 2022-12-14 PROCEDURE — 3008F PR BODY MASS INDEX (BMI) DOCUMENTED: ICD-10-PCS | Mod: CPTII,S$GLB,, | Performed by: INTERNAL MEDICINE

## 2022-12-14 PROCEDURE — 1160F PR REVIEW ALL MEDS BY PRESCRIBER/CLIN PHARMACIST DOCUMENTED: ICD-10-PCS | Mod: CPTII,S$GLB,, | Performed by: INTERNAL MEDICINE

## 2022-12-14 PROCEDURE — 3066F NEPHROPATHY DOC TX: CPT | Mod: CPTII,S$GLB,, | Performed by: INTERNAL MEDICINE

## 2022-12-14 PROCEDURE — 3044F HG A1C LEVEL LT 7.0%: CPT | Mod: CPTII,S$GLB,, | Performed by: INTERNAL MEDICINE

## 2022-12-14 PROCEDURE — 99999 PR PBB SHADOW E&M-EST. PATIENT-LVL III: ICD-10-PCS | Mod: PBBFAC,,, | Performed by: INTERNAL MEDICINE

## 2022-12-14 PROCEDURE — 1159F MED LIST DOCD IN RCRD: CPT | Mod: CPTII,S$GLB,, | Performed by: INTERNAL MEDICINE

## 2022-12-14 PROCEDURE — 36415 COLL VENOUS BLD VENIPUNCTURE: CPT | Performed by: INTERNAL MEDICINE

## 2022-12-14 PROCEDURE — 3078F DIAST BP <80 MM HG: CPT | Mod: CPTII,S$GLB,, | Performed by: INTERNAL MEDICINE

## 2022-12-14 PROCEDURE — 99215 OFFICE O/P EST HI 40 MIN: CPT | Mod: S$GLB,,, | Performed by: INTERNAL MEDICINE

## 2022-12-14 PROCEDURE — 27201040 HC RC 50 FILTER: Performed by: INTERNAL MEDICINE

## 2022-12-14 PROCEDURE — 3077F SYST BP >= 140 MM HG: CPT | Mod: CPTII,S$GLB,, | Performed by: INTERNAL MEDICINE

## 2022-12-14 PROCEDURE — 99999 PR PBB SHADOW E&M-EST. PATIENT-LVL III: CPT | Mod: PBBFAC,,, | Performed by: INTERNAL MEDICINE

## 2022-12-14 PROCEDURE — 86901 BLOOD TYPING SEROLOGIC RH(D): CPT | Performed by: INTERNAL MEDICINE

## 2022-12-14 PROCEDURE — 1160F RVW MEDS BY RX/DR IN RCRD: CPT | Mod: CPTII,S$GLB,, | Performed by: INTERNAL MEDICINE

## 2022-12-14 PROCEDURE — 86920 COMPATIBILITY TEST SPIN: CPT | Performed by: INTERNAL MEDICINE

## 2022-12-14 PROCEDURE — 3061F NEG MICROALBUMINURIA REV: CPT | Mod: CPTII,S$GLB,, | Performed by: INTERNAL MEDICINE

## 2022-12-14 PROCEDURE — 99215 PR OFFICE/OUTPT VISIT, EST, LEVL V, 40-54 MIN: ICD-10-PCS | Mod: S$GLB,,, | Performed by: INTERNAL MEDICINE

## 2022-12-14 PROCEDURE — 4010F ACE/ARB THERAPY RXD/TAKEN: CPT | Mod: CPTII,S$GLB,, | Performed by: INTERNAL MEDICINE

## 2022-12-14 PROCEDURE — 3077F PR MOST RECENT SYSTOLIC BLOOD PRESSURE >= 140 MM HG: ICD-10-PCS | Mod: CPTII,S$GLB,, | Performed by: INTERNAL MEDICINE

## 2022-12-14 RX ORDER — DIPHENHYDRAMINE HCL 25 MG
25 CAPSULE ORAL
Status: CANCELLED | OUTPATIENT
Start: 2022-12-14

## 2022-12-14 RX ORDER — ACETAMINOPHEN 325 MG/1
650 TABLET ORAL
Status: CANCELLED | OUTPATIENT
Start: 2022-12-14

## 2022-12-14 RX ORDER — HYDROCODONE BITARTRATE AND ACETAMINOPHEN 500; 5 MG/1; MG/1
TABLET ORAL ONCE
Status: CANCELLED | OUTPATIENT
Start: 2022-12-14 | End: 2022-12-14

## 2022-12-15 ENCOUNTER — TELEPHONE (OUTPATIENT)
Dept: INFUSION THERAPY | Facility: HOSPITAL | Age: 57
End: 2022-12-15

## 2022-12-15 ENCOUNTER — INFUSION (OUTPATIENT)
Dept: INFUSION THERAPY | Facility: HOSPITAL | Age: 57
End: 2022-12-15
Attending: INTERNAL MEDICINE
Payer: COMMERCIAL

## 2022-12-15 VITALS
BODY MASS INDEX: 30.85 KG/M2 | RESPIRATION RATE: 18 BRPM | SYSTOLIC BLOOD PRESSURE: 119 MMHG | TEMPERATURE: 99 F | HEIGHT: 69 IN | HEART RATE: 88 BPM | OXYGEN SATURATION: 97 % | DIASTOLIC BLOOD PRESSURE: 56 MMHG | WEIGHT: 208.31 LBS

## 2022-12-15 DIAGNOSIS — C83.31 DIFFUSE LARGE B-CELL LYMPHOMA OF LYMPH NODES OF NECK: ICD-10-CM

## 2022-12-15 DIAGNOSIS — D63.0 ANEMIA IN NEOPLASTIC DISEASE: Primary | ICD-10-CM

## 2022-12-15 DIAGNOSIS — D64.9 SYMPTOMATIC ANEMIA: ICD-10-CM

## 2022-12-15 DIAGNOSIS — C83.38 DIFFUSE LARGE B-CELL LYMPHOMA OF LYMPH NODES OF MULTIPLE REGIONS: ICD-10-CM

## 2022-12-15 LAB
BLD PROD TYP BPU: NORMAL
BLOOD UNIT EXPIRATION DATE: NORMAL
BLOOD UNIT TYPE CODE: 5100
BLOOD UNIT TYPE: NORMAL
CODING SYSTEM: NORMAL
DISPENSE STATUS: NORMAL
NUM UNITS TRANS PACKED RBC: NORMAL

## 2022-12-15 PROCEDURE — 25000003 PHARM REV CODE 250: Performed by: INTERNAL MEDICINE

## 2022-12-15 PROCEDURE — 36430 TRANSFUSION BLD/BLD COMPNT: CPT

## 2022-12-15 PROCEDURE — 63600175 PHARM REV CODE 636 W HCPCS: Performed by: INTERNAL MEDICINE

## 2022-12-15 PROCEDURE — P9038 RBC IRRADIATED: HCPCS | Performed by: INTERNAL MEDICINE

## 2022-12-15 RX ORDER — SODIUM CHLORIDE 0.9 % (FLUSH) 0.9 %
10 SYRINGE (ML) INJECTION
OUTPATIENT
Start: 2022-12-15

## 2022-12-15 RX ORDER — DIPHENHYDRAMINE HCL 25 MG
25 CAPSULE ORAL
Status: DISCONTINUED | OUTPATIENT
Start: 2022-12-15 | End: 2022-12-15 | Stop reason: HOSPADM

## 2022-12-15 RX ORDER — HEPARIN 100 UNIT/ML
500 SYRINGE INTRAVENOUS
Status: DISCONTINUED | OUTPATIENT
Start: 2022-12-15 | End: 2022-12-15 | Stop reason: HOSPADM

## 2022-12-15 RX ORDER — ACETAMINOPHEN 325 MG/1
650 TABLET ORAL
Status: DISCONTINUED | OUTPATIENT
Start: 2022-12-15 | End: 2022-12-15 | Stop reason: HOSPADM

## 2022-12-15 RX ORDER — HYDROCODONE BITARTRATE AND ACETAMINOPHEN 500; 5 MG/1; MG/1
TABLET ORAL ONCE
Status: COMPLETED | OUTPATIENT
Start: 2022-12-15 | End: 2022-12-15

## 2022-12-15 RX ORDER — SODIUM CHLORIDE 0.9 % (FLUSH) 0.9 %
10 SYRINGE (ML) INJECTION
Status: CANCELLED | OUTPATIENT
Start: 2022-12-15

## 2022-12-15 RX ORDER — HEPARIN 100 UNIT/ML
500 SYRINGE INTRAVENOUS
Status: CANCELLED | OUTPATIENT
Start: 2022-12-15

## 2022-12-15 RX ORDER — HEPARIN 100 UNIT/ML
500 SYRINGE INTRAVENOUS
OUTPATIENT
Start: 2022-12-15

## 2022-12-15 RX ORDER — SODIUM CHLORIDE 0.9 % (FLUSH) 0.9 %
10 SYRINGE (ML) INJECTION
Status: DISCONTINUED | OUTPATIENT
Start: 2022-12-15 | End: 2022-12-15 | Stop reason: HOSPADM

## 2022-12-15 RX ADMIN — HEPARIN 500 UNITS: 100 SYRINGE at 01:12

## 2022-12-15 RX ADMIN — SODIUM CHLORIDE: 0.9 INJECTION, SOLUTION INTRAVENOUS at 09:12

## 2022-12-15 NOTE — NURSING
Pt received 1 unit PRBC through PAC without difficulties. S/S of transfusion reaction discussed with patient. Pt verbalized understanding.  Vital signs remained stable.  Pt tolerated tranfusion well.

## 2022-12-16 ENCOUNTER — TELEPHONE (OUTPATIENT)
Dept: HEMATOLOGY/ONCOLOGY | Facility: CLINIC | Age: 57
End: 2022-12-16
Payer: COMMERCIAL

## 2022-12-16 NOTE — TELEPHONE ENCOUNTER
----- Message from Adelita Gregg sent at 12/16/2022 10:47 AM CST -----  Type:  Needs Medical Advice    Who Called:   Symptoms (please be specific):    How long has patient had these symptoms:    Pharmacy name and phone #:    Would the patient rather a call back or a response via [facility]? call  Best Call Back Number: [phone] (M)   Additional Information: people he is dealing with needs more inforamtion from office. He will tell provider when they call. No other information wants to be given.

## 2022-12-16 NOTE — TELEPHONE ENCOUNTER
Spoke with the pt about sending in his last two notes from his office visit. I will be axing the notes over to the number that was provided [phone]. I asked if I was to send to any one in particular pt said no just fax notes over, also he inquired about getting his chemo expiated as well

## 2022-12-19 ENCOUNTER — TELEPHONE (OUTPATIENT)
Dept: INFUSION THERAPY | Facility: HOSPITAL | Age: 57
End: 2022-12-19
Payer: COMMERCIAL

## 2022-12-19 NOTE — TELEPHONE ENCOUNTER
Confirmed following appointments with the patient. Reviewed that he should take daily medications before infusion. Pt verbalized understanding  12/20 labs in [place]  12/21 at 8:20am clinic visit with Dr. Vickers  12/22 at 8:00am infusion

## 2022-12-20 ENCOUNTER — LAB VISIT (OUTPATIENT)
Dept: LAB | Facility: HOSPITAL | Age: 57
End: 2022-12-20
Attending: INTERNAL MEDICINE
Payer: COMMERCIAL

## 2022-12-20 DIAGNOSIS — C83.38 DIFFUSE LARGE B-CELL LYMPHOMA OF LYMPH NODES OF MULTIPLE REGIONS: ICD-10-CM

## 2022-12-20 LAB
ALBUMIN SERPL BCP-MCNC: 3.6 G/DL (ref 3.5–5.2)
ALP SERPL-CCNC: 129 U/L (ref 38–126)
ALT SERPL W/O P-5'-P-CCNC: 18 U/L (ref 10–44)
ANION GAP SERPL CALC-SCNC: 10 MMOL/L (ref 8–16)
AST SERPL-CCNC: 41 U/L (ref 15–46)
BASOPHILS # BLD AUTO: 0.05 K/UL (ref 0–0.2)
BASOPHILS NFR BLD: 0.5 % (ref 0–1.9)
BILIRUB SERPL-MCNC: 1.4 MG/DL (ref 0.1–1)
CALCIUM SERPL-MCNC: 8.9 MG/DL (ref 8.7–10.5)
CHLORIDE SERPL-SCNC: 94 MMOL/L (ref 95–110)
CO2 SERPL-SCNC: 26 MMOL/L (ref 23–29)
CREAT SERPL-MCNC: 0.45 MG/DL (ref 0.5–1.4)
DIFFERENTIAL METHOD: ABNORMAL
EOSINOPHIL # BLD AUTO: 0 K/UL (ref 0–0.5)
EOSINOPHIL NFR BLD: 0 % (ref 0–8)
ERYTHROCYTE [DISTWIDTH] IN BLOOD BY AUTOMATED COUNT: 16 % (ref 11.5–14.5)
EST. GFR  (NO RACE VARIABLE): >60 ML/MIN/1.73 M^2
GLUCOSE SERPL-MCNC: 131 MG/DL (ref 70–110)
HCT VFR BLD AUTO: 26.4 % (ref 40–54)
HGB BLD-MCNC: 8.3 G/DL (ref 14–18)
IMM GRANULOCYTES # BLD AUTO: 0.06 K/UL (ref 0–0.04)
IMM GRANULOCYTES NFR BLD AUTO: 0.7 % (ref 0–0.5)
LDH SERPL L TO P-CCNC: 656 U/L (ref 110–260)
LYMPHOCYTES # BLD AUTO: 0.5 K/UL (ref 1–4.8)
LYMPHOCYTES NFR BLD: 5.4 % (ref 18–48)
MCH RBC QN AUTO: 30.3 PG (ref 27–31)
MCHC RBC AUTO-ENTMCNC: 31.4 G/DL (ref 32–36)
MCV RBC AUTO: 96 FL (ref 82–98)
MONOCYTES # BLD AUTO: 0.6 K/UL (ref 0.3–1)
MONOCYTES NFR BLD: 6.5 % (ref 4–15)
NEUTROPHILS # BLD AUTO: 7.9 K/UL (ref 1.8–7.7)
NEUTROPHILS NFR BLD: 86.9 % (ref 38–73)
NRBC BLD-RTO: 0 /100 WBC
PLATELET # BLD AUTO: 275 K/UL (ref 150–450)
PMV BLD AUTO: 8.9 FL (ref 9.2–12.9)
POTASSIUM SERPL-SCNC: 4.5 MMOL/L (ref 3.5–5.1)
PROT SERPL-MCNC: 7.4 G/DL (ref 6–8.4)
RBC # BLD AUTO: 2.74 M/UL (ref 4.6–6.2)
SODIUM SERPL-SCNC: 130 MMOL/L (ref 136–145)
URATE SERPL-MCNC: 3.8 MG/DL (ref 3.4–7)
UUN UR-MCNC: 8 MG/DL (ref 2–20)
WBC # BLD AUTO: 9.11 K/UL (ref 3.9–12.7)

## 2022-12-20 PROCEDURE — 84550 ASSAY OF BLOOD/URIC ACID: CPT | Performed by: INTERNAL MEDICINE

## 2022-12-20 PROCEDURE — 85025 COMPLETE CBC W/AUTO DIFF WBC: CPT | Mod: PO | Performed by: INTERNAL MEDICINE

## 2022-12-20 PROCEDURE — 80053 COMPREHEN METABOLIC PANEL: CPT | Mod: PO | Performed by: INTERNAL MEDICINE

## 2022-12-20 PROCEDURE — 83615 LACTATE (LD) (LDH) ENZYME: CPT | Performed by: INTERNAL MEDICINE

## 2022-12-20 NOTE — PROGRESS NOTES
"PATIENT: Saud Mayers  MRN: [id number]  DATE: 12/21/2022    Diagnosis:   1. Diffuse large B-cell lymphoma of lymph nodes of multiple regions    2. Anemia in neoplastic disease    3. Immunodeficiency due to drug therapy    4. Tumor lysis syndrome    5. Chemotherapy-induced nausea and vomiting    6. Type 2 diabetes mellitus with hyperglycemia, without long-term current use of insulin    7. Severe obesity (BMI 35.0-39.9) with comorbidity    8. Folate deficiency        Chief Complaint: Lymphoma        Oncologic History:      Oncologic History Diffuse large B-cell lymphoma      Oncologic Treatment R-CHOP (start dated 12/21/22)      Pathology 11/23/22:  "LYMPH NODE", RIGHT CERVICAL, EXCISION:   -- DIFFUSE LARGE B-CELL LYMPHOMA, NON-GCB SUBTYPE (SEE COMMENT).     Flow cytometric analysis of right cervical lymph node detects a kappa light   chain restricted B lymphocyte population expressing CD19 and CD20, and CD5   (partial and dim).    CD10 is negative.  T lymphocytes are   immunophenotypically unremarkable.  Correlation with tissue morphology is   required. Flow differential: Lymphocytes 35.8%, Monocytes 0.0%, Granulocytes   4.4%, Blast  0.0%, Debris/nRBC 60.0%,  Viability 43.5%.   Immunohistochemical stains are performed with adequate c ontrols.  The large   lymphoid cells are positive for CD20, BCL6 (>30%), MUM1 (>30%), and BCL2   (>50%); negative for CD5, CD10, CD30, cyclin D1, ALK1 and EBV (VICKY in-situ   hybridization).  They display high proliferation index (Ki-67 90%).   CD3-positive small mature T-cells are seen.   Taken together, the overall findings consistent with diffuse large B-cell   lymphoma, non-GCB subtype.   1. Immunohistochemical stain:  The lymphoma cells are positive for MYC (>40%).   2. Fluorescence in-situ hybridization: The result is abnormal and indicates a   BCL6 rearrangement in 91% of nuclei.  No rearrangement of MYC or BCL2 and no   fusion of MYC and IGH was observed.  Rearrangement of BCL6 " has been   associated with several B-cell lymphomas, including follicular lymphoma,   diffuse large B-cell lymphoma, and high-grade B-cell lymphoma.  As no MYC   rearrangement and no fusion of MYC and IGH was observed, this makes unlikely   the possibility of high-grade B-cell lymphoma with MYC and BCL6 rearrangement.   Taken together, the overall findings are consistent with diffuse large B-cell   lymphoma, NOS (non-GCB subtype).  The lymphoma cells display MYC and BCL2   double expressor phenotype.       Flow cytometry - lymph node (11/23/22):  Flow cytometric analysis of lymph node detects a kappa light chain restricted   B lymphocyte population expressing CD19 and CD20, and CD5 (partial and dim).     CD10 is negative.  T lymphocytes are immunophenotypically unremarkable.   Correlation with tissue morphology is required.   Flow differential:  Lymphocytes 35.8%, Monocytes 0.0%, Granulocytes  4.4%,   Blast  0.0%, Debris/nRBC 60.0%,  Viability 43.5%.           Subjective:    History of Present Illness: Mr. Mayers is a 57 y.o. male who presents for evaluation and management of diffuse large B-cell lymphoma. I had seen him during a hospitalization in November 2022.    - he was admitted for lymphadenopathy/weight loss.  - he underwent excisional lymph node biopsy on 11/23/22. Pathology revealed diffuse large B-cell lymphoma, non-GCB type.  - he underwent port-a-cath placement on 12/7/22 with Dr. Velazquez.  - he underwent pet scan on 12/9/22.  - he underwent port-a-cath placement on 12/7/22 with Dr. Velazquez.  - he underwent pet scan on 12/9/22.    Interval history:  - he presents for a follow-up appointment for his diffuse large B-cell lymphoma.  - he received a unit of blood on 12/15/22.  - he is scheduled for cycle #1 of R-CHOP tomorrow  - today, he endorses fatigue, weakness, nausea. He denies chest pain, nausea, vomiting, diarrhea, constipation.      Past medical, surgical, family, and social histories have been  reviewed and updated below.    Past Medical History:   Past Medical History:   Diagnosis Date    Asthma     Diabetes mellitus     High cholesterol     Hypertension     ANAMIKA on CPAP     Testicular hypofunction        Past Surgical History:   Past Surgical History:   Procedure Laterality Date    CARPAL TUNNEL RELEASE Bilateral 2020    CHOLECYSTECTOMY  01/01/1990    COLONOSCOPY  01/01/2013    COLONOSCOPY N/A 9/2/2022    Procedure: COLONOSCOPY sutab;  Surgeon: Jeovany Cisse MD;  Location: [facility] ENDO;  Service: Endoscopy;  Laterality: N/A;    ESOPHAGOGASTRODUODENOSCOPY N/A 9/2/2022    Procedure: EGD (ESOPHAGOGASTRODUODENOSCOPY);  Surgeon: Jeovany Cisse MD;  Location: [facility] ENDO;  Service: Endoscopy;  Laterality: N/A;    INSERTION OF TUNNELED CENTRAL VENOUS CATHETER (CVC) WITH SUBCUTANEOUS PORT N/A 12/7/2022    Procedure: [id number]-F-CATH;  Surgeon: Francisco Velazquez MD;  Location: [facility] OR;  Service: General;  Laterality: N/A;    ORTHOPEDIC SURGERY Bilateral 01/01/2007    feet plantar    ORTHOPEDIC SURGERY Right 01/01/2006    knee    ROTATOR CUFF REPAIR Left 01/01/1997    SURGICAL REMOVAL OF LYMPH NODE Right 11/23/2022    Procedure: EXCISION, LYMPH NODE;  Surgeon: Francisco Velazquez MD;  Location: [facility] OR;  Service: General;  Laterality: Right;   (right neck)       Family History:   Family History   Problem Relation Age of Onset    Heart attack Father        Social History:  reports that he quit smoking about 30 years ago. His smoking use included cigarettes. He has never used smokeless tobacco. He reports that he does not drink alcohol and does not use drugs.    Allergies:  Review of patient's allergies indicates:   Allergen Reactions    Gabapentin Hallucinations    Tizanidine Other (See Comments)     somnolence       Medications:  Current Outpatient Medications   Medication Sig Dispense Refill    acetaminophen (TYLENOL) 500 MG tablet Take 500 mg by mouth every 6 (six) hours as needed for Pain.      allopurinoL  (ZYLOPRIM) 300 MG tablet Take 1 tablet (300 mg total) by mouth once daily. 30 tablet 11    atorvastatin (LIPITOR) 20 MG tablet Take 1 tablet (20 mg total) by mouth once daily. 90 tablet 3    cyanocobalamin (VITAMIN B-12) 100 MCG tablet Take 100 mcg by mouth once daily.      folic acid (FOLVITE) 1 MG tablet Take 1 tablet (1 mg total) by mouth once daily. 100 tablet 3    LIDOcaine-prilocaine (EMLA) cream Apply topically as needed. Apply to skin overlying port site 30 minutes before chemotherapy. 30 g 1    linaCLOtide (LINZESS) 72 mcg Cap capsule Take 1 capsule (72 mcg total) by mouth before breakfast. 90 capsule 3    losartan (COZAAR) 25 MG tablet Take 1 tablet (25 mg total) by mouth once daily. 90 tablet 3    metFORMIN (GLUCOPHAGE) 1000 MG tablet Take 1 tablet (1,000 mg total) by mouth 2 (two) times daily with meals. 180 tablet 3    metoprolol succinate (TOPROL-XL) 50 MG 24 hr tablet Take 1 tablet (50 mg total) by mouth once daily. 90 tablet 3    multivit-min-folic-vit K-lycop (ONE-A-DAY MEN'S 50 PLUS) 400- mcg Tab Take 1 tablet by mouth once daily.      ondansetron (ZOFRAN) 4 MG tablet Take 1 tablet (4 mg total) by mouth every 8 (eight) hours as needed for Nausea. 90 tablet 1    ondansetron (ZOFRAN-ODT) 8 MG TbDL Take 1 tablet (8 mg total) by mouth every 8 (eight) hours as needed (chemotherapy-induced nausea and vomiting). 40 tablet 5    predniSONE (DELTASONE) 50 MG Tab Take 2 tablets (100 mg total) by mouth As instructed (take 2 tablets (100mg ) by mouth daily on days 2-5 of each cycle of chemotherapy). 48 tablet 1     No current facility-administered medications for this visit.       Review of Systems   Constitutional:  Positive for fatigue, fever and unexpected weight change.   HENT:  Negative for sore throat.    Eyes:  Negative for visual disturbance.   Respiratory:  Positive for shortness of breath.    Cardiovascular:  Negative for chest pain.   Gastrointestinal:  Positive for nausea. Negative for  "abdominal pain.   Genitourinary:  Negative for dysuria.   Musculoskeletal:  Negative for back pain.   Skin:  Negative for rash.   Neurological:  Positive for weakness. Negative for headaches.   Hematological:  Negative for adenopathy.   Psychiatric/Behavioral:  The patient is not nervous/anxious.      ECOG Performance Status:   ECOG SCORE    1 - Restricted in strenuous activity-ambulatory and able to carry out work of a light nature         Objective:      Vitals:   Vitals:    12/21/22 0818   BP: 134/71   BP Location: Right arm   Patient Position: Sitting   BP Method: Large (Automatic)   Pulse: (!) 114   Resp: 20   Temp: 97.9 °F (36.6 °C)   TempSrc: Oral   SpO2: 96%   Weight: 95.3 kg (210 lb 1.6 oz)   Height: 5' 9" (1.753 m)     BMI: Body mass index is 31.03 kg/m².    Physical Exam  Vitals and nursing note reviewed.   Constitutional:       Appearance: He is well-developed.   HENT:      Head: Normocephalic and atraumatic.   Eyes:      Pupils: Pupils are equal, round, and reactive to light.   Cardiovascular:      Rate and Rhythm: Regular rhythm. Tachycardia present.   Pulmonary:      Effort: Pulmonary effort is normal.      Breath sounds: Normal breath sounds.   Abdominal:      General: Bowel sounds are normal.      Palpations: Abdomen is soft.   Musculoskeletal:         General: Normal range of motion.      Cervical back: Normal range of motion and neck supple.   Lymphadenopathy:      Cervical: Cervical adenopathy present.      Upper Body:      Right upper body: Axillary adenopathy present.      Left upper body: Axillary adenopathy present.   Skin:     General: Skin is warm and dry.   Neurological:      Mental Status: He is alert and oriented to person, place, and time.   Psychiatric:         Behavior: Behavior normal.         Thought Content: Thought content normal.         Judgment: Judgment normal.       Laboratory Data:  Labs have been reviewed.    Lab Results   Component Value Date    WBC 9.11 12/20/2022    HGB " 8.3 (L) 12/20/2022    HCT 26.4 (L) 12/20/2022    MCV 96 12/20/2022     12/20/2022           Imaging:    Echocardiogram (12/13/22):  The left ventricle is normal in size with concentric remodeling and normal systolic function.  The estimated ejection fraction is 65%.  Normal left ventricular diastolic function.  Normal right ventricular size with normal right ventricular systolic function.  Normal central venous pressure (3 mmHg).  The left ventricular global longitudinal strain is -17.6%.  There is moderate aortic valve stenosis.  Aortic valve area is 2.04 cm2; peak velocity is 3.17 m/s; mean gradient is 26 mmHg.      PET/CT scan (12/9/22): I have personally reviewed the images  Quality of the study: Adequate.     Reference values:     Mediastinal blood pool maximum SUV: 2.9     Normal liver background maximum SUV: 3.0     In the head and neck, numerous enlarged hypermetabolic lymph nodes within bilateral cervical periclavicular lesions.  For example, Conglomerate of right cervical and supraclavicular lymph nodes with SUV 14.  Left infraclavicular lymph node measuring 1.8 cm (image 95), with SUV max 10.  There is fluid and gas within the level 2 B measuring 2.1 cm, likely related to recent biopsy.     In the chest, numerous enlarged hypermetabolic mediastinal, hilar, and axilla lymph nodes.  For example subcarinal lymph node measuring 3.2 x 6.5 cm demonstrating SUV max of 9.8 (image 129).     In the abdomen and pelvis, there is physiologic tracer distribution within the abdominal organs and excretion into the genitourinary system.     Numerous enlarged hypermetabolic lymph nodes.  For example, conglomerate of lymph nodes within the anterior abdomen demonstrating SUV max of 18 (fused image 205).  Posterior right pararenal space hypermetabolic node measuring 3.0 x 2.4 cm with SUV max of 16 (image 218).  Conglomerate of right pelvic sidewall nodes measuring 8.0 AP x 4.4 TV x 11.1 CC cm demonstrating SUV max of  19 (image 251).     The spleen has multifocal hypermetabolic uptake within SUV max of 13 and is enlarged at 19.0 cm in craniocaudal dimension.  There are multiple areas of hypoattenuation, better characterized on prior CTs.     In the bones, there are no hypermetabolic lesions worrisome for malignancy.     Additional CT findings     Right-sided chest port tip terminating in the cavoatrial junction.  Trace left pleural fluid, prior cholecystectomy.     Impression:     Multiple hypermetabolic lymph nodes involving both sides of the diaphragm including bulky right iliac conglomerate in keeping with known large B-cell lymphoma.     Splenomegaly and multifocal hypermetabolic foci within the spleen.     The Deauville Score is: 5     Reference values:     Mediastinal blood pool maximum SUV: 2.9     Normal liver maximum SUV: 3.0      CT chest/abdomen/pelvis (11/7/22): I have personally reviewed the images     Innumerable adenopathy above and below the diaphragm and throughout the neck, LEFT axilla and chest wall, retroperitoneum and mesentery as well as in the iliac chain, right greater than left.     Findings are suspicious for lymphoma/leukemia.     Multiple splenic lesions appearing to have increased in number and size since the prior exam.        CT soft tissue neck (11/7/22): I have personally reviewed the images  Extensive adenopathy within the right greater than left neck with also adenopathy within the mediastinum and left axilla.  The largest lymph node in the right posterior triangle/spinal accessory region demonstrates internal necrosis as does a right paratracheal lymph node within the mediastinum..  There is inflammatory change surrounding the necrotic lymph nodes and although an infectious/inflammatory process is not excluded, a neoplastic process including lymphoma to be considered.      Assessment:       1. Diffuse large B-cell lymphoma of lymph nodes of multiple regions    2. Anemia in neoplastic disease   "  3. Immunodeficiency due to drug therapy    4. Tumor lysis syndrome    5. Chemotherapy-induced nausea and vomiting    6. Type 2 diabetes mellitus with hyperglycemia, without long-term current use of insulin    7. Severe obesity (BMI 35.0-39.9) with comorbidity    8. Folate deficiency           Plan:     Diffuse large B-cell lymphoma  - I have reviewed his chart  - CT scans (11/7/22) revealed extensive adenopathy.  - excisional biopsy (11/23/22) revealed diffuse large B-cell lymphoma, non-GCB subtype.  - No rearrangement of MYC or BCL2, so not a double-hit lymphoma. 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  - I and via oncology recommend R-CHOP x 6 cycles.  - The risks and benefits of chemotherapy were discussed, written information was given, and informed consent was obtained.  - hepatitis B core antibody/surface antigen and hepatitis C antibody were negative.    - he underwent port-a-cath placement on 12/7/22 with Dr. Velazquez.  - PET/CT scan (12/9/22) revealed "Multiple hypermetabolic lymph nodes involving both sides of the diaphragm including bulky right iliac conglomerate in keeping with known large B-cell lymphoma."  - I sent emla cream to his pharmacy  - I sent anti-emetics to his pharmacy.  - I sent prednisone to his pharmacy  - echocardiogram (12/13/22) revealed an ejection fraction of 65%  - he received a unit of blood on 12/15/22 for symptomatic anemia  - Labs have been reviewed. Okay to proceed with cycle #1 of R-CHOP, scheduled for 12/22/22  - return to clinic in 3 weeks in preparation for cycle #2 of R-CHOP.    2. Anemia in neoplastic disease  - he received a unit of blood on 12/15/22 for symptomatic anemia  - Labs have been reviewed. Hemoglobin is 8.3 g/dL.  - continue to monitor    3. Tumor lysis syndrome  - uric acid was 3.8 mg/dL  - continue allopurinol     4. Chemotherapy-induced nausea/vomiting  - I sent zofran to his pharmacy at previous visit.    5. Type 2 " diabetes  - last hemoglobin A1c (7/20/22) was 6.5%  - continue metformin  - will monitor closely as R-CHOP includes prednisone which can worsen hyperglycemia.  - defer management to primary care    6. Folate deficiency  - continue folic acid    7. Advance Care Planning   - will address at next visit    - return to clinic in 3 weeks in preparation for cycle #2 of R-CHOP.      Sandip Vickers M.D.  Hematology/Oncology  [facility] - [address], [address]  [place] [address]  Phone: [phone]  Fax: [phone]

## 2022-12-21 ENCOUNTER — OFFICE VISIT (OUTPATIENT)
Dept: HEMATOLOGY/ONCOLOGY | Facility: CLINIC | Age: 57
End: 2022-12-21
Payer: COMMERCIAL

## 2022-12-21 VITALS
RESPIRATION RATE: 20 BRPM | WEIGHT: 210.13 LBS | SYSTOLIC BLOOD PRESSURE: 134 MMHG | DIASTOLIC BLOOD PRESSURE: 71 MMHG | OXYGEN SATURATION: 96 % | HEIGHT: 69 IN | BODY MASS INDEX: 31.12 KG/M2 | HEART RATE: 114 BPM | TEMPERATURE: 98 F

## 2022-12-21 DIAGNOSIS — E88.3 TUMOR LYSIS SYNDROME: ICD-10-CM

## 2022-12-21 DIAGNOSIS — R11.2 CHEMOTHERAPY-INDUCED NAUSEA AND VOMITING: ICD-10-CM

## 2022-12-21 DIAGNOSIS — D84.821 IMMUNODEFICIENCY DUE TO DRUG THERAPY: ICD-10-CM

## 2022-12-21 DIAGNOSIS — T45.1X5A CHEMOTHERAPY-INDUCED NAUSEA AND VOMITING: ICD-10-CM

## 2022-12-21 DIAGNOSIS — C83.38 DIFFUSE LARGE B-CELL LYMPHOMA OF LYMPH NODES OF MULTIPLE REGIONS: Primary | ICD-10-CM

## 2022-12-21 DIAGNOSIS — E11.65 TYPE 2 DIABETES MELLITUS WITH HYPERGLYCEMIA, WITHOUT LONG-TERM CURRENT USE OF INSULIN: ICD-10-CM

## 2022-12-21 DIAGNOSIS — D63.0 ANEMIA IN NEOPLASTIC DISEASE: ICD-10-CM

## 2022-12-21 DIAGNOSIS — E66.01 SEVERE OBESITY (BMI 35.0-39.9) WITH COMORBIDITY: ICD-10-CM

## 2022-12-21 DIAGNOSIS — E53.8 FOLATE DEFICIENCY: ICD-10-CM

## 2022-12-21 DIAGNOSIS — Z79.899 IMMUNODEFICIENCY DUE TO DRUG THERAPY: ICD-10-CM

## 2022-12-21 PROCEDURE — 4010F PR ACE/ARB THEARPY RXD/TAKEN: ICD-10-PCS | Mod: CPTII,S$GLB,, | Performed by: INTERNAL MEDICINE

## 2022-12-21 PROCEDURE — 1160F PR REVIEW ALL MEDS BY PRESCRIBER/CLIN PHARMACIST DOCUMENTED: ICD-10-PCS | Mod: CPTII,S$GLB,, | Performed by: INTERNAL MEDICINE

## 2022-12-21 PROCEDURE — 99999 PR PBB SHADOW E&M-EST. PATIENT-LVL III: CPT | Mod: PBBFAC,,, | Performed by: INTERNAL MEDICINE

## 2022-12-21 PROCEDURE — 3044F HG A1C LEVEL LT 7.0%: CPT | Mod: CPTII,S$GLB,, | Performed by: INTERNAL MEDICINE

## 2022-12-21 PROCEDURE — 3061F NEG MICROALBUMINURIA REV: CPT | Mod: CPTII,S$GLB,, | Performed by: INTERNAL MEDICINE

## 2022-12-21 PROCEDURE — 1160F RVW MEDS BY RX/DR IN RCRD: CPT | Mod: CPTII,S$GLB,, | Performed by: INTERNAL MEDICINE

## 2022-12-21 PROCEDURE — 3061F PR NEG MICROALBUMINURIA RESULT DOCUMENTED/REVIEW: ICD-10-PCS | Mod: CPTII,S$GLB,, | Performed by: INTERNAL MEDICINE

## 2022-12-21 PROCEDURE — 3075F PR MOST RECENT SYSTOLIC BLOOD PRESS GE 130-139MM HG: ICD-10-PCS | Mod: CPTII,S$GLB,, | Performed by: INTERNAL MEDICINE

## 2022-12-21 PROCEDURE — 3008F BODY MASS INDEX DOCD: CPT | Mod: CPTII,S$GLB,, | Performed by: INTERNAL MEDICINE

## 2022-12-21 PROCEDURE — 4010F ACE/ARB THERAPY RXD/TAKEN: CPT | Mod: CPTII,S$GLB,, | Performed by: INTERNAL MEDICINE

## 2022-12-21 PROCEDURE — 3008F PR BODY MASS INDEX (BMI) DOCUMENTED: ICD-10-PCS | Mod: CPTII,S$GLB,, | Performed by: INTERNAL MEDICINE

## 2022-12-21 PROCEDURE — 3066F PR DOCUMENTATION OF TREATMENT FOR NEPHROPATHY: ICD-10-PCS | Mod: CPTII,S$GLB,, | Performed by: INTERNAL MEDICINE

## 2022-12-21 PROCEDURE — 3066F NEPHROPATHY DOC TX: CPT | Mod: CPTII,S$GLB,, | Performed by: INTERNAL MEDICINE

## 2022-12-21 PROCEDURE — 99215 PR OFFICE/OUTPT VISIT, EST, LEVL V, 40-54 MIN: ICD-10-PCS | Mod: S$GLB,,, | Performed by: INTERNAL MEDICINE

## 2022-12-21 PROCEDURE — 3078F DIAST BP <80 MM HG: CPT | Mod: CPTII,S$GLB,, | Performed by: INTERNAL MEDICINE

## 2022-12-21 PROCEDURE — 99999 PR PBB SHADOW E&M-EST. PATIENT-LVL III: ICD-10-PCS | Mod: PBBFAC,,, | Performed by: INTERNAL MEDICINE

## 2022-12-21 PROCEDURE — 3078F PR MOST RECENT DIASTOLIC BLOOD PRESSURE < 80 MM HG: ICD-10-PCS | Mod: CPTII,S$GLB,, | Performed by: INTERNAL MEDICINE

## 2022-12-21 PROCEDURE — 99215 OFFICE O/P EST HI 40 MIN: CPT | Mod: S$GLB,,, | Performed by: INTERNAL MEDICINE

## 2022-12-21 PROCEDURE — 3075F SYST BP GE 130 - 139MM HG: CPT | Mod: CPTII,S$GLB,, | Performed by: INTERNAL MEDICINE

## 2022-12-21 PROCEDURE — 3044F PR MOST RECENT HEMOGLOBIN A1C LEVEL <7.0%: ICD-10-PCS | Mod: CPTII,S$GLB,, | Performed by: INTERNAL MEDICINE

## 2022-12-21 PROCEDURE — 1159F MED LIST DOCD IN RCRD: CPT | Mod: CPTII,S$GLB,, | Performed by: INTERNAL MEDICINE

## 2022-12-21 PROCEDURE — 1159F PR MEDICATION LIST DOCUMENTED IN MEDICAL RECORD: ICD-10-PCS | Mod: CPTII,S$GLB,, | Performed by: INTERNAL MEDICINE

## 2022-12-21 RX ORDER — SODIUM CHLORIDE 0.9 % (FLUSH) 0.9 %
10 SYRINGE (ML) INJECTION
Status: CANCELLED | OUTPATIENT
Start: 2022-12-22

## 2022-12-21 RX ORDER — ACETAMINOPHEN 325 MG/1
650 TABLET ORAL
Status: CANCELLED | OUTPATIENT
Start: 2022-12-22

## 2022-12-21 RX ORDER — DOXORUBICIN HYDROCHLORIDE 2 MG/ML
50 INJECTION, SOLUTION INTRAVENOUS
Status: CANCELLED | OUTPATIENT
Start: 2022-12-22

## 2022-12-21 RX ORDER — HEPARIN 100 UNIT/ML
500 SYRINGE INTRAVENOUS
Status: CANCELLED | OUTPATIENT
Start: 2022-12-22

## 2022-12-21 RX ORDER — FAMOTIDINE 10 MG/ML
20 INJECTION INTRAVENOUS
Status: CANCELLED | OUTPATIENT
Start: 2022-12-22

## 2022-12-22 ENCOUNTER — INFUSION (OUTPATIENT)
Dept: INFUSION THERAPY | Facility: HOSPITAL | Age: 57
End: 2022-12-22
Attending: INTERNAL MEDICINE
Payer: COMMERCIAL

## 2022-12-22 VITALS
DIASTOLIC BLOOD PRESSURE: 63 MMHG | WEIGHT: 210.13 LBS | HEART RATE: 128 BPM | BODY MASS INDEX: 31.12 KG/M2 | HEIGHT: 69 IN | TEMPERATURE: 99 F | RESPIRATION RATE: 18 BRPM | OXYGEN SATURATION: 92 % | SYSTOLIC BLOOD PRESSURE: 148 MMHG

## 2022-12-22 DIAGNOSIS — C83.31 DIFFUSE LARGE B-CELL LYMPHOMA OF LYMPH NODES OF NECK: Primary | ICD-10-CM

## 2022-12-22 PROCEDURE — 96375 TX/PRO/DX INJ NEW DRUG ADDON: CPT

## 2022-12-22 PROCEDURE — 63600175 PHARM REV CODE 636 W HCPCS: Performed by: INTERNAL MEDICINE

## 2022-12-22 PROCEDURE — 96411 CHEMO IV PUSH ADDL DRUG: CPT

## 2022-12-22 PROCEDURE — 96415 CHEMO IV INFUSION ADDL HR: CPT

## 2022-12-22 PROCEDURE — 25000003 PHARM REV CODE 250: Performed by: INTERNAL MEDICINE

## 2022-12-22 PROCEDURE — 96367 TX/PROPH/DG ADDL SEQ IV INF: CPT

## 2022-12-22 PROCEDURE — A4216 STERILE WATER/SALINE, 10 ML: HCPCS | Performed by: INTERNAL MEDICINE

## 2022-12-22 PROCEDURE — 96417 CHEMO IV INFUS EACH ADDL SEQ: CPT

## 2022-12-22 PROCEDURE — 96413 CHEMO IV INFUSION 1 HR: CPT

## 2022-12-22 RX ORDER — DOXORUBICIN HYDROCHLORIDE 2 MG/ML
25 INJECTION, SOLUTION INTRAVENOUS
Status: COMPLETED | OUTPATIENT
Start: 2022-12-22 | End: 2022-12-22

## 2022-12-22 RX ORDER — HEPARIN 100 UNIT/ML
500 SYRINGE INTRAVENOUS
Status: DISCONTINUED | OUTPATIENT
Start: 2022-12-22 | End: 2022-12-22 | Stop reason: HOSPADM

## 2022-12-22 RX ORDER — FAMOTIDINE 10 MG/ML
20 INJECTION INTRAVENOUS
Status: COMPLETED | OUTPATIENT
Start: 2022-12-22 | End: 2022-12-22

## 2022-12-22 RX ORDER — ACETAMINOPHEN 325 MG/1
650 TABLET ORAL
Status: COMPLETED | OUTPATIENT
Start: 2022-12-22 | End: 2022-12-22

## 2022-12-22 RX ORDER — SODIUM CHLORIDE 0.9 % (FLUSH) 0.9 %
10 SYRINGE (ML) INJECTION
Status: DISCONTINUED | OUTPATIENT
Start: 2022-12-22 | End: 2022-12-22 | Stop reason: HOSPADM

## 2022-12-22 RX ORDER — MEPERIDINE HYDROCHLORIDE 50 MG/ML
25 INJECTION INTRAMUSCULAR; INTRAVENOUS; SUBCUTANEOUS
Status: DISCONTINUED | OUTPATIENT
Start: 2022-12-22 | End: 2022-12-22 | Stop reason: HOSPADM

## 2022-12-22 RX ORDER — MEPERIDINE HYDROCHLORIDE 50 MG/ML
25 INJECTION INTRAMUSCULAR; INTRAVENOUS; SUBCUTANEOUS
Status: CANCELLED
Start: 2022-12-22

## 2022-12-22 RX ORDER — DOXORUBICIN HYDROCHLORIDE 2 MG/ML
25 INJECTION, SOLUTION INTRAVENOUS
Status: CANCELLED | OUTPATIENT
Start: 2022-12-22

## 2022-12-22 RX ORDER — DOXORUBICIN HYDROCHLORIDE 2 MG/ML
50 INJECTION, SOLUTION INTRAVENOUS
Status: DISCONTINUED | OUTPATIENT
Start: 2022-12-22 | End: 2022-12-22

## 2022-12-22 RX ADMIN — SODIUM CHLORIDE 800 MG: 0.9 INJECTION, SOLUTION INTRAVENOUS at 09:12

## 2022-12-22 RX ADMIN — MEPERIDINE HYDROCHLORIDE 25 MG: 50 INJECTION INTRAMUSCULAR; INTRAVENOUS; SUBCUTANEOUS at 11:12

## 2022-12-22 RX ADMIN — DOXORUBICIN HYDROCHLORIDE 54 MG: 2 INJECTION, SOLUTION INTRAVENOUS at 04:12

## 2022-12-22 RX ADMIN — Medication 10 ML: at 05:12

## 2022-12-22 RX ADMIN — ACETAMINOPHEN 650 MG: 325 TABLET ORAL at 08:12

## 2022-12-22 RX ADMIN — CYCLOPHOSPHAMIDE 1620 MG: 200 INJECTION, SOLUTION INTRAVENOUS at 04:12

## 2022-12-22 RX ADMIN — DIPHENHYDRAMINE HYDROCHLORIDE 50 MG: 50 INJECTION INTRAMUSCULAR; INTRAVENOUS at 08:12

## 2022-12-22 RX ADMIN — VINCRISTINE SULFATE 2 MG: 1 INJECTION, SOLUTION INTRAVENOUS at 04:12

## 2022-12-22 RX ADMIN — SODIUM CHLORIDE: 0.9 INJECTION, SOLUTION INTRAVENOUS at 08:12

## 2022-12-22 RX ADMIN — HEPARIN 500 UNITS: 100 SYRINGE at 05:12

## 2022-12-22 RX ADMIN — PALONOSETRON: 0.25 INJECTION, SOLUTION INTRAVENOUS at 03:12

## 2022-12-22 RX ADMIN — FAMOTIDINE 20 MG: 10 INJECTION INTRAVENOUS at 08:12

## 2022-12-22 NOTE — NURSING
11:30. RN at chairside checking on pt and to titrate Ruxience. Pt shaking with rigors. Infusion paused. BP elevated at 199/86, , SpO2 96%. Denies SOB or CP. Dr. Vickers notified. Orders for demerol 25mg IV received. Will administer per orders. Leave infusion paused for 30 min or until rigors resolves then okay to restart Ruxience infusion. Will continue to monitor closely and titrate per orders.

## 2022-12-22 NOTE — NURSING
PT tolerated RCHOP infusion. Patient experienced rigors in the beginning of treatment. Dr. Vickers notified, medications administered. See note. Pt education reinforced on side effects, what to expect and when to call Dr. Vickers. Pt instructed and reinforced on what signs and symptoms to look for and when to report to the ER. Pt and wife verbalized understanding. All questions and concerns addressed. PAC flushed with heparin and de-accessed per protocol.

## 2022-12-22 NOTE — NURSING
Pt HR sustaining 130's. Asymptomatic. BP improving 139/63. Pt denies CP or tightness, denies SOB. No complaints. Dr. Vickers notified. Okay to proceed with Ruxience infusion.

## 2022-12-22 NOTE — NURSING
12:30. Pt resting in recliner. Rigors resolved. VSS improved 143/61, , SpO2 94%. Ruxince infusion restarted at 100ml/hr, will continue to monitor closely and titrate per protocol.

## 2023-01-06 ENCOUNTER — PATIENT MESSAGE (OUTPATIENT)
Dept: HEMATOLOGY/ONCOLOGY | Facility: CLINIC | Age: 58
End: 2023-01-06
Payer: COMMERCIAL

## 2023-01-06 DIAGNOSIS — K12.31 MUCOSITIS DUE TO CHEMOTHERAPY: Primary | ICD-10-CM

## 2023-01-08 ENCOUNTER — PATIENT MESSAGE (OUTPATIENT)
Dept: HEMATOLOGY/ONCOLOGY | Facility: CLINIC | Age: 58
End: 2023-01-08
Payer: COMMERCIAL

## 2023-01-08 DIAGNOSIS — K12.31 MUCOSITIS DUE TO CHEMOTHERAPY: ICD-10-CM

## 2023-01-10 NOTE — PROGRESS NOTES
"PATIENT: Saud Mayers  MRN: [id number]  DATE: 1/11/2023    Diagnosis:   1. Diffuse large B-cell lymphoma of lymph nodes of multiple regions    2. Anemia in neoplastic disease    3. Immunodeficiency due to drug therapy    4. Tumor lysis syndrome    5. Chemotherapy-induced nausea and vomiting    6. Type 2 diabetes mellitus with hyperglycemia, without long-term current use of insulin    7. Severe obesity (BMI 35.0-39.9) with comorbidity    8. Folate deficiency    9. Chemotherapy-induced neutropenia    10. Anemia due to antineoplastic chemotherapy    11. Mucositis due to chemotherapy      Chief Complaint: Lymphoma    Oncologic History:      Oncologic History Diffuse large B-cell lymphoma      Oncologic Treatment R-CHOP (start date 12/21/22)      Pathology 11/23/22:  "LYMPH NODE", RIGHT CERVICAL, EXCISION:   -- DIFFUSE LARGE B-CELL LYMPHOMA, NON-GCB SUBTYPE (SEE COMMENT).     Flow cytometric analysis of right cervical lymph node detects a kappa light   chain restricted B lymphocyte population expressing CD19 and CD20, and CD5   (partial and dim).    CD10 is negative.  T lymphocytes are   immunophenotypically unremarkable.  Correlation with tissue morphology is   required. Flow differential: Lymphocytes 35.8%, Monocytes 0.0%, Granulocytes   4.4%, Blast  0.0%, Debris/nRBC 60.0%,  Viability 43.5%.   Immunohistochemical stains are performed with adequate c ontrols.  The large   lymphoid cells are positive for CD20, BCL6 (>30%), MUM1 (>30%), and BCL2   (>50%); negative for CD5, CD10, CD30, cyclin D1, ALK1 and EBV (VICKY in-situ   hybridization).  They display high proliferation index (Ki-67 90%).   CD3-positive small mature T-cells are seen.   Taken together, the overall findings consistent with diffuse large B-cell   lymphoma, non-GCB subtype.   1. Immunohistochemical stain:  The lymphoma cells are positive for MYC (>40%).   2. Fluorescence in-situ hybridization: The result is abnormal and indicates a   BCL6 rearrangement in " 91% of nuclei.  No rearrangement of MYC or BCL2 and no   fusion of MYC and IGH was observed.  Rearrangement of BCL6 has been   associated with several B-cell lymphomas, including follicular lymphoma,   diffuse large B-cell lymphoma, and high-grade B-cell lymphoma.  As no MYC   rearrangement and no fusion of MYC and IGH was observed, this makes unlikely   the possibility of high-grade B-cell lymphoma with MYC and BCL6 rearrangement.   Taken together, the overall findings are consistent with diffuse large B-cell   lymphoma, NOS (non-GCB subtype).  The lymphoma cells display MYC and BCL2   double expressor phenotype.       Flow cytometry - lymph node (11/23/22):  Flow cytometric analysis of lymph node detects a kappa light chain restricted   B lymphocyte population expressing CD19 and CD20, and CD5 (partial and dim).     CD10 is negative.  T lymphocytes are immunophenotypically unremarkable.   Correlation with tissue morphology is required.   Flow differential:  Lymphocytes 35.8%, Monocytes 0.0%, Granulocytes  4.4%,   Blast  0.0%, Debris/nRBC 60.0%,  Viability 43.5%.           Subjective:    History of Present Illness: Mr. Mayers is a 57 y.o. male who presents for evaluation and management of diffuse large B-cell lymphoma. I had seen him during a hospitalization in November 2022.    - he was admitted for lymphadenopathy/weight loss.  - he underwent excisional lymph node biopsy on 11/23/22. Pathology revealed diffuse large B-cell lymphoma, non-GCB type.  - he underwent port-a-cath placement on 12/7/22 with Dr. Velazquez.  - he underwent pet scan on 12/9/22.  - he underwent port-a-cath placement on 12/7/22 with Dr. Velazquez.  - he underwent pet scan on 12/9/22.    Interval history:  - he presents for a follow-up appointment for his diffuse large B-cell lymphoma.  - he is scheduled for cycle #2 of R-CHOP tomorrow  - he is feeling much better after cycle #1 of chemotherapy. He weakness and fatigue have improved. He endorses  mucositis from chemotherapy.  - He denies chest pain, nausea, vomiting, diarrhea, constipation.      Past medical, surgical, family, and social histories have been reviewed and updated below.    Past Medical History:   Past Medical History:   Diagnosis Date    Asthma     Diabetes mellitus     High cholesterol     Hypertension     ANAMIKA on CPAP     Testicular hypofunction        Past Surgical History:   Past Surgical History:   Procedure Laterality Date    CARPAL TUNNEL RELEASE Bilateral 2020    CHOLECYSTECTOMY  01/01/1990    COLONOSCOPY  01/01/2013    COLONOSCOPY N/A 9/2/2022    Procedure: COLONOSCOPY sutab;  Surgeon: Jeovany Cisse MD;  Location: [facility];  Service: Endoscopy;  Laterality: N/A;    ESOPHAGOGASTRODUODENOSCOPY N/A 9/2/2022    Procedure: EGD (ESOPHAGOGASTRODUODENOSCOPY);  Surgeon: Jeovany Cisse MD;  Location: [facility];  Service: Endoscopy;  Laterality: N/A;    INSERTION OF TUNNELED CENTRAL VENOUS CATHETER (CVC) WITH SUBCUTANEOUS PORT N/A 12/7/2022    Procedure: [id number];  Surgeon: Francisco Velazquez MD;  Location: [facility];  Service: General;  Laterality: N/A;    ORTHOPEDIC SURGERY Bilateral 01/01/2007    feet plantar    ORTHOPEDIC SURGERY Right 01/01/2006    knee    ROTATOR CUFF REPAIR Left 01/01/1997    SURGICAL REMOVAL OF LYMPH NODE Right 11/23/2022    Procedure: EXCISION, LYMPH NODE;  Surgeon: Francisco Velazquez MD;  Location: [facility];  Service: General;  Laterality: Right;   (right neck)       Family History:   Family History   Problem Relation Age of Onset    Heart attack Father        Social History:  reports that he quit smoking about 31 years ago. His smoking use included cigarettes. He has never used smokeless tobacco. He reports that he does not drink alcohol and does not use drugs.    Allergies:  Review of patient's allergies indicates:   Allergen Reactions    Gabapentin Hallucinations    Tizanidine Other (See Comments)     somnolence       Medications:  Current Outpatient  Medications   Medication Sig Dispense Refill    acetaminophen (TYLENOL) 500 MG tablet Take 500 mg by mouth every 6 (six) hours as needed for Pain.      allopurinoL (ZYLOPRIM) 300 MG tablet Take 1 tablet (300 mg total) by mouth once daily. 30 tablet 11    atorvastatin (LIPITOR) 20 MG tablet Take 1 tablet (20 mg total) by mouth once daily. 90 tablet 3    cyanocobalamin (VITAMIN B-12) 100 MCG tablet Take 100 mcg by mouth once daily.      duke's soln (benadryl 30 mL, mylanta 30 mL, LIDOcaine 30 mL, nystatin 30 mL) 120mL Take 10 mLs by mouth 3 (three) times daily as needed (chemotherapy-induced mucositis.). 300 mL 1    folic acid (FOLVITE) 1 MG tablet Take 1 tablet (1 mg total) by mouth once daily. 100 tablet 3    LIDOcaine-prilocaine (EMLA) cream Apply topically as needed. Apply to skin overlying port site 30 minutes before chemotherapy. 30 g 1    linaCLOtide (LINZESS) 72 mcg Cap capsule Take 1 capsule (72 mcg total) by mouth before breakfast. 90 capsule 3    losartan (COZAAR) 25 MG tablet Take 1 tablet (25 mg total) by mouth once daily. 90 tablet 3    metFORMIN (GLUCOPHAGE) 1000 MG tablet Take 1 tablet (1,000 mg total) by mouth 2 (two) times daily with meals. 180 tablet 3    metoprolol succinate (TOPROL-XL) 50 MG 24 hr tablet Take 1 tablet (50 mg total) by mouth once daily. 90 tablet 3    multivit-min-folic-vit K-lycop (ONE-A-DAY MEN'S 50 PLUS) 400- mcg Tab Take 1 tablet by mouth once daily.      ondansetron (ZOFRAN) 4 MG tablet Take 1 tablet (4 mg total) by mouth every 8 (eight) hours as needed for Nausea. 90 tablet 1    ondansetron (ZOFRAN-ODT) 8 MG TbDL Take 1 tablet (8 mg total) by mouth every 8 (eight) hours as needed (chemotherapy-induced nausea and vomiting). 40 tablet 5    predniSONE (DELTASONE) 50 MG Tab Take 2 tablets (100 mg total) by mouth As instructed (take 2 tablets (100mg ) by mouth daily on days 2-5 of each cycle of chemotherapy). 48 tablet 1     No current facility-administered medications for  this visit.       Review of Systems   Constitutional:  Positive for fatigue (improved). Negative for fever.   HENT:  Positive for sore throat.    Eyes:  Negative for visual disturbance.   Respiratory:  Negative for shortness of breath.    Cardiovascular:  Negative for chest pain.   Gastrointestinal:  Positive for nausea. Negative for abdominal pain.   Genitourinary:  Negative for dysuria.   Musculoskeletal:  Negative for back pain.   Skin:  Negative for rash.   Neurological:  Negative for headaches.   Hematological:  Negative for adenopathy.   Psychiatric/Behavioral:  The patient is not nervous/anxious.      ECOG Performance Status:   ECOG SCORE    1 - Restricted in strenuous activity-ambulatory and able to carry out work of a light nature         Objective:      Vitals:   Vitals:    01/11/23 1511   BP: 128/64   BP Location: Right arm   Patient Position: Sitting   BP Method: Large (Automatic)   Pulse: 84   Resp: 18   SpO2: 99%   Weight: 90.9 kg (200 lb 6.4 oz)     BMI: Body mass index is 29.59 kg/m².    Physical Exam  Vitals and nursing note reviewed.   Constitutional:       Appearance: He is well-developed.   HENT:      Head: Normocephalic and atraumatic.   Eyes:      Pupils: Pupils are equal, round, and reactive to light.   Cardiovascular:      Rate and Rhythm: Normal rate and regular rhythm.   Pulmonary:      Effort: Pulmonary effort is normal.      Breath sounds: Normal breath sounds.   Abdominal:      General: Bowel sounds are normal.      Palpations: Abdomen is soft.   Musculoskeletal:         General: Normal range of motion.      Cervical back: Normal range of motion and neck supple.   Lymphadenopathy:      Cervical: Cervical adenopathy (improved) present.      Upper Body:      Right upper body: Axillary adenopathy present.      Left upper body: Axillary adenopathy present.   Skin:     General: Skin is warm and dry.   Neurological:      Mental Status: He is alert and oriented to person, place, and time.    Psychiatric:         Behavior: Behavior normal.         Thought Content: Thought content normal.         Judgment: Judgment normal.       Laboratory Data:  Labs have been reviewed.    Lab Results   Component Value Date    WBC 2.29 (L) 01/11/2023    HGB 9.5 (L) 01/11/2023    HCT 30.2 (L) 01/11/2023    MCV 98 01/11/2023     01/11/2023           Imaging:    Echocardiogram (12/13/22):  The left ventricle is normal in size with concentric remodeling and normal systolic function.  The estimated ejection fraction is 65%.  Normal left ventricular diastolic function.  Normal right ventricular size with normal right ventricular systolic function.  Normal central venous pressure (3 mmHg).  The left ventricular global longitudinal strain is -17.6%.  There is moderate aortic valve stenosis.  Aortic valve area is 2.04 cm2; peak velocity is 3.17 m/s; mean gradient is 26 mmHg.      PET/CT scan (12/9/22): I have personally reviewed the images  Quality of the study: Adequate.     Reference values:     Mediastinal blood pool maximum SUV: 2.9     Normal liver background maximum SUV: 3.0     In the head and neck, numerous enlarged hypermetabolic lymph nodes within bilateral cervical periclavicular lesions.  For example, Conglomerate of right cervical and supraclavicular lymph nodes with SUV 14.  Left infraclavicular lymph node measuring 1.8 cm (image 95), with SUV max 10.  There is fluid and gas within the level 2 B measuring 2.1 cm, likely related to recent biopsy.     In the chest, numerous enlarged hypermetabolic mediastinal, hilar, and axilla lymph nodes.  For example subcarinal lymph node measuring 3.2 x 6.5 cm demonstrating SUV max of 9.8 (image 129).     In the abdomen and pelvis, there is physiologic tracer distribution within the abdominal organs and excretion into the genitourinary system.     Numerous enlarged hypermetabolic lymph nodes.  For example, conglomerate of lymph nodes within the anterior abdomen  demonstrating SUV max of 18 (fused image 205).  Posterior right pararenal space hypermetabolic node measuring 3.0 x 2.4 cm with SUV max of 16 (image 218).  Conglomerate of right pelvic sidewall nodes measuring 8.0 AP x 4.4 TV x 11.1 CC cm demonstrating SUV max of 19 (image 251).     The spleen has multifocal hypermetabolic uptake within SUV max of 13 and is enlarged at 19.0 cm in craniocaudal dimension.  There are multiple areas of hypoattenuation, better characterized on prior CTs.     In the bones, there are no hypermetabolic lesions worrisome for malignancy.     Additional CT findings     Right-sided chest port tip terminating in the cavoatrial junction.  Trace left pleural fluid, prior cholecystectomy.     Impression:     Multiple hypermetabolic lymph nodes involving both sides of the diaphragm including bulky right iliac conglomerate in keeping with known large B-cell lymphoma.     Splenomegaly and multifocal hypermetabolic foci within the spleen.     The Deauville Score is: 5     Reference values:     Mediastinal blood pool maximum SUV: 2.9     Normal liver maximum SUV: 3.0      CT chest/abdomen/pelvis (11/7/22): I have personally reviewed the images     Innumerable adenopathy above and below the diaphragm and throughout the neck, LEFT axilla and chest wall, retroperitoneum and mesentery as well as in the iliac chain, right greater than left.     Findings are suspicious for lymphoma/leukemia.     Multiple splenic lesions appearing to have increased in number and size since the prior exam.        CT soft tissue neck (11/7/22): I have personally reviewed the images  Extensive adenopathy within the right greater than left neck with also adenopathy within the mediastinum and left axilla.  The largest lymph node in the right posterior triangle/spinal accessory region demonstrates internal necrosis as does a right paratracheal lymph node within the mediastinum..  There is inflammatory change surrounding the necrotic  "lymph nodes and although an infectious/inflammatory process is not excluded, a neoplastic process including lymphoma to be considered.      Assessment:       1. Diffuse large B-cell lymphoma of lymph nodes of multiple regions    2. Anemia in neoplastic disease    3. Immunodeficiency due to drug therapy    4. Tumor lysis syndrome    5. Chemotherapy-induced nausea and vomiting    6. Type 2 diabetes mellitus with hyperglycemia, without long-term current use of insulin    7. Severe obesity (BMI 35.0-39.9) with comorbidity    8. Folate deficiency    9. Chemotherapy-induced neutropenia    10. Anemia due to antineoplastic chemotherapy    11. Mucositis due to chemotherapy           Plan:     Diffuse large B-cell lymphoma  - I have reviewed his chart  - CT scans (11/7/22) revealed extensive adenopathy.  - excisional biopsy (11/23/22) revealed diffuse large B-cell lymphoma, non-GCB subtype.  - No rearrangement of MYC or BCL2, so not a double-hit lymphoma. 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  - I and via oncology recommend R-CHOP x 6 cycles.  - The risks and benefits of chemotherapy were discussed, written information was given, and informed consent was obtained.  - hepatitis B core antibody/surface antigen and hepatitis C antibody were negative.    - he underwent port-a-cath placement on 12/7/22 with Dr. Velazquez.  - PET/CT scan (12/9/22) revealed "Multiple hypermetabolic lymph nodes involving both sides of the diaphragm including bulky right iliac conglomerate in keeping with known large B-cell lymphoma."  - I sent emla cream to his pharmacy  - I sent anti-emetics to his pharmacy.  - I sent prednisone to his pharmacy  - echocardiogram (12/13/22) revealed an ejection fraction of 65%  - he received a unit of blood on 12/15/22 for symptomatic anemia  - Labs have been reviewed. Hemoglobin is 9.5 g/dL. Absolute neutrophil count is 1.3 k/uL Okay to proceed with cycle #2 of R-CHOP, scheduled " for 1/12/23  - return to clinic in 3 weeks in preparation for cycle #3 of R-CHOP.    2. Anemia in neoplastic disease  - he received a unit of blood on 12/15/22 for symptomatic anemia  - Labs have been reviewed. Hemoglobin is 9.5 g/dL.  - continue to monitor    3. Tumor lysis syndrome  - uric acid was 3.2 mg/dL  - continue allopurinol     4. Chemotherapy-induced nausea/vomiting  - mild symptoms. Continue zofran as needed.    5. Type 2 diabetes  - last hemoglobin A1c (7/20/22) was 6.5%  - continue metformin  - will monitor closely as R-CHOP includes prednisone which can worsen hyperglycemia.  - defer management to primary care    6. Folate deficiency  - continue folic acid    7 chemotherapy-induced mucositis  - I had sent [employer]'s solution    8. Advance Care Planning   - will address at next visit    - return to clinic in 3 weeks in preparation for cycle #2 of R-CHOP.      Sandip Vickers M.D.  Hematology/Oncology  [facility] - [address], [address]  [place] [address]  Phone: [phone]  Fax: [phone]

## 2023-01-11 ENCOUNTER — LAB VISIT (OUTPATIENT)
Dept: LAB | Facility: HOSPITAL | Age: 58
End: 2023-01-11
Payer: COMMERCIAL

## 2023-01-11 ENCOUNTER — OFFICE VISIT (OUTPATIENT)
Dept: HEMATOLOGY/ONCOLOGY | Facility: CLINIC | Age: 58
End: 2023-01-11
Payer: COMMERCIAL

## 2023-01-11 VITALS
OXYGEN SATURATION: 99 % | HEART RATE: 84 BPM | RESPIRATION RATE: 18 BRPM | WEIGHT: 200.38 LBS | BODY MASS INDEX: 29.59 KG/M2 | DIASTOLIC BLOOD PRESSURE: 64 MMHG | SYSTOLIC BLOOD PRESSURE: 128 MMHG

## 2023-01-11 DIAGNOSIS — D70.1 CHEMOTHERAPY-INDUCED NEUTROPENIA: ICD-10-CM

## 2023-01-11 DIAGNOSIS — C83.38 DIFFUSE LARGE B-CELL LYMPHOMA OF LYMPH NODES OF MULTIPLE REGIONS: Primary | ICD-10-CM

## 2023-01-11 DIAGNOSIS — E66.01 SEVERE OBESITY (BMI 35.0-39.9) WITH COMORBIDITY: ICD-10-CM

## 2023-01-11 DIAGNOSIS — D64.81 ANEMIA DUE TO ANTINEOPLASTIC CHEMOTHERAPY: ICD-10-CM

## 2023-01-11 DIAGNOSIS — E11.65 TYPE 2 DIABETES MELLITUS WITH HYPERGLYCEMIA, WITHOUT LONG-TERM CURRENT USE OF INSULIN: ICD-10-CM

## 2023-01-11 DIAGNOSIS — D63.0 ANEMIA IN NEOPLASTIC DISEASE: ICD-10-CM

## 2023-01-11 DIAGNOSIS — T45.1X5A ANEMIA DUE TO ANTINEOPLASTIC CHEMOTHERAPY: ICD-10-CM

## 2023-01-11 DIAGNOSIS — Z79.899 IMMUNODEFICIENCY DUE TO DRUG THERAPY: ICD-10-CM

## 2023-01-11 DIAGNOSIS — E88.3 TUMOR LYSIS SYNDROME: ICD-10-CM

## 2023-01-11 DIAGNOSIS — C83.38 DIFFUSE LARGE B-CELL LYMPHOMA OF LYMPH NODES OF MULTIPLE REGIONS: ICD-10-CM

## 2023-01-11 DIAGNOSIS — D84.821 IMMUNODEFICIENCY DUE TO DRUG THERAPY: ICD-10-CM

## 2023-01-11 DIAGNOSIS — E53.8 FOLATE DEFICIENCY: ICD-10-CM

## 2023-01-11 DIAGNOSIS — R11.2 CHEMOTHERAPY-INDUCED NAUSEA AND VOMITING: ICD-10-CM

## 2023-01-11 DIAGNOSIS — D64.9 SYMPTOMATIC ANEMIA: ICD-10-CM

## 2023-01-11 DIAGNOSIS — T45.1X5A CHEMOTHERAPY-INDUCED NAUSEA AND VOMITING: ICD-10-CM

## 2023-01-11 DIAGNOSIS — T45.1X5A CHEMOTHERAPY-INDUCED NEUTROPENIA: ICD-10-CM

## 2023-01-11 DIAGNOSIS — K12.31 MUCOSITIS DUE TO CHEMOTHERAPY: ICD-10-CM

## 2023-01-11 LAB
ABO + RH BLD: NORMAL
ALBUMIN SERPL BCP-MCNC: 3.4 G/DL (ref 3.5–5.2)
ALP SERPL-CCNC: 73 U/L (ref 55–135)
ALT SERPL W/O P-5'-P-CCNC: 11 U/L (ref 10–44)
ANION GAP SERPL CALC-SCNC: 12 MMOL/L (ref 8–16)
ANISOCYTOSIS BLD QL SMEAR: SLIGHT
AST SERPL-CCNC: 14 U/L (ref 10–40)
BASOPHILS # BLD AUTO: 0.07 K/UL (ref 0–0.2)
BASOPHILS NFR BLD: 3.1 % (ref 0–1.9)
BILIRUB SERPL-MCNC: 0.5 MG/DL (ref 0.1–1)
BLD GP AB SCN CELLS X3 SERPL QL: NORMAL
BUN SERPL-MCNC: 10 MG/DL (ref 6–20)
CALCIUM SERPL-MCNC: 9.2 MG/DL (ref 8.7–10.5)
CHLORIDE SERPL-SCNC: 105 MMOL/L (ref 95–110)
CO2 SERPL-SCNC: 22 MMOL/L (ref 23–29)
CREAT SERPL-MCNC: 0.7 MG/DL (ref 0.5–1.4)
DIFFERENTIAL METHOD: ABNORMAL
EOSINOPHIL # BLD AUTO: 0 K/UL (ref 0–0.5)
EOSINOPHIL NFR BLD: 1.3 % (ref 0–8)
ERYTHROCYTE [DISTWIDTH] IN BLOOD BY AUTOMATED COUNT: 18.8 % (ref 11.5–14.5)
EST. GFR  (NO RACE VARIABLE): >60 ML/MIN/1.73 M^2
GLUCOSE SERPL-MCNC: 152 MG/DL (ref 70–110)
HCT VFR BLD AUTO: 30.2 % (ref 40–54)
HGB BLD-MCNC: 9.5 G/DL (ref 14–18)
HYPOCHROMIA BLD QL SMEAR: ABNORMAL
IMM GRANULOCYTES # BLD AUTO: 0.11 K/UL (ref 0–0.04)
IMM GRANULOCYTES NFR BLD AUTO: 4.8 % (ref 0–0.5)
LDH SERPL L TO P-CCNC: 198 U/L (ref 110–260)
LYMPHOCYTES # BLD AUTO: 0.4 K/UL (ref 1–4.8)
LYMPHOCYTES NFR BLD: 17 % (ref 18–48)
MCH RBC QN AUTO: 30.8 PG (ref 27–31)
MCHC RBC AUTO-ENTMCNC: 31.5 G/DL (ref 32–36)
MCV RBC AUTO: 98 FL (ref 82–98)
MONOCYTES # BLD AUTO: 0.4 K/UL (ref 0.3–1)
MONOCYTES NFR BLD: 17.5 % (ref 4–15)
NEUTROPHILS # BLD AUTO: 1.3 K/UL (ref 1.8–7.7)
NEUTROPHILS NFR BLD: 56.3 % (ref 38–73)
NRBC BLD-RTO: 0 /100 WBC
PLATELET # BLD AUTO: 210 K/UL (ref 150–450)
PLATELET BLD QL SMEAR: ABNORMAL
PMV BLD AUTO: 8.6 FL (ref 9.2–12.9)
POLYCHROMASIA BLD QL SMEAR: ABNORMAL
POTASSIUM SERPL-SCNC: 3.9 MMOL/L (ref 3.5–5.1)
PROT SERPL-MCNC: 7 G/DL (ref 6–8.4)
RBC # BLD AUTO: 3.08 M/UL (ref 4.6–6.2)
SODIUM SERPL-SCNC: 139 MMOL/L (ref 136–145)
URATE SERPL-MCNC: 3.2 MG/DL (ref 3.4–7)
WBC # BLD AUTO: 2.29 K/UL (ref 3.9–12.7)

## 2023-01-11 PROCEDURE — 3074F PR MOST RECENT SYSTOLIC BLOOD PRESSURE < 130 MM HG: ICD-10-PCS | Mod: CPTII,S$GLB,, | Performed by: INTERNAL MEDICINE

## 2023-01-11 PROCEDURE — 99999 PR PBB SHADOW E&M-EST. PATIENT-LVL IV: ICD-10-PCS | Mod: PBBFAC,,, | Performed by: INTERNAL MEDICINE

## 2023-01-11 PROCEDURE — 80053 COMPREHEN METABOLIC PANEL: CPT | Performed by: INTERNAL MEDICINE

## 2023-01-11 PROCEDURE — 99215 OFFICE O/P EST HI 40 MIN: CPT | Mod: S$GLB,,, | Performed by: INTERNAL MEDICINE

## 2023-01-11 PROCEDURE — 3008F BODY MASS INDEX DOCD: CPT | Mod: CPTII,S$GLB,, | Performed by: INTERNAL MEDICINE

## 2023-01-11 PROCEDURE — 1160F PR REVIEW ALL MEDS BY PRESCRIBER/CLIN PHARMACIST DOCUMENTED: ICD-10-PCS | Mod: CPTII,S$GLB,, | Performed by: INTERNAL MEDICINE

## 2023-01-11 PROCEDURE — 85025 COMPLETE CBC W/AUTO DIFF WBC: CPT | Performed by: INTERNAL MEDICINE

## 2023-01-11 PROCEDURE — 3078F PR MOST RECENT DIASTOLIC BLOOD PRESSURE < 80 MM HG: ICD-10-PCS | Mod: CPTII,S$GLB,, | Performed by: INTERNAL MEDICINE

## 2023-01-11 PROCEDURE — 1159F MED LIST DOCD IN RCRD: CPT | Mod: CPTII,S$GLB,, | Performed by: INTERNAL MEDICINE

## 2023-01-11 PROCEDURE — 83615 LACTATE (LD) (LDH) ENZYME: CPT | Performed by: INTERNAL MEDICINE

## 2023-01-11 PROCEDURE — 84550 ASSAY OF BLOOD/URIC ACID: CPT | Performed by: INTERNAL MEDICINE

## 2023-01-11 PROCEDURE — 1160F RVW MEDS BY RX/DR IN RCRD: CPT | Mod: CPTII,S$GLB,, | Performed by: INTERNAL MEDICINE

## 2023-01-11 PROCEDURE — 3074F SYST BP LT 130 MM HG: CPT | Mod: CPTII,S$GLB,, | Performed by: INTERNAL MEDICINE

## 2023-01-11 PROCEDURE — 3008F PR BODY MASS INDEX (BMI) DOCUMENTED: ICD-10-PCS | Mod: CPTII,S$GLB,, | Performed by: INTERNAL MEDICINE

## 2023-01-11 PROCEDURE — 1159F PR MEDICATION LIST DOCUMENTED IN MEDICAL RECORD: ICD-10-PCS | Mod: CPTII,S$GLB,, | Performed by: INTERNAL MEDICINE

## 2023-01-11 PROCEDURE — 36415 COLL VENOUS BLD VENIPUNCTURE: CPT | Performed by: INTERNAL MEDICINE

## 2023-01-11 PROCEDURE — 99999 PR PBB SHADOW E&M-EST. PATIENT-LVL IV: CPT | Mod: PBBFAC,,, | Performed by: INTERNAL MEDICINE

## 2023-01-11 PROCEDURE — 99215 PR OFFICE/OUTPT VISIT, EST, LEVL V, 40-54 MIN: ICD-10-PCS | Mod: S$GLB,,, | Performed by: INTERNAL MEDICINE

## 2023-01-11 PROCEDURE — 86900 BLOOD TYPING SEROLOGIC ABO: CPT | Performed by: INTERNAL MEDICINE

## 2023-01-11 PROCEDURE — 3078F DIAST BP <80 MM HG: CPT | Mod: CPTII,S$GLB,, | Performed by: INTERNAL MEDICINE

## 2023-01-11 RX ORDER — HEPARIN 100 UNIT/ML
500 SYRINGE INTRAVENOUS
Status: CANCELLED | OUTPATIENT
Start: 2023-01-12

## 2023-01-11 RX ORDER — ACETAMINOPHEN 325 MG/1
650 TABLET ORAL
Status: CANCELLED | OUTPATIENT
Start: 2023-01-12

## 2023-01-11 RX ORDER — SODIUM CHLORIDE 0.9 % (FLUSH) 0.9 %
10 SYRINGE (ML) INJECTION
Status: CANCELLED | OUTPATIENT
Start: 2023-01-12

## 2023-01-11 RX ORDER — FAMOTIDINE 10 MG/ML
20 INJECTION INTRAVENOUS
Status: CANCELLED | OUTPATIENT
Start: 2023-01-12

## 2023-01-11 RX ORDER — DOXORUBICIN HYDROCHLORIDE 2 MG/ML
50 INJECTION, SOLUTION INTRAVENOUS
Status: CANCELLED | OUTPATIENT
Start: 2023-01-12

## 2023-01-11 RX ORDER — MEPERIDINE HYDROCHLORIDE 50 MG/ML
25 INJECTION INTRAMUSCULAR; INTRAVENOUS; SUBCUTANEOUS
Status: CANCELLED
Start: 2023-01-12

## 2023-01-12 ENCOUNTER — INFUSION (OUTPATIENT)
Dept: INFUSION THERAPY | Facility: HOSPITAL | Age: 58
End: 2023-01-12
Attending: INTERNAL MEDICINE
Payer: COMMERCIAL

## 2023-01-12 VITALS
BODY MASS INDEX: 29.68 KG/M2 | HEART RATE: 69 BPM | DIASTOLIC BLOOD PRESSURE: 59 MMHG | OXYGEN SATURATION: 100 % | HEIGHT: 69 IN | RESPIRATION RATE: 17 BRPM | WEIGHT: 200.38 LBS | TEMPERATURE: 99 F | SYSTOLIC BLOOD PRESSURE: 127 MMHG

## 2023-01-12 DIAGNOSIS — C83.31 DIFFUSE LARGE B-CELL LYMPHOMA OF LYMPH NODES OF NECK: Primary | ICD-10-CM

## 2023-01-12 PROCEDURE — A4216 STERILE WATER/SALINE, 10 ML: HCPCS | Performed by: INTERNAL MEDICINE

## 2023-01-12 PROCEDURE — 96417 CHEMO IV INFUS EACH ADDL SEQ: CPT

## 2023-01-12 PROCEDURE — 96413 CHEMO IV INFUSION 1 HR: CPT

## 2023-01-12 PROCEDURE — 96367 TX/PROPH/DG ADDL SEQ IV INF: CPT

## 2023-01-12 PROCEDURE — 96411 CHEMO IV PUSH ADDL DRUG: CPT

## 2023-01-12 PROCEDURE — 25000003 PHARM REV CODE 250: Performed by: INTERNAL MEDICINE

## 2023-01-12 PROCEDURE — 63600175 PHARM REV CODE 636 W HCPCS: Performed by: INTERNAL MEDICINE

## 2023-01-12 PROCEDURE — 96415 CHEMO IV INFUSION ADDL HR: CPT

## 2023-01-12 PROCEDURE — 96375 TX/PRO/DX INJ NEW DRUG ADDON: CPT

## 2023-01-12 RX ORDER — HEPARIN 100 UNIT/ML
500 SYRINGE INTRAVENOUS
Status: DISCONTINUED | OUTPATIENT
Start: 2023-01-12 | End: 2023-01-12 | Stop reason: HOSPADM

## 2023-01-12 RX ORDER — FAMOTIDINE 10 MG/ML
20 INJECTION INTRAVENOUS
Status: COMPLETED | OUTPATIENT
Start: 2023-01-12 | End: 2023-01-12

## 2023-01-12 RX ORDER — DOXORUBICIN HYDROCHLORIDE 2 MG/ML
50 INJECTION, SOLUTION INTRAVENOUS
Status: COMPLETED | OUTPATIENT
Start: 2023-01-12 | End: 2023-01-12

## 2023-01-12 RX ORDER — MEPERIDINE HYDROCHLORIDE 50 MG/ML
25 INJECTION INTRAMUSCULAR; INTRAVENOUS; SUBCUTANEOUS
Status: DISCONTINUED | OUTPATIENT
Start: 2023-01-12 | End: 2023-01-12 | Stop reason: HOSPADM

## 2023-01-12 RX ORDER — SODIUM CHLORIDE 0.9 % (FLUSH) 0.9 %
10 SYRINGE (ML) INJECTION
Status: DISCONTINUED | OUTPATIENT
Start: 2023-01-12 | End: 2023-01-12 | Stop reason: HOSPADM

## 2023-01-12 RX ORDER — ACETAMINOPHEN 325 MG/1
650 TABLET ORAL
Status: COMPLETED | OUTPATIENT
Start: 2023-01-12 | End: 2023-01-12

## 2023-01-12 RX ADMIN — SODIUM CHLORIDE 800 MG: 0.9 INJECTION, SOLUTION INTRAVENOUS at 09:01

## 2023-01-12 RX ADMIN — HEPARIN 500 UNITS: 100 SYRINGE at 02:01

## 2023-01-12 RX ADMIN — SODIUM CHLORIDE: 0.9 INJECTION, SOLUTION INTRAVENOUS at 08:01

## 2023-01-12 RX ADMIN — DIPHENHYDRAMINE HYDROCHLORIDE 50 MG: 50 INJECTION INTRAMUSCULAR; INTRAVENOUS at 08:01

## 2023-01-12 RX ADMIN — FAMOTIDINE 20 MG: 10 INJECTION INTRAVENOUS at 08:01

## 2023-01-12 RX ADMIN — VINCRISTINE SULFATE 2 MG: 1 INJECTION, SOLUTION INTRAVENOUS at 12:01

## 2023-01-12 RX ADMIN — DEXAMETHASONE SODIUM PHOSPHATE 0.25 MG: 10 INJECTION, SOLUTION INTRAMUSCULAR; INTRAVENOUS at 12:01

## 2023-01-12 RX ADMIN — CYCLOPHOSPHAMIDE 1620 MG: 200 INJECTION, SOLUTION INTRAVENOUS at 01:01

## 2023-01-12 RX ADMIN — Medication 10 ML: at 02:01

## 2023-01-12 RX ADMIN — DOXORUBICIN HYDROCHLORIDE 108 MG: 2 INJECTION, SOLUTION INTRAVENOUS at 01:01

## 2023-01-12 RX ADMIN — ACETAMINOPHEN 650 MG: 325 TABLET ORAL at 08:01

## 2023-01-12 NOTE — NURSING
RCHOP given through PAC, noted for having positive blood return.  Dosage and BSA checked by two chemotherapy certified nurses.  Chemotherapeutic precautions in place throughout therapy. Pt tolerated it well, no adverse reactions noted.  PAC flushed with heparin and de-accessed per protocol. Next appointment scheduled.  AVS given. Discharged with no acute distress.

## 2023-01-13 ENCOUNTER — TELEPHONE (OUTPATIENT)
Dept: HEMATOLOGY/ONCOLOGY | Facility: CLINIC | Age: 58
End: 2023-01-13
Payer: COMMERCIAL

## 2023-01-13 NOTE — TELEPHONE ENCOUNTER
RE: Nutrition Call     Spoke with patient regarding referral to Hematology/Oncology Nutrition Services Call Outcome: Patient requesting to cancel referral due to feeling fine and eating well. Encouraged patient to call RD or ask infusion nurse or MD if he would like to follow up at another time. Patient voiced understanding. RD number provided.     Future Appointments   Date Time Provider Department Center   2/1/2023 10:40 AM APPOINTMENT LAB, TORI GALINDO [facility] LAB [facility]   2/1/2023 11:40 AM Sandip Vickers MD [facility] HEM ONC [facility]   2/2/2023  9:00 AM CHAIR 05 [facility] CHEMO [facility]   2/23/2023  9:00 AM CHAIR 04 [facility] CHEMO [facility]       Call time: 2 minutes

## 2023-01-17 ENCOUNTER — PATIENT MESSAGE (OUTPATIENT)
Dept: ADMINISTRATIVE | Facility: HOSPITAL | Age: 58
End: 2023-01-17
Payer: COMMERCIAL

## 2023-01-29 DIAGNOSIS — E11.9 TYPE 2 DIABETES MELLITUS WITHOUT COMPLICATION, WITHOUT LONG-TERM CURRENT USE OF INSULIN: ICD-10-CM

## 2023-01-31 DIAGNOSIS — E11.9 TYPE 2 DIABETES MELLITUS WITHOUT COMPLICATION, WITHOUT LONG-TERM CURRENT USE OF INSULIN: ICD-10-CM

## 2023-01-31 RX ORDER — METFORMIN HYDROCHLORIDE 1000 MG/1
1000 TABLET ORAL 2 TIMES DAILY WITH MEALS
Qty: 180 TABLET | Refills: 3 | OUTPATIENT
Start: 2023-01-31

## 2023-01-31 RX ORDER — METFORMIN HYDROCHLORIDE 1000 MG/1
1000 TABLET ORAL 2 TIMES DAILY WITH MEALS
Qty: 180 TABLET | Refills: 3 | Status: SHIPPED | OUTPATIENT
Start: 2023-01-31

## 2023-01-31 NOTE — PROGRESS NOTES
"PATIENT: Saud Mayers  MRN: [id number]  DATE: 2/1/2023    Diagnosis:   1. Diffuse large B-cell lymphoma of lymph nodes of multiple regions    2. Immunodeficiency due to drug therapy    3. Anemia in neoplastic disease    4. Tumor lysis syndrome    5. Chemotherapy-induced nausea and vomiting    6. Type 2 diabetes mellitus with hyperglycemia, without long-term current use of insulin    7. Severe obesity (BMI 35.0-39.9) with comorbidity    8. Folate deficiency    9. Chemotherapy-induced neutropenia    10. Anemia due to antineoplastic chemotherapy    11. Mucositis due to chemotherapy      Chief Complaint: Lymphoma    Oncologic History:      Oncologic History Diffuse large B-cell lymphoma      Oncologic Treatment R-CHOP (start date 12/21/22)      Pathology 11/23/22:  "LYMPH NODE", RIGHT CERVICAL, EXCISION:   -- DIFFUSE LARGE B-CELL LYMPHOMA, NON-GCB SUBTYPE (SEE COMMENT).     Flow cytometric analysis of right cervical lymph node detects a kappa light   chain restricted B lymphocyte population expressing CD19 and CD20, and CD5   (partial and dim).    CD10 is negative.  T lymphocytes are   immunophenotypically unremarkable.  Correlation with tissue morphology is   required. Flow differential: Lymphocytes 35.8%, Monocytes 0.0%, Granulocytes   4.4%, Blast  0.0%, Debris/nRBC 60.0%,  Viability 43.5%.   Immunohistochemical stains are performed with adequate c ontrols.  The large   lymphoid cells are positive for CD20, BCL6 (>30%), MUM1 (>30%), and BCL2   (>50%); negative for CD5, CD10, CD30, cyclin D1, ALK1 and EBV (VICKY in-situ   hybridization).  They display high proliferation index (Ki-67 90%).   CD3-positive small mature T-cells are seen.   Taken together, the overall findings consistent with diffuse large B-cell   lymphoma, non-GCB subtype.   1. Immunohistochemical stain:  The lymphoma cells are positive for MYC (>40%).   2. Fluorescence in-situ hybridization: The result is abnormal and indicates a   BCL6 rearrangement in " 91% of nuclei.  No rearrangement of MYC or BCL2 and no   fusion of MYC and IGH was observed.  Rearrangement of BCL6 has been   associated with several B-cell lymphomas, including follicular lymphoma,   diffuse large B-cell lymphoma, and high-grade B-cell lymphoma.  As no MYC   rearrangement and no fusion of MYC and IGH was observed, this makes unlikely   the possibility of high-grade B-cell lymphoma with MYC and BCL6 rearrangement.   Taken together, the overall findings are consistent with diffuse large B-cell   lymphoma, NOS (non-GCB subtype).  The lymphoma cells display MYC and BCL2   double expressor phenotype.       Flow cytometry - lymph node (11/23/22):  Flow cytometric analysis of lymph node detects a kappa light chain restricted   B lymphocyte population expressing CD19 and CD20, and CD5 (partial and dim).     CD10 is negative.  T lymphocytes are immunophenotypically unremarkable.   Correlation with tissue morphology is required.   Flow differential:  Lymphocytes 35.8%, Monocytes 0.0%, Granulocytes  4.4%,   Blast  0.0%, Debris/nRBC 60.0%,  Viability 43.5%.           Subjective:    History of Present Illness: Mr. Mayers is a 57 y.o. male who presents for evaluation and management of diffuse large B-cell lymphoma. I had seen him during a hospitalization in November 2022.    - he was admitted for lymphadenopathy/weight loss.  - he underwent excisional lymph node biopsy on 11/23/22. Pathology revealed diffuse large B-cell lymphoma, non-GCB type.  - he underwent port-a-cath placement on 12/7/22 with Dr. Velazquez.  - he underwent pet scan on 12/9/22.  - he underwent port-a-cath placement on 12/7/22 with Dr. Velazquez.  - he underwent pet scan on 12/9/22.    Interval history:  - he presents for a follow-up appointment for his diffuse large B-cell lymphoma.  - he is scheduled for cycle #3 of R-CHOP tomorrow  - today, he is doing well. He has gained weight since last visit.  - He denies chest pain, nausea, vomiting,  diarrhea, constipation.      Past medical, surgical, family, and social histories have been reviewed and updated below.    Past Medical History:   Past Medical History:   Diagnosis Date    Asthma     Diabetes mellitus     High cholesterol     Hypertension     ANAMIKA on CPAP     Testicular hypofunction        Past Surgical History:   Past Surgical History:   Procedure Laterality Date    CARPAL TUNNEL RELEASE Bilateral 2020    CHOLECYSTECTOMY  01/01/1990    COLONOSCOPY  01/01/2013    COLONOSCOPY N/A 9/2/2022    Procedure: COLONOSCOPY sutab;  Surgeon: Jeovany Cisse MD;  Location: [facility];  Service: Endoscopy;  Laterality: N/A;    ESOPHAGOGASTRODUODENOSCOPY N/A 9/2/2022    Procedure: EGD (ESOPHAGOGASTRODUODENOSCOPY);  Surgeon: Jeovany Cisse MD;  Location: [facility] ENDO;  Service: Endoscopy;  Laterality: N/A;    INSERTION OF TUNNELED CENTRAL VENOUS CATHETER (CVC) WITH SUBCUTANEOUS PORT N/A 12/7/2022    Procedure: [id number]-E-CATH;  Surgeon: Francisco Velazquez MD;  Location: [facility] OR;  Service: General;  Laterality: N/A;    ORTHOPEDIC SURGERY Bilateral 01/01/2007    feet plantar    ORTHOPEDIC SURGERY Right 01/01/2006    knee    ROTATOR CUFF REPAIR Left 01/01/1997    SURGICAL REMOVAL OF LYMPH NODE Right 11/23/2022    Procedure: EXCISION, LYMPH NODE;  Surgeon: Francisco Velazquez MD;  Location: [facility] OR;  Service: General;  Laterality: Right;   (right neck)       Family History:   Family History   Problem Relation Age of Onset    Heart attack Father        Social History:  reports that he quit smoking about 31 years ago. His smoking use included cigarettes. He has never used smokeless tobacco. He reports that he does not drink alcohol and does not use drugs.    Allergies:  Review of patient's allergies indicates:   Allergen Reactions    Gabapentin Hallucinations    Tizanidine Other (See Comments)     somnolence       Medications:  Current Outpatient Medications   Medication Sig Dispense Refill    acetaminophen (TYLENOL) 500  MG tablet Take 500 mg by mouth every 6 (six) hours as needed for Pain.      allopurinoL (ZYLOPRIM) 300 MG tablet Take 1 tablet (300 mg total) by mouth once daily. 30 tablet 11    atorvastatin (LIPITOR) 20 MG tablet Take 1 tablet (20 mg total) by mouth once daily. 90 tablet 3    cyanocobalamin (VITAMIN B-12) 100 MCG tablet Take 100 mcg by mouth once daily.      magic mouthwash diphen/antac/lidoc/nysta 10 mLs 3 (three) times daily as needed (chemotherapy-induced mucositis.). 300 mL 1    folic acid (FOLVITE) 1 MG tablet Take 1 tablet (1 mg total) by mouth once daily. 100 tablet 3    LIDOcaine-prilocaine (EMLA) cream Apply topically as needed. Apply to skin overlying port site 30 minutes before chemotherapy. 30 g 1    linaCLOtide (LINZESS) 72 mcg Cap capsule Take 1 capsule (72 mcg total) by mouth before breakfast. 90 capsule 3    losartan (COZAAR) 25 MG tablet Take 1 tablet (25 mg total) by mouth once daily. 90 tablet 3    metFORMIN (GLUCOPHAGE) 1000 MG tablet Take 1 tablet (1,000 mg total) by mouth 2 (two) times daily with meals. 180 tablet 3    metoprolol succinate (TOPROL-XL) 50 MG 24 hr tablet Take 1 tablet (50 mg total) by mouth once daily. 90 tablet 3    multivit-min-folic-vit K-lycop (ONE-A-DAY MEN'S 50 PLUS) 400- mcg Tab Take 1 tablet by mouth once daily.      ondansetron (ZOFRAN) 4 MG tablet Take 1 tablet (4 mg total) by mouth every 8 (eight) hours as needed for Nausea. 90 tablet 1    ondansetron (ZOFRAN-ODT) 8 MG TbDL Take 1 tablet (8 mg total) by mouth every 8 (eight) hours as needed (chemotherapy-induced nausea and vomiting). 40 tablet 5    predniSONE (DELTASONE) 50 MG Tab Take 2 tablets (100 mg total) by mouth As instructed (take 2 tablets (100mg ) by mouth daily on days 2-5 of each cycle of chemotherapy). 48 tablet 1     No current facility-administered medications for this visit.       Review of Systems   Constitutional:  Positive for fatigue (improved). Negative for fever.   HENT:  Positive for  sore throat.    Eyes:  Negative for visual disturbance.   Respiratory:  Negative for shortness of breath.    Cardiovascular:  Negative for chest pain.   Gastrointestinal:  Positive for nausea. Negative for abdominal pain.   Genitourinary:  Negative for dysuria.   Musculoskeletal:  Negative for back pain.   Skin:  Negative for rash.   Neurological:  Negative for headaches.   Hematological:  Negative for adenopathy.   Psychiatric/Behavioral:  The patient is not nervous/anxious.      ECOG Performance Status:   ECOG SCORE    1 - Restricted in strenuous activity-ambulatory and able to carry out work of a light nature         Objective:      Vitals:   Vitals:    02/01/23 1137   BP: (!) 152/66   BP Location: Right arm   Patient Position: Sitting   BP Method: Large (Automatic)   Pulse: 78   Resp: 18   SpO2: 100%   Weight: 102 kg (224 lb 13.9 oz)     BMI: Body mass index is 33.21 kg/m².    Physical Exam  Vitals and nursing note reviewed.   Constitutional:       Appearance: He is well-developed.   HENT:      Head: Normocephalic and atraumatic.   Eyes:      Pupils: Pupils are equal, round, and reactive to light.   Cardiovascular:      Rate and Rhythm: Normal rate and regular rhythm.   Pulmonary:      Effort: Pulmonary effort is normal.      Breath sounds: Normal breath sounds.   Abdominal:      General: Bowel sounds are normal.      Palpations: Abdomen is soft.   Musculoskeletal:         General: Normal range of motion.      Cervical back: Normal range of motion and neck supple.   Lymphadenopathy:      Cervical: Cervical adenopathy (improved) present.      Upper Body:      Right upper body: Axillary adenopathy present.      Left upper body: Axillary adenopathy present.   Skin:     General: Skin is warm and dry.   Neurological:      Mental Status: He is alert and oriented to person, place, and time.   Psychiatric:         Behavior: Behavior normal.         Thought Content: Thought content normal.         Judgment: Judgment  normal.       Laboratory Data:  Labs have been reviewed.    Lab Results   Component Value Date    WBC 1.99 (LL) 02/01/2023    HGB 9.7 (L) 02/01/2023    HCT 29.8 (L) 02/01/2023     (H) 02/01/2023     02/01/2023           Imaging:    Echocardiogram (12/13/22):  The left ventricle is normal in size with concentric remodeling and normal systolic function.  The estimated ejection fraction is 65%.  Normal left ventricular diastolic function.  Normal right ventricular size with normal right ventricular systolic function.  Normal central venous pressure (3 mmHg).  The left ventricular global longitudinal strain is -17.6%.  There is moderate aortic valve stenosis.  Aortic valve area is 2.04 cm2; peak velocity is 3.17 m/s; mean gradient is 26 mmHg.      PET/CT scan (12/9/22): I have personally reviewed the images  Quality of the study: Adequate.     Reference values:     Mediastinal blood pool maximum SUV: 2.9     Normal liver background maximum SUV: 3.0     In the head and neck, numerous enlarged hypermetabolic lymph nodes within bilateral cervical periclavicular lesions.  For example, Conglomerate of right cervical and supraclavicular lymph nodes with SUV 14.  Left infraclavicular lymph node measuring 1.8 cm (image 95), with SUV max 10.  There is fluid and gas within the level 2 B measuring 2.1 cm, likely related to recent biopsy.     In the chest, numerous enlarged hypermetabolic mediastinal, hilar, and axilla lymph nodes.  For example subcarinal lymph node measuring 3.2 x 6.5 cm demonstrating SUV max of 9.8 (image 129).     In the abdomen and pelvis, there is physiologic tracer distribution within the abdominal organs and excretion into the genitourinary system.     Numerous enlarged hypermetabolic lymph nodes.  For example, conglomerate of lymph nodes within the anterior abdomen demonstrating SUV max of 18 (fused image 205).  Posterior right pararenal space hypermetabolic node measuring 3.0 x 2.4 cm with  SUV max of 16 (image 218).  Conglomerate of right pelvic sidewall nodes measuring 8.0 AP x 4.4 TV x 11.1 CC cm demonstrating SUV max of 19 (image 251).     The spleen has multifocal hypermetabolic uptake within SUV max of 13 and is enlarged at 19.0 cm in craniocaudal dimension.  There are multiple areas of hypoattenuation, better characterized on prior CTs.     In the bones, there are no hypermetabolic lesions worrisome for malignancy.     Additional CT findings     Right-sided chest port tip terminating in the cavoatrial junction.  Trace left pleural fluid, prior cholecystectomy.     Impression:     Multiple hypermetabolic lymph nodes involving both sides of the diaphragm including bulky right iliac conglomerate in keeping with known large B-cell lymphoma.     Splenomegaly and multifocal hypermetabolic foci within the spleen.     The Deauville Score is: 5     Reference values:     Mediastinal blood pool maximum SUV: 2.9     Normal liver maximum SUV: 3.0      CT chest/abdomen/pelvis (11/7/22): I have personally reviewed the images     Innumerable adenopathy above and below the diaphragm and throughout the neck, LEFT axilla and chest wall, retroperitoneum and mesentery as well as in the iliac chain, right greater than left.     Findings are suspicious for lymphoma/leukemia.     Multiple splenic lesions appearing to have increased in number and size since the prior exam.        CT soft tissue neck (11/7/22): I have personally reviewed the images  Extensive adenopathy within the right greater than left neck with also adenopathy within the mediastinum and left axilla.  The largest lymph node in the right posterior triangle/spinal accessory region demonstrates internal necrosis as does a right paratracheal lymph node within the mediastinum..  There is inflammatory change surrounding the necrotic lymph nodes and although an infectious/inflammatory process is not excluded, a neoplastic process including lymphoma to be  "considered.      Assessment:       1. Diffuse large B-cell lymphoma of lymph nodes of multiple regions    2. Immunodeficiency due to drug therapy    3. Anemia in neoplastic disease    4. Tumor lysis syndrome    5. Chemotherapy-induced nausea and vomiting    6. Type 2 diabetes mellitus with hyperglycemia, without long-term current use of insulin    7. Severe obesity (BMI 35.0-39.9) with comorbidity    8. Folate deficiency    9. Chemotherapy-induced neutropenia    10. Anemia due to antineoplastic chemotherapy    11. Mucositis due to chemotherapy           Plan:     Diffuse large B-cell lymphoma  - I have reviewed his chart  - CT scans (11/7/22) revealed extensive adenopathy.  - excisional biopsy (11/23/22) revealed diffuse large B-cell lymphoma, non-GCB subtype.  - No rearrangement of MYC or BCL2, so not a double-hit lymphoma. 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  - I and via oncology recommend R-CHOP x 6 cycles.  - The risks and benefits of chemotherapy were discussed, written information was given, and informed consent was obtained.  - hepatitis B core antibody/surface antigen and hepatitis C antibody were negative.    - he underwent port-a-cath placement on 12/7/22 with Dr. Velazquez.  - PET/CT scan (12/9/22) revealed "Multiple hypermetabolic lymph nodes involving both sides of the diaphragm including bulky right iliac conglomerate in keeping with known large B-cell lymphoma."  - I sent emla cream to his pharmacy  - I sent anti-emetics to his pharmacy.  - I sent prednisone to his pharmacy  - echocardiogram (12/13/22) revealed an ejection fraction of 65%  - he received a unit of blood on 12/15/22 for symptomatic anemia  - Labs have been reviewed. Hemoglobin is 9.7 g/dL. Absolute neutrophil count is decreased at 700 cells/uL. We will delay cycle #3 by one week.  - return to clinic in 4 weeks in preparation for cycle #4 of R-CHOP. Repeat pet scan before next visit.    2. Anemia in " neoplastic disease / chemotherapy-induced neutropenia.  - he received a unit of blood on 12/15/22 for symptomatic anemia  - Labs have been reviewed. Hemoglobin is 9.7 g/dL.  - continue to monitor    3. Tumor lysis syndrome  - uric acid was 3.4 mg/dL  - continue allopurinol     4. Chemotherapy-induced nausea/vomiting  - mild symptoms. Continue zofran as needed.    5. Type 2 diabetes  - last hemoglobin A1c (7/20/22) was 6.5%  - continue metformin  - will monitor closely as R-CHOP includes prednisone which can worsen hyperglycemia.  - defer management to primary care    6. Folate deficiency  - continue folic acid    7 chemotherapy-induced mucositis  - stable. Continue Gerardo's solution    - return to clinic in 4 weeks in preparation for cycle #4 of R-CHOP. Repeat pet scan before next visit.      Sandip Vickers M.D.  Hematology/Oncology  [facility] - [address], [address]  [place] [address]  Phone: [phone]  Fax: [phone]

## 2023-02-01 ENCOUNTER — DOCUMENTATION ONLY (OUTPATIENT)
Dept: HEMATOLOGY/ONCOLOGY | Facility: CLINIC | Age: 58
End: 2023-02-01
Payer: COMMERCIAL

## 2023-02-01 ENCOUNTER — OFFICE VISIT (OUTPATIENT)
Dept: HEMATOLOGY/ONCOLOGY | Facility: CLINIC | Age: 58
End: 2023-02-01
Payer: COMMERCIAL

## 2023-02-01 ENCOUNTER — LAB VISIT (OUTPATIENT)
Dept: LAB | Facility: HOSPITAL | Age: 58
End: 2023-02-01
Attending: INTERNAL MEDICINE
Payer: COMMERCIAL

## 2023-02-01 VITALS
RESPIRATION RATE: 18 BRPM | HEART RATE: 78 BPM | WEIGHT: 224.88 LBS | OXYGEN SATURATION: 100 % | DIASTOLIC BLOOD PRESSURE: 66 MMHG | SYSTOLIC BLOOD PRESSURE: 152 MMHG | BODY MASS INDEX: 33.21 KG/M2

## 2023-02-01 DIAGNOSIS — C83.38 DIFFUSE LARGE B-CELL LYMPHOMA OF LYMPH NODES OF MULTIPLE REGIONS: Primary | ICD-10-CM

## 2023-02-01 DIAGNOSIS — E11.65 TYPE 2 DIABETES MELLITUS WITH HYPERGLYCEMIA, WITHOUT LONG-TERM CURRENT USE OF INSULIN: ICD-10-CM

## 2023-02-01 DIAGNOSIS — D84.821 IMMUNODEFICIENCY DUE TO DRUG THERAPY: ICD-10-CM

## 2023-02-01 DIAGNOSIS — T45.1X5A ANEMIA DUE TO ANTINEOPLASTIC CHEMOTHERAPY: ICD-10-CM

## 2023-02-01 DIAGNOSIS — D63.0 ANEMIA IN NEOPLASTIC DISEASE: ICD-10-CM

## 2023-02-01 DIAGNOSIS — Z79.899 IMMUNODEFICIENCY DUE TO DRUG THERAPY: ICD-10-CM

## 2023-02-01 DIAGNOSIS — E11.9 TYPE 2 DIABETES MELLITUS WITHOUT COMPLICATION: ICD-10-CM

## 2023-02-01 DIAGNOSIS — T45.1X5A CHEMOTHERAPY-INDUCED NEUTROPENIA: ICD-10-CM

## 2023-02-01 DIAGNOSIS — C83.38 DIFFUSE LARGE B-CELL LYMPHOMA OF LYMPH NODES OF MULTIPLE REGIONS: ICD-10-CM

## 2023-02-01 DIAGNOSIS — K12.31 MUCOSITIS DUE TO CHEMOTHERAPY: ICD-10-CM

## 2023-02-01 DIAGNOSIS — E66.01 SEVERE OBESITY (BMI 35.0-39.9) WITH COMORBIDITY: ICD-10-CM

## 2023-02-01 DIAGNOSIS — D64.81 ANEMIA DUE TO ANTINEOPLASTIC CHEMOTHERAPY: ICD-10-CM

## 2023-02-01 DIAGNOSIS — T45.1X5A CHEMOTHERAPY-INDUCED NAUSEA AND VOMITING: ICD-10-CM

## 2023-02-01 DIAGNOSIS — E53.8 FOLATE DEFICIENCY: ICD-10-CM

## 2023-02-01 DIAGNOSIS — R11.2 CHEMOTHERAPY-INDUCED NAUSEA AND VOMITING: ICD-10-CM

## 2023-02-01 DIAGNOSIS — E88.3 TUMOR LYSIS SYNDROME: ICD-10-CM

## 2023-02-01 DIAGNOSIS — D70.1 CHEMOTHERAPY-INDUCED NEUTROPENIA: ICD-10-CM

## 2023-02-01 LAB
ALBUMIN SERPL BCP-MCNC: 3.7 G/DL (ref 3.5–5.2)
ALP SERPL-CCNC: 75 U/L (ref 55–135)
ALT SERPL W/O P-5'-P-CCNC: 26 U/L (ref 10–44)
ANION GAP SERPL CALC-SCNC: 8 MMOL/L (ref 8–16)
ANISOCYTOSIS BLD QL SMEAR: SLIGHT
AST SERPL-CCNC: 20 U/L (ref 10–40)
BASOPHILS # BLD AUTO: ABNORMAL K/UL (ref 0–0.2)
BASOPHILS NFR BLD: 0 % (ref 0–1.9)
BILIRUB SERPL-MCNC: 0.5 MG/DL (ref 0.1–1)
BUN SERPL-MCNC: 12 MG/DL (ref 6–20)
CALCIUM SERPL-MCNC: 8.9 MG/DL (ref 8.7–10.5)
CHLORIDE SERPL-SCNC: 106 MMOL/L (ref 95–110)
CO2 SERPL-SCNC: 24 MMOL/L (ref 23–29)
CREAT SERPL-MCNC: 0.7 MG/DL (ref 0.5–1.4)
DIFFERENTIAL METHOD: ABNORMAL
EOSINOPHIL # BLD AUTO: ABNORMAL K/UL (ref 0–0.5)
EOSINOPHIL NFR BLD: 0 % (ref 0–8)
ERYTHROCYTE [DISTWIDTH] IN BLOOD BY AUTOMATED COUNT: 18.7 % (ref 11.5–14.5)
EST. GFR  (NO RACE VARIABLE): >60 ML/MIN/1.73 M^2
GLUCOSE SERPL-MCNC: 213 MG/DL (ref 70–110)
HCT VFR BLD AUTO: 29.8 % (ref 40–54)
HGB BLD-MCNC: 9.7 G/DL (ref 14–18)
HYPOCHROMIA BLD QL SMEAR: ABNORMAL
IMM GRANULOCYTES # BLD AUTO: ABNORMAL K/UL (ref 0–0.04)
IMM GRANULOCYTES NFR BLD AUTO: ABNORMAL % (ref 0–0.5)
LDH SERPL L TO P-CCNC: 210 U/L (ref 110–260)
LYMPHOCYTES # BLD AUTO: ABNORMAL K/UL (ref 1–4.8)
LYMPHOCYTES NFR BLD: 60 % (ref 18–48)
MCH RBC QN AUTO: 32.8 PG (ref 27–31)
MCHC RBC AUTO-ENTMCNC: 32.6 G/DL (ref 32–36)
MCV RBC AUTO: 101 FL (ref 82–98)
MONOCYTES # BLD AUTO: ABNORMAL K/UL (ref 0.3–1)
MONOCYTES NFR BLD: 4 % (ref 4–15)
NEUTROPHILS NFR BLD: 34 % (ref 38–73)
NEUTS BAND NFR BLD MANUAL: 2 %
NRBC BLD-RTO: 0 /100 WBC
PLATELET # BLD AUTO: 185 K/UL (ref 150–450)
PLATELET BLD QL SMEAR: ABNORMAL
PMV BLD AUTO: 8.7 FL (ref 9.2–12.9)
POIKILOCYTOSIS BLD QL SMEAR: SLIGHT
POLYCHROMASIA BLD QL SMEAR: ABNORMAL
POTASSIUM SERPL-SCNC: 3.9 MMOL/L (ref 3.5–5.1)
PROT SERPL-MCNC: 6.5 G/DL (ref 6–8.4)
RBC # BLD AUTO: 2.96 M/UL (ref 4.6–6.2)
SODIUM SERPL-SCNC: 138 MMOL/L (ref 136–145)
URATE SERPL-MCNC: 3.4 MG/DL (ref 3.4–7)
WBC # BLD AUTO: 1.99 K/UL (ref 3.9–12.7)

## 2023-02-01 PROCEDURE — 99999 PR PBB SHADOW E&M-EST. PATIENT-LVL IV: CPT | Mod: PBBFAC,,, | Performed by: INTERNAL MEDICINE

## 2023-02-01 PROCEDURE — 3078F PR MOST RECENT DIASTOLIC BLOOD PRESSURE < 80 MM HG: ICD-10-PCS | Mod: CPTII,S$GLB,, | Performed by: INTERNAL MEDICINE

## 2023-02-01 PROCEDURE — 3008F PR BODY MASS INDEX (BMI) DOCUMENTED: ICD-10-PCS | Mod: CPTII,S$GLB,, | Performed by: INTERNAL MEDICINE

## 2023-02-01 PROCEDURE — 99215 PR OFFICE/OUTPT VISIT, EST, LEVL V, 40-54 MIN: ICD-10-PCS | Mod: S$GLB,,, | Performed by: INTERNAL MEDICINE

## 2023-02-01 PROCEDURE — 3077F SYST BP >= 140 MM HG: CPT | Mod: CPTII,S$GLB,, | Performed by: INTERNAL MEDICINE

## 2023-02-01 PROCEDURE — 83615 LACTATE (LD) (LDH) ENZYME: CPT | Performed by: INTERNAL MEDICINE

## 2023-02-01 PROCEDURE — 3008F BODY MASS INDEX DOCD: CPT | Mod: CPTII,S$GLB,, | Performed by: INTERNAL MEDICINE

## 2023-02-01 PROCEDURE — 1160F RVW MEDS BY RX/DR IN RCRD: CPT | Mod: CPTII,S$GLB,, | Performed by: INTERNAL MEDICINE

## 2023-02-01 PROCEDURE — 84550 ASSAY OF BLOOD/URIC ACID: CPT | Performed by: INTERNAL MEDICINE

## 2023-02-01 PROCEDURE — 3077F PR MOST RECENT SYSTOLIC BLOOD PRESSURE >= 140 MM HG: ICD-10-PCS | Mod: CPTII,S$GLB,, | Performed by: INTERNAL MEDICINE

## 2023-02-01 PROCEDURE — 80053 COMPREHEN METABOLIC PANEL: CPT | Performed by: INTERNAL MEDICINE

## 2023-02-01 PROCEDURE — 85027 COMPLETE CBC AUTOMATED: CPT | Performed by: INTERNAL MEDICINE

## 2023-02-01 PROCEDURE — 99215 OFFICE O/P EST HI 40 MIN: CPT | Mod: S$GLB,,, | Performed by: INTERNAL MEDICINE

## 2023-02-01 PROCEDURE — 1160F PR REVIEW ALL MEDS BY PRESCRIBER/CLIN PHARMACIST DOCUMENTED: ICD-10-PCS | Mod: CPTII,S$GLB,, | Performed by: INTERNAL MEDICINE

## 2023-02-01 PROCEDURE — 3078F DIAST BP <80 MM HG: CPT | Mod: CPTII,S$GLB,, | Performed by: INTERNAL MEDICINE

## 2023-02-01 PROCEDURE — 85007 BL SMEAR W/DIFF WBC COUNT: CPT | Performed by: INTERNAL MEDICINE

## 2023-02-01 PROCEDURE — 1159F MED LIST DOCD IN RCRD: CPT | Mod: CPTII,S$GLB,, | Performed by: INTERNAL MEDICINE

## 2023-02-01 PROCEDURE — 36415 COLL VENOUS BLD VENIPUNCTURE: CPT | Performed by: INTERNAL MEDICINE

## 2023-02-01 PROCEDURE — 1159F PR MEDICATION LIST DOCUMENTED IN MEDICAL RECORD: ICD-10-PCS | Mod: CPTII,S$GLB,, | Performed by: INTERNAL MEDICINE

## 2023-02-01 PROCEDURE — 99999 PR PBB SHADOW E&M-EST. PATIENT-LVL IV: ICD-10-PCS | Mod: PBBFAC,,, | Performed by: INTERNAL MEDICINE

## 2023-02-08 ENCOUNTER — LAB VISIT (OUTPATIENT)
Dept: LAB | Facility: HOSPITAL | Age: 58
End: 2023-02-08
Attending: STUDENT IN AN ORGANIZED HEALTH CARE EDUCATION/TRAINING PROGRAM
Payer: COMMERCIAL

## 2023-02-08 DIAGNOSIS — C83.38 DIFFUSE LARGE B-CELL LYMPHOMA OF LYMPH NODES OF MULTIPLE REGIONS: ICD-10-CM

## 2023-02-08 LAB
ALBUMIN SERPL BCP-MCNC: 4.2 G/DL (ref 3.5–5.2)
ALP SERPL-CCNC: 83 U/L (ref 38–126)
ALT SERPL W/O P-5'-P-CCNC: 25 U/L (ref 10–44)
ANION GAP SERPL CALC-SCNC: 5 MMOL/L (ref 8–16)
AST SERPL-CCNC: 26 U/L (ref 15–46)
BASOPHILS # BLD AUTO: 0.04 K/UL (ref 0–0.2)
BASOPHILS NFR BLD: 1 % (ref 0–1.9)
BILIRUB SERPL-MCNC: 0.5 MG/DL (ref 0.1–1)
CALCIUM SERPL-MCNC: 8.9 MG/DL (ref 8.7–10.5)
CHLORIDE SERPL-SCNC: 105 MMOL/L (ref 95–110)
CO2 SERPL-SCNC: 28 MMOL/L (ref 23–29)
CREAT SERPL-MCNC: 0.66 MG/DL (ref 0.5–1.4)
DIFFERENTIAL METHOD: ABNORMAL
EOSINOPHIL # BLD AUTO: 0 K/UL (ref 0–0.5)
EOSINOPHIL NFR BLD: 0.8 % (ref 0–8)
ERYTHROCYTE [DISTWIDTH] IN BLOOD BY AUTOMATED COUNT: 16.7 % (ref 11.5–14.5)
EST. GFR  (NO RACE VARIABLE): >60 ML/MIN/1.73 M^2
GLUCOSE SERPL-MCNC: 275 MG/DL (ref 70–110)
HCT VFR BLD AUTO: 32.7 % (ref 40–54)
HGB BLD-MCNC: 10.6 G/DL (ref 14–18)
IMM GRANULOCYTES # BLD AUTO: 0.05 K/UL (ref 0–0.04)
IMM GRANULOCYTES NFR BLD AUTO: 1.3 % (ref 0–0.5)
LDH SERPL L TO P-CCNC: 231 U/L (ref 110–260)
LYMPHOCYTES # BLD AUTO: 0.3 K/UL (ref 1–4.8)
LYMPHOCYTES NFR BLD: 8.5 % (ref 18–48)
MCH RBC QN AUTO: 32.4 PG (ref 27–31)
MCHC RBC AUTO-ENTMCNC: 32.4 G/DL (ref 32–36)
MCV RBC AUTO: 100 FL (ref 82–98)
MONOCYTES # BLD AUTO: 0.6 K/UL (ref 0.3–1)
MONOCYTES NFR BLD: 14.7 % (ref 4–15)
NEUTROPHILS # BLD AUTO: 2.9 K/UL (ref 1.8–7.7)
NEUTROPHILS NFR BLD: 73.7 % (ref 38–73)
NRBC BLD-RTO: 0 /100 WBC
PLATELET # BLD AUTO: 144 K/UL (ref 150–450)
PMV BLD AUTO: 8.9 FL (ref 9.2–12.9)
POTASSIUM SERPL-SCNC: 4.3 MMOL/L (ref 3.5–5.1)
PROT SERPL-MCNC: 6.9 G/DL (ref 6–8.4)
RBC # BLD AUTO: 3.27 M/UL (ref 4.6–6.2)
SODIUM SERPL-SCNC: 138 MMOL/L (ref 136–145)
URATE SERPL-MCNC: 3.3 MG/DL (ref 3.4–7)
UUN UR-MCNC: 16 MG/DL (ref 2–20)
WBC # BLD AUTO: 3.89 K/UL (ref 3.9–12.7)

## 2023-02-08 PROCEDURE — 36415 COLL VENOUS BLD VENIPUNCTURE: CPT | Mod: PO | Performed by: INTERNAL MEDICINE

## 2023-02-08 PROCEDURE — 83615 LACTATE (LD) (LDH) ENZYME: CPT | Performed by: INTERNAL MEDICINE

## 2023-02-08 PROCEDURE — 80053 COMPREHEN METABOLIC PANEL: CPT | Mod: PO | Performed by: INTERNAL MEDICINE

## 2023-02-08 PROCEDURE — 85025 COMPLETE CBC W/AUTO DIFF WBC: CPT | Mod: PO | Performed by: INTERNAL MEDICINE

## 2023-02-08 PROCEDURE — 84550 ASSAY OF BLOOD/URIC ACID: CPT | Mod: PO | Performed by: INTERNAL MEDICINE

## 2023-02-08 RX ORDER — HEPARIN 100 UNIT/ML
500 SYRINGE INTRAVENOUS
Status: CANCELLED | OUTPATIENT
Start: 2023-02-09

## 2023-02-08 RX ORDER — DOXORUBICIN HYDROCHLORIDE 2 MG/ML
50 INJECTION, SOLUTION INTRAVENOUS
Status: CANCELLED | OUTPATIENT
Start: 2023-02-09

## 2023-02-08 RX ORDER — FAMOTIDINE 10 MG/ML
20 INJECTION INTRAVENOUS
Status: CANCELLED | OUTPATIENT
Start: 2023-02-09

## 2023-02-08 RX ORDER — MEPERIDINE HYDROCHLORIDE 50 MG/ML
25 INJECTION INTRAMUSCULAR; INTRAVENOUS; SUBCUTANEOUS
Status: CANCELLED
Start: 2023-02-09

## 2023-02-08 RX ORDER — ACETAMINOPHEN 325 MG/1
650 TABLET ORAL
Status: CANCELLED | OUTPATIENT
Start: 2023-02-09

## 2023-02-08 RX ORDER — SODIUM CHLORIDE 0.9 % (FLUSH) 0.9 %
10 SYRINGE (ML) INJECTION
Status: CANCELLED | OUTPATIENT
Start: 2023-02-09

## 2023-02-08 NOTE — PROGRESS NOTES
Labs have been reviewed. Okay to proceed with chem on 2/9/23.    Sandip Vickers M.D.  Hematology/Oncology  [facility] - [address], [address]  [place] [address]  Phone: [phone]  Fax: [phone]

## 2023-02-09 ENCOUNTER — INFUSION (OUTPATIENT)
Dept: INFUSION THERAPY | Facility: HOSPITAL | Age: 58
End: 2023-02-09
Attending: INTERNAL MEDICINE
Payer: COMMERCIAL

## 2023-02-09 VITALS
DIASTOLIC BLOOD PRESSURE: 66 MMHG | OXYGEN SATURATION: 100 % | TEMPERATURE: 98 F | BODY MASS INDEX: 32.85 KG/M2 | WEIGHT: 221.81 LBS | HEIGHT: 69 IN | RESPIRATION RATE: 18 BRPM | HEART RATE: 73 BPM | SYSTOLIC BLOOD PRESSURE: 146 MMHG

## 2023-02-09 DIAGNOSIS — C83.31 DIFFUSE LARGE B-CELL LYMPHOMA OF LYMPH NODES OF NECK: Primary | ICD-10-CM

## 2023-02-09 DIAGNOSIS — L29.9 PRURITUS: Primary | ICD-10-CM

## 2023-02-09 PROCEDURE — 63600175 PHARM REV CODE 636 W HCPCS: Mod: JG | Performed by: INTERNAL MEDICINE

## 2023-02-09 PROCEDURE — 25000003 PHARM REV CODE 250: Performed by: INTERNAL MEDICINE

## 2023-02-09 PROCEDURE — 96377 APPLICATON ON-BODY INJECTOR: CPT

## 2023-02-09 PROCEDURE — 96367 TX/PROPH/DG ADDL SEQ IV INF: CPT

## 2023-02-09 PROCEDURE — 96413 CHEMO IV INFUSION 1 HR: CPT

## 2023-02-09 PROCEDURE — 96401 CHEMO ANTI-NEOPL SQ/IM: CPT

## 2023-02-09 PROCEDURE — 96411 CHEMO IV PUSH ADDL DRUG: CPT

## 2023-02-09 PROCEDURE — A4216 STERILE WATER/SALINE, 10 ML: HCPCS | Performed by: INTERNAL MEDICINE

## 2023-02-09 PROCEDURE — 96375 TX/PRO/DX INJ NEW DRUG ADDON: CPT

## 2023-02-09 RX ORDER — HYDROXYZINE HYDROCHLORIDE 10 MG/1
10 TABLET, FILM COATED ORAL 3 TIMES DAILY PRN
Qty: 30 TABLET | Refills: 1 | Status: ON HOLD | OUTPATIENT
Start: 2023-02-09 | End: 2023-05-08 | Stop reason: HOSPADM

## 2023-02-09 RX ORDER — SODIUM CHLORIDE 0.9 % (FLUSH) 0.9 %
10 SYRINGE (ML) INJECTION
Status: DISCONTINUED | OUTPATIENT
Start: 2023-02-09 | End: 2023-02-09 | Stop reason: HOSPADM

## 2023-02-09 RX ORDER — FAMOTIDINE 10 MG/ML
20 INJECTION INTRAVENOUS
Status: COMPLETED | OUTPATIENT
Start: 2023-02-09 | End: 2023-02-09

## 2023-02-09 RX ORDER — MEPERIDINE HYDROCHLORIDE 50 MG/ML
25 INJECTION INTRAMUSCULAR; INTRAVENOUS; SUBCUTANEOUS
Status: DISCONTINUED | OUTPATIENT
Start: 2023-02-09 | End: 2023-02-09 | Stop reason: HOSPADM

## 2023-02-09 RX ORDER — ACETAMINOPHEN 325 MG/1
650 TABLET ORAL
Status: COMPLETED | OUTPATIENT
Start: 2023-02-09 | End: 2023-02-09

## 2023-02-09 RX ORDER — HEPARIN 100 UNIT/ML
500 SYRINGE INTRAVENOUS
Status: DISCONTINUED | OUTPATIENT
Start: 2023-02-09 | End: 2023-02-09 | Stop reason: HOSPADM

## 2023-02-09 RX ORDER — DOXORUBICIN HYDROCHLORIDE 2 MG/ML
50 INJECTION, SOLUTION INTRAVENOUS
Status: COMPLETED | OUTPATIENT
Start: 2023-02-09 | End: 2023-02-09

## 2023-02-09 RX ADMIN — Medication 10 ML: at 11:02

## 2023-02-09 RX ADMIN — CYCLOPHOSPHAMIDE 1620 MG: 200 INJECTION, SOLUTION INTRAVENOUS at 10:02

## 2023-02-09 RX ADMIN — PEGFILGRASTIM 6 MG: KIT SUBCUTANEOUS at 11:02

## 2023-02-09 RX ADMIN — FAMOTIDINE 20 MG: 10 INJECTION INTRAVENOUS at 08:02

## 2023-02-09 RX ADMIN — PALONOSETRON: 0.25 INJECTION, SOLUTION INTRAVENOUS at 09:02

## 2023-02-09 RX ADMIN — HEPARIN 500 UNITS: 100 SYRINGE at 11:02

## 2023-02-09 RX ADMIN — DIPHENHYDRAMINE HYDROCHLORIDE 50 MG: 50 INJECTION INTRAMUSCULAR; INTRAVENOUS at 08:02

## 2023-02-09 RX ADMIN — ACETAMINOPHEN 650 MG: 325 TABLET ORAL at 08:02

## 2023-02-09 RX ADMIN — VINCRISTINE SULFATE 2 MG: 1 INJECTION, SOLUTION INTRAVENOUS at 09:02

## 2023-02-09 RX ADMIN — DOXORUBICIN HYDROCHLORIDE 108 MG: 2 INJECTION, SOLUTION INTRAVENOUS at 10:02

## 2023-02-09 RX ADMIN — SODIUM CHLORIDE: 9 INJECTION, SOLUTION INTRAVENOUS at 08:02

## 2023-02-09 RX ADMIN — RITUXIMAB AND HYALURONIDASE 1400 MG: 120; 2000 INJECTION, SOLUTION SUBCUTANEOUS at 09:02

## 2023-03-01 ENCOUNTER — OFFICE VISIT (OUTPATIENT)
Dept: HEMATOLOGY/ONCOLOGY | Facility: CLINIC | Age: 58
End: 2023-03-01
Payer: COMMERCIAL

## 2023-03-01 ENCOUNTER — HOSPITAL ENCOUNTER (OUTPATIENT)
Dept: RADIOLOGY | Facility: HOSPITAL | Age: 58
Discharge: HOME | End: 2023-03-01
Attending: INTERNAL MEDICINE
Payer: COMMERCIAL

## 2023-03-01 VITALS
RESPIRATION RATE: 18 BRPM | WEIGHT: 221.81 LBS | OXYGEN SATURATION: 97 % | SYSTOLIC BLOOD PRESSURE: 127 MMHG | TEMPERATURE: 100 F | BODY MASS INDEX: 32.75 KG/M2 | DIASTOLIC BLOOD PRESSURE: 58 MMHG | HEART RATE: 115 BPM

## 2023-03-01 DIAGNOSIS — C83.38 DIFFUSE LARGE B-CELL LYMPHOMA OF LYMPH NODES OF MULTIPLE REGIONS: ICD-10-CM

## 2023-03-01 DIAGNOSIS — E88.3 TUMOR LYSIS SYNDROME: ICD-10-CM

## 2023-03-01 DIAGNOSIS — E53.8 FOLATE DEFICIENCY: ICD-10-CM

## 2023-03-01 DIAGNOSIS — C83.38 DIFFUSE LARGE B-CELL LYMPHOMA OF LYMPH NODES OF MULTIPLE REGIONS: Primary | ICD-10-CM

## 2023-03-01 DIAGNOSIS — D63.0 ANEMIA IN NEOPLASTIC DISEASE: ICD-10-CM

## 2023-03-01 DIAGNOSIS — K12.31 MUCOSITIS DUE TO CHEMOTHERAPY: ICD-10-CM

## 2023-03-01 DIAGNOSIS — T45.1X5A CHEMOTHERAPY-INDUCED NAUSEA AND VOMITING: ICD-10-CM

## 2023-03-01 DIAGNOSIS — T45.1X5A ANEMIA DUE TO ANTINEOPLASTIC CHEMOTHERAPY: ICD-10-CM

## 2023-03-01 DIAGNOSIS — D64.81 ANEMIA DUE TO ANTINEOPLASTIC CHEMOTHERAPY: ICD-10-CM

## 2023-03-01 DIAGNOSIS — E11.65 TYPE 2 DIABETES MELLITUS WITH HYPERGLYCEMIA, WITHOUT LONG-TERM CURRENT USE OF INSULIN: ICD-10-CM

## 2023-03-01 DIAGNOSIS — E66.01 SEVERE OBESITY (BMI 35.0-39.9) WITH COMORBIDITY: ICD-10-CM

## 2023-03-01 DIAGNOSIS — D84.821 IMMUNODEFICIENCY DUE TO DRUG THERAPY: ICD-10-CM

## 2023-03-01 DIAGNOSIS — R11.2 CHEMOTHERAPY-INDUCED NAUSEA AND VOMITING: ICD-10-CM

## 2023-03-01 DIAGNOSIS — Z79.899 IMMUNODEFICIENCY DUE TO DRUG THERAPY: ICD-10-CM

## 2023-03-01 PROCEDURE — 99999 PR PBB SHADOW E&M-EST. PATIENT-LVL V: ICD-10-PCS | Mod: PBBFAC,,, | Performed by: INTERNAL MEDICINE

## 2023-03-01 PROCEDURE — 3008F BODY MASS INDEX DOCD: CPT | Mod: CPTII,S$GLB,, | Performed by: INTERNAL MEDICINE

## 2023-03-01 PROCEDURE — 1159F PR MEDICATION LIST DOCUMENTED IN MEDICAL RECORD: ICD-10-PCS | Mod: CPTII,S$GLB,, | Performed by: INTERNAL MEDICINE

## 2023-03-01 PROCEDURE — 3078F PR MOST RECENT DIASTOLIC BLOOD PRESSURE < 80 MM HG: ICD-10-PCS | Mod: CPTII,S$GLB,, | Performed by: INTERNAL MEDICINE

## 2023-03-01 PROCEDURE — 99215 OFFICE O/P EST HI 40 MIN: CPT | Mod: S$GLB,,, | Performed by: INTERNAL MEDICINE

## 2023-03-01 PROCEDURE — 3078F DIAST BP <80 MM HG: CPT | Mod: CPTII,S$GLB,, | Performed by: INTERNAL MEDICINE

## 2023-03-01 PROCEDURE — 99999 PR PBB SHADOW E&M-EST. PATIENT-LVL V: CPT | Mod: PBBFAC,,, | Performed by: INTERNAL MEDICINE

## 2023-03-01 PROCEDURE — 3074F PR MOST RECENT SYSTOLIC BLOOD PRESSURE < 130 MM HG: ICD-10-PCS | Mod: CPTII,S$GLB,, | Performed by: INTERNAL MEDICINE

## 2023-03-01 PROCEDURE — 1160F PR REVIEW ALL MEDS BY PRESCRIBER/CLIN PHARMACIST DOCUMENTED: ICD-10-PCS | Mod: CPTII,S$GLB,, | Performed by: INTERNAL MEDICINE

## 2023-03-01 PROCEDURE — 99215 PR OFFICE/OUTPT VISIT, EST, LEVL V, 40-54 MIN: ICD-10-PCS | Mod: S$GLB,,, | Performed by: INTERNAL MEDICINE

## 2023-03-01 PROCEDURE — 1160F RVW MEDS BY RX/DR IN RCRD: CPT | Mod: CPTII,S$GLB,, | Performed by: INTERNAL MEDICINE

## 2023-03-01 PROCEDURE — 3074F SYST BP LT 130 MM HG: CPT | Mod: CPTII,S$GLB,, | Performed by: INTERNAL MEDICINE

## 2023-03-01 PROCEDURE — 3008F PR BODY MASS INDEX (BMI) DOCUMENTED: ICD-10-PCS | Mod: CPTII,S$GLB,, | Performed by: INTERNAL MEDICINE

## 2023-03-01 PROCEDURE — 4010F PR ACE/ARB THEARPY RXD/TAKEN: ICD-10-PCS | Mod: CPTII,S$GLB,, | Performed by: INTERNAL MEDICINE

## 2023-03-01 PROCEDURE — 1159F MED LIST DOCD IN RCRD: CPT | Mod: CPTII,S$GLB,, | Performed by: INTERNAL MEDICINE

## 2023-03-01 PROCEDURE — 4010F ACE/ARB THERAPY RXD/TAKEN: CPT | Mod: CPTII,S$GLB,, | Performed by: INTERNAL MEDICINE

## 2023-03-01 RX ORDER — DOXORUBICIN HYDROCHLORIDE 2 MG/ML
50 INJECTION, SOLUTION INTRAVENOUS
Status: CANCELLED | OUTPATIENT
Start: 2023-03-02

## 2023-03-01 RX ORDER — HEPARIN 100 UNIT/ML
500 SYRINGE INTRAVENOUS
Status: CANCELLED | OUTPATIENT
Start: 2023-03-02

## 2023-03-01 RX ORDER — ACETAMINOPHEN 325 MG/1
650 TABLET ORAL
Status: CANCELLED | OUTPATIENT
Start: 2023-03-02

## 2023-03-01 RX ORDER — SODIUM CHLORIDE 0.9 % (FLUSH) 0.9 %
10 SYRINGE (ML) INJECTION
Status: CANCELLED | OUTPATIENT
Start: 2023-03-02

## 2023-03-01 RX ORDER — MEPERIDINE HYDROCHLORIDE 50 MG/ML
25 INJECTION INTRAMUSCULAR; INTRAVENOUS; SUBCUTANEOUS
Status: CANCELLED
Start: 2023-03-02

## 2023-03-01 RX ORDER — FAMOTIDINE 10 MG/ML
20 INJECTION INTRAVENOUS
Status: CANCELLED | OUTPATIENT
Start: 2023-03-02

## 2023-03-01 NOTE — PROGRESS NOTES
"PATIENT: Saud Mayers  MRN: [id number]  DATE: 3/1/2023    Diagnosis:   1. Diffuse large B-cell lymphoma of lymph nodes of multiple regions    2. Immunodeficiency due to drug therapy    3. Anemia in neoplastic disease    4. Tumor lysis syndrome    5. Chemotherapy-induced nausea and vomiting    6. Type 2 diabetes mellitus with hyperglycemia, without long-term current use of insulin    7. Anemia due to antineoplastic chemotherapy    8. Folate deficiency    9. Severe obesity (BMI 35.0-39.9) with comorbidity    10. Mucositis due to chemotherapy      Chief Complaint: Lymphoma    Oncologic History:      Oncologic History Diffuse large B-cell lymphoma      Oncologic Treatment R-CHOP (start date 12/21/22)      Pathology 11/23/22:  "LYMPH NODE", RIGHT CERVICAL, EXCISION:   -- DIFFUSE LARGE B-CELL LYMPHOMA, NON-GCB SUBTYPE (SEE COMMENT).     Flow cytometric analysis of right cervical lymph node detects a kappa light   chain restricted B lymphocyte population expressing CD19 and CD20, and CD5   (partial and dim).    CD10 is negative.  T lymphocytes are   immunophenotypically unremarkable.  Correlation with tissue morphology is   required. Flow differential: Lymphocytes 35.8%, Monocytes 0.0%, Granulocytes   4.4%, Blast  0.0%, Debris/nRBC 60.0%,  Viability 43.5%.   Immunohistochemical stains are performed with adequate c ontrols.  The large   lymphoid cells are positive for CD20, BCL6 (>30%), MUM1 (>30%), and BCL2   (>50%); negative for CD5, CD10, CD30, cyclin D1, ALK1 and EBV (VICKY in-situ   hybridization).  They display high proliferation index (Ki-67 90%).   CD3-positive small mature T-cells are seen.   Taken together, the overall findings consistent with diffuse large B-cell   lymphoma, non-GCB subtype.   1. Immunohistochemical stain:  The lymphoma cells are positive for MYC (>40%).   2. Fluorescence in-situ hybridization: The result is abnormal and indicates a   BCL6 rearrangement in 91% of nuclei.  No rearrangement of MYC or " BCL2 and no   fusion of MYC and IGH was observed.  Rearrangement of BCL6 has been   associated with several B-cell lymphomas, including follicular lymphoma,   diffuse large B-cell lymphoma, and high-grade B-cell lymphoma.  As no MYC   rearrangement and no fusion of MYC and IGH was observed, this makes unlikely   the possibility of high-grade B-cell lymphoma with MYC and BCL6 rearrangement.   Taken together, the overall findings are consistent with diffuse large B-cell   lymphoma, NOS (non-GCB subtype).  The lymphoma cells display MYC and BCL2   double expressor phenotype.       Flow cytometry - lymph node (11/23/22):  Flow cytometric analysis of lymph node detects a kappa light chain restricted   B lymphocyte population expressing CD19 and CD20, and CD5 (partial and dim).     CD10 is negative.  T lymphocytes are immunophenotypically unremarkable.   Correlation with tissue morphology is required.   Flow differential:  Lymphocytes 35.8%, Monocytes 0.0%, Granulocytes  4.4%,   Blast  0.0%, Debris/nRBC 60.0%,  Viability 43.5%.           Subjective:    History of Present Illness: Mr. Mayers is a 58 y.o. male who presents for evaluation and management of diffuse large B-cell lymphoma. I had seen him during a hospitalization in November 2022.    - he was admitted for lymphadenopathy/weight loss.  - he underwent excisional lymph node biopsy on 11/23/22. Pathology revealed diffuse large B-cell lymphoma, non-GCB type.  - he underwent port-a-cath placement on 12/7/22 with Dr. Velazquez.  - he underwent pet scan on 12/9/22.  - he underwent port-a-cath placement on 12/7/22 with Dr. Velazquez.  - he underwent pet scan on 12/9/22.    Interval history:  - he presents for a follow-up appointment for his diffuse large B-cell lymphoma.  - he is scheduled for cycle #4 of R-CHOP tomorrow  - he did not get PET scan today due to hyperglycemia.  - today, he is doing okay. He notes worsened fatigue, leg weakness with this last cycle of  chemotherapy. He denies chest pain, vomiting, diarrhea, constipation.      Past medical, surgical, family, and social histories have been reviewed and updated below.    Past Medical History:   Past Medical History:   Diagnosis Date    Asthma     Diabetes mellitus     High cholesterol     Hypertension     ANAMIKA on CPAP     Testicular hypofunction        Past Surgical History:   Past Surgical History:   Procedure Laterality Date    CARPAL TUNNEL RELEASE Bilateral 2020    CHOLECYSTECTOMY  01/01/1990    COLONOSCOPY  01/01/2013    COLONOSCOPY N/A 9/2/2022    Procedure: COLONOSCOPY sutab;  Surgeon: Jeovany Cisse MD;  Location: [facility];  Service: Endoscopy;  Laterality: N/A;    ESOPHAGOGASTRODUODENOSCOPY N/A 9/2/2022    Procedure: EGD (ESOPHAGOGASTRODUODENOSCOPY);  Surgeon: Jeovany Cisse MD;  Location: [facility];  Service: Endoscopy;  Laterality: N/A;    INSERTION OF TUNNELED CENTRAL VENOUS CATHETER (CVC) WITH SUBCUTANEOUS PORT N/A 12/7/2022    Procedure: [id number];  Surgeon: Francisco Velazquez MD;  Location: [facility];  Service: General;  Laterality: N/A;    ORTHOPEDIC SURGERY Bilateral 01/01/2007    feet plantar    ORTHOPEDIC SURGERY Right 01/01/2006    knee    ROTATOR CUFF REPAIR Left 01/01/1997    SURGICAL REMOVAL OF LYMPH NODE Right 11/23/2022    Procedure: EXCISION, LYMPH NODE;  Surgeon: Francisco Velazquez MD;  Location: [facility];  Service: General;  Laterality: Right;   (right neck)       Family History:   Family History   Problem Relation Age of Onset    Heart attack Father        Social History:  reports that he quit smoking about 31 years ago. His smoking use included cigarettes. He has never used smokeless tobacco. He reports that he does not drink alcohol and does not use drugs.    Allergies:  Review of patient's allergies indicates:   Allergen Reactions    Gabapentin Hallucinations    Tizanidine Other (See Comments)     somnolence       Medications:  Current Outpatient Medications   Medication Sig  Dispense Refill    acetaminophen (TYLENOL) 500 MG tablet Take 500 mg by mouth every 6 (six) hours as needed for Pain.      allopurinoL (ZYLOPRIM) 300 MG tablet Take 1 tablet (300 mg total) by mouth once daily. 30 tablet 11    atorvastatin (LIPITOR) 20 MG tablet Take 1 tablet (20 mg total) by mouth once daily. 90 tablet 3    cyanocobalamin (VITAMIN B-12) 100 MCG tablet Take 100 mcg by mouth once daily.      folic acid (FOLVITE) 1 MG tablet Take 1 tablet (1 mg total) by mouth once daily. 100 tablet 3    hydrOXYzine HCL (ATARAX) 10 MG Tab Take 1 tablet (10 mg total) by mouth 3 (three) times daily as needed (pruritis). 30 tablet 1    LIDOcaine-prilocaine (EMLA) cream Apply topically as needed. Apply to skin overlying port site 30 minutes before chemotherapy. 30 g 1    linaCLOtide (LINZESS) 72 mcg Cap capsule Take 1 capsule (72 mcg total) by mouth before breakfast. 90 capsule 3    losartan (COZAAR) 25 MG tablet Take 1 tablet (25 mg total) by mouth once daily. 90 tablet 3    magic mouthwash diphen/antac/lidoc/nysta 10 mLs 3 (three) times daily as needed (chemotherapy-induced mucositis.). 300 mL 1    metFORMIN (GLUCOPHAGE) 1000 MG tablet Take 1 tablet (1,000 mg total) by mouth 2 (two) times daily with meals. 180 tablet 3    metoprolol succinate (TOPROL-XL) 50 MG 24 hr tablet Take 1 tablet (50 mg total) by mouth once daily. 90 tablet 3    multivit-min-folic-vit K-lycop (ONE-A-DAY MEN'S 50 PLUS) 400- mcg Tab Take 1 tablet by mouth once daily.      ondansetron (ZOFRAN) 4 MG tablet Take 1 tablet (4 mg total) by mouth every 8 (eight) hours as needed for Nausea. 90 tablet 1    ondansetron (ZOFRAN-ODT) 8 MG TbDL Take 1 tablet (8 mg total) by mouth every 8 (eight) hours as needed (chemotherapy-induced nausea and vomiting). 40 tablet 5    predniSONE (DELTASONE) 50 MG Tab Take 2 tablets (100 mg total) by mouth As instructed (take 2 tablets (100mg ) by mouth daily on days 2-5 of each cycle of chemotherapy). 48 tablet 1     No  current facility-administered medications for this visit.       Review of Systems   Constitutional:  Positive for fatigue. Negative for fever.   HENT:  Negative for sore throat.    Eyes:  Negative for visual disturbance.   Respiratory:  Negative for shortness of breath.    Cardiovascular:  Negative for chest pain.   Gastrointestinal:  Positive for nausea. Negative for abdominal pain.   Genitourinary:  Negative for dysuria.   Musculoskeletal:  Negative for back pain.   Skin:  Negative for rash.   Neurological:  Positive for weakness. Negative for headaches.   Hematological:  Negative for adenopathy.   Psychiatric/Behavioral:  The patient is not nervous/anxious.      ECOG Performance Status:   ECOG SCORE    1 - Restricted in strenuous activity-ambulatory and able to carry out work of a light nature         Objective:      Vitals:   Vitals:    03/01/23 1328   BP: (!) 127/58   BP Location: Left arm   Patient Position: Sitting   BP Method: Large (Automatic)   Pulse: (!) 115   Resp: 18   Temp: 100.2 °F (37.9 °C)   TempSrc: Oral   SpO2: 97%   Weight: 100.6 kg (221 lb 12.5 oz)     BMI: Body mass index is 32.75 kg/m².    Physical Exam  Vitals and nursing note reviewed.   Constitutional:       Appearance: He is well-developed.   HENT:      Head: Normocephalic and atraumatic.   Eyes:      Pupils: Pupils are equal, round, and reactive to light.   Cardiovascular:      Rate and Rhythm: Regular rhythm. Tachycardia present.   Pulmonary:      Effort: Pulmonary effort is normal.      Breath sounds: Normal breath sounds.   Abdominal:      General: Bowel sounds are normal.      Palpations: Abdomen is soft.   Musculoskeletal:         General: Normal range of motion.      Cervical back: Normal range of motion and neck supple.   Lymphadenopathy:      Cervical: Cervical adenopathy (improved) present.      Upper Body:      Right upper body: No axillary adenopathy.      Left upper body: No axillary adenopathy.   Skin:     General: Skin is  warm and dry.   Neurological:      Mental Status: He is alert and oriented to person, place, and time.   Psychiatric:         Behavior: Behavior normal.         Thought Content: Thought content normal.         Judgment: Judgment normal.       Laboratory Data:  Labs have been reviewed.    Lab Results   Component Value Date    WBC 3.70 (L) 03/01/2023    HGB 10.3 (L) 03/01/2023    HCT 30.0 (L) 03/01/2023    MCV 97 03/01/2023     03/01/2023           Imaging:    Echocardiogram (12/13/22):  The left ventricle is normal in size with concentric remodeling and normal systolic function.  The estimated ejection fraction is 65%.  Normal left ventricular diastolic function.  Normal right ventricular size with normal right ventricular systolic function.  Normal central venous pressure (3 mmHg).  The left ventricular global longitudinal strain is -17.6%.  There is moderate aortic valve stenosis.  Aortic valve area is 2.04 cm2; peak velocity is 3.17 m/s; mean gradient is 26 mmHg.      PET/CT scan (12/9/22): I have personally reviewed the images  Quality of the study: Adequate.     Reference values:     Mediastinal blood pool maximum SUV: 2.9     Normal liver background maximum SUV: 3.0     In the head and neck, numerous enlarged hypermetabolic lymph nodes within bilateral cervical periclavicular lesions.  For example, Conglomerate of right cervical and supraclavicular lymph nodes with SUV 14.  Left infraclavicular lymph node measuring 1.8 cm (image 95), with SUV max 10.  There is fluid and gas within the level 2 B measuring 2.1 cm, likely related to recent biopsy.     In the chest, numerous enlarged hypermetabolic mediastinal, hilar, and axilla lymph nodes.  For example subcarinal lymph node measuring 3.2 x 6.5 cm demonstrating SUV max of 9.8 (image 129).     In the abdomen and pelvis, there is physiologic tracer distribution within the abdominal organs and excretion into the genitourinary system.     Numerous enlarged  hypermetabolic lymph nodes.  For example, conglomerate of lymph nodes within the anterior abdomen demonstrating SUV max of 18 (fused image 205).  Posterior right pararenal space hypermetabolic node measuring 3.0 x 2.4 cm with SUV max of 16 (image 218).  Conglomerate of right pelvic sidewall nodes measuring 8.0 AP x 4.4 TV x 11.1 CC cm demonstrating SUV max of 19 (image 251).     The spleen has multifocal hypermetabolic uptake within SUV max of 13 and is enlarged at 19.0 cm in craniocaudal dimension.  There are multiple areas of hypoattenuation, better characterized on prior CTs.     In the bones, there are no hypermetabolic lesions worrisome for malignancy.     Additional CT findings     Right-sided chest port tip terminating in the cavoatrial junction.  Trace left pleural fluid, prior cholecystectomy.     Impression:     Multiple hypermetabolic lymph nodes involving both sides of the diaphragm including bulky right iliac conglomerate in keeping with known large B-cell lymphoma.     Splenomegaly and multifocal hypermetabolic foci within the spleen.     The Deauville Score is: 5     Reference values:     Mediastinal blood pool maximum SUV: 2.9     Normal liver maximum SUV: 3.0      CT chest/abdomen/pelvis (11/7/22): I have personally reviewed the images     Innumerable adenopathy above and below the diaphragm and throughout the neck, LEFT axilla and chest wall, retroperitoneum and mesentery as well as in the iliac chain, right greater than left.     Findings are suspicious for lymphoma/leukemia.     Multiple splenic lesions appearing to have increased in number and size since the prior exam.        CT soft tissue neck (11/7/22): I have personally reviewed the images  Extensive adenopathy within the right greater than left neck with also adenopathy within the mediastinum and left axilla.  The largest lymph node in the right posterior triangle/spinal accessory region demonstrates internal necrosis as does a right  "paratracheal lymph node within the mediastinum..  There is inflammatory change surrounding the necrotic lymph nodes and although an infectious/inflammatory process is not excluded, a neoplastic process including lymphoma to be considered.      Assessment:       1. Diffuse large B-cell lymphoma of lymph nodes of multiple regions    2. Immunodeficiency due to drug therapy    3. Anemia in neoplastic disease    4. Tumor lysis syndrome    5. Chemotherapy-induced nausea and vomiting    6. Type 2 diabetes mellitus with hyperglycemia, without long-term current use of insulin    7. Anemia due to antineoplastic chemotherapy    8. Folate deficiency    9. Severe obesity (BMI 35.0-39.9) with comorbidity    10. Mucositis due to chemotherapy           Plan:     Diffuse large B-cell lymphoma  - I have reviewed his chart  - CT scans (11/7/22) revealed extensive adenopathy.  - excisional biopsy (11/23/22) revealed diffuse large B-cell lymphoma, non-GCB subtype.  - No rearrangement of MYC or BCL2, so not a double-hit lymphoma. 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  - I and via oncology recommend R-CHOP x 6 cycles.  - The risks and benefits of chemotherapy were discussed, written information was given, and informed consent was obtained.  - hepatitis B core antibody/surface antigen and hepatitis C antibody were negative.    - he underwent port-a-cath placement on 12/7/22 with Dr. Velazquez.  - PET/CT scan (12/9/22) revealed "Multiple hypermetabolic lymph nodes involving both sides of the diaphragm including bulky right iliac conglomerate in keeping with known large B-cell lymphoma."  - I sent emla cream to his pharmacy  - I sent anti-emetics to his pharmacy.  - I sent prednisone to his pharmacy  - echocardiogram (12/13/22) revealed an ejection fraction of 65%  - he received a unit of blood on 12/15/22 for symptomatic anemia  - Labs have been reviewed. Hemoglobin is 10.3 g/dL. Absolute neutrophil count " is 3 k/uL. Okay to proceed with cycle #4 of R-CHOP tomorrow.  - return to clinic in 3 weeks in preparation for cycle #5 of R-CHOP. Re-schedule pet scan before next visit.    2. Anemia in neoplastic disease   - he received a unit of blood on 12/15/22 for symptomatic anemia  - Labs have been reviewed. Hemoglobin is 10.3 g/dL.  - continue to monitor    3. Tumor lysis syndrome  - uric acid was 2.2 mg/dL  - continue allopurinol     4. Chemotherapy-induced nausea/vomiting  - mild symptoms. Continue zofran as needed.    5. Type 2 diabetes  - last hemoglobin A1c (7/20/22) was 6.5%  - continue metformin  - will monitor closely as R-CHOP includes prednisone which can worsen hyperglycemia.  - he could not get his pet scan today due to hyperglycemia  - repeat hemoglobin A1c at next lab appt.  - defer management to primary care    6. Folate deficiency  - continue folic acid    7 chemotherapy-induced mucositis  - stable. Continue Gerardo's solution    - return to clinic in 3 weeks in preparation for cycle #5 of R-CHOP. Re-schedule pet scan before next visit.    Sandip Vickers M.D.  Hematology/Oncology  [facility] - [address], [address]  [place] [address]  Phone: [phone]  Fax: [phone]

## 2023-03-02 ENCOUNTER — INFUSION (OUTPATIENT)
Dept: INFUSION THERAPY | Facility: HOSPITAL | Age: 58
End: 2023-03-02
Attending: INTERNAL MEDICINE
Payer: COMMERCIAL

## 2023-03-02 VITALS
RESPIRATION RATE: 18 BRPM | DIASTOLIC BLOOD PRESSURE: 67 MMHG | OXYGEN SATURATION: 100 % | TEMPERATURE: 98 F | BODY MASS INDEX: 32.85 KG/M2 | HEART RATE: 86 BPM | WEIGHT: 221.81 LBS | SYSTOLIC BLOOD PRESSURE: 138 MMHG | HEIGHT: 69 IN

## 2023-03-02 DIAGNOSIS — C83.31 DIFFUSE LARGE B-CELL LYMPHOMA OF LYMPH NODES OF NECK: Primary | ICD-10-CM

## 2023-03-02 PROCEDURE — 96413 CHEMO IV INFUSION 1 HR: CPT

## 2023-03-02 PROCEDURE — 96367 TX/PROPH/DG ADDL SEQ IV INF: CPT

## 2023-03-02 PROCEDURE — 63600175 PHARM REV CODE 636 W HCPCS: Performed by: INTERNAL MEDICINE

## 2023-03-02 PROCEDURE — 96377 APPLICATON ON-BODY INJECTOR: CPT

## 2023-03-02 PROCEDURE — 96411 CHEMO IV PUSH ADDL DRUG: CPT

## 2023-03-02 PROCEDURE — 96401 CHEMO ANTI-NEOPL SQ/IM: CPT

## 2023-03-02 PROCEDURE — 96375 TX/PRO/DX INJ NEW DRUG ADDON: CPT

## 2023-03-02 PROCEDURE — A4216 STERILE WATER/SALINE, 10 ML: HCPCS | Performed by: INTERNAL MEDICINE

## 2023-03-02 PROCEDURE — 25000003 PHARM REV CODE 250: Performed by: INTERNAL MEDICINE

## 2023-03-02 RX ORDER — FAMOTIDINE 10 MG/ML
20 INJECTION INTRAVENOUS
Status: COMPLETED | OUTPATIENT
Start: 2023-03-02 | End: 2023-03-02

## 2023-03-02 RX ORDER — SODIUM CHLORIDE 0.9 % (FLUSH) 0.9 %
10 SYRINGE (ML) INJECTION
Status: DISCONTINUED | OUTPATIENT
Start: 2023-03-02 | End: 2023-03-02 | Stop reason: HOSPADM

## 2023-03-02 RX ORDER — ACETAMINOPHEN 325 MG/1
650 TABLET ORAL
Status: COMPLETED | OUTPATIENT
Start: 2023-03-02 | End: 2023-03-02

## 2023-03-02 RX ORDER — DOXORUBICIN HYDROCHLORIDE 2 MG/ML
50 INJECTION, SOLUTION INTRAVENOUS
Status: COMPLETED | OUTPATIENT
Start: 2023-03-02 | End: 2023-03-02

## 2023-03-02 RX ORDER — HEPARIN 100 UNIT/ML
500 SYRINGE INTRAVENOUS
Status: DISCONTINUED | OUTPATIENT
Start: 2023-03-02 | End: 2023-03-02 | Stop reason: HOSPADM

## 2023-03-02 RX ORDER — MEPERIDINE HYDROCHLORIDE 50 MG/ML
25 INJECTION INTRAMUSCULAR; INTRAVENOUS; SUBCUTANEOUS
Status: DISCONTINUED | OUTPATIENT
Start: 2023-03-02 | End: 2023-03-02 | Stop reason: HOSPADM

## 2023-03-02 RX ADMIN — FAMOTIDINE 20 MG: 10 INJECTION INTRAVENOUS at 08:03

## 2023-03-02 RX ADMIN — CYCLOPHOSPHAMIDE 1620 MG: 200 INJECTION, SOLUTION INTRAVENOUS at 10:03

## 2023-03-02 RX ADMIN — DIPHENHYDRAMINE HYDROCHLORIDE 50 MG: 50 INJECTION INTRAMUSCULAR; INTRAVENOUS at 08:03

## 2023-03-02 RX ADMIN — DEXAMETHASONE SODIUM PHOSPHATE 0.25 MG: 10 INJECTION, SOLUTION INTRAMUSCULAR; INTRAVENOUS at 09:03

## 2023-03-02 RX ADMIN — RITUXIMAB AND HYALURONIDASE 1400 MG: 120; 2000 INJECTION, SOLUTION SUBCUTANEOUS at 09:03

## 2023-03-02 RX ADMIN — SODIUM CHLORIDE: 9 INJECTION, SOLUTION INTRAVENOUS at 08:03

## 2023-03-02 RX ADMIN — ACETAMINOPHEN 650 MG: 325 TABLET ORAL at 08:03

## 2023-03-02 RX ADMIN — DOXORUBICIN HYDROCHLORIDE 108 MG: 2 INJECTION, SOLUTION INTRAVENOUS at 10:03

## 2023-03-02 RX ADMIN — Medication 10 ML: at 11:03

## 2023-03-02 RX ADMIN — PEGFILGRASTIM 6 MG: KIT SUBCUTANEOUS at 11:03

## 2023-03-02 RX ADMIN — VINCRISTINE SULFATE 2 MG: 1 INJECTION, SOLUTION INTRAVENOUS at 10:03

## 2023-03-02 RX ADMIN — HEPARIN 500 UNITS: 100 SYRINGE at 11:03

## 2023-03-02 NOTE — NURSING
Pt tolerated Cycle 4 R (hycela) CHOP infusion well.  No adverse reaction noted. No complaints at this time. PAD flushed with NS and Heparin and de-accessed per protocol. Neulasta OBI placed to right arm- patent and secured. Instructions provided and booklet given to pt. Pt verbalized understanding and  left clinic in no acute distress.

## 2023-03-21 NOTE — PROGRESS NOTES
"PATIENT: aSud Mayers  MRN: [id number]  DATE: 3/22/2023    Diagnosis:   1. Diffuse large B-cell lymphoma of lymph nodes of multiple regions    2. Immunodeficiency due to drug therapy    3. Anemia in neoplastic disease    4. Tumor lysis syndrome    5. Chemotherapy-induced nausea and vomiting    6. Anemia due to antineoplastic chemotherapy    7. Folate deficiency    8. Type 2 diabetes mellitus with hyperglycemia, without long-term current use of insulin    9. Severe obesity (BMI 35.0-39.9) with comorbidity    10. Mucositis due to chemotherapy      Chief Complaint: Lymphoma    Oncologic History:      Oncologic History Diffuse large B-cell lymphoma      Oncologic Treatment R-CHOP (start date 12/21/22)      Pathology 11/23/22:  "LYMPH NODE", RIGHT CERVICAL, EXCISION:   -- DIFFUSE LARGE B-CELL LYMPHOMA, NON-GCB SUBTYPE (SEE COMMENT).     Flow cytometric analysis of right cervical lymph node detects a kappa light   chain restricted B lymphocyte population expressing CD19 and CD20, and CD5   (partial and dim).    CD10 is negative.  T lymphocytes are   immunophenotypically unremarkable.  Correlation with tissue morphology is   required. Flow differential: Lymphocytes 35.8%, Monocytes 0.0%, Granulocytes   4.4%, Blast  0.0%, Debris/nRBC 60.0%,  Viability 43.5%.   Immunohistochemical stains are performed with adequate c ontrols.  The large   lymphoid cells are positive for CD20, BCL6 (>30%), MUM1 (>30%), and BCL2   (>50%); negative for CD5, CD10, CD30, cyclin D1, ALK1 and EBV (VICKY in-situ   hybridization).  They display high proliferation index (Ki-67 90%).   CD3-positive small mature T-cells are seen.   Taken together, the overall findings consistent with diffuse large B-cell   lymphoma, non-GCB subtype.   1. Immunohistochemical stain:  The lymphoma cells are positive for MYC (>40%).   2. Fluorescence in-situ hybridization: The result is abnormal and indicates a   BCL6 rearrangement in 91% of nuclei.  No rearrangement of MYC " or BCL2 and no   fusion of MYC and IGH was observed.  Rearrangement of BCL6 has been   associated with several B-cell lymphomas, including follicular lymphoma,   diffuse large B-cell lymphoma, and high-grade B-cell lymphoma.  As no MYC   rearrangement and no fusion of MYC and IGH was observed, this makes unlikely   the possibility of high-grade B-cell lymphoma with MYC and BCL6 rearrangement.   Taken together, the overall findings are consistent with diffuse large B-cell   lymphoma, NOS (non-GCB subtype).  The lymphoma cells display MYC and BCL2   double expressor phenotype.       Flow cytometry - lymph node (11/23/22):  Flow cytometric analysis of lymph node detects a kappa light chain restricted   B lymphocyte population expressing CD19 and CD20, and CD5 (partial and dim).     CD10 is negative.  T lymphocytes are immunophenotypically unremarkable.   Correlation with tissue morphology is required.   Flow differential:  Lymphocytes 35.8%, Monocytes 0.0%, Granulocytes  4.4%,   Blast  0.0%, Debris/nRBC 60.0%,  Viability 43.5%.           Subjective:    History of Present Illness: Mr. Mayers is a 58 y.o. male who presents for evaluation and management of diffuse large B-cell lymphoma. I had seen him during a hospitalization in November 2022.    - he was admitted for lymphadenopathy/weight loss.  - he underwent excisional lymph node biopsy on 11/23/22. Pathology revealed diffuse large B-cell lymphoma, non-GCB type.  - he underwent port-a-cath placement on 12/7/22 with Dr. Velazquez.  - he underwent pet scan on 12/9/22.  - he underwent port-a-cath placement on 12/7/22 with Dr. Velazquez.  - he underwent pet scan on 12/9/22.    Interval history:  - he presents for a follow-up appointment for his diffuse large B-cell lymphoma.  - he is scheduled for cycle #5 of R-CHOP tomorrow  - he underwent PET scan today.  - today, he is doing well. He notes soreness after pegfilgrastim for several days. He denies chest pain, vomiting,  diarrhea, constipation.      Past medical, surgical, family, and social histories have been reviewed and updated below.    Past Medical History:   Past Medical History:   Diagnosis Date    Asthma     Diabetes mellitus     High cholesterol     Hypertension     ANAMIKA on CPAP     Testicular hypofunction        Past Surgical History:   Past Surgical History:   Procedure Laterality Date    CARPAL TUNNEL RELEASE Bilateral 2020    CHOLECYSTECTOMY  01/01/1990    COLONOSCOPY  01/01/2013    COLONOSCOPY N/A 9/2/2022    Procedure: COLONOSCOPY sutab;  Surgeon: Jeovany Cisse MD;  Location: [facility];  Service: Endoscopy;  Laterality: N/A;    ESOPHAGOGASTRODUODENOSCOPY N/A 9/2/2022    Procedure: EGD (ESOPHAGOGASTRODUODENOSCOPY);  Surgeon: Jeovany Cisse MD;  Location: [facility] ENDO;  Service: Endoscopy;  Laterality: N/A;    INSERTION OF TUNNELED CENTRAL VENOUS CATHETER (CVC) WITH SUBCUTANEOUS PORT N/A 12/7/2022    Procedure: [id number];  Surgeon: Francisco Velazquez MD;  Location: [facility] OR;  Service: General;  Laterality: N/A;    ORTHOPEDIC SURGERY Bilateral 01/01/2007    feet plantar    ORTHOPEDIC SURGERY Right 01/01/2006    knee    ROTATOR CUFF REPAIR Left 01/01/1997    SURGICAL REMOVAL OF LYMPH NODE Right 11/23/2022    Procedure: EXCISION, LYMPH NODE;  Surgeon: Francisco Velazquez MD;  Location: [facility] OR;  Service: General;  Laterality: Right;   (right neck)       Family History:   Family History   Problem Relation Age of Onset    Heart attack Father        Social History:  reports that he quit smoking about 31 years ago. His smoking use included cigarettes. He has never used smokeless tobacco. He reports that he does not drink alcohol and does not use drugs.    Allergies:  Review of patient's allergies indicates:   Allergen Reactions    Gabapentin Hallucinations    Tizanidine Other (See Comments)     somnolence       Medications:  Current Outpatient Medications   Medication Sig Dispense Refill    acetaminophen (TYLENOL) 500  MG tablet Take 500 mg by mouth every 6 (six) hours as needed for Pain.      allopurinoL (ZYLOPRIM) 300 MG tablet Take 1 tablet (300 mg total) by mouth once daily. 30 tablet 11    atorvastatin (LIPITOR) 20 MG tablet Take 1 tablet (20 mg total) by mouth once daily. 90 tablet 3    cyanocobalamin (VITAMIN B-12) 100 MCG tablet Take 100 mcg by mouth once daily.      folic acid (FOLVITE) 1 MG tablet Take 1 tablet (1 mg total) by mouth once daily. 100 tablet 3    hydrOXYzine HCL (ATARAX) 10 MG Tab Take 1 tablet (10 mg total) by mouth 3 (three) times daily as needed (pruritis). 30 tablet 1    LIDOcaine-prilocaine (EMLA) cream Apply topically as needed. Apply to skin overlying port site 30 minutes before chemotherapy. 30 g 1    linaCLOtide (LINZESS) 72 mcg Cap capsule Take 1 capsule (72 mcg total) by mouth before breakfast. 90 capsule 3    losartan (COZAAR) 25 MG tablet Take 1 tablet (25 mg total) by mouth once daily. 90 tablet 3    magic mouthwash diphen/antac/lidoc/nysta 10 mLs 3 (three) times daily as needed (chemotherapy-induced mucositis.). 300 mL 1    metFORMIN (GLUCOPHAGE) 1000 MG tablet Take 1 tablet (1,000 mg total) by mouth 2 (two) times daily with meals. 180 tablet 3    metoprolol succinate (TOPROL-XL) 50 MG 24 hr tablet Take 1 tablet (50 mg total) by mouth once daily. 90 tablet 3    multivit-min-folic-vit K-lycop (ONE-A-DAY MEN'S 50 PLUS) 400- mcg Tab Take 1 tablet by mouth once daily.      ondansetron (ZOFRAN) 4 MG tablet Take 1 tablet (4 mg total) by mouth every 8 (eight) hours as needed for Nausea. 90 tablet 1    ondansetron (ZOFRAN-ODT) 8 MG TbDL Take 1 tablet (8 mg total) by mouth every 8 (eight) hours as needed (chemotherapy-induced nausea and vomiting). 40 tablet 5    predniSONE (DELTASONE) 50 MG Tab Take 2 tablets (100 mg total) by mouth As instructed (take 2 tablets (100mg ) by mouth daily on days 2-5 of each cycle of chemotherapy). 48 tablet 1     No current facility-administered medications for  this visit.       Review of Systems   Constitutional:  Positive for fatigue. Negative for fever.   HENT:  Negative for sore throat.    Eyes:  Negative for visual disturbance.   Respiratory:  Negative for shortness of breath.    Cardiovascular:  Negative for chest pain.   Gastrointestinal:  Positive for nausea. Negative for abdominal pain.   Genitourinary:  Negative for dysuria.   Musculoskeletal:  Negative for back pain.   Skin:  Negative for rash.   Neurological:  Positive for weakness. Negative for headaches.   Hematological:  Negative for adenopathy.   Psychiatric/Behavioral:  The patient is not nervous/anxious.      ECOG Performance Status:   ECOG SCORE    1 - Restricted in strenuous activity-ambulatory and able to carry out work of a light nature         Objective:      Vitals:   Vitals:    03/22/23 1427   BP: 131/67   Pulse: 90   SpO2: 98%   Weight: 96.4 kg (212 lb 8.4 oz)     BMI: Body mass index is 31.38 kg/m².    Physical Exam  Vitals and nursing note reviewed.   Constitutional:       Appearance: He is well-developed.   HENT:      Head: Normocephalic and atraumatic.   Eyes:      Pupils: Pupils are equal, round, and reactive to light.   Cardiovascular:      Rate and Rhythm: Normal rate and regular rhythm.   Pulmonary:      Effort: Pulmonary effort is normal.      Breath sounds: Normal breath sounds.   Abdominal:      General: Bowel sounds are normal.      Palpations: Abdomen is soft.   Musculoskeletal:         General: Normal range of motion.      Cervical back: Normal range of motion and neck supple.   Lymphadenopathy:      Cervical: No cervical adenopathy.      Upper Body:      Right upper body: No axillary adenopathy.      Left upper body: No axillary adenopathy.   Skin:     General: Skin is warm and dry.   Neurological:      Mental Status: He is alert and oriented to person, place, and time.   Psychiatric:         Behavior: Behavior normal.         Thought Content: Thought content normal.          Judgment: Judgment normal.       Laboratory Data:  Labs have been reviewed.    Lab Results   Component Value Date    WBC 4.71 03/22/2023    HGB 10.0 (L) 03/22/2023    HCT 31.0 (L) 03/22/2023    MCV 99 (H) 03/22/2023     03/22/2023           Imaging:    PET/CT scan (3/22/23): I have personally reviewed the images  - great response to therapy.      Echocardiogram (12/13/22):  The left ventricle is normal in size with concentric remodeling and normal systolic function.  The estimated ejection fraction is 65%.  Normal left ventricular diastolic function.  Normal right ventricular size with normal right ventricular systolic function.  Normal central venous pressure (3 mmHg).  The left ventricular global longitudinal strain is -17.6%.  There is moderate aortic valve stenosis.  Aortic valve area is 2.04 cm2; peak velocity is 3.17 m/s; mean gradient is 26 mmHg.      PET/CT scan (12/9/22): I have personally reviewed the images  Quality of the study: Adequate.     Reference values:     Mediastinal blood pool maximum SUV: 2.9     Normal liver background maximum SUV: 3.0     In the head and neck, numerous enlarged hypermetabolic lymph nodes within bilateral cervical periclavicular lesions.  For example, Conglomerate of right cervical and supraclavicular lymph nodes with SUV 14.  Left infraclavicular lymph node measuring 1.8 cm (image 95), with SUV max 10.  There is fluid and gas within the level 2 B measuring 2.1 cm, likely related to recent biopsy.     In the chest, numerous enlarged hypermetabolic mediastinal, hilar, and axilla lymph nodes.  For example subcarinal lymph node measuring 3.2 x 6.5 cm demonstrating SUV max of 9.8 (image 129).     In the abdomen and pelvis, there is physiologic tracer distribution within the abdominal organs and excretion into the genitourinary system.     Numerous enlarged hypermetabolic lymph nodes.  For example, conglomerate of lymph nodes within the anterior abdomen demonstrating SUV  max of 18 (fused image 205).  Posterior right pararenal space hypermetabolic node measuring 3.0 x 2.4 cm with SUV max of 16 (image 218).  Conglomerate of right pelvic sidewall nodes measuring 8.0 AP x 4.4 TV x 11.1 CC cm demonstrating SUV max of 19 (image 251).     The spleen has multifocal hypermetabolic uptake within SUV max of 13 and is enlarged at 19.0 cm in craniocaudal dimension.  There are multiple areas of hypoattenuation, better characterized on prior CTs.     In the bones, there are no hypermetabolic lesions worrisome for malignancy.     Additional CT findings     Right-sided chest port tip terminating in the cavoatrial junction.  Trace left pleural fluid, prior cholecystectomy.     Impression:     Multiple hypermetabolic lymph nodes involving both sides of the diaphragm including bulky right iliac conglomerate in keeping with known large B-cell lymphoma.     Splenomegaly and multifocal hypermetabolic foci within the spleen.     The Deauville Score is: 5     Reference values:     Mediastinal blood pool maximum SUV: 2.9     Normal liver maximum SUV: 3.0      CT chest/abdomen/pelvis (11/7/22): I have personally reviewed the images     Innumerable adenopathy above and below the diaphragm and throughout the neck, LEFT axilla and chest wall, retroperitoneum and mesentery as well as in the iliac chain, right greater than left.     Findings are suspicious for lymphoma/leukemia.     Multiple splenic lesions appearing to have increased in number and size since the prior exam.        CT soft tissue neck (11/7/22): I have personally reviewed the images  Extensive adenopathy within the right greater than left neck with also adenopathy within the mediastinum and left axilla.  The largest lymph node in the right posterior triangle/spinal accessory region demonstrates internal necrosis as does a right paratracheal lymph node within the mediastinum..  There is inflammatory change surrounding the necrotic lymph nodes and  "although an infectious/inflammatory process is not excluded, a neoplastic process including lymphoma to be considered.      Assessment:       1. Diffuse large B-cell lymphoma of lymph nodes of multiple regions    2. Immunodeficiency due to drug therapy    3. Anemia in neoplastic disease    4. Tumor lysis syndrome    5. Chemotherapy-induced nausea and vomiting    6. Anemia due to antineoplastic chemotherapy    7. Folate deficiency    8. Type 2 diabetes mellitus with hyperglycemia, without long-term current use of insulin    9. Severe obesity (BMI 35.0-39.9) with comorbidity    10. Mucositis due to chemotherapy           Plan:     Diffuse large B-cell lymphoma  - I have reviewed his chart  - CT scans (11/7/22) revealed extensive adenopathy.  - excisional biopsy (11/23/22) revealed diffuse large B-cell lymphoma, non-GCB subtype.  - No rearrangement of MYC or BCL2, so not a double-hit lymphoma. 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  - I and via oncology recommend R-CHOP x 6 cycles.  - The risks and benefits of chemotherapy were discussed, written information was given, and informed consent was obtained.  - hepatitis B core antibody/surface antigen and hepatitis C antibody were negative.    - he underwent port-a-cath placement on 12/7/22 with Dr. Velazquez.  - PET/CT scan (12/9/22) revealed "Multiple hypermetabolic lymph nodes involving both sides of the diaphragm including bulky right iliac conglomerate in keeping with known large B-cell lymphoma."  - I sent emla cream to his pharmacy  - I sent anti-emetics to his pharmacy.  - I sent prednisone to his pharmacy  - echocardiogram (12/13/22) revealed an ejection fraction of 65%  - he received a unit of blood on 12/15/22 for symptomatic anemia  - PET/CT scan (3/22/23) [following 4 cycles of therapy] reveals a great response to therapy. Follow up official radiology report.  - Labs have been reviewed. Hemoglobin is 10. g/dL. Absolute " neutrophil count is 3.7 k/uL. Okay to proceed with cycle #5 of R-CHOP tomorrow.  - return to clinic in 3 weeks in preparation for cycle #6 of R-CHOP.     2. Anemia in neoplastic disease   - he received a unit of blood on 12/15/22 for symptomatic anemia  - Labs have been reviewed. Hemoglobin is 10 g/dL.  - continue to monitor    3. Tumor lysis syndrome  - uric acid was 3.8 mg/dL  - continue allopurinol     4. Chemotherapy-induced nausea/vomiting  - mild symptoms. Continue zofran as needed.    5. Type 2 diabetes  - last hemoglobin A1c (3/22/23) was 7.7%  - continue metformin  - will monitor closely as R-CHOP includes prednisone which can worsen hyperglycemia.  - defer management to primary care    6. Folate deficiency  - continue folic acid    7 chemotherapy-induced mucositis  - stable. Continue Gerardo's solution    - return to clinic in 3 weeks in preparation for cycle #6 of R-CHOP.     Sandip Vickers M.D.  Hematology/Oncology  [facility] - [address], [address]  [place] [address]  Phone: [phone]  Fax: [phone]

## 2023-03-22 ENCOUNTER — OFFICE VISIT (OUTPATIENT)
Dept: HEMATOLOGY/ONCOLOGY | Facility: CLINIC | Age: 58
End: 2023-03-22
Payer: COMMERCIAL

## 2023-03-22 ENCOUNTER — HOSPITAL ENCOUNTER (OUTPATIENT)
Dept: RADIOLOGY | Facility: HOSPITAL | Age: 58
Discharge: HOME | End: 2023-03-22
Attending: INTERNAL MEDICINE
Payer: COMMERCIAL

## 2023-03-22 VITALS
BODY MASS INDEX: 31.38 KG/M2 | DIASTOLIC BLOOD PRESSURE: 67 MMHG | SYSTOLIC BLOOD PRESSURE: 131 MMHG | WEIGHT: 212.5 LBS | HEART RATE: 90 BPM | OXYGEN SATURATION: 98 %

## 2023-03-22 DIAGNOSIS — E11.65 TYPE 2 DIABETES MELLITUS WITH HYPERGLYCEMIA, WITHOUT LONG-TERM CURRENT USE OF INSULIN: ICD-10-CM

## 2023-03-22 DIAGNOSIS — C83.38 DIFFUSE LARGE B-CELL LYMPHOMA OF LYMPH NODES OF MULTIPLE REGIONS: Primary | ICD-10-CM

## 2023-03-22 DIAGNOSIS — E66.01 SEVERE OBESITY (BMI 35.0-39.9) WITH COMORBIDITY: ICD-10-CM

## 2023-03-22 DIAGNOSIS — C83.38 DIFFUSE LARGE B-CELL LYMPHOMA OF LYMPH NODES OF MULTIPLE REGIONS: ICD-10-CM

## 2023-03-22 DIAGNOSIS — E53.8 FOLATE DEFICIENCY: ICD-10-CM

## 2023-03-22 DIAGNOSIS — Z79.899 IMMUNODEFICIENCY DUE TO DRUG THERAPY: ICD-10-CM

## 2023-03-22 DIAGNOSIS — T45.1X5A CHEMOTHERAPY-INDUCED NAUSEA AND VOMITING: ICD-10-CM

## 2023-03-22 DIAGNOSIS — T45.1X5A ANEMIA DUE TO ANTINEOPLASTIC CHEMOTHERAPY: ICD-10-CM

## 2023-03-22 DIAGNOSIS — D63.0 ANEMIA IN NEOPLASTIC DISEASE: ICD-10-CM

## 2023-03-22 DIAGNOSIS — D64.81 ANEMIA DUE TO ANTINEOPLASTIC CHEMOTHERAPY: ICD-10-CM

## 2023-03-22 DIAGNOSIS — E88.3 TUMOR LYSIS SYNDROME: ICD-10-CM

## 2023-03-22 DIAGNOSIS — D84.821 IMMUNODEFICIENCY DUE TO DRUG THERAPY: ICD-10-CM

## 2023-03-22 DIAGNOSIS — K12.31 MUCOSITIS DUE TO CHEMOTHERAPY: ICD-10-CM

## 2023-03-22 DIAGNOSIS — R11.2 CHEMOTHERAPY-INDUCED NAUSEA AND VOMITING: ICD-10-CM

## 2023-03-22 PROCEDURE — 99215 PR OFFICE/OUTPT VISIT, EST, LEVL V, 40-54 MIN: ICD-10-PCS | Mod: S$GLB,,, | Performed by: INTERNAL MEDICINE

## 2023-03-22 PROCEDURE — 99999 PR PBB SHADOW E&M-EST. PATIENT-LVL IV: CPT | Mod: PBBFAC,,, | Performed by: INTERNAL MEDICINE

## 2023-03-22 PROCEDURE — A9552 F18 FDG: HCPCS

## 2023-03-22 PROCEDURE — 3008F BODY MASS INDEX DOCD: CPT | Mod: CPTII,S$GLB,, | Performed by: INTERNAL MEDICINE

## 2023-03-22 PROCEDURE — 1160F PR REVIEW ALL MEDS BY PRESCRIBER/CLIN PHARMACIST DOCUMENTED: ICD-10-PCS | Mod: CPTII,S$GLB,, | Performed by: INTERNAL MEDICINE

## 2023-03-22 PROCEDURE — 3008F PR BODY MASS INDEX (BMI) DOCUMENTED: ICD-10-PCS | Mod: CPTII,S$GLB,, | Performed by: INTERNAL MEDICINE

## 2023-03-22 PROCEDURE — 99215 OFFICE O/P EST HI 40 MIN: CPT | Mod: S$GLB,,, | Performed by: INTERNAL MEDICINE

## 2023-03-22 PROCEDURE — 1159F MED LIST DOCD IN RCRD: CPT | Mod: CPTII,S$GLB,, | Performed by: INTERNAL MEDICINE

## 2023-03-22 PROCEDURE — 4010F ACE/ARB THERAPY RXD/TAKEN: CPT | Mod: CPTII,S$GLB,, | Performed by: INTERNAL MEDICINE

## 2023-03-22 PROCEDURE — 3051F PR MOST RECENT HEMOGLOBIN A1C LEVEL 7.0 - < 8.0%: ICD-10-PCS | Mod: CPTII,S$GLB,, | Performed by: INTERNAL MEDICINE

## 2023-03-22 PROCEDURE — 1159F PR MEDICATION LIST DOCUMENTED IN MEDICAL RECORD: ICD-10-PCS | Mod: CPTII,S$GLB,, | Performed by: INTERNAL MEDICINE

## 2023-03-22 PROCEDURE — 3078F DIAST BP <80 MM HG: CPT | Mod: CPTII,S$GLB,, | Performed by: INTERNAL MEDICINE

## 2023-03-22 PROCEDURE — 3075F PR MOST RECENT SYSTOLIC BLOOD PRESS GE 130-139MM HG: ICD-10-PCS | Mod: CPTII,S$GLB,, | Performed by: INTERNAL MEDICINE

## 2023-03-22 PROCEDURE — 3051F HG A1C>EQUAL 7.0%<8.0%: CPT | Mod: CPTII,S$GLB,, | Performed by: INTERNAL MEDICINE

## 2023-03-22 PROCEDURE — 3078F PR MOST RECENT DIASTOLIC BLOOD PRESSURE < 80 MM HG: ICD-10-PCS | Mod: CPTII,S$GLB,, | Performed by: INTERNAL MEDICINE

## 2023-03-22 PROCEDURE — 4010F PR ACE/ARB THEARPY RXD/TAKEN: ICD-10-PCS | Mod: CPTII,S$GLB,, | Performed by: INTERNAL MEDICINE

## 2023-03-22 PROCEDURE — 1160F RVW MEDS BY RX/DR IN RCRD: CPT | Mod: CPTII,S$GLB,, | Performed by: INTERNAL MEDICINE

## 2023-03-22 PROCEDURE — 3075F SYST BP GE 130 - 139MM HG: CPT | Mod: CPTII,S$GLB,, | Performed by: INTERNAL MEDICINE

## 2023-03-22 PROCEDURE — 78815 NM PET CT ROUTINE: ICD-10-PCS | Mod: 26,PS,, | Performed by: RADIOLOGY

## 2023-03-22 PROCEDURE — 99999 PR PBB SHADOW E&M-EST. PATIENT-LVL IV: ICD-10-PCS | Mod: PBBFAC,,, | Performed by: INTERNAL MEDICINE

## 2023-03-22 PROCEDURE — 78815 PET IMAGE W/CT SKULL-THIGH: CPT | Mod: 26,PS,, | Performed by: RADIOLOGY

## 2023-03-22 RX ORDER — SODIUM CHLORIDE 0.9 % (FLUSH) 0.9 %
10 SYRINGE (ML) INJECTION
Status: CANCELLED | OUTPATIENT
Start: 2023-03-23

## 2023-03-22 RX ORDER — MEPERIDINE HYDROCHLORIDE 50 MG/ML
25 INJECTION INTRAMUSCULAR; INTRAVENOUS; SUBCUTANEOUS
Status: CANCELLED
Start: 2023-03-23

## 2023-03-22 RX ORDER — FAMOTIDINE 10 MG/ML
20 INJECTION INTRAVENOUS
Status: CANCELLED | OUTPATIENT
Start: 2023-03-23

## 2023-03-22 RX ORDER — ACETAMINOPHEN 325 MG/1
650 TABLET ORAL
Status: CANCELLED | OUTPATIENT
Start: 2023-03-23

## 2023-03-22 RX ORDER — DOXORUBICIN HYDROCHLORIDE 2 MG/ML
50 INJECTION, SOLUTION INTRAVENOUS
Status: CANCELLED | OUTPATIENT
Start: 2023-03-23

## 2023-03-22 RX ORDER — HEPARIN 100 UNIT/ML
500 SYRINGE INTRAVENOUS
Status: CANCELLED | OUTPATIENT
Start: 2023-03-23

## 2023-03-23 ENCOUNTER — INFUSION (OUTPATIENT)
Dept: INFUSION THERAPY | Facility: HOSPITAL | Age: 58
End: 2023-03-23
Attending: INTERNAL MEDICINE
Payer: COMMERCIAL

## 2023-03-23 VITALS
WEIGHT: 212.5 LBS | DIASTOLIC BLOOD PRESSURE: 61 MMHG | OXYGEN SATURATION: 100 % | TEMPERATURE: 98 F | HEIGHT: 69 IN | RESPIRATION RATE: 18 BRPM | BODY MASS INDEX: 31.47 KG/M2 | HEART RATE: 74 BPM | SYSTOLIC BLOOD PRESSURE: 132 MMHG

## 2023-03-23 DIAGNOSIS — C83.31 DIFFUSE LARGE B-CELL LYMPHOMA OF LYMPH NODES OF NECK: Primary | ICD-10-CM

## 2023-03-23 PROCEDURE — A4216 STERILE WATER/SALINE, 10 ML: HCPCS | Performed by: INTERNAL MEDICINE

## 2023-03-23 PROCEDURE — 25000003 PHARM REV CODE 250: Performed by: INTERNAL MEDICINE

## 2023-03-23 PROCEDURE — 96401 CHEMO ANTI-NEOPL SQ/IM: CPT

## 2023-03-23 PROCEDURE — 63600175 PHARM REV CODE 636 W HCPCS: Performed by: INTERNAL MEDICINE

## 2023-03-23 PROCEDURE — 96367 TX/PROPH/DG ADDL SEQ IV INF: CPT

## 2023-03-23 PROCEDURE — 96377 APPLICATON ON-BODY INJECTOR: CPT

## 2023-03-23 PROCEDURE — 96375 TX/PRO/DX INJ NEW DRUG ADDON: CPT

## 2023-03-23 PROCEDURE — 96411 CHEMO IV PUSH ADDL DRUG: CPT

## 2023-03-23 PROCEDURE — 96413 CHEMO IV INFUSION 1 HR: CPT

## 2023-03-23 RX ORDER — FAMOTIDINE 10 MG/ML
20 INJECTION INTRAVENOUS
Status: COMPLETED | OUTPATIENT
Start: 2023-03-23 | End: 2023-03-23

## 2023-03-23 RX ORDER — HEPARIN 100 UNIT/ML
500 SYRINGE INTRAVENOUS
Status: DISCONTINUED | OUTPATIENT
Start: 2023-03-23 | End: 2023-03-23 | Stop reason: HOSPADM

## 2023-03-23 RX ORDER — DOXORUBICIN HYDROCHLORIDE 2 MG/ML
50 INJECTION, SOLUTION INTRAVENOUS
Status: COMPLETED | OUTPATIENT
Start: 2023-03-23 | End: 2023-03-23

## 2023-03-23 RX ORDER — ACETAMINOPHEN 325 MG/1
650 TABLET ORAL
Status: COMPLETED | OUTPATIENT
Start: 2023-03-23 | End: 2023-03-23

## 2023-03-23 RX ORDER — SODIUM CHLORIDE 0.9 % (FLUSH) 0.9 %
10 SYRINGE (ML) INJECTION
Status: DISCONTINUED | OUTPATIENT
Start: 2023-03-23 | End: 2023-03-23 | Stop reason: HOSPADM

## 2023-03-23 RX ADMIN — SODIUM CHLORIDE: 9 INJECTION, SOLUTION INTRAVENOUS at 08:03

## 2023-03-23 RX ADMIN — Medication 10 ML: at 11:03

## 2023-03-23 RX ADMIN — ACETAMINOPHEN 650 MG: 325 TABLET ORAL at 08:03

## 2023-03-23 RX ADMIN — PEGFILGRASTIM 6 MG: KIT SUBCUTANEOUS at 11:03

## 2023-03-23 RX ADMIN — RITUXIMAB AND HYALURONIDASE 1400 MG: 120; 2000 INJECTION, SOLUTION SUBCUTANEOUS at 09:03

## 2023-03-23 RX ADMIN — VINCRISTINE SULFATE 2 MG: 1 INJECTION, SOLUTION INTRAVENOUS at 09:03

## 2023-03-23 RX ADMIN — DEXAMETHASONE SODIUM PHOSPHATE 0.25 MG: 10 INJECTION, SOLUTION INTRAMUSCULAR; INTRAVENOUS at 09:03

## 2023-03-23 RX ADMIN — FAMOTIDINE 20 MG: 10 INJECTION INTRAVENOUS at 08:03

## 2023-03-23 RX ADMIN — CYCLOPHOSPHAMIDE 1620 MG: 200 INJECTION, SOLUTION INTRAVENOUS at 10:03

## 2023-03-23 RX ADMIN — DIPHENHYDRAMINE HYDROCHLORIDE 50 MG: 50 INJECTION INTRAMUSCULAR; INTRAVENOUS at 08:03

## 2023-03-23 RX ADMIN — HEPARIN 500 UNITS: 100 SYRINGE at 11:03

## 2023-03-23 RX ADMIN — DOXORUBICIN HYDROCHLORIDE 108 MG: 2 INJECTION, SOLUTION INTRAVENOUS at 10:03

## 2023-03-23 NOTE — NURSING
Pt tolerated cycle 5 R(hycela)CHOP infusion well.  No adverse reaction noted. No complaints at this time. PAD flushed with NS and Heparin and de-accessed per protocol.  Neulasta OBI placed to right arm, patent and secured. Education provided on s/s to monitor and when to remove device. Pt and spouse verbalized understanding.  Patient left clinic in no acute distress.

## 2023-04-11 ENCOUNTER — PATIENT MESSAGE (OUTPATIENT)
Dept: ADMINISTRATIVE | Facility: HOSPITAL | Age: 58
End: 2023-04-11
Payer: COMMERCIAL

## 2023-04-11 NOTE — PROGRESS NOTES
"PATIENT: Saud Mayers  MRN: [id number]  DATE: 4/12/2023    Diagnosis:   1. Diffuse large B-cell lymphoma of lymph nodes of multiple regions    2. Immunodeficiency due to drug therapy    3. Anemia in neoplastic disease    4. Anemia due to antineoplastic chemotherapy    5. Tumor lysis syndrome    6. Chemotherapy-induced nausea and vomiting    7. Folate deficiency    8. Mucositis due to chemotherapy    9. Type 2 diabetes mellitus with hyperglycemia, without long-term current use of insulin    10. Severe obesity (BMI 35.0-39.9) with comorbidity      Chief Complaint: Lymphoma    Oncologic History:      Oncologic History Diffuse large B-cell lymphoma      Oncologic Treatment R-CHOP (start date 12/21/22)      Pathology 11/23/22:  "LYMPH NODE", RIGHT CERVICAL, EXCISION:   -- DIFFUSE LARGE B-CELL LYMPHOMA, NON-GCB SUBTYPE (SEE COMMENT).     Flow cytometric analysis of right cervical lymph node detects a kappa light   chain restricted B lymphocyte population expressing CD19 and CD20, and CD5   (partial and dim).    CD10 is negative.  T lymphocytes are   immunophenotypically unremarkable.  Correlation with tissue morphology is   required. Flow differential: Lymphocytes 35.8%, Monocytes 0.0%, Granulocytes   4.4%, Blast  0.0%, Debris/nRBC 60.0%,  Viability 43.5%.   Immunohistochemical stains are performed with adequate c ontrols.  The large   lymphoid cells are positive for CD20, BCL6 (>30%), MUM1 (>30%), and BCL2   (>50%); negative for CD5, CD10, CD30, cyclin D1, ALK1 and EBV (VICKY in-situ   hybridization).  They display high proliferation index (Ki-67 90%).   CD3-positive small mature T-cells are seen.   Taken together, the overall findings consistent with diffuse large B-cell   lymphoma, non-GCB subtype.   1. Immunohistochemical stain:  The lymphoma cells are positive for MYC (>40%).   2. Fluorescence in-situ hybridization: The result is abnormal and indicates a   BCL6 rearrangement in 91% of nuclei.  No rearrangement of MYC " or BCL2 and no   fusion of MYC and IGH was observed.  Rearrangement of BCL6 has been   associated with several B-cell lymphomas, including follicular lymphoma,   diffuse large B-cell lymphoma, and high-grade B-cell lymphoma.  As no MYC   rearrangement and no fusion of MYC and IGH was observed, this makes unlikely   the possibility of high-grade B-cell lymphoma with MYC and BCL6 rearrangement.   Taken together, the overall findings are consistent with diffuse large B-cell   lymphoma, NOS (non-GCB subtype).  The lymphoma cells display MYC and BCL2   double expressor phenotype.       Flow cytometry - lymph node (11/23/22):  Flow cytometric analysis of lymph node detects a kappa light chain restricted   B lymphocyte population expressing CD19 and CD20, and CD5 (partial and dim).     CD10 is negative.  T lymphocytes are immunophenotypically unremarkable.   Correlation with tissue morphology is required.   Flow differential:  Lymphocytes 35.8%, Monocytes 0.0%, Granulocytes  4.4%,   Blast  0.0%, Debris/nRBC 60.0%,  Viability 43.5%.           Subjective:    History of Present Illness: Mr. Mayers is a 58 y.o. male who presents for evaluation and management of diffuse large B-cell lymphoma. I had seen him during a hospitalization in November 2022.    - he was admitted for lymphadenopathy/weight loss.  - he underwent excisional lymph node biopsy on 11/23/22. Pathology revealed diffuse large B-cell lymphoma, non-GCB type.  - he underwent port-a-cath placement on 12/7/22 with Dr. Velazquez.  - he underwent pet scan on 12/9/22.  - he underwent port-a-cath placement on 12/7/22 with Dr. Velazquez.  - he underwent pet scan on 12/9/22.  - he underwent PET scan on 3/22/23.    Interval history:  - he presents for a follow-up appointment for his diffuse large B-cell lymphoma.  - he is scheduled for cycle #6 of R-CHOP tomorrow  - today, he is doing okay. He endorses fatigue, intermittent shoulder pain. He denies chest pain, vomiting,  diarrhea, constipation.      Past medical, surgical, family, and social histories have been reviewed and updated below.    Past Medical History:   Past Medical History:   Diagnosis Date    Asthma     Diabetes mellitus     High cholesterol     Hypertension     ANAMIKA on CPAP     Testicular hypofunction        Past Surgical History:   Past Surgical History:   Procedure Laterality Date    CARPAL TUNNEL RELEASE Bilateral 2020    CHOLECYSTECTOMY  01/01/1990    COLONOSCOPY  01/01/2013    COLONOSCOPY N/A 9/2/2022    Procedure: COLONOSCOPY sutab;  Surgeon: Jeovany Cisse MD;  Location: [facility];  Service: Endoscopy;  Laterality: N/A;    ESOPHAGOGASTRODUODENOSCOPY N/A 9/2/2022    Procedure: EGD (ESOPHAGOGASTRODUODENOSCOPY);  Surgeon: Jeovany Cisse MD;  Location: [facility] ENDO;  Service: Endoscopy;  Laterality: N/A;    INSERTION OF TUNNELED CENTRAL VENOUS CATHETER (CVC) WITH SUBCUTANEOUS PORT N/A 12/7/2022    Procedure: DOKUMYZYC-GZIZ-A-CATH;  Surgeon: Francisco Velazquez MD;  Location: [facility] OR;  Service: General;  Laterality: N/A;    ORTHOPEDIC SURGERY Bilateral 01/01/2007    feet plantar    ORTHOPEDIC SURGERY Right 01/01/2006    knee    ROTATOR CUFF REPAIR Left 01/01/1997    SURGICAL REMOVAL OF LYMPH NODE Right 11/23/2022    Procedure: EXCISION, LYMPH NODE;  Surgeon: Francisco Velazquez MD;  Location: [facility] OR;  Service: General;  Laterality: Right;   (right neck)       Family History:   Family History   Problem Relation Age of Onset    Heart attack Father        Social History:  reports that he quit smoking about 31 years ago. His smoking use included cigarettes. He has never used smokeless tobacco. He reports that he does not drink alcohol and does not use drugs.    Allergies:  Review of patient's allergies indicates:   Allergen Reactions    Gabapentin Hallucinations    Tizanidine Other (See Comments)     somnolence       Medications:  Current Outpatient Medications   Medication Sig Dispense Refill    acetaminophen (TYLENOL) 500  MG tablet Take 500 mg by mouth every 6 (six) hours as needed for Pain.      allopurinoL (ZYLOPRIM) 300 MG tablet Take 1 tablet (300 mg total) by mouth once daily. 30 tablet 11    atorvastatin (LIPITOR) 20 MG tablet Take 1 tablet (20 mg total) by mouth once daily. 90 tablet 3    cyanocobalamin (VITAMIN B-12) 100 MCG tablet Take 100 mcg by mouth once daily.      folic acid (FOLVITE) 1 MG tablet Take 1 tablet (1 mg total) by mouth once daily. 100 tablet 3    hydrOXYzine HCL (ATARAX) 10 MG Tab Take 1 tablet (10 mg total) by mouth 3 (three) times daily as needed (pruritis). 30 tablet 1    LIDOcaine-prilocaine (EMLA) cream Apply topically as needed. Apply to skin overlying port site 30 minutes before chemotherapy. 30 g 1    linaCLOtide (LINZESS) 72 mcg Cap capsule Take 1 capsule (72 mcg total) by mouth before breakfast. 90 capsule 3    losartan (COZAAR) 25 MG tablet Take 1 tablet (25 mg total) by mouth once daily. 90 tablet 3    magic mouthwash diphen/antac/lidoc/nysta 10 mLs 3 (three) times daily as needed (chemotherapy-induced mucositis.). 300 mL 1    metFORMIN (GLUCOPHAGE) 1000 MG tablet Take 1 tablet (1,000 mg total) by mouth 2 (two) times daily with meals. 180 tablet 3    metoprolol succinate (TOPROL-XL) 50 MG 24 hr tablet Take 1 tablet (50 mg total) by mouth once daily. 90 tablet 3    multivit-min-folic-vit K-lycop (ONE-A-DAY MEN'S 50 PLUS) 400- mcg Tab Take 1 tablet by mouth once daily.      ondansetron (ZOFRAN) 4 MG tablet Take 1 tablet (4 mg total) by mouth every 8 (eight) hours as needed for Nausea. 90 tablet 1    ondansetron (ZOFRAN-ODT) 8 MG TbDL Take 1 tablet (8 mg total) by mouth every 8 (eight) hours as needed (chemotherapy-induced nausea and vomiting). 40 tablet 5    predniSONE (DELTASONE) 50 MG Tab Take 2 tablets (100 mg total) by mouth As instructed (take 2 tablets (100mg ) by mouth daily on days 2-5 of each cycle of chemotherapy). 48 tablet 1     No current facility-administered medications for  this visit.       Review of Systems   Constitutional:  Positive for fatigue. Negative for fever.   HENT:  Negative for sore throat.    Eyes:  Negative for visual disturbance.   Respiratory:  Negative for shortness of breath.    Cardiovascular:  Negative for chest pain.   Gastrointestinal:  Positive for nausea. Negative for abdominal pain.   Genitourinary:  Negative for dysuria.   Musculoskeletal:  Negative for back pain.   Skin:  Negative for rash.   Neurological:  Positive for weakness. Negative for headaches.   Hematological:  Negative for adenopathy.   Psychiatric/Behavioral:  The patient is not nervous/anxious.      ECOG Performance Status:   ECOG SCORE    1 - Restricted in strenuous activity-ambulatory and able to carry out work of a light nature         Objective:      Vitals:   Vitals:    04/12/23 1403   BP: 102/60   BP Location: Left arm   Patient Position: Sitting   BP Method: Large (Automatic)   Pulse: 90   Resp: 18   SpO2: 97%   Weight: 98.8 kg (217 lb 13 oz)     BMI: Body mass index is 32.17 kg/m².    Physical Exam  Vitals and nursing note reviewed.   Constitutional:       Appearance: He is well-developed.   HENT:      Head: Normocephalic and atraumatic.   Eyes:      Pupils: Pupils are equal, round, and reactive to light.   Cardiovascular:      Rate and Rhythm: Normal rate and regular rhythm.   Pulmonary:      Effort: Pulmonary effort is normal.      Breath sounds: Normal breath sounds.   Abdominal:      General: Bowel sounds are normal.      Palpations: Abdomen is soft.   Musculoskeletal:         General: Normal range of motion.      Cervical back: Normal range of motion and neck supple.   Lymphadenopathy:      Cervical: No cervical adenopathy.      Upper Body:      Right upper body: No axillary adenopathy.      Left upper body: No axillary adenopathy.   Skin:     General: Skin is warm and dry.   Neurological:      Mental Status: He is alert and oriented to person, place, and time.   Psychiatric:          Behavior: Behavior normal.         Thought Content: Thought content normal.         Judgment: Judgment normal.       Laboratory Data:  Labs have been reviewed.    Lab Results   Component Value Date    WBC 4.71 03/22/2023    HGB 10.0 (L) 03/22/2023    HCT 31.0 (L) 03/22/2023    MCV 99 (H) 03/22/2023     03/22/2023           Imaging:    PET/CT scan (3/22/23): I have personally reviewed the images  1.  In this patient with lymphoma, there has been interval resolution of pathologically enlarged, hypermetabolic lymph nodes on both sides of the diaphragm in keeping with excellent response to therapy.     2.  Improved but persistent splenomegaly, with interval resolution of previously noted hypermetabolic foci.     3.  Diffuse uptake in the bone marrow, consistent with bone marrow hyperplasia.         Echocardiogram (12/13/22):  The left ventricle is normal in size with concentric remodeling and normal systolic function.  The estimated ejection fraction is 65%.  Normal left ventricular diastolic function.  Normal right ventricular size with normal right ventricular systolic function.  Normal central venous pressure (3 mmHg).  The left ventricular global longitudinal strain is -17.6%.  There is moderate aortic valve stenosis.  Aortic valve area is 2.04 cm2; peak velocity is 3.17 m/s; mean gradient is 26 mmHg.      PET/CT scan (12/9/22): I have personally reviewed the images  Quality of the study: Adequate.     Reference values:     Mediastinal blood pool maximum SUV: 2.9     Normal liver background maximum SUV: 3.0     In the head and neck, numerous enlarged hypermetabolic lymph nodes within bilateral cervical periclavicular lesions.  For example, Conglomerate of right cervical and supraclavicular lymph nodes with SUV 14.  Left infraclavicular lymph node measuring 1.8 cm (image 95), with SUV max 10.  There is fluid and gas within the level 2 B measuring 2.1 cm, likely related to recent biopsy.     In the chest,  numerous enlarged hypermetabolic mediastinal, hilar, and axilla lymph nodes.  For example subcarinal lymph node measuring 3.2 x 6.5 cm demonstrating SUV max of 9.8 (image 129).     In the abdomen and pelvis, there is physiologic tracer distribution within the abdominal organs and excretion into the genitourinary system.     Numerous enlarged hypermetabolic lymph nodes.  For example, conglomerate of lymph nodes within the anterior abdomen demonstrating SUV max of 18 (fused image 205).  Posterior right pararenal space hypermetabolic node measuring 3.0 x 2.4 cm with SUV max of 16 (image 218).  Conglomerate of right pelvic sidewall nodes measuring 8.0 AP x 4.4 TV x 11.1 CC cm demonstrating SUV max of 19 (image 251).     The spleen has multifocal hypermetabolic uptake within SUV max of 13 and is enlarged at 19.0 cm in craniocaudal dimension.  There are multiple areas of hypoattenuation, better characterized on prior CTs.     In the bones, there are no hypermetabolic lesions worrisome for malignancy.     Additional CT findings     Right-sided chest port tip terminating in the cavoatrial junction.  Trace left pleural fluid, prior cholecystectomy.     Impression:     Multiple hypermetabolic lymph nodes involving both sides of the diaphragm including bulky right iliac conglomerate in keeping with known large B-cell lymphoma.     Splenomegaly and multifocal hypermetabolic foci within the spleen.     The Deauville Score is: 5     Reference values:     Mediastinal blood pool maximum SUV: 2.9     Normal liver maximum SUV: 3.0      CT chest/abdomen/pelvis (11/7/22): I have personally reviewed the images     Innumerable adenopathy above and below the diaphragm and throughout the neck, LEFT axilla and chest wall, retroperitoneum and mesentery as well as in the iliac chain, right greater than left.     Findings are suspicious for lymphoma/leukemia.     Multiple splenic lesions appearing to have increased in number and size since the  "prior exam.        CT soft tissue neck (11/7/22): I have personally reviewed the images  Extensive adenopathy within the right greater than left neck with also adenopathy within the mediastinum and left axilla.  The largest lymph node in the right posterior triangle/spinal accessory region demonstrates internal necrosis as does a right paratracheal lymph node within the mediastinum..  There is inflammatory change surrounding the necrotic lymph nodes and although an infectious/inflammatory process is not excluded, a neoplastic process including lymphoma to be considered.      Assessment:       1. Diffuse large B-cell lymphoma of lymph nodes of multiple regions    2. Immunodeficiency due to drug therapy    3. Anemia in neoplastic disease    4. Anemia due to antineoplastic chemotherapy    5. Tumor lysis syndrome    6. Chemotherapy-induced nausea and vomiting    7. Folate deficiency    8. Mucositis due to chemotherapy    9. Type 2 diabetes mellitus with hyperglycemia, without long-term current use of insulin    10. Severe obesity (BMI 35.0-39.9) with comorbidity           Plan:     Diffuse large B-cell lymphoma  - I have reviewed his chart  - CT scans (11/7/22) revealed extensive adenopathy.  - excisional biopsy (11/23/22) revealed diffuse large B-cell lymphoma, non-GCB subtype.  - No rearrangement of MYC or BCL2, so not a double-hit lymphoma. 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  - I and via oncology recommend R-CHOP x 6 cycles.  - The risks and benefits of chemotherapy were discussed, written information was given, and informed consent was obtained.  - hepatitis B core antibody/surface antigen and hepatitis C antibody were negative.  - he underwent port-a-cath placement on 12/7/22 with Dr. Velazquez.  - PET/CT scan (12/9/22) revealed "Multiple hypermetabolic lymph nodes involving both sides of the diaphragm including bulky right iliac conglomerate in keeping with known large B-cell " "lymphoma."  - I sent emla cream to his pharmacy  - I sent anti-emetics to his pharmacy.  - I sent prednisone to his pharmacy  - echocardiogram (12/13/22) revealed an ejection fraction of 65%  - he received a unit of blood on 12/15/22 for symptomatic anemia  - PET/CT scan (3/22/23) [following 4 cycles of therapy] revealed "interval resolution of pathologically enlarged, hypermetabolic lymph nodes on both sides of the diaphragm in keeping with excellent response to therapy."  - Labs have been reviewed. Hemoglobin is 8.2 g/dL. Absolute neutrophil count is 3.3 k/uL. Okay to proceed with cycle #6 of R-CHOP tomorrow.  - return to clinic in 3 months. Since his pet scan on 3/22/23 revealed a complete response to therapy, we will hold off on further PET scans (end-of-treatment pet scan). Patient is in agreement with that plan.    2. Anemia in neoplastic disease   - he received a unit of blood on 12/15/22 for symptomatic anemia  - Labs have been reviewed. Hemoglobin is 8.2 g/dL.  - continue to monitor    3. Tumor lysis syndrome  - uric acid was 3.3 mg/dL  - continue allopurinol     4. Chemotherapy-induced nausea/vomiting  - mild symptoms. Continue zofran as needed.    5. Type 2 diabetes  - last hemoglobin A1c (3/22/23) was 7.7%  - continue metformin  - will monitor closely as R-CHOP includes prednisone which can worsen hyperglycemia.  - defer management to primary care    6. Folate deficiency  - continue folic acid    7 chemotherapy-induced mucositis  - stable. Continue Gerardo's solution    - return to clinic in 3 months. Since his pet scan on 3/22/23 revealed a complete response to therapy, we will hold off on further PET scans (end-of-treatment pet scan). Patient is in agreement with that plan.    Sandip Vickers M.D.  Hematology/Oncology  [facility] - [address], [address]  [place] [address]  Phone: [phone]  Fax: [phone]  "

## 2023-04-12 ENCOUNTER — OFFICE VISIT (OUTPATIENT)
Dept: HEMATOLOGY/ONCOLOGY | Facility: CLINIC | Age: 58
End: 2023-04-12
Payer: COMMERCIAL

## 2023-04-12 ENCOUNTER — LAB VISIT (OUTPATIENT)
Dept: LAB | Facility: HOSPITAL | Age: 58
End: 2023-04-12
Attending: INTERNAL MEDICINE
Payer: COMMERCIAL

## 2023-04-12 VITALS
WEIGHT: 217.81 LBS | OXYGEN SATURATION: 97 % | BODY MASS INDEX: 32.17 KG/M2 | HEART RATE: 90 BPM | RESPIRATION RATE: 18 BRPM | DIASTOLIC BLOOD PRESSURE: 60 MMHG | SYSTOLIC BLOOD PRESSURE: 102 MMHG

## 2023-04-12 DIAGNOSIS — C83.38 DIFFUSE LARGE B-CELL LYMPHOMA OF LYMPH NODES OF MULTIPLE REGIONS: Primary | ICD-10-CM

## 2023-04-12 DIAGNOSIS — R11.2 CHEMOTHERAPY-INDUCED NAUSEA AND VOMITING: ICD-10-CM

## 2023-04-12 DIAGNOSIS — E66.01 SEVERE OBESITY (BMI 35.0-39.9) WITH COMORBIDITY: ICD-10-CM

## 2023-04-12 DIAGNOSIS — T45.1X5A CHEMOTHERAPY-INDUCED NAUSEA AND VOMITING: ICD-10-CM

## 2023-04-12 DIAGNOSIS — C83.38 DIFFUSE LARGE B-CELL LYMPHOMA OF LYMPH NODES OF MULTIPLE REGIONS: ICD-10-CM

## 2023-04-12 DIAGNOSIS — D84.821 IMMUNODEFICIENCY DUE TO DRUG THERAPY: ICD-10-CM

## 2023-04-12 DIAGNOSIS — E11.65 TYPE 2 DIABETES MELLITUS WITH HYPERGLYCEMIA, WITHOUT LONG-TERM CURRENT USE OF INSULIN: ICD-10-CM

## 2023-04-12 DIAGNOSIS — T45.1X5A ANEMIA DUE TO ANTINEOPLASTIC CHEMOTHERAPY: ICD-10-CM

## 2023-04-12 DIAGNOSIS — E88.3 TUMOR LYSIS SYNDROME: ICD-10-CM

## 2023-04-12 DIAGNOSIS — Z79.899 IMMUNODEFICIENCY DUE TO DRUG THERAPY: ICD-10-CM

## 2023-04-12 DIAGNOSIS — K12.31 MUCOSITIS DUE TO CHEMOTHERAPY: ICD-10-CM

## 2023-04-12 DIAGNOSIS — D64.81 ANEMIA DUE TO ANTINEOPLASTIC CHEMOTHERAPY: ICD-10-CM

## 2023-04-12 DIAGNOSIS — E53.8 FOLATE DEFICIENCY: ICD-10-CM

## 2023-04-12 DIAGNOSIS — D63.0 ANEMIA IN NEOPLASTIC DISEASE: ICD-10-CM

## 2023-04-12 LAB
ALBUMIN SERPL BCP-MCNC: 3.5 G/DL (ref 3.5–5.2)
ALP SERPL-CCNC: 66 U/L (ref 55–135)
ALT SERPL W/O P-5'-P-CCNC: 15 U/L (ref 10–44)
ANION GAP SERPL CALC-SCNC: 10 MMOL/L (ref 8–16)
AST SERPL-CCNC: 19 U/L (ref 10–40)
BASOPHILS # BLD AUTO: 0.03 K/UL (ref 0–0.2)
BASOPHILS NFR BLD: 0.7 % (ref 0–1.9)
BILIRUB SERPL-MCNC: 0.5 MG/DL (ref 0.1–1)
BUN SERPL-MCNC: 14 MG/DL (ref 6–20)
CALCIUM SERPL-MCNC: 8.9 MG/DL (ref 8.7–10.5)
CHLORIDE SERPL-SCNC: 105 MMOL/L (ref 95–110)
CO2 SERPL-SCNC: 23 MMOL/L (ref 23–29)
CREAT SERPL-MCNC: 0.8 MG/DL (ref 0.5–1.4)
DIFFERENTIAL METHOD: ABNORMAL
EOSINOPHIL # BLD AUTO: 0 K/UL (ref 0–0.5)
EOSINOPHIL NFR BLD: 0 % (ref 0–8)
ERYTHROCYTE [DISTWIDTH] IN BLOOD BY AUTOMATED COUNT: 18.3 % (ref 11.5–14.5)
EST. GFR  (NO RACE VARIABLE): >60 ML/MIN/1.73 M^2
GLUCOSE SERPL-MCNC: 175 MG/DL (ref 70–110)
HCT VFR BLD AUTO: 25.6 % (ref 40–54)
HGB BLD-MCNC: 8.2 G/DL (ref 14–18)
IMM GRANULOCYTES # BLD AUTO: 0.03 K/UL (ref 0–0.04)
IMM GRANULOCYTES NFR BLD AUTO: 0.7 % (ref 0–0.5)
LDH SERPL L TO P-CCNC: 284 U/L (ref 110–260)
LYMPHOCYTES # BLD AUTO: 0.4 K/UL (ref 1–4.8)
LYMPHOCYTES NFR BLD: 9.3 % (ref 18–48)
MCH RBC QN AUTO: 32.7 PG (ref 27–31)
MCHC RBC AUTO-ENTMCNC: 32 G/DL (ref 32–36)
MCV RBC AUTO: 102 FL (ref 82–98)
MONOCYTES # BLD AUTO: 0.4 K/UL (ref 0.3–1)
MONOCYTES NFR BLD: 9.5 % (ref 4–15)
NEUTROPHILS # BLD AUTO: 3.3 K/UL (ref 1.8–7.7)
NEUTROPHILS NFR BLD: 79.8 % (ref 38–73)
NRBC BLD-RTO: 0 /100 WBC
PLATELET # BLD AUTO: 169 K/UL (ref 150–450)
PMV BLD AUTO: 8.8 FL (ref 9.2–12.9)
POTASSIUM SERPL-SCNC: 4.3 MMOL/L (ref 3.5–5.1)
PROT SERPL-MCNC: 6.8 G/DL (ref 6–8.4)
RBC # BLD AUTO: 2.51 M/UL (ref 4.6–6.2)
SODIUM SERPL-SCNC: 138 MMOL/L (ref 136–145)
URATE SERPL-MCNC: 3.3 MG/DL (ref 3.4–7)
WBC # BLD AUTO: 4.09 K/UL (ref 3.9–12.7)

## 2023-04-12 PROCEDURE — 3008F BODY MASS INDEX DOCD: CPT | Mod: CPTII,S$GLB,, | Performed by: INTERNAL MEDICINE

## 2023-04-12 PROCEDURE — 3008F PR BODY MASS INDEX (BMI) DOCUMENTED: ICD-10-PCS | Mod: CPTII,S$GLB,, | Performed by: INTERNAL MEDICINE

## 2023-04-12 PROCEDURE — 99999 PR PBB SHADOW E&M-EST. PATIENT-LVL IV: CPT | Mod: PBBFAC,,, | Performed by: INTERNAL MEDICINE

## 2023-04-12 PROCEDURE — 84550 ASSAY OF BLOOD/URIC ACID: CPT | Performed by: INTERNAL MEDICINE

## 2023-04-12 PROCEDURE — 1159F MED LIST DOCD IN RCRD: CPT | Mod: CPTII,S$GLB,, | Performed by: INTERNAL MEDICINE

## 2023-04-12 PROCEDURE — 83615 LACTATE (LD) (LDH) ENZYME: CPT | Performed by: INTERNAL MEDICINE

## 2023-04-12 PROCEDURE — 1160F PR REVIEW ALL MEDS BY PRESCRIBER/CLIN PHARMACIST DOCUMENTED: ICD-10-PCS | Mod: CPTII,S$GLB,, | Performed by: INTERNAL MEDICINE

## 2023-04-12 PROCEDURE — 3078F PR MOST RECENT DIASTOLIC BLOOD PRESSURE < 80 MM HG: ICD-10-PCS | Mod: CPTII,S$GLB,, | Performed by: INTERNAL MEDICINE

## 2023-04-12 PROCEDURE — 3078F DIAST BP <80 MM HG: CPT | Mod: CPTII,S$GLB,, | Performed by: INTERNAL MEDICINE

## 2023-04-12 PROCEDURE — 3051F PR MOST RECENT HEMOGLOBIN A1C LEVEL 7.0 - < 8.0%: ICD-10-PCS | Mod: CPTII,S$GLB,, | Performed by: INTERNAL MEDICINE

## 2023-04-12 PROCEDURE — 1159F PR MEDICATION LIST DOCUMENTED IN MEDICAL RECORD: ICD-10-PCS | Mod: CPTII,S$GLB,, | Performed by: INTERNAL MEDICINE

## 2023-04-12 PROCEDURE — 1160F RVW MEDS BY RX/DR IN RCRD: CPT | Mod: CPTII,S$GLB,, | Performed by: INTERNAL MEDICINE

## 2023-04-12 PROCEDURE — 85025 COMPLETE CBC W/AUTO DIFF WBC: CPT | Performed by: INTERNAL MEDICINE

## 2023-04-12 PROCEDURE — 36415 COLL VENOUS BLD VENIPUNCTURE: CPT | Performed by: INTERNAL MEDICINE

## 2023-04-12 PROCEDURE — 80053 COMPREHEN METABOLIC PANEL: CPT | Performed by: INTERNAL MEDICINE

## 2023-04-12 PROCEDURE — 3074F PR MOST RECENT SYSTOLIC BLOOD PRESSURE < 130 MM HG: ICD-10-PCS | Mod: CPTII,S$GLB,, | Performed by: INTERNAL MEDICINE

## 2023-04-12 PROCEDURE — 99215 PR OFFICE/OUTPT VISIT, EST, LEVL V, 40-54 MIN: ICD-10-PCS | Mod: S$GLB,,, | Performed by: INTERNAL MEDICINE

## 2023-04-12 PROCEDURE — 3074F SYST BP LT 130 MM HG: CPT | Mod: CPTII,S$GLB,, | Performed by: INTERNAL MEDICINE

## 2023-04-12 PROCEDURE — 3051F HG A1C>EQUAL 7.0%<8.0%: CPT | Mod: CPTII,S$GLB,, | Performed by: INTERNAL MEDICINE

## 2023-04-12 PROCEDURE — 4010F PR ACE/ARB THEARPY RXD/TAKEN: ICD-10-PCS | Mod: CPTII,S$GLB,, | Performed by: INTERNAL MEDICINE

## 2023-04-12 PROCEDURE — 4010F ACE/ARB THERAPY RXD/TAKEN: CPT | Mod: CPTII,S$GLB,, | Performed by: INTERNAL MEDICINE

## 2023-04-12 PROCEDURE — 99999 PR PBB SHADOW E&M-EST. PATIENT-LVL IV: ICD-10-PCS | Mod: PBBFAC,,, | Performed by: INTERNAL MEDICINE

## 2023-04-12 PROCEDURE — 99215 OFFICE O/P EST HI 40 MIN: CPT | Mod: S$GLB,,, | Performed by: INTERNAL MEDICINE

## 2023-04-12 RX ORDER — ACETAMINOPHEN 325 MG/1
650 TABLET ORAL
Status: CANCELLED | OUTPATIENT
Start: 2023-04-13

## 2023-04-12 RX ORDER — FAMOTIDINE 10 MG/ML
20 INJECTION INTRAVENOUS
Status: CANCELLED | OUTPATIENT
Start: 2023-04-13

## 2023-04-12 RX ORDER — SODIUM CHLORIDE 0.9 % (FLUSH) 0.9 %
10 SYRINGE (ML) INJECTION
Status: CANCELLED | OUTPATIENT
Start: 2023-04-13

## 2023-04-12 RX ORDER — DOXORUBICIN HYDROCHLORIDE 2 MG/ML
50 INJECTION, SOLUTION INTRAVENOUS
Status: CANCELLED | OUTPATIENT
Start: 2023-04-13

## 2023-04-12 RX ORDER — HEPARIN 100 UNIT/ML
500 SYRINGE INTRAVENOUS
Status: CANCELLED | OUTPATIENT
Start: 2023-04-13

## 2023-04-12 RX ORDER — PREDNISONE 50 MG/1
100 TABLET ORAL SEE ADMIN INSTRUCTIONS
Qty: 8 TABLET | Refills: 0 | Status: SHIPPED | OUTPATIENT
Start: 2023-04-12 | End: 2023-05-05 | Stop reason: ALTCHOICE

## 2023-04-12 RX ORDER — MEPERIDINE HYDROCHLORIDE 50 MG/ML
25 INJECTION INTRAMUSCULAR; INTRAVENOUS; SUBCUTANEOUS
Status: CANCELLED
Start: 2023-04-13

## 2023-04-13 ENCOUNTER — INFUSION (OUTPATIENT)
Dept: INFUSION THERAPY | Facility: HOSPITAL | Age: 58
End: 2023-04-13
Attending: INTERNAL MEDICINE
Payer: COMMERCIAL

## 2023-04-13 VITALS
TEMPERATURE: 98 F | SYSTOLIC BLOOD PRESSURE: 148 MMHG | BODY MASS INDEX: 32.26 KG/M2 | RESPIRATION RATE: 18 BRPM | HEART RATE: 78 BPM | WEIGHT: 217.81 LBS | OXYGEN SATURATION: 100 % | DIASTOLIC BLOOD PRESSURE: 67 MMHG | HEIGHT: 69 IN

## 2023-04-13 DIAGNOSIS — C83.31 DIFFUSE LARGE B-CELL LYMPHOMA OF LYMPH NODES OF NECK: Primary | ICD-10-CM

## 2023-04-13 PROCEDURE — 96411 CHEMO IV PUSH ADDL DRUG: CPT

## 2023-04-13 PROCEDURE — 96377 APPLICATON ON-BODY INJECTOR: CPT

## 2023-04-13 PROCEDURE — 96375 TX/PRO/DX INJ NEW DRUG ADDON: CPT

## 2023-04-13 PROCEDURE — 25000003 PHARM REV CODE 250: Performed by: INTERNAL MEDICINE

## 2023-04-13 PROCEDURE — 63600175 PHARM REV CODE 636 W HCPCS: Performed by: INTERNAL MEDICINE

## 2023-04-13 PROCEDURE — 96413 CHEMO IV INFUSION 1 HR: CPT

## 2023-04-13 PROCEDURE — 96401 CHEMO ANTI-NEOPL SQ/IM: CPT

## 2023-04-13 PROCEDURE — A4216 STERILE WATER/SALINE, 10 ML: HCPCS | Performed by: INTERNAL MEDICINE

## 2023-04-13 PROCEDURE — 96367 TX/PROPH/DG ADDL SEQ IV INF: CPT

## 2023-04-13 RX ORDER — FAMOTIDINE 10 MG/ML
20 INJECTION INTRAVENOUS
Status: COMPLETED | OUTPATIENT
Start: 2023-04-13 | End: 2023-04-13

## 2023-04-13 RX ORDER — SODIUM CHLORIDE 0.9 % (FLUSH) 0.9 %
10 SYRINGE (ML) INJECTION
Status: DISCONTINUED | OUTPATIENT
Start: 2023-04-13 | End: 2023-04-13 | Stop reason: HOSPADM

## 2023-04-13 RX ORDER — MEPERIDINE HYDROCHLORIDE 50 MG/ML
25 INJECTION INTRAMUSCULAR; INTRAVENOUS; SUBCUTANEOUS
Status: DISCONTINUED | OUTPATIENT
Start: 2023-04-13 | End: 2023-04-13 | Stop reason: HOSPADM

## 2023-04-13 RX ORDER — DOXORUBICIN HYDROCHLORIDE 2 MG/ML
50 INJECTION, SOLUTION INTRAVENOUS
Status: COMPLETED | OUTPATIENT
Start: 2023-04-13 | End: 2023-04-13

## 2023-04-13 RX ORDER — ACETAMINOPHEN 325 MG/1
650 TABLET ORAL
Status: COMPLETED | OUTPATIENT
Start: 2023-04-13 | End: 2023-04-13

## 2023-04-13 RX ORDER — HEPARIN 100 UNIT/ML
500 SYRINGE INTRAVENOUS
Status: DISCONTINUED | OUTPATIENT
Start: 2023-04-13 | End: 2023-04-13 | Stop reason: HOSPADM

## 2023-04-13 RX ADMIN — PEGFILGRASTIM 6 MG: KIT SUBCUTANEOUS at 11:04

## 2023-04-13 RX ADMIN — VINCRISTINE SULFATE 2 MG: 1 INJECTION, SOLUTION INTRAVENOUS at 09:04

## 2023-04-13 RX ADMIN — DOXORUBICIN HYDROCHLORIDE 108 MG: 2 INJECTION, SOLUTION INTRAVENOUS at 10:04

## 2023-04-13 RX ADMIN — FAMOTIDINE 20 MG: 10 INJECTION INTRAVENOUS at 08:04

## 2023-04-13 RX ADMIN — ACETAMINOPHEN 650 MG: 325 TABLET ORAL at 08:04

## 2023-04-13 RX ADMIN — DEXAMETHASONE SODIUM PHOSPHATE 0.25 MG: 10 INJECTION, SOLUTION INTRAMUSCULAR; INTRAVENOUS at 09:04

## 2023-04-13 RX ADMIN — SODIUM CHLORIDE: 9 INJECTION, SOLUTION INTRAVENOUS at 08:04

## 2023-04-13 RX ADMIN — RITUXIMAB AND HYALURONIDASE 1400 MG: 120; 2000 INJECTION, SOLUTION SUBCUTANEOUS at 09:04

## 2023-04-13 RX ADMIN — Medication 10 ML: at 11:04

## 2023-04-13 RX ADMIN — DIPHENHYDRAMINE HYDROCHLORIDE 50 MG: 50 INJECTION INTRAMUSCULAR; INTRAVENOUS at 08:04

## 2023-04-13 RX ADMIN — CYCLOPHOSPHAMIDE 1620 MG: 200 INJECTION, SOLUTION INTRAVENOUS at 10:04

## 2023-04-13 RX ADMIN — HEPARIN 500 UNITS: 100 SYRINGE at 11:04

## 2023-04-13 NOTE — NURSING
CHOP given through PAC, noted for having positive blood return + SQ Hycela.  Dosage and BSA checked by two chemotherapy certified nurses.  Chemotherapeutic precautions in place throughout therapy. Pt tolerated it well, no adverse reactions noted.  PAC flushed with heparin and de-accessed per protocol. Nulesta OBI placed and instructions given.  AVS given. Discharged with no acute distress. Pt will follow up with MD.

## 2023-04-14 ENCOUNTER — PATIENT MESSAGE (OUTPATIENT)
Dept: HEMATOLOGY/ONCOLOGY | Facility: CLINIC | Age: 58
End: 2023-04-14
Payer: COMMERCIAL

## 2023-04-20 PROCEDURE — 93010 ELECTROCARDIOGRAM REPORT: CPT | Mod: ,,, | Performed by: INTERNAL MEDICINE

## 2023-04-20 PROCEDURE — 99285 EMERGENCY DEPT VISIT HI MDM: CPT

## 2023-04-20 PROCEDURE — 93010 EKG 12-LEAD: ICD-10-PCS | Mod: ,,, | Performed by: INTERNAL MEDICINE

## 2023-04-20 PROCEDURE — 93005 ELECTROCARDIOGRAM TRACING: CPT

## 2023-04-20 RX ORDER — ASPIRIN 325 MG
325 TABLET ORAL
Status: COMPLETED | OUTPATIENT
Start: 2023-04-21 | End: 2023-04-21

## 2023-04-21 ENCOUNTER — PATIENT MESSAGE (OUTPATIENT)
Dept: HEMATOLOGY/ONCOLOGY | Facility: CLINIC | Age: 58
End: 2023-04-21
Payer: COMMERCIAL

## 2023-04-21 ENCOUNTER — HOSPITAL ENCOUNTER (EMERGENCY)
Facility: HOSPITAL | Age: 58
Discharge: HOME | End: 2023-04-21
Attending: STUDENT IN AN ORGANIZED HEALTH CARE EDUCATION/TRAINING PROGRAM
Payer: COMMERCIAL

## 2023-04-21 VITALS
WEIGHT: 217 LBS | TEMPERATURE: 99 F | SYSTOLIC BLOOD PRESSURE: 131 MMHG | OXYGEN SATURATION: 99 % | HEIGHT: 69 IN | DIASTOLIC BLOOD PRESSURE: 63 MMHG | BODY MASS INDEX: 32.14 KG/M2 | RESPIRATION RATE: 18 BRPM | HEART RATE: 71 BPM

## 2023-04-21 DIAGNOSIS — R79.89 ELEVATED TROPONIN: ICD-10-CM

## 2023-04-21 DIAGNOSIS — R07.9 CHEST PAIN: ICD-10-CM

## 2023-04-21 DIAGNOSIS — R07.89 TIGHTNESS IN CHEST: ICD-10-CM

## 2023-04-21 DIAGNOSIS — D64.9 ANEMIA, UNSPECIFIED TYPE: Primary | ICD-10-CM

## 2023-04-21 DIAGNOSIS — D69.6 THROMBOCYTOPENIA: ICD-10-CM

## 2023-04-21 LAB
ALBUMIN SERPL BCP-MCNC: 3.5 G/DL (ref 3.5–5.2)
ALP SERPL-CCNC: 82 U/L (ref 55–135)
ALT SERPL W/O P-5'-P-CCNC: 15 U/L (ref 10–44)
ANION GAP SERPL CALC-SCNC: 9 MMOL/L (ref 8–16)
ANISOCYTOSIS BLD QL SMEAR: SLIGHT
AST SERPL-CCNC: 14 U/L (ref 10–40)
BASOPHILS NFR BLD: 3.3 % (ref 0–1.9)
BILIRUB SERPL-MCNC: 0.6 MG/DL (ref 0.1–1)
BILIRUB UR QL STRIP: NEGATIVE
BNP SERPL-MCNC: 33 PG/ML (ref 0–99)
BUN SERPL-MCNC: 11 MG/DL (ref 6–20)
CALCIUM SERPL-MCNC: 9 MG/DL (ref 8.7–10.5)
CHLORIDE SERPL-SCNC: 103 MMOL/L (ref 95–110)
CLARITY UR: CLEAR
CO2 SERPL-SCNC: 26 MMOL/L (ref 23–29)
COLOR UR: COLORLESS
CREAT SERPL-MCNC: 0.7 MG/DL (ref 0.5–1.4)
DACRYOCYTES BLD QL SMEAR: ABNORMAL
DIFFERENTIAL METHOD: ABNORMAL
EOSINOPHIL NFR BLD: 6.7 % (ref 0–8)
ERYTHROCYTE [DISTWIDTH] IN BLOOD BY AUTOMATED COUNT: 16.4 % (ref 11.5–14.5)
EST. GFR  (NO RACE VARIABLE): >60 ML/MIN/1.73 M^2
GIANT PLATELETS BLD QL SMEAR: PRESENT
GLUCOSE SERPL-MCNC: 150 MG/DL (ref 70–110)
GLUCOSE UR QL STRIP: ABNORMAL
HCT VFR BLD AUTO: 22.7 % (ref 40–54)
HGB BLD-MCNC: 7.4 G/DL (ref 14–18)
HGB UR QL STRIP: NEGATIVE
HYPOCHROMIA BLD QL SMEAR: ABNORMAL
IMM GRANULOCYTES # BLD AUTO: ABNORMAL K/UL (ref 0–0.04)
IMM GRANULOCYTES NFR BLD AUTO: ABNORMAL % (ref 0–0.5)
KETONES UR QL STRIP: NEGATIVE
LACTATE SERPL-SCNC: 1.5 MMOL/L (ref 0.5–2.2)
LEUKOCYTE ESTERASE UR QL STRIP: NEGATIVE
LYMPHOCYTES NFR BLD: 31.7 % (ref 18–48)
MCH RBC QN AUTO: 32.3 PG (ref 27–31)
MCHC RBC AUTO-ENTMCNC: 32.6 G/DL (ref 32–36)
MCV RBC AUTO: 99 FL (ref 82–98)
MONOCYTES NFR BLD: 10 % (ref 4–15)
NEUTROPHILS NFR BLD: 48.3 % (ref 38–73)
NITRITE UR QL STRIP: NEGATIVE
NRBC BLD-RTO: 0 /100 WBC
OVALOCYTES BLD QL SMEAR: ABNORMAL
PH UR STRIP: 7 [PH] (ref 5–8)
PLATELET # BLD AUTO: 55 K/UL (ref 150–450)
PLATELET BLD QL SMEAR: ABNORMAL
PMV BLD AUTO: 9.8 FL (ref 9.2–12.9)
POIKILOCYTOSIS BLD QL SMEAR: SLIGHT
POTASSIUM SERPL-SCNC: 3.6 MMOL/L (ref 3.5–5.1)
PROCALCITONIN SERPL IA-MCNC: 0.16 NG/ML
PROT SERPL-MCNC: 6.6 G/DL (ref 6–8.4)
PROT UR QL STRIP: NEGATIVE
RBC # BLD AUTO: 2.29 M/UL (ref 4.6–6.2)
SODIUM SERPL-SCNC: 138 MMOL/L (ref 136–145)
SP GR UR STRIP: 1.01 (ref 1–1.03)
TOXIC GRANULES BLD QL SMEAR: PRESENT
TROPONIN I SERPL DL<=0.01 NG/ML-MCNC: 0.04 NG/ML (ref 0–0.03)
URN SPEC COLLECT METH UR: ABNORMAL
UROBILINOGEN UR STRIP-ACNC: NEGATIVE EU/DL
WBC # BLD AUTO: 0.67 K/UL (ref 3.9–12.7)

## 2023-04-21 PROCEDURE — 83880 ASSAY OF NATRIURETIC PEPTIDE: CPT | Performed by: PHYSICIAN ASSISTANT

## 2023-04-21 PROCEDURE — 81003 URINALYSIS AUTO W/O SCOPE: CPT | Performed by: STUDENT IN AN ORGANIZED HEALTH CARE EDUCATION/TRAINING PROGRAM

## 2023-04-21 PROCEDURE — 84145 PROCALCITONIN (PCT): CPT | Performed by: STUDENT IN AN ORGANIZED HEALTH CARE EDUCATION/TRAINING PROGRAM

## 2023-04-21 PROCEDURE — 85027 COMPLETE CBC AUTOMATED: CPT | Performed by: PHYSICIAN ASSISTANT

## 2023-04-21 PROCEDURE — 83605 ASSAY OF LACTIC ACID: CPT | Performed by: STUDENT IN AN ORGANIZED HEALTH CARE EDUCATION/TRAINING PROGRAM

## 2023-04-21 PROCEDURE — 25000003 PHARM REV CODE 250: Performed by: PHYSICIAN ASSISTANT

## 2023-04-21 PROCEDURE — 87040 BLOOD CULTURE FOR BACTERIA: CPT | Performed by: STUDENT IN AN ORGANIZED HEALTH CARE EDUCATION/TRAINING PROGRAM

## 2023-04-21 PROCEDURE — 84484 ASSAY OF TROPONIN QUANT: CPT | Performed by: PHYSICIAN ASSISTANT

## 2023-04-21 PROCEDURE — 80053 COMPREHEN METABOLIC PANEL: CPT | Performed by: PHYSICIAN ASSISTANT

## 2023-04-21 PROCEDURE — 85007 BL SMEAR W/DIFF WBC COUNT: CPT | Performed by: PHYSICIAN ASSISTANT

## 2023-04-21 RX ADMIN — ASPIRIN 325 MG ORAL TABLET 325 MG: 325 PILL ORAL at 12:04

## 2023-04-21 NOTE — ED PROVIDER NOTES
"Encounter Date: 4/20/2023       History     Chief Complaint   Patient presents with    Chest Pain     Complaining of chest tightness x 3 days.  Finished chemo last Thursday.     58-year-old male with past medical history of large B-cell lymphoma recently finished chemotherapy, asthma, diabetes, hypertension presenting with chest tightness for the past 3 days.  Patient denies any exertional component to his symptoms.  He states been exerting himself by putting wincing at his house.  Denies any shortness of breath cough or hemoptysis.  Denies any fevers or chills.  Denies any lower extremity swelling.  He reports at time feeling nauseous been no episodes of emesis.  Patient denies specific chest pain states feels like "heaviness." He reports having these symptoms in the past after chemotherapy      Review of patient's allergies indicates:   Allergen Reactions    Gabapentin Hallucinations    Tizanidine Other (See Comments)     somnolence     Past Medical History:   Diagnosis Date    Asthma     Diabetes mellitus     High cholesterol     Hypertension     ANAMIKA on CPAP     Testicular hypofunction      Past Surgical History:   Procedure Laterality Date    CARPAL TUNNEL RELEASE Bilateral 2020    CHOLECYSTECTOMY  01/01/1990    COLONOSCOPY  01/01/2013    COLONOSCOPY N/A 9/2/2022    Procedure: COLONOSCOPY sutab;  Surgeon: Jeovany Cisse MD;  Location: [facility];  Service: Endoscopy;  Laterality: N/A;    ESOPHAGOGASTRODUODENOSCOPY N/A 9/2/2022    Procedure: EGD (ESOPHAGOGASTRODUODENOSCOPY);  Surgeon: Jeovany Cisse MD;  Location: [facility];  Service: Endoscopy;  Laterality: N/A;    INSERTION OF TUNNELED CENTRAL VENOUS CATHETER (CVC) WITH SUBCUTANEOUS PORT N/A 12/7/2022    Procedure: [id number]-[id number];  Surgeon: Francisco Velazquez MD;  Location: [facility];  Service: General;  Laterality: N/A;    ORTHOPEDIC SURGERY Bilateral 01/01/2007    feet plantar    ORTHOPEDIC SURGERY Right 01/01/2006    knee    ROTATOR CUFF REPAIR Left " 1997    SURGICAL REMOVAL OF LYMPH NODE Right 2022    Procedure: EXCISION, LYMPH NODE;  Surgeon: Francisco Velazquez MD;  Location: [facility];  Service: General;  Laterality: Right;   (right neck)     Family History   Problem Relation Age of Onset    Heart attack Father      Social History     Tobacco Use    Smoking status: Former     Types: Cigarettes     Quit date: 1/15/1992     Years since quittin.2    Smokeless tobacco: Never   Substance Use Topics    Alcohol use: Never    Drug use: Never     Review of Systems   Constitutional:  Negative for chills and fever.   HENT:  Negative for congestion and rhinorrhea.    Eyes:  Negative for pain.   Respiratory:  Positive for chest tightness. Negative for cough and shortness of breath.    Cardiovascular:  Positive for chest pain. Negative for leg swelling.   Gastrointestinal:  Negative for abdominal pain, nausea and vomiting.   Endocrine: Negative for polyuria.   Genitourinary:  Negative for dysuria and hematuria.   Musculoskeletal:  Negative for gait problem and neck pain.   Skin:  Negative for rash.   Allergic/Immunologic: Negative for immunocompromised state.   Neurological:  Negative for weakness and headaches.     Physical Exam     Initial Vitals [23 2341]   BP Pulse Resp Temp SpO2   125/61 79 18 98.7 °F (37.1 °C) 99 %      MAP       --         Physical Exam    Constitutional: He appears well-developed and well-nourished. He is not diaphoretic. No distress.   HENT:   Head: Normocephalic and atraumatic.   Eyes: Conjunctivae and EOM are normal. Pupils are equal, round, and reactive to light.   Neck:   Normal range of motion.  Cardiovascular:  Regular rhythm.           Pulses:       Radial pulses are 2+ on the right side and 2+ on the left side.        Posterior tibial pulses are 2+ on the right side and 2+ on the left side.   Pulmonary/Chest: Breath sounds normal. No respiratory distress.   Abdominal: Abdomen is soft. Bowel sounds are normal. He exhibits  no distension. There is no abdominal tenderness. There is no rebound and no guarding.   Musculoskeletal:         General: No tenderness. Normal range of motion.      Cervical back: Normal range of motion.     Lymphadenopathy:     He has no cervical adenopathy.   Neurological: He is alert and oriented to person, place, and time.   Skin: Skin is warm. Capillary refill takes less than 2 seconds.   Psychiatric: His behavior is normal.       ED Course   Procedures  Labs Reviewed   CBC W/ AUTO DIFFERENTIAL - Abnormal; Notable for the following components:       Result Value    WBC 0.67 (*)     RBC 2.29 (*)     Hemoglobin 7.4 (*)     Hematocrit 22.7 (*)     MCV 99 (*)     MCH 32.3 (*)     RDW 16.4 (*)     Platelets 55 (*)     Basophil % 3.3 (*)     Platelet Estimate Decreased (*)     All other components within normal limits    Narrative:       WBC critical result(s) called and verbal readback obtained from Jovanny Capps RN by DW5 04/21/2023 01:19   COMPREHENSIVE METABOLIC PANEL - Abnormal; Notable for the following components:    Glucose 150 (*)     All other components within normal limits   TROPONIN I - Abnormal; Notable for the following components:    Troponin I 0.039 (*)     All other components within normal limits   URINALYSIS, REFLEX TO URINE CULTURE - Abnormal; Notable for the following components:    Color, UA Colorless (*)     Glucose, UA Trace (*)     All other components within normal limits    Narrative:     Specimen Source->Urine   CULTURE, BLOOD   CULTURE, BLOOD   B-TYPE NATRIURETIC PEPTIDE   LACTIC ACID, PLASMA   PROCALCITONIN     EKG Readings: (Independently Interpreted)   Independent Interpretation of EKG:  Rhythm: Sinus   Rate: 78  Axis: normal  QTC: 440  No STEMI         Imaging Results              X-Ray Chest AP Portable (Final result)  Result time 04/21/23 01:31:06      Final result by Gianluca Higuera DO (04/21/23 01:31:06)                   Impression:      No acute abnormality.      Electronically  signed by: Gianluca Higuera  Date:    04/21/2023  Time:    01:31               Narrative:    EXAMINATION:  XR CHEST AP PORTABLE    CLINICAL HISTORY:  Chest Pain;    TECHNIQUE:  Single frontal view of the chest was performed.    COMPARISON:  11/11/2022.    FINDINGS:  There is a right chest wall port.    The lungs are well expanded and clear. No focal opacities are seen. The pleural spaces are clear.    The cardiac silhouette is unremarkable.    The visualized osseous structures are unremarkable.                                       Medications   aspirin tablet 325 mg (325 mg Oral Given 4/21/23 0054)     Medical Decision Making:   History:   Old Medical Records: I decided to obtain old medical records.  Initial Assessment:   58-year-old male with past medical history of large B-cell lymphoma recently finished chemotherapy, asthma, diabetes, hypertension presenting with chest tightness for the past 3 days.  Patient currently asymptomatic.  Vitals within normal limits.  EKG with no STEMI.  Given patient had recent chemotherapy suspect symptoms may be due to gastritis.  Patient given aspirin prior to my evaluation.  Exam without evidence of volume overload.. Given the timing of pain to ER presentation, plan to send single troponin to evaluate for NSTEMI. Presentation not consistent with pneumothorax, thoracic arotic dissection, cardiac effusion or tamponade.    Plan: labs, troponin, EKG, CXR, pain control, serial reassessment   Differential Diagnosis:   Differential Diagnosis includes, but is not limited to:  ACS/MI, PE, aortic dissection, pneumothorax, cardiac tamponade, pericarditis/myocarditis, pneumonia, infection/abscess, lung mass, trauma/fracture, costochondritis/pleurisy, MSK pain/contusion, GERD, biliary disease, pancreatitis, anemia    Clinical Tests:   Lab Tests: Ordered and Reviewed  Radiological Study: Ordered and Reviewed  Medical Tests: Ordered and Reviewed           ED Course as of 04/21/23 0310 Fri Apr  21, 2023   0122 WBC(!!): 0.67  Patient without any infectious symptoms such as fevers or chills but given significant leukopenia will obtain lactic acid procalcitonin, and blood cultures [AS]   0133 Chem 14 negative for hypo-or hyper natremia, kalemia, chloridemia, or other electrolyte abnormalities; BUN and creatinine were within normal limits indicating normal kidney function, ALT and AST were within normal limits indicating normal liver function.   [AS]   0246 This patient has elected to leave against medical advice. In my opinion, the patient has the capacity to leave AMA. The patient is clinically sober, free from distracting injury, appears to have intact insight and judgment and reason, and in my opinion has capacity to make decisions. I explained to the patient that his symptoms may represent risk to their life and the patient verbalized understanding of my concerns.    I had a discussion with the patient about their workup and results, and that they may still have a life, limb and/or vision threatening illness without completion of this workup. I informed the patient what the next step in diagnosis and treatment would be and they verbalized understanding of this as well. I explained the risks of leaving without further workup or treatment, which included reasonably foreseeable complications such as death, serious injury, permanent disability, and detrimental effects to their health overall. I also offered alternatives to departing AMA such as assigning the patient a different provider or an alternate workup pathway.    The patient is refusing any further care and is leaving against medical advice. I am unable to convince the patient to stay. I have asked them to return as soon as possible to complete their evaluation, and also explained that they were welcome to return to the ER for further evaluation whenever they choose. I have asked the patient to follow up with their primary doctor as soon as possible.  I have answered all their questions. Patient did  sign AMA paperwork.   [AS]      ED Course User Index  [AS] Everardo Scott MD            DISCLAIMER: This note was prepared with [employer] voice recognition transcription software. Garbled syntax, mangled pronouns, and other bizarre constructions may be attributed to that software system.        Clinical Impression:   Final diagnoses:  [R07.89] Tightness in chest  [R07.9] Chest pain  [D64.9] Anemia, unspecified type (Primary)  [D69.6] Thrombocytopenia  [R77.8] Elevated troponin        ED Disposition Condition    AMA Stable                Everardo Scott MD  04/21/23 6685

## 2023-04-21 NOTE — FIRST PROVIDER EVALUATION
Emergency Department TeleTriage Encounter Note      CHIEF COMPLAINT    Chief Complaint   Patient presents with    Chest Pain     Complaining of chest tightness x 3 days.  Finished chemo last Thursday.       VITAL SIGNS   Initial Vitals [04/20/23 2341]   BP Pulse Resp Temp SpO2   125/61 79 18 98.7 °F (37.1 °C) 99 %      MAP       --            ALLERGIES    Review of patient's allergies indicates:   Allergen Reactions    Gabapentin Hallucinations    Tizanidine Other (See Comments)     somnolence       PROVIDER TRIAGE NOTE  57 yo M to the ED with chest tightness for a few days. No pain @ this time. BP was elevated at home. No distress on exam. No abd pain. No SOB.       ORDERS  Labs Reviewed - No data to display    ED Orders (720h ago, onward)      Start Ordered     Status Ordering Provider    04/21/23 0249 04/20/23 2348  Troponin I #2  Once Timed         Ordered AMERICA FALK    04/21/23 0000 04/20/23 2348  aspirin tablet 325 mg  ED 1 Time         Ordered AMERICA FALK    04/20/23 2349 04/20/23 2348  Vital signs  Every 15 min         Ordered AMERICA FALK    04/20/23 2349 04/20/23 2348  Cardiac Monitoring - Adult  Continuous        Comments: Notify Physician If:    Ordered AMERICA FALK    04/20/23 2349 04/20/23 2348  Pulse Oximetry Continuous  Continuous         Ordered AMERICA FALK    04/20/23 2349 04/20/23 2348  Diet NPO  Diet effective now         Ordered AMERICA FALK    04/20/23 2349 04/20/23 2348  Saline lock IV  Once         Ordered AMERICA FALK    04/20/23 2349 04/20/23 2348  EKG 12-lead  Once        Comments: Do not perform if previously done during this visit/ triage    Ordered AMERICA FALK    04/20/23 2349 04/20/23 2348  CBC auto differential  STAT         Ordered AMERICA FALK    04/20/23 2349 04/20/23 2348  Comprehensive metabolic panel  STAT         Ordered AMERICA FALK    04/20/23 2349 04/20/23 2348  Troponin I #1  STAT         Ordered AMERICA FALK     04/20/23 2349 04/20/23 2348  BNP  STAT         Ordered AMERICA FALK.    04/20/23 2349 04/20/23 2348  X-Ray Chest AP Portable  1 time imaging         Ordered AMERICA FALK.    04/20/23 2344 04/20/23 2344  EKG 12-lead  Once         Completed by JB HASSAN on 4/20/2023 at 11:46 PM LOIS ORELLANA              Virtual Visit Note: The provider triage portion of this emergency department evaluation and documentation was performed via [employer], a HIPAA-compliant telemedicine application, in concert with a tele-presenter in the room. A face to face patient evaluation with one of my colleagues will occur once the patient is placed in an emergency department room.      DISCLAIMER: This note was prepared with [employer] voice recognition transcription software. Garbled syntax, mangled pronouns, and other bizarre constructions may be attributed to that software system.

## 2023-04-21 NOTE — DISCHARGE INSTRUCTIONS
All medications may potentially have side effects and it is impossible to predict which medications may give you side effects. If you feel that you are having a negative effect of any medication you should immediately stop taking them and seek medical attention.    Return to the ER with any questions/concerns, new/concerning symptoms, worsening or failure to improve. Do not drive or make any important decisions for 24 hours if you have received any pain medications, sedatives or mood altering drugs during your ER visit.

## 2023-04-26 LAB
BACTERIA BLD CULT: NORMAL
BACTERIA BLD CULT: NORMAL

## 2023-05-05 ENCOUNTER — HOSPITAL ENCOUNTER (INPATIENT)
Facility: HOSPITAL | Age: 58
LOS: 3 days | Discharge: HOME | DRG: 872 | End: 2023-05-08
Attending: EMERGENCY MEDICINE | Admitting: FAMILY MEDICINE
Payer: COMMERCIAL

## 2023-05-05 DIAGNOSIS — R65.20 SEVERE SEPSIS: ICD-10-CM

## 2023-05-05 DIAGNOSIS — A41.9 SEVERE SEPSIS: ICD-10-CM

## 2023-05-05 DIAGNOSIS — A41.9 SEPSIS: ICD-10-CM

## 2023-05-05 PROBLEM — E83.42 HYPOMAGNESEMIA: Status: ACTIVE | Noted: 2023-05-05

## 2023-05-05 PROBLEM — E83.39 HYPOPHOSPHATEMIA: Status: ACTIVE | Noted: 2023-05-05

## 2023-05-05 PROBLEM — D53.9 MACROCYTIC ANEMIA: Status: ACTIVE | Noted: 2023-05-05

## 2023-05-05 LAB
ALBUMIN SERPL BCP-MCNC: 3.7 G/DL (ref 3.5–5.2)
ALP SERPL-CCNC: 85 U/L (ref 55–135)
ALT SERPL W/O P-5'-P-CCNC: 20 U/L (ref 10–44)
ANION GAP SERPL CALC-SCNC: 10 MMOL/L (ref 8–16)
AST SERPL-CCNC: 29 U/L (ref 10–40)
BACTERIA #/AREA URNS HPF: NORMAL /HPF
BASOPHILS # BLD AUTO: 0.03 K/UL (ref 0–0.2)
BASOPHILS NFR BLD: 0.5 % (ref 0–1.9)
BILIRUB SERPL-MCNC: 0.7 MG/DL (ref 0.1–1)
BILIRUB UR QL STRIP: NEGATIVE
BNP SERPL-MCNC: 57 PG/ML (ref 0–99)
BUN SERPL-MCNC: 14 MG/DL (ref 6–20)
CALCIUM SERPL-MCNC: 9 MG/DL (ref 8.7–10.5)
CHLORIDE SERPL-SCNC: 103 MMOL/L (ref 95–110)
CLARITY UR: CLEAR
CO2 SERPL-SCNC: 22 MMOL/L (ref 23–29)
COLOR UR: YELLOW
CREAT SERPL-MCNC: 0.9 MG/DL (ref 0.5–1.4)
CTP QC/QA: YES
CTP QC/QA: YES
D DIMER PPP IA.FEU-MCNC: 1.56 MG/L FEU
DIFFERENTIAL METHOD: ABNORMAL
EOSINOPHIL # BLD AUTO: 0 K/UL (ref 0–0.5)
EOSINOPHIL NFR BLD: 0 % (ref 0–8)
ERYTHROCYTE [DISTWIDTH] IN BLOOD BY AUTOMATED COUNT: 19.7 % (ref 11.5–14.5)
EST. GFR  (NO RACE VARIABLE): >60 ML/MIN/1.73 M^2
FIO2: 21 %
GLUCOSE SERPL-MCNC: 166 MG/DL (ref 70–110)
GLUCOSE UR QL STRIP: NEGATIVE
HCT VFR BLD AUTO: 25.6 % (ref 40–54)
HGB BLD-MCNC: 7.9 G/DL (ref 14–18)
HGB UR QL STRIP: NEGATIVE
IMM GRANULOCYTES # BLD AUTO: 0.02 K/UL (ref 0–0.04)
IMM GRANULOCYTES NFR BLD AUTO: 0.3 % (ref 0–0.5)
KETONES UR QL STRIP: NEGATIVE
LACTATE SERPL-SCNC: 2.7 MMOL/L (ref 0.5–2.2)
LACTATE SERPL-SCNC: 4.1 MMOL/L (ref 0.5–2.2)
LDH SERPL L TO P-CCNC: 2.4 MMOL/L (ref 0.5–2.2)
LEUKOCYTE ESTERASE UR QL STRIP: NEGATIVE
LIPASE SERPL-CCNC: 8 U/L (ref 4–60)
LYMPHOCYTES # BLD AUTO: 0.4 K/UL (ref 1–4.8)
LYMPHOCYTES NFR BLD: 6.1 % (ref 18–48)
MAGNESIUM SERPL-MCNC: 1.3 MG/DL (ref 1.6–2.6)
MCH RBC QN AUTO: 33.1 PG (ref 27–31)
MCHC RBC AUTO-ENTMCNC: 30.9 G/DL (ref 32–36)
MCV RBC AUTO: 107 FL (ref 82–98)
MICROSCOPIC COMMENT: NORMAL
MONOCYTES # BLD AUTO: 0.5 K/UL (ref 0.3–1)
MONOCYTES NFR BLD: 8.3 % (ref 4–15)
NEUTROPHILS # BLD AUTO: 5.1 K/UL (ref 1.8–7.7)
NEUTROPHILS NFR BLD: 84.8 % (ref 38–73)
NITRITE UR QL STRIP: POSITIVE
NRBC BLD-RTO: 0 /100 WBC
PH UR STRIP: 6 [PH] (ref 5–8)
PHOSPHATE SERPL-MCNC: 1.9 MG/DL (ref 2.7–4.5)
PLATELET # BLD AUTO: 128 K/UL (ref 150–450)
PMV BLD AUTO: 9.2 FL (ref 9.2–12.9)
POC MOLECULAR INFLUENZA A AGN: NEGATIVE
POC MOLECULAR INFLUENZA B AGN: NEGATIVE
POC PERFORMED BY: ABNORMAL
POCT GLUCOSE: 116 MG/DL (ref 70–110)
POCT GLUCOSE: 150 MG/DL (ref 70–110)
POTASSIUM SERPL-SCNC: 3.9 MMOL/L (ref 3.5–5.1)
PROCALCITONIN SERPL IA-MCNC: 2.01 NG/ML
PROT SERPL-MCNC: 6.7 G/DL (ref 6–8.4)
PROT UR QL STRIP: NEGATIVE
RBC # BLD AUTO: 2.39 M/UL (ref 4.6–6.2)
RBC #/AREA URNS HPF: 0 /HPF (ref 0–4)
SARS-COV-2 RDRP RESP QL NAA+PROBE: NEGATIVE
SODIUM SERPL-SCNC: 135 MMOL/L (ref 136–145)
SP GR UR STRIP: 1.01 (ref 1–1.03)
SPECIMEN SOURCE: ABNORMAL
SQUAMOUS #/AREA URNS HPF: 0 /HPF
TROPONIN I SERPL DL<=0.01 NG/ML-MCNC: 0.17 NG/ML (ref 0–0.03)
TROPONIN I SERPL DL<=0.01 NG/ML-MCNC: 0.21 NG/ML (ref 0–0.03)
TROPONIN I SERPL DL<=0.01 NG/ML-MCNC: 0.34 NG/ML (ref 0–0.03)
URN SPEC COLLECT METH UR: ABNORMAL
UROBILINOGEN UR STRIP-ACNC: NEGATIVE EU/DL
WBC # BLD AUTO: 6.02 K/UL (ref 3.9–12.7)
WBC #/AREA URNS HPF: 3 /HPF (ref 0–5)

## 2023-05-05 PROCEDURE — 84484 ASSAY OF TROPONIN QUANT: CPT | Performed by: EMERGENCY MEDICINE

## 2023-05-05 PROCEDURE — 25000003 PHARM REV CODE 250: Performed by: NURSE PRACTITIONER

## 2023-05-05 PROCEDURE — 96372 THER/PROPH/DIAG INJ SC/IM: CPT | Mod: 59 | Performed by: NURSE PRACTITIONER

## 2023-05-05 PROCEDURE — 63600175 PHARM REV CODE 636 W HCPCS: Performed by: NURSE PRACTITIONER

## 2023-05-05 PROCEDURE — 25500020 PHARM REV CODE 255: Performed by: EMERGENCY MEDICINE

## 2023-05-05 PROCEDURE — 99222 1ST HOSP IP/OBS MODERATE 55: CPT | Mod: ,,, | Performed by: INTERNAL MEDICINE

## 2023-05-05 PROCEDURE — 87040 BLOOD CULTURE FOR BACTERIA: CPT | Mod: 59 | Performed by: EMERGENCY MEDICINE

## 2023-05-05 PROCEDURE — 99900035 HC TECH TIME PER 15 MIN (STAT)

## 2023-05-05 PROCEDURE — 11000001 HC ACUTE MED/SURG PRIVATE ROOM

## 2023-05-05 PROCEDURE — 84484 ASSAY OF TROPONIN QUANT: CPT | Mod: 91 | Performed by: NURSE PRACTITIONER

## 2023-05-05 PROCEDURE — 25000003 PHARM REV CODE 250: Performed by: EMERGENCY MEDICINE

## 2023-05-05 PROCEDURE — 99222 PR INITIAL HOSPITAL CARE,LEVL II: ICD-10-PCS | Mod: ,,, | Performed by: INTERNAL MEDICINE

## 2023-05-05 PROCEDURE — 96365 THER/PROPH/DIAG IV INF INIT: CPT

## 2023-05-05 PROCEDURE — 27000207 HC ISOLATION

## 2023-05-05 PROCEDURE — 96366 THER/PROPH/DIAG IV INF ADDON: CPT

## 2023-05-05 PROCEDURE — 81000 URINALYSIS NONAUTO W/SCOPE: CPT | Performed by: EMERGENCY MEDICINE

## 2023-05-05 PROCEDURE — 93005 ELECTROCARDIOGRAM TRACING: CPT

## 2023-05-05 PROCEDURE — 63600175 PHARM REV CODE 636 W HCPCS: Performed by: EMERGENCY MEDICINE

## 2023-05-05 PROCEDURE — 87502 INFLUENZA DNA AMP PROBE: CPT

## 2023-05-05 PROCEDURE — 82962 GLUCOSE BLOOD TEST: CPT

## 2023-05-05 PROCEDURE — 83605 ASSAY OF LACTIC ACID: CPT

## 2023-05-05 PROCEDURE — 96361 HYDRATE IV INFUSION ADD-ON: CPT

## 2023-05-05 PROCEDURE — 83880 ASSAY OF NATRIURETIC PEPTIDE: CPT | Performed by: EMERGENCY MEDICINE

## 2023-05-05 PROCEDURE — 87154 CUL TYP ID BLD PTHGN 6+ TRGT: CPT | Performed by: EMERGENCY MEDICINE

## 2023-05-05 PROCEDURE — 85025 COMPLETE CBC W/AUTO DIFF WBC: CPT | Performed by: EMERGENCY MEDICINE

## 2023-05-05 PROCEDURE — 36415 COLL VENOUS BLD VENIPUNCTURE: CPT | Performed by: NURSE PRACTITIONER

## 2023-05-05 PROCEDURE — 83605 ASSAY OF LACTIC ACID: CPT | Performed by: EMERGENCY MEDICINE

## 2023-05-05 PROCEDURE — 84100 ASSAY OF PHOSPHORUS: CPT | Performed by: EMERGENCY MEDICINE

## 2023-05-05 PROCEDURE — 93010 ELECTROCARDIOGRAM REPORT: CPT | Mod: ,,, | Performed by: INTERNAL MEDICINE

## 2023-05-05 PROCEDURE — 85379 FIBRIN DEGRADATION QUANT: CPT | Performed by: EMERGENCY MEDICINE

## 2023-05-05 PROCEDURE — 84145 PROCALCITONIN (PCT): CPT | Performed by: EMERGENCY MEDICINE

## 2023-05-05 PROCEDURE — 99285 EMERGENCY DEPT VISIT HI MDM: CPT | Mod: 25

## 2023-05-05 PROCEDURE — 87077 CULTURE AEROBIC IDENTIFY: CPT | Performed by: EMERGENCY MEDICINE

## 2023-05-05 PROCEDURE — 93010 EKG 12-LEAD: ICD-10-PCS | Mod: ,,, | Performed by: INTERNAL MEDICINE

## 2023-05-05 PROCEDURE — 83735 ASSAY OF MAGNESIUM: CPT | Performed by: EMERGENCY MEDICINE

## 2023-05-05 PROCEDURE — 83690 ASSAY OF LIPASE: CPT | Performed by: EMERGENCY MEDICINE

## 2023-05-05 PROCEDURE — 96367 TX/PROPH/DG ADDL SEQ IV INF: CPT

## 2023-05-05 PROCEDURE — 80053 COMPREHEN METABOLIC PANEL: CPT | Performed by: EMERGENCY MEDICINE

## 2023-05-05 PROCEDURE — 87186 SC STD MICRODIL/AGAR DIL: CPT | Performed by: EMERGENCY MEDICINE

## 2023-05-05 RX ORDER — NALOXONE HCL 0.4 MG/ML
0.02 VIAL (ML) INJECTION
Status: DISCONTINUED | OUTPATIENT
Start: 2023-05-05 | End: 2023-05-08 | Stop reason: HOSPADM

## 2023-05-05 RX ORDER — SODIUM,POTASSIUM PHOSPHATES 280-250MG
2 POWDER IN PACKET (EA) ORAL
Status: DISCONTINUED | OUTPATIENT
Start: 2023-05-05 | End: 2023-05-06

## 2023-05-05 RX ORDER — ONDANSETRON 2 MG/ML
4 INJECTION INTRAMUSCULAR; INTRAVENOUS EVERY 8 HOURS PRN
Status: DISCONTINUED | OUTPATIENT
Start: 2023-05-05 | End: 2023-05-08 | Stop reason: HOSPADM

## 2023-05-05 RX ORDER — FOLIC ACID 1 MG/1
1 TABLET ORAL DAILY
Status: DISCONTINUED | OUTPATIENT
Start: 2023-05-05 | End: 2023-05-08 | Stop reason: HOSPADM

## 2023-05-05 RX ORDER — ACETAMINOPHEN 325 MG/1
650 TABLET ORAL EVERY 4 HOURS PRN
Status: DISCONTINUED | OUTPATIENT
Start: 2023-05-05 | End: 2023-05-08 | Stop reason: HOSPADM

## 2023-05-05 RX ORDER — SODIUM CHLORIDE 9 MG/ML
INJECTION, SOLUTION INTRAVENOUS CONTINUOUS
Status: DISCONTINUED | OUTPATIENT
Start: 2023-05-05 | End: 2023-05-06

## 2023-05-05 RX ORDER — ACETAMINOPHEN 500 MG
1000 TABLET ORAL
Status: COMPLETED | OUTPATIENT
Start: 2023-05-05 | End: 2023-05-05

## 2023-05-05 RX ORDER — ACETAMINOPHEN 325 MG/1
650 TABLET ORAL EVERY 8 HOURS PRN
Status: DISCONTINUED | OUTPATIENT
Start: 2023-05-05 | End: 2023-05-05

## 2023-05-05 RX ORDER — ALBUTEROL SULFATE 90 UG/1
2 AEROSOL, METERED RESPIRATORY (INHALATION) EVERY 6 HOURS PRN
Status: DISCONTINUED | OUTPATIENT
Start: 2023-05-05 | End: 2023-05-08 | Stop reason: HOSPADM

## 2023-05-05 RX ORDER — DEXTROSE 40 %
15 GEL (GRAM) ORAL
Status: DISCONTINUED | OUTPATIENT
Start: 2023-05-05 | End: 2023-05-08 | Stop reason: HOSPADM

## 2023-05-05 RX ORDER — GLUCAGON 1 MG
1 KIT INJECTION
Status: DISCONTINUED | OUTPATIENT
Start: 2023-05-05 | End: 2023-05-08 | Stop reason: HOSPADM

## 2023-05-05 RX ORDER — PNV NO.95/FERROUS FUM/FOLIC AC 28MG-0.8MG
100 TABLET ORAL DAILY
Status: DISCONTINUED | OUTPATIENT
Start: 2023-05-05 | End: 2023-05-08 | Stop reason: HOSPADM

## 2023-05-05 RX ORDER — IBUPROFEN 400 MG/1
400 TABLET ORAL
Status: COMPLETED | OUTPATIENT
Start: 2023-05-05 | End: 2023-05-05

## 2023-05-05 RX ORDER — INSULIN ASPART 100 [IU]/ML
0-5 INJECTION, SOLUTION INTRAVENOUS; SUBCUTANEOUS
Status: DISCONTINUED | OUTPATIENT
Start: 2023-05-05 | End: 2023-05-08 | Stop reason: HOSPADM

## 2023-05-05 RX ORDER — DEXTROSE 40 %
30 GEL (GRAM) ORAL
Status: DISCONTINUED | OUTPATIENT
Start: 2023-05-05 | End: 2023-05-08 | Stop reason: HOSPADM

## 2023-05-05 RX ORDER — ENOXAPARIN SODIUM 100 MG/ML
40 INJECTION SUBCUTANEOUS EVERY 24 HOURS
Status: DISCONTINUED | OUTPATIENT
Start: 2023-05-05 | End: 2023-05-08 | Stop reason: HOSPADM

## 2023-05-05 RX ORDER — SODIUM CHLORIDE 0.9 % (FLUSH) 0.9 %
10 SYRINGE (ML) INJECTION EVERY 12 HOURS PRN
Status: DISCONTINUED | OUTPATIENT
Start: 2023-05-05 | End: 2023-05-08 | Stop reason: HOSPADM

## 2023-05-05 RX ORDER — MAGNESIUM SULFATE HEPTAHYDRATE 40 MG/ML
2 INJECTION, SOLUTION INTRAVENOUS ONCE
Status: COMPLETED | OUTPATIENT
Start: 2023-05-05 | End: 2023-05-05

## 2023-05-05 RX ORDER — IBUPROFEN 400 MG/1
400 TABLET ORAL EVERY 6 HOURS PRN
Status: DISCONTINUED | OUTPATIENT
Start: 2023-05-05 | End: 2023-05-08 | Stop reason: HOSPADM

## 2023-05-05 RX ADMIN — POTASSIUM & SODIUM PHOSPHATES POWDER PACK 280-160-250 MG 2 PACKET: 280-160-250 PACK at 06:05

## 2023-05-05 RX ADMIN — IOHEXOL 100 ML: 350 INJECTION, SOLUTION INTRAVENOUS at 12:05

## 2023-05-05 RX ADMIN — SODIUM CHLORIDE, POTASSIUM CHLORIDE, SODIUM LACTATE AND CALCIUM CHLORIDE 2994 ML: 600; 310; 30; 20 INJECTION, SOLUTION INTRAVENOUS at 08:05

## 2023-05-05 RX ADMIN — VANCOMYCIN HYDROCHLORIDE 1750 MG: 1 INJECTION, POWDER, LYOPHILIZED, FOR SOLUTION INTRAVENOUS at 10:05

## 2023-05-05 RX ADMIN — SODIUM CHLORIDE: 0.9 INJECTION, SOLUTION INTRAVENOUS at 02:05

## 2023-05-05 RX ADMIN — MAGNESIUM SULFATE 2 G: 2 INJECTION INTRAVENOUS at 02:05

## 2023-05-05 RX ADMIN — IBUPROFEN 400 MG: 400 TABLET ORAL at 02:05

## 2023-05-05 RX ADMIN — PIPERACILLIN AND TAZOBACTAM 4.5 G: 4; .5 INJECTION, POWDER, LYOPHILIZED, FOR SOLUTION INTRAVENOUS; PARENTERAL at 06:05

## 2023-05-05 RX ADMIN — ACETAMINOPHEN 1000 MG: 500 TABLET ORAL at 08:05

## 2023-05-05 RX ADMIN — VANCOMYCIN HYDROCHLORIDE 2000 MG: 500 INJECTION, POWDER, LYOPHILIZED, FOR SOLUTION INTRAVENOUS at 09:05

## 2023-05-05 RX ADMIN — POTASSIUM & SODIUM PHOSPHATES POWDER PACK 280-160-250 MG 2 PACKET: 280-160-250 PACK at 10:05

## 2023-05-05 RX ADMIN — FOLIC ACID 1 MG: 1 TABLET ORAL at 02:05

## 2023-05-05 RX ADMIN — ACETAMINOPHEN 650 MG: 325 TABLET ORAL at 09:05

## 2023-05-05 RX ADMIN — ENOXAPARIN SODIUM 40 MG: 40 INJECTION SUBCUTANEOUS at 06:05

## 2023-05-05 RX ADMIN — PIPERACILLIN AND TAZOBACTAM 4.5 G: 4; .5 INJECTION, POWDER, LYOPHILIZED, FOR SOLUTION INTRAVENOUS; PARENTERAL at 08:05

## 2023-05-05 RX ADMIN — Medication 100 MCG: at 02:05

## 2023-05-05 NOTE — ED PROVIDER NOTES
Encounter Date: 5/5/2023    SCRIBE #1 NOTE: I, Jose Ramon Chen, am scribing for, and in the presence of,  Ben Good MD. I have scribed the following portions of the note - Other sections scribed: HPI, ROS, PE, MDM.     History     Chief Complaint   Patient presents with    Fever     Pt states that he has fever and chills that had a onset at 6am this morning . Pt is a cancer pt and is immunocompromised.      Saud Mayers is a 58 y.o. male who has a past medical history of Asthma, Diabetes mellitus, High cholesterol, Hypertension.    The patient presents to the ED for evaluation of fever, onset of 0600 today. There is associated shaking chills, drying coughing, and diarrhea without nausea and vomiting. He explained that he had been having cold-like symptoms for about 4 weeks prior to this presentation and had been taking medications to resolve his symptoms without relief. He also has a history of lymphoma which he has been seeing Dr. Vickers for chemotherapy. He has undergone 6 rounds of chemo and reports that his lymphoma is in remission. His last treatment of chemo was 4/13/23.       The history is provided by the patient. No  was used.   Review of patient's allergies indicates:   Allergen Reactions    Gabapentin Hallucinations    Tizanidine Other (See Comments)     somnolence     Past Medical History:   Diagnosis Date    Asthma     Cancer     Diabetes mellitus     High cholesterol     Hypertension     ANAMIKA on CPAP     Testicular hypofunction      Past Surgical History:   Procedure Laterality Date    CARPAL TUNNEL RELEASE Bilateral 2020    CHOLECYSTECTOMY  01/01/1990    COLONOSCOPY  01/01/2013    COLONOSCOPY N/A 9/2/2022    Procedure: COLONOSCOPY sutab;  Surgeon: Jeovany Cisse MD;  Location: [facility];  Service: Endoscopy;  Laterality: N/A;    ESOPHAGOGASTRODUODENOSCOPY N/A 9/2/2022    Procedure: EGD (ESOPHAGOGASTRODUODENOSCOPY);  Surgeon: Jeovany Cisse MD;  Location: [facility];  Service:  Endoscopy;  Laterality: N/A;    INSERTION OF TUNNELED CENTRAL VENOUS CATHETER (CVC) WITH SUBCUTANEOUS PORT N/A 2022    Procedure: [id number]-W-CATH;  Surgeon: Francisco Velazquez MD;  Location: [facility] OR;  Service: General;  Laterality: N/A;    ORTHOPEDIC SURGERY Bilateral 2007    feet plantar    ORTHOPEDIC SURGERY Right 2006    knee    ROTATOR CUFF REPAIR Left 1997    SURGICAL REMOVAL OF LYMPH NODE Right 2022    Procedure: EXCISION, LYMPH NODE;  Surgeon: Francisco Velazquez MD;  Location: [facility] OR;  Service: General;  Laterality: Right;   (right neck)     Family History   Problem Relation Age of Onset    Heart attack Father      Social History     Tobacco Use    Smoking status: Former     Types: Cigarettes     Quit date: 1/15/1992     Years since quittin.3    Smokeless tobacco: Never   Substance Use Topics    Alcohol use: Never    Drug use: Never     Review of Systems   Constitutional:  Positive for chills (shaking) and fever.   Respiratory:  Positive for cough (dry).    Gastrointestinal:  Positive for diarrhea. Negative for nausea and vomiting.   All other systems reviewed and are negative.    Physical Exam     Initial Vitals [23 0830]   BP Pulse Resp Temp SpO2   130/60 (!) 120 18 (!) 103.2 °F (39.6 °C) 96 %      MAP       --         Physical Exam    HENT:   Head: Atraumatic.   Mouth/Throat: Oropharynx is clear and moist.   Eyes: Conjunctivae and EOM are normal.   Neck: Neck supple.   Cardiovascular:    Tachycardia present.         Murmur heard.  Systolic (ejection) murmur is present.  Pulmonary/Chest: Breath sounds normal. No respiratory distress.   Abdominal: Abdomen is soft. There is no abdominal tenderness.   Musculoskeletal:      Cervical back: Neck supple.      Right lower leg: No edema.      Left lower leg: No edema.     Neurological: He is alert and oriented to person, place, and time.   Skin: Skin is warm and dry.   Psychiatric: Thought content normal.       ED Course    Critical Care    Date/Time: 5/5/2023 2:06 PM  Performed by: Ben Good MD  Authorized by: Mis Castillo MD   Direct patient critical care time: 60 minutes  Additional history critical care time: 10 minutes  Ordering / reviewing critical care time: 15 minutes  Documentation critical care time: 15 minutes  Consulting other physicians critical care time: 5 minutes  Total critical care time (exclusive of procedural time) : 105 minutes  Critical care was time spent personally by me on the following activities: discussions with primary provider, discussions with consultants, examination of patient, obtaining history from patient or surrogate, ordering and performing treatments and interventions, ordering and review of laboratory studies, ordering and review of radiographic studies, pulse oximetry, re-evaluation of patient's condition, review of old charts and evaluation of patient's response to treatment.      Labs Reviewed   CBC W/ AUTO DIFFERENTIAL - Abnormal; Notable for the following components:       Result Value    RBC 2.39 (*)     Hemoglobin 7.9 (*)     Hematocrit 25.6 (*)      (*)     MCH 33.1 (*)     MCHC 30.9 (*)     RDW 19.7 (*)     Platelets 128 (*)     Lymph # 0.4 (*)     Gran % 84.8 (*)     Lymph % 6.1 (*)     All other components within normal limits   COMPREHENSIVE METABOLIC PANEL - Abnormal; Notable for the following components:    Sodium 135 (*)     CO2 22 (*)     Glucose 166 (*)     All other components within normal limits   LACTIC ACID, PLASMA - Abnormal; Notable for the following components:    Lactate (Lactic Acid) 2.7 (*)     All other components within normal limits   URINALYSIS, REFLEX TO URINE CULTURE - Abnormal; Notable for the following components:    Nitrite, UA Positive (*)     All other components within normal limits    Narrative:     Specimen Source->Urine   MAGNESIUM - Abnormal; Notable for the following components:    Magnesium 1.3 (*)     All other  components within normal limits   PHOSPHORUS - Abnormal; Notable for the following components:    Phosphorus 1.9 (*)     All other components within normal limits   TROPONIN I - Abnormal; Notable for the following components:    Troponin I 0.212 (*)     All other components within normal limits   PROCALCITONIN - Abnormal; Notable for the following components:    Procalcitonin 2.01 (*)     All other components within normal limits   LACTIC ACID, PLASMA - Abnormal; Notable for the following components:    Lactate (Lactic Acid) 4.1 (*)     All other components within normal limits    Narrative:     Lactic Acid critical result(s) called and verbal readback obtained   from Jess Duran RN. by CMISRAEL 05/05/2023 12:48   D DIMER, QUANTITATIVE - Abnormal; Notable for the following components:    D-Dimer 1.56 (*)     All other components within normal limits   TROPONIN I - Abnormal; Notable for the following components:    Troponin I 0.337 (*)     All other components within normal limits   POCT GLUCOSE - Abnormal; Notable for the following components:    POCT Glucose 116 (*)     All other components within normal limits   CULTURE, BLOOD   CULTURE, BLOOD   CULTURE, RESPIRATORY   CULTURE, STOOL   CLOSTRIDIUM DIFFICILE   CULTURE, BLOOD   RESPIRATORY INFECTION PANEL (PCR), NASOPHARYNGEAL   B-TYPE NATRIURETIC PEPTIDE   LIPASE   URINALYSIS MICROSCOPIC    Narrative:     Specimen Source->Urine   WBC, STOOL   TROPONIN I   LACTIC ACID, PLASMA   SARS-COV-2 RDRP GENE   POCT INFLUENZA A/B MOLECULAR   POCT GLUCOSE MONITORING CONTINUOUS     EKG Readings: (Independently Interpreted)   Initial Reading: No STEMI. Rhythm: Sinus Tachycardia. Heart Rate: 112. ST Segments: Normal ST Segments.     Imaging Results              CTA Chest Non-Coronary (PE Studies) (Final result)  Result time 05/05/23 13:29:38      Final result by Jefferson Bustos MD (05/05/23 13:29:38)                   Impression:      1. Examination considered diagnostic to the  proximal segmental pulmonary arterial level without convincing evidence of acute pulmonary embolism.  2. No acute thoracic findings.  3. Details of chronic and incidental findings, as provided in the body of the report.      Electronically signed by: Jefferson Bustos  Date:    05/05/2023  Time:    13:29               Narrative:    EXAMINATION:  CTA CHEST NON CORONARY (PE STUDIES)    CLINICAL HISTORY:  Pulmonary embolism (PE) suspected, high prob;    TECHNIQUE:  Low dose axial images, sagittal and coronal reformations were obtained from the thoracic inlet to the lung bases following the IV administration of 100 mL of Omnipaque 350.  Contrast timing was optimized to evaluate the pulmonary arteries.  MIP images were performed.    COMPARISON:  Chest radiograph 05/05/2023.  PET/CT performed 03/22/2023.  CT of the chest, abdomen, and pelvis performed 11/09/2022.    FINDINGS:  Exam quality: Study limited by suboptimal contrast bolus timing.  Study considered diagnostic to the proximal segmental pulmonary arterial level.    Pulmonary embolism: No acute or chronic pulmonary embolism.    Central pulmonary arteries: Normal caliber.    Aorta: Normal caliber.    Heart: No right heart strain. Normal size.    Coronary arteries: No calcifications.    Pericardium: Normal. No effusion, thickening, or calcification.  Ill-defined induration is noted in the anterior mediastinal fat (axial series 2, image 130); adenopathy noted in this region on prior CT 11/09/2022.    Support tubes and lines: None.    Base of neck/thyroid: Normal.    Lymph nodes: No supraclavicular, axillary, internal mammary, mediastinal, or hilar adenopathy.  No definite new or enlarging thoracic nodes.    Esophagus: Normal.    Pleura: No effusion, thickening, or calcification.    Upper abdomen: Status post cholecystectomy.  Ill-defined areas of hypoattenuation are noted in the spleen, measuring up to least 48 mm.  While these are incompletely characterized, no obvious  enlargement when compared to attenuation correction CT imaging performed as part of PET/CT of 03/02/2022.    Body wall: Unremarkable    Airways: Central airways appear patent.    Lungs: Assessment lungs is limited due to respiratory motion.  Noting this, no concerning focal or diffuse abnormality is seen.  Minimal dependent atelectasis.    Bones: No definite acute change.  Degenerative findings are noted in the spine.                                       X-Ray Chest AP Portable (Final result)  Result time 05/05/23 09:51:33      Final result by Jefferson Bustos MD (05/05/23 09:51:33)                   Impression:      No convincing evidence of acute cardiopulmonary disease.      Electronically signed by: Jefferson Bustos  Date:    05/05/2023  Time:    09:51               Narrative:    EXAMINATION:  XR CHEST AP PORTABLE    CLINICAL HISTORY:  Sepsis;    TECHNIQUE:  Single frontal view of the chest was performed.    COMPARISON:  Chest radiograph performed 04/21/2023, 01:22 hours.    FINDINGS:  Monitoring leads over the chest.  Unchanged position of right internal jugular approach tunneled port catheter.    Essentially unchanged appearance of the cardiomediastinal contours.    Lungs appear grossly clear.    No definite pneumothorax or large volume pleural effusion.    No acute findings in the visualized abdomen.  Osseous and soft tissue structures appear without definite acute change.                                       Medications   piperacillin-tazobactam (ZOSYN) 4.5 g in dextrose 5 % in water (D5W) 5 % 100 mL IVPB (MB+) (0 g Intravenous Stopped 5/5/23 0925)   cyanocobalamin tablet 100 mcg (100 mcg Oral Given 5/5/23 1449)   folic acid tablet 1 mg (1 mg Oral Given 5/5/23 1448)   sodium chloride 0.9% flush 10 mL (has no administration in time range)   naloxone 0.4 mg/mL injection 0.02 mg (has no administration in time range)   enoxaparin injection 40 mg (has no administration in time range)   ondansetron injection 4  mg (has no administration in time range)   vancomycin - pharmacy to dose (has no administration in time range)   glucagon (human recombinant) injection 1 mg (has no administration in time range)   dextrose 10% bolus 125 mL 125 mL (has no administration in time range)   dextrose 10% bolus 250 mL 250 mL (has no administration in time range)   dextrose 40 % gel 15,000 mg (has no administration in time range)   dextrose 40 % gel 30,000 mg (has no administration in time range)   insulin aspart U-100 pen 0-5 Units (has no administration in time range)   potassium, sodium phosphates 280-160-250 mg packet 2 packet (has no administration in time range)   magnesium sulfate 2g in water 50mL IVPB (premix) (2 g Intravenous New Bag 5/5/23 1452)   0.9%  NaCl infusion ( Intravenous New Bag 5/5/23 1433)   vancomycin (VANCOCIN) 1,750 mg in dextrose 5 % (D5W) 500 mL IVPB (1,750 mg Intravenous Trough Due As Scheduled Before Dose 5/6/23 2030)   acetaminophen tablet 650 mg (has no administration in time range)   ibuprofen tablet 400 mg (has no administration in time range)   albuterol inhaler 2 puff (has no administration in time range)   acetaminophen tablet 1,000 mg (1,000 mg Oral Given 5/5/23 0843)   lactated ringers bolus 2,994 mL (0 mLs Intravenous Stopped 5/5/23 0948)   vancomycin 2 g in dextrose 5 % 500 mL IVPB (0 mg Intravenous Stopped 5/5/23 1133)   iohexoL (OMNIPAQUE 350) injection 100 mL (100 mLs Intravenous Given 5/5/23 1214)   ibuprofen tablet 400 mg (400 mg Oral Given 5/5/23 1407)     Medical Decision Making:   History:   Old Medical Records: I decided to obtain old medical records.  Initial Assessment:   The patient presents to the ED for evaluation of fever, onset of 0600 today.  Differential Diagnosis:   Differential Diagnosis includes, but is not limited to:  Sepsis, bacteremia, UTI, pneumonia, cellulitis, abscess, indwelling line/catheter infection, cholecystitis, viral URI, gastroenteritis, viral syndrome, sinusitis,  otitis media/externa, neoplasm, drug reaction, serotonin syndrome, intoxication/withdrawal syndrome.   Clinical Tests:   Lab Tests: Ordered and Reviewed  The following lab test(s) were unremarkable: CMP       <> Summary of Lab: CBC with a hemoglobin of 7.9 and hematocrit of 25.6.  Lactic acid is 2.7.  Troponin is 0.212.  Radiological Study: Reviewed and Ordered  Medical Tests: Reviewed and Ordered  ED Management:  Sepsis workup was ordered immediately upon patient arrival to the ED. IV fluids and antibiotics were initiated.  Urinalysis has positive nitrites, otherwise no specific source has been found.  Due to rising lactic acid I feel the patient will require admission for further IV fluids and assessment.  He will be admitted by Dr. Flowers.  Other:   I have discussed this case with another health care provider.        Scribe Attestation:   Scribe #1: I performed the above scribed service and the documentation accurately describes the services I performed. I attest to the accuracy of the note.            I, Dr. Ben Good, personally performed the services described in this documentation. All medical record entries made by the scribe were at my direction and in my presence. I have reviewed the chart and agree that the record reflects my personal performance and is accurate and complete. Ben Good MD.  9:23 AM 05/05/2023         Clinical Impression:   Final diagnoses:  [A41.9] Sepsis  [A41.9, R65.20] Severe sepsis        ED Disposition Condition    Observation                 Ben Good MD  05/05/23 3761

## 2023-05-05 NOTE — ASSESSMENT & PLAN NOTE
diffuse large B-cell lymphoma s/p 6 cycles of R-CHOP (cycle #6 was 4/13/23)- completed treatment

## 2023-05-05 NOTE — ASSESSMENT & PLAN NOTE
"- my patient in outpatient setting  - s/p 6 cycles of R-CHOP chemotherapy (last cycle was 4/13/23).  - he has had a great response to therapy. PET scan (3/22/23) revealed "interval resolution of pathologically enlarged, hypermetabolic lymph nodes on both sides of the diaphragm in keeping with excellent response to therapy."  - now presents with fever of unknown etiology  - absolute neutrophil count is 5.1 k/uL, so he does not have neutropenia  - I do not feel it is related to his chemo, sine his chemotherapy was several weeks ago  - CTA chest (5/5/23) was negative for pulmonary embolism/pneumonia  - follow up infectious workup. If no source is found, perhaps the port-a-cath could be a source and could be removed (perhaps cultures from the port will be helpful).  "

## 2023-05-05 NOTE — ASSESSMENT & PLAN NOTE
Body mass index is 32.49 kg/m². Morbid obesity complicates all aspects of disease management from diagnostic modalities to treatment. Weight loss encouraged and health benefits explained to patient.

## 2023-05-05 NOTE — PLAN OF CARE
VIRTUAL NURSE:  Cued into patient's room.  Permission received per patient to turn camera to view patient.  Introduced as VN that will be working with floor nurse and nursing assistant.  Educated patient on VN's role in patient care and  [facility] model.  Plan of care reviewed with patient.  Education per flowsheet.   Informed patient that staff will round on them every 2 hours but to use call light for any other needs they may have; informed of fall risk and fall precautions.  Patient verbalized understanding.  Call light within reach; bed siderails up x3.  Opportunity given for questions and questions answered.  Admission assessment questions answered.  Patient denies complaints or any needs at this time. Instructed to call for assistance.  Will cont to monitor and intervene as needed.    Labs, notes, orders, and careplan reviewed.   Problem: Adult Inpatient Plan of Care  Goal: Plan of Care Review  Outcome: Ongoing, Progressing  Goal: Patient-Specific Goal (Individualized)  Outcome: Ongoing, Progressing  Goal: Absence of Hospital-Acquired Illness or Injury  Outcome: Ongoing, Progressing  Goal: Optimal Comfort and Wellbeing  Outcome: Ongoing, Progressing  Goal: Readiness for Transition of Care  Outcome: Ongoing, Progressing     Problem: Diabetes Comorbidity  Goal: Blood Glucose Level Within Targeted Range  Outcome: Ongoing, Progressing     Problem: Adjustment to Illness (Sepsis/Septic Shock)  Goal: Optimal Coping  Outcome: Ongoing, Progressing     Problem: Bleeding (Sepsis/Septic Shock)  Goal: Absence of Bleeding  Outcome: Ongoing, Progressing     Problem: Glycemic Control Impaired (Sepsis/Septic Shock)  Goal: Blood Glucose Level Within Desired Range  Outcome: Ongoing, Progressing     Problem: Infection Progression (Sepsis/Septic Shock)  Goal: Absence of Infection Signs and Symptoms  Outcome: Ongoing, Progressing     Problem: Nutrition Impaired (Sepsis/Septic Shock)  Goal: Optimal Nutrition Intake  Outcome: Ongoing,  Progressing     Problem: Infection  Goal: Absence of Infection Signs and Symptoms  Outcome: Ongoing, Progressing

## 2023-05-05 NOTE — ASSESSMENT & PLAN NOTE
Moderate per 2D echo  Will need to hold beta-blocker at this time with sepsis  Monitor for signs of volume overload  Will need to follow with outpatient Cardiology

## 2023-05-05 NOTE — HPI
58 year-old male with diffuse large B-cell lymphoma s/p 6 cycles of R-CHOP (cycle #6 was 4/13/23) was admitted on 5/5/23 for fever. Consult is for lymphoma, fever.

## 2023-05-05 NOTE — ASSESSMENT & PLAN NOTE
Patient's FSGs are controlled on current medication regimen.  Last A1c reviewed-   Lab Results   Component Value Date    HGBA1C 7.7 (H) 03/22/2023     Most recent fingerstick glucose reviewed-   Recent Labs   Lab 05/05/23  1419   POCTGLUCOSE 116*     Current correctional scale  Low  Maintain anti-hyperglycemic dose as follows-   Antihyperglycemics (From admission, onward)    Start     Stop Route Frequency Ordered    05/05/23 1506  insulin aspart U-100 pen 0-5 Units         -- SubQ Before meals & nightly PRN 05/05/23 1406        Hold Oral hypoglycemics while patient is in the hospital.

## 2023-05-05 NOTE — HPI
58-year-old male with a past medical history of asthma, lymphoma-treated with R-CHOP - now complete as of last dose April 13, 2023, diabetes, hyperlipidemia, hypertension, ANAMIKA, presents with complaints of feeling freezing and having fever onset 8:00 a.m. after going to work today.  Otherwise he has been in his normal state of health.  He has an intermittent dry cough.  He states that after each prior chemotherapy treatment aside from the 1st and 2nd he experienced similar febrile episodes after this same duration from prior chemo treatments.  During past febrile episodes no source of infection was noted.  He denies headache, vision changes, dizziness, lightheadedness, sore throat, nasal congestion, earache, chest pain, shortness of breath, abdominal pain, nausea, vomiting, diarrhea, productive cough, urinary changes, alcohol, tobacco, illicit drug, skin sores or wounds, sick contacts.

## 2023-05-05 NOTE — PLAN OF CARE
Full note to follow after exam     Patient with lymphoma presented with high fever and normal WBC - n ot neutropenic     Agree with vanc and zosyn for now   Await culture results

## 2023-05-05 NOTE — ASSESSMENT & PLAN NOTE
Hemoglobin is 7.9.  It was 10 1 month ago    Lab Results   Component Value Date    IRON 44 (L) 08/03/2022    TRANSFERRIN 226 08/03/2022    TIBC 334 08/03/2022    FESATURATED 13 (L) 08/03/2022    Patient takes vitamin B12 and folic acid supplements   No obvious source of bleeding   Negative for occult blood on UA  Will trend H&H if hemoglobin continues to be below baseline will repeat anemia panel and check stool for occult blood  Transfuse if hemoglobin less than 7 or if patient becomes symptomatic

## 2023-05-05 NOTE — SUBJECTIVE & OBJECTIVE
Past Medical History:   Diagnosis Date    Asthma     Cancer     Diabetes mellitus     High cholesterol     Hypertension     ANAMIKA on CPAP     Testicular hypofunction        Past Surgical History:   Procedure Laterality Date    CARPAL TUNNEL RELEASE Bilateral 2020    CHOLECYSTECTOMY  01/01/1990    COLONOSCOPY  01/01/2013    COLONOSCOPY N/A 9/2/2022    Procedure: COLONOSCOPY sutab;  Surgeon: Jeovany Cisse MD;  Location: [facility] ENDO;  Service: Endoscopy;  Laterality: N/A;    ESOPHAGOGASTRODUODENOSCOPY N/A 9/2/2022    Procedure: EGD (ESOPHAGOGASTRODUODENOSCOPY);  Surgeon: Jeovany Cisse MD;  Location: [facility] ENDO;  Service: Endoscopy;  Laterality: N/A;    INSERTION OF TUNNELED CENTRAL VENOUS CATHETER (CVC) WITH SUBCUTANEOUS PORT N/A 12/7/2022    Procedure: [id number];  Surgeon: Francisco Velazquez MD;  Location: [facility] OR;  Service: General;  Laterality: N/A;    ORTHOPEDIC SURGERY Bilateral 01/01/2007    feet plantar    ORTHOPEDIC SURGERY Right 01/01/2006    knee    ROTATOR CUFF REPAIR Left 01/01/1997    SURGICAL REMOVAL OF LYMPH NODE Right 11/23/2022    Procedure: EXCISION, LYMPH NODE;  Surgeon: Francisco Velazquez MD;  Location: [facility] OR;  Service: General;  Laterality: Right;   (right neck)       Review of patient's allergies indicates:   Allergen Reactions    Gabapentin Hallucinations    Tizanidine Other (See Comments)     somnolence       No current facility-administered medications on file prior to encounter.     Current Outpatient Medications on File Prior to Encounter   Medication Sig    acetaminophen (TYLENOL) 500 MG tablet Take 500 mg by mouth every 6 (six) hours as needed for Pain.    allopurinoL (ZYLOPRIM) 300 MG tablet Take 1 tablet (300 mg total) by mouth once daily.    atorvastatin (LIPITOR) 20 MG tablet Take 1 tablet (20 mg total) by mouth once daily.    cyanocobalamin (VITAMIN B-12) 100 MCG tablet Take 100 mcg by mouth once daily.    folic acid (FOLVITE) 1 MG tablet Take 1 tablet (1 mg total) by mouth  once daily.    LIDOcaine-prilocaine (EMLA) cream Apply topically as needed. Apply to skin overlying port site 30 minutes before chemotherapy.    linaCLOtide (LINZESS) 72 mcg Cap capsule Take 1 capsule (72 mcg total) by mouth before breakfast.    losartan (COZAAR) 25 MG tablet Take 1 tablet (25 mg total) by mouth once daily.    magic mouthwash diphen/antac/lidoc/nysta 10 mLs 3 (three) times daily as needed (chemotherapy-induced mucositis.).    metFORMIN (GLUCOPHAGE) 1000 MG tablet Take 1 tablet (1,000 mg total) by mouth 2 (two) times daily with meals.    metoprolol succinate (TOPROL-XL) 50 MG 24 hr tablet Take 1 tablet (50 mg total) by mouth once daily.    multivit-min-folic-vit K-lycop (ONE-A-DAY MEN'S 50 PLUS) 400- mcg Tab Take 1 tablet by mouth once daily.    hydrOXYzine HCL (ATARAX) 10 MG Tab Take 1 tablet (10 mg total) by mouth 3 (three) times daily as needed (pruritis). (Patient not taking: Reported on 2023)    [DISCONTINUED] ondansetron (ZOFRAN) 4 MG tablet Take 1 tablet (4 mg total) by mouth every 8 (eight) hours as needed for Nausea.    [DISCONTINUED] ondansetron (ZOFRAN-ODT) 8 MG TbDL Take 1 tablet (8 mg total) by mouth every 8 (eight) hours as needed (chemotherapy-induced nausea and vomiting).    [DISCONTINUED] predniSONE (DELTASONE) 50 MG Tab Take 2 tablets (100 mg total) by mouth As instructed (take 2 tablets (100mg ) by mouth daily on days 2-5 of each cycle of chemotherapy).     Family History       Problem Relation (Age of Onset)    Heart attack Father          Tobacco Use    Smoking status: Former     Types: Cigarettes     Quit date: 1/15/1992     Years since quittin.3    Smokeless tobacco: Never   Substance and Sexual Activity    Alcohol use: Never    Drug use: Never    Sexual activity: Yes     Partners: Female     Review of Systems  Objective:     Vital Signs (Most Recent):  Temp: (!) 102 °F (38.9 °C) (23 1403)  Pulse: 93 (23 1408)  Resp: 18 (23 1408)  BP: (!) 149/77  (05/05/23 1408)  SpO2: 100 % (05/05/23 1408) Vital Signs (24h Range):  Temp:  [100 °F (37.8 °C)-103.2 °F (39.6 °C)] 102 °F (38.9 °C)  Pulse:  [] 93  Resp:  [8-21] 18  SpO2:  [94 %-100 %] 100 %  BP: (110-149)/(55-77) 149/77     Weight: 99.8 kg (220 lb)  Body mass index is 32.49 kg/m².     Physical Exam  Constitutional:       Appearance: He is ill-appearing.      Comments: Trembling   HENT:      Head: Normocephalic and atraumatic.      Nose: Nose normal.      Mouth/Throat:      Mouth: Mucous membranes are moist.      Pharynx: Oropharynx is clear.   Eyes:      Extraocular Movements: Extraocular movements intact.      Conjunctiva/sclera: Conjunctivae normal.   Cardiovascular:      Rate and Rhythm: Normal rate and regular rhythm.      Pulses: Normal pulses.      Heart sounds: Normal heart sounds.   Pulmonary:      Effort: Pulmonary effort is normal. No respiratory distress.      Breath sounds: Normal breath sounds. No wheezing.   Abdominal:      General: Abdomen is flat. Bowel sounds are normal.      Palpations: Abdomen is soft.   Musculoskeletal:         General: Swelling present. Normal range of motion.      Cervical back: Normal range of motion and neck supple.      Comments: 1+ bilateral lower extremity edema   Skin:     General: Skin is warm and dry.      Findings: No lesion or rash.      Comments: Right chest wall port site not accessed.  Site is without erythema edema or drainage   Neurological:      General: No focal deficit present.      Mental Status: He is alert and oriented to person, place, and time.              Significant Labs: All pertinent labs within the past 24 hours have been reviewed.    Significant Imaging: I have reviewed all pertinent imaging results/findings within the past 24 hours.

## 2023-05-05 NOTE — PROGRESS NOTES
Pharmacokinetic Initial Assessment: IV Vancomycin    Assessment/Plan:    Initiate intravenous vancomycin with loading dose of 2000 mg once followed by a maintenance dose of vancomycin 1750 mg IV every 12 hours  Desired empiric serum trough concentration is 15 to 20 mcg/mL  Draw vancomycin trough level 60 min prior to fourth dose on 5/6 at approximately 2030  Pharmacy will continue to follow and monitor vancomycin.      Please contact pharmacy at extension [phone] with any questions regarding this assessment.     Thank you for the consult,   Katty Pina       Patient brief summary:  Saud Mayers is a 58 y.o. male initiated on antimicrobial therapy with IV Vancomycin for treatment of suspected sepsis    Drug Allergies:   Review of patient's allergies indicates:   Allergen Reactions    Gabapentin Hallucinations    Tizanidine Other (See Comments)     somnolence       Actual Body Weight:   99.8 kg    Renal Function:   Estimated Creatinine Clearance: 101.6 mL/min (based on SCr of 0.9 mg/dL).,     Dialysis Method (if applicable):  N/A    CBC (last 72 hours):  Recent Labs   Lab Result Units 05/05/23  0845   WBC K/uL 6.02   Hemoglobin g/dL 7.9*   Hematocrit % 25.6*   Platelets K/uL 128*   Gran % % 84.8*   Lymph % % 6.1*   Mono % % 8.3   Eosinophil % % 0.0   Basophil % % 0.5   Differential Method  Automated       Metabolic Panel (last 72 hours):  Recent Labs   Lab Result Units 05/05/23  0845 05/05/23  1033   Sodium mmol/L 135*  --    Potassium mmol/L 3.9  --    Chloride mmol/L 103  --    CO2 mmol/L 22*  --    Glucose mg/dL 166*  --    Glucose, UA   --  Negative   BUN mg/dL 14  --    Creatinine mg/dL 0.9  --    Albumin g/dL 3.7  --    Total Bilirubin mg/dL 0.7  --    Alkaline Phosphatase U/L 85  --    AST U/L 29  --    ALT U/L 20  --    Magnesium mg/dL 1.3*  --    Phosphorus mg/dL 1.9*  --        Drug levels (last 3 results):  No results for input(s): VANCOMYCINRA, VANCORANDOM, VANCOMYCINPE, VANCOPEAK, VANCOMYCINTR, VANCOTROUGH  in the last 72 hours.    Microbiologic Results:  Microbiology Results (last 7 days)       Procedure Component Value Units Date/Time    Respiratory Infection Panel (PCR), Nasopharyngeal [[id number]]     Order Status: No result Specimen: Nasopharyngeal Swab     Culture, Respiratory with Gram Stain [[id number]]     Order Status: No result Specimen: Sputum, Expectorated     Stool culture [[id number]]     Order Status: No result Specimen: Stool     Clostridium difficile EIA [[id number]]     Order Status: No result Specimen: Stool     Blood culture [[id number]]     Order Status: No result Specimen: Blood     Blood culture x two cultures. Draw prior to antibiotics. [[id number]] Collected: 05/05/23 0845    Order Status: Sent Specimen: Blood from Peripheral, Forearm, Right Updated: 05/05/23 1252    Blood culture x two cultures. Draw prior to antibiotics. [[id number]] Collected: 05/05/23 0845    Order Status: Sent Specimen: Blood from Peripheral, Antecubital, Left Updated: 05/05/23 1252

## 2023-05-05 NOTE — H&P
[facility] Medicine  History & Physical    Patient Name: Saud Mayers  MRN: [id number]  Patient Class: OP- Observation  Admission Date: 5/5/2023  Attending Physician: Mis Castillo*   Primary Care Provider: Anson Blunt MD         Patient information was obtained from patient, spouse/SO, past medical records and ER records.     Subjective:     Principal Problem:Severe sepsis    Chief Complaint:   Chief Complaint   Patient presents with    Fever     Pt states that he has fever and chills that had a onset at 6am this morning . Pt is a cancer pt and is immunocompromised.         HPI: 58-year-old male with a past medical history of asthma, lymphoma-treated with R-CHOP - now complete as of last dose April 13, 2023, diabetes, hyperlipidemia, hypertension, ANAMIKA, presents with complaints of feeling freezing and having fever onset 8:00 a.m. after going to work today.  Otherwise he has been in his normal state of health.  He has an intermittent dry cough.  He states that after each prior chemotherapy treatment aside from the 1st and 2nd he experienced similar febrile episodes after this same duration from prior chemo treatments.  During past febrile episodes no source of infection was noted.  He denies headache, vision changes, dizziness, lightheadedness, sore throat, nasal congestion, earache, chest pain, shortness of breath, abdominal pain, nausea, vomiting, diarrhea, productive cough, urinary changes, alcohol, tobacco, illicit drug, skin sores or wounds, sick contacts.      Past Medical History:   Diagnosis Date    Asthma     Cancer     Diabetes mellitus     High cholesterol     Hypertension     ANAMIKA on CPAP     Testicular hypofunction        Past Surgical History:   Procedure Laterality Date    CARPAL TUNNEL RELEASE Bilateral 2020    CHOLECYSTECTOMY  01/01/1990    COLONOSCOPY  01/01/2013    COLONOSCOPY N/A 9/2/2022    Procedure: COLONOSCOPY sutab;  Surgeon: Jeovany Cisse MD;   Location: [facility];  Service: Endoscopy;  Laterality: N/A;    ESOPHAGOGASTRODUODENOSCOPY N/A 9/2/2022    Procedure: EGD (ESOPHAGOGASTRODUODENOSCOPY);  Surgeon: Jeovany Cisse MD;  Location: [facility];  Service: Endoscopy;  Laterality: N/A;    INSERTION OF TUNNELED CENTRAL VENOUS CATHETER (CVC) WITH SUBCUTANEOUS PORT N/A 12/7/2022    Procedure: [id number]-[id number];  Surgeon: Francisco Velazquez MD;  Location: [facility] OR;  Service: General;  Laterality: N/A;    ORTHOPEDIC SURGERY Bilateral 01/01/2007    feet plantar    ORTHOPEDIC SURGERY Right 01/01/2006    knee    ROTATOR CUFF REPAIR Left 01/01/1997    SURGICAL REMOVAL OF LYMPH NODE Right 11/23/2022    Procedure: EXCISION, LYMPH NODE;  Surgeon: Francisco Velazquez MD;  Location: [facility] OR;  Service: General;  Laterality: Right;   (right neck)       Review of patient's allergies indicates:   Allergen Reactions    Gabapentin Hallucinations    Tizanidine Other (See Comments)     somnolence       No current facility-administered medications on file prior to encounter.     Current Outpatient Medications on File Prior to Encounter   Medication Sig    acetaminophen (TYLENOL) 500 MG tablet Take 500 mg by mouth every 6 (six) hours as needed for Pain.    allopurinoL (ZYLOPRIM) 300 MG tablet Take 1 tablet (300 mg total) by mouth once daily.    atorvastatin (LIPITOR) 20 MG tablet Take 1 tablet (20 mg total) by mouth once daily.    cyanocobalamin (VITAMIN B-12) 100 MCG tablet Take 100 mcg by mouth once daily.    folic acid (FOLVITE) 1 MG tablet Take 1 tablet (1 mg total) by mouth once daily.    LIDOcaine-prilocaine (EMLA) cream Apply topically as needed. Apply to skin overlying port site 30 minutes before chemotherapy.    linaCLOtide (LINZESS) 72 mcg Cap capsule Take 1 capsule (72 mcg total) by mouth before breakfast.    losartan (COZAAR) 25 MG tablet Take 1 tablet (25 mg total) by mouth once daily.    magic mouthwash diphen/antac/lidoc/nysta 10 mLs 3 (three) times  daily as needed (chemotherapy-induced mucositis.).    metFORMIN (GLUCOPHAGE) 1000 MG tablet Take 1 tablet (1,000 mg total) by mouth 2 (two) times daily with meals.    metoprolol succinate (TOPROL-XL) 50 MG 24 hr tablet Take 1 tablet (50 mg total) by mouth once daily.    multivit-min-folic-vit K-lycop (ONE-A-DAY MEN'S 50 PLUS) 400- mcg Tab Take 1 tablet by mouth once daily.    hydrOXYzine HCL (ATARAX) 10 MG Tab Take 1 tablet (10 mg total) by mouth 3 (three) times daily as needed (pruritis). (Patient not taking: Reported on 2023)    [DISCONTINUED] ondansetron (ZOFRAN) 4 MG tablet Take 1 tablet (4 mg total) by mouth every 8 (eight) hours as needed for Nausea.    [DISCONTINUED] ondansetron (ZOFRAN-ODT) 8 MG TbDL Take 1 tablet (8 mg total) by mouth every 8 (eight) hours as needed (chemotherapy-induced nausea and vomiting).    [DISCONTINUED] predniSONE (DELTASONE) 50 MG Tab Take 2 tablets (100 mg total) by mouth As instructed (take 2 tablets (100mg ) by mouth daily on days 2-5 of each cycle of chemotherapy).     Family History       Problem Relation (Age of Onset)    Heart attack Father          Tobacco Use    Smoking status: Former     Types: Cigarettes     Quit date: 1/15/1992     Years since quittin.3    Smokeless tobacco: Never   Substance and Sexual Activity    Alcohol use: Never    Drug use: Never    Sexual activity: Yes     Partners: Female     Review of Systems  Objective:     Vital Signs (Most Recent):  Temp: (!) 102 °F (38.9 °C) (23 1403)  Pulse: 93 (23 1408)  Resp: 18 (23 1408)  BP: (!) 149/77 (23 1408)  SpO2: 100 % (23 1408) Vital Signs (24h Range):  Temp:  [100 °F (37.8 °C)-103.2 °F (39.6 °C)] 102 °F (38.9 °C)  Pulse:  [] 93  Resp:  [8-21] 18  SpO2:  [94 %-100 %] 100 %  BP: (110-149)/(55-77) 149/77     Weight: 99.8 kg (220 lb)  Body mass index is 32.49 kg/m².     Physical Exam  Constitutional:       Appearance: He is ill-appearing.      Comments:  Trembling   HENT:      Head: Normocephalic and atraumatic.      Nose: Nose normal.      Mouth/Throat:      Mouth: Mucous membranes are moist.      Pharynx: Oropharynx is clear.   Eyes:      Extraocular Movements: Extraocular movements intact.      Conjunctiva/sclera: Conjunctivae normal.   Cardiovascular:      Rate and Rhythm: Normal rate and regular rhythm.      Pulses: Normal pulses.      Heart sounds: Normal heart sounds.   Pulmonary:      Effort: Pulmonary effort is normal. No respiratory distress.      Breath sounds: Normal breath sounds. No wheezing.   Abdominal:      General: Abdomen is flat. Bowel sounds are normal.      Palpations: Abdomen is soft.   Musculoskeletal:         General: Swelling present. Normal range of motion.      Cervical back: Normal range of motion and neck supple.      Comments: 1+ bilateral lower extremity edema   Skin:     General: Skin is warm and dry.      Findings: No lesion or rash.      Comments: Right chest wall port site not accessed.  Site is without erythema edema or drainage   Neurological:      General: No focal deficit present.      Mental Status: He is alert and oriented to person, place, and time.              Significant Labs: All pertinent labs within the past 24 hours have been reviewed.    Significant Imaging: I have reviewed all pertinent imaging results/findings within the past 24 hours.    Assessment/Plan:     * Severe sepsis  This patient does not have evidence of infective focus  My overall impression is sepsis.  Source: Unknown  Antibiotics given-   Antibiotics (72h ago, onward)    Start     Stop Route Frequency Ordered    05/05/23 2130  vancomycin (VANCOCIN) 1,750 mg in dextrose 5 % (D5W) 500 mL IVPB         -- IV Every 12 hours (non-standard times) 05/05/23 1420    05/05/23 1505  vancomycin - pharmacy to dose  (vancomycin IVPB)        See Hyperspace for full Linked Orders Report.    -- IV pharmacy to manage frequency 05/05/23 1406    05/05/23 0825   piperacillin-tazobactam (ZOSYN) 4.5 g in dextrose 5 % in water (D5W) 5 % 100 mL IVPB (MB+)  (ED Adult Sepsis Treatment)         05/06 0844 IV Every 8 hours (non-standard times) 05/05/23 0842        Latest lactate reviewed-  Recent Labs   Lab 05/05/23  0845 05/05/23  1207   LACTATE 2.7* 4.1*   Procalcitonin 2.01  No leukopenia or leukocytosis  COVID and influenza negative    Organ dysfunction indicated by Thrombocytopenia  and elevated troponin, elevated lactic acid   -troponin elevated suspected as secondary to demand ischemia related to sepsis    Fluid challenge Actual Body weight- Patient will receive 30ml/kg actual body weight to calculate fluid bolus for treatment of septic shock.     Post- resuscitation assessment Yes Perfusion exam was performed within 6 hours of septic shock presentation after bolus shows Adequate tissue perfusion assessed by non-invasive monitoring       Will Not start Pressors- Levophed for MAP of 65  Source control achieved by: IV vancomycin and Zosyn       -elevated D-dimer.  CTA ruled out pulmonary embolism  -pan culture-blood, urine, stool, sputum, respiratory viral panel, blood culture to include from port site    Patient has fever and chills with no other complaints.  Suspect at this time chemotherapy reaction   -consult Hematology/Oncology  -consult Infectious Disease  -trend lactic acid and troponin  -antipyretics for fever      Macrocytic anemia    Hemoglobin is 7.9.  It was 10 1 month ago    Lab Results   Component Value Date    IRON 44 (L) 08/03/2022    TRANSFERRIN 226 08/03/2022    TIBC 334 08/03/2022    FESATURATED 13 (L) 08/03/2022    Patient takes vitamin B12 and folic acid supplements   No obvious source of bleeding   Negative for occult blood on UA  Will trend H&H if hemoglobin continues to be below baseline will repeat anemia panel and check stool for occult blood  Transfuse if hemoglobin less than 7 or if patient becomes  symptomatic    Hypomagnesemia  Replete      Hypophosphatemia  Replete      Diffuse large B-cell lymphoma of lymph nodes of neck    diffuse large B-cell lymphoma s/p 6 cycles of R-CHOP (cycle #6 was 4/13/23)- completed treatment    Nonrheumatic aortic valve stenosis    Moderate per 2D echo  Will need to hold beta-blocker at this time with sepsis  Monitor for signs of volume overload  Will need to follow with outpatient Cardiology    Pure hypercholesterolemia    Hold Lipitor at this time with sepsis    Type 2 diabetes mellitus with hyperglycemia, without long-term current use of insulin  Patient's FSGs are controlled on current medication regimen.  Last A1c reviewed-   Lab Results   Component Value Date    HGBA1C 7.7 (H) 03/22/2023     Most recent fingerstick glucose reviewed-   Recent Labs   Lab 05/05/23  1419   POCTGLUCOSE 116*     Current correctional scale  Low  Maintain anti-hyperglycemic dose as follows-   Antihyperglycemics (From admission, onward)    Start     Stop Route Frequency Ordered    05/05/23 1506  insulin aspart U-100 pen 0-5 Units         -- SubQ Before meals & nightly PRN 05/05/23 1406        Hold Oral hypoglycemics while patient is in the hospital.    Asthma, mild intermittent    Lungs are clear  Will order p.r.n. albuterol inhaler    Essential hypertension    Hold medications at this time with sepsis    Class 2 severe obesity due to excess calories with serious comorbidity and body mass index (BMI) of 36.0 to 36.9 in adult  Body mass index is 32.49 kg/m². Morbid obesity complicates all aspects of disease management from diagnostic modalities to treatment. Weight loss encouraged and health benefits explained to patient.         ANAMIKA (obstructive sleep apnea)    Continue CPAP q.h.s.      VTE Risk Mitigation (From admission, onward)         Ordered     enoxaparin injection 40 mg  Daily         05/05/23 1406     Place SADI hose  Until discontinued         05/05/23 1520     IP VTE HIGH RISK PATIENT  Once          05/05/23 1406     Place sequential compression device  Until discontinued         05/05/23 1406                     On 05/05/2023, patient should be placed in hospital observation services under my care in collaboration with Dr. Mis Castillo.      Karina Benites NP  Department of Hospital Medicine  [place] - Emergency Dept

## 2023-05-05 NOTE — CONSULTS
[facility] - Emergency Dept  Hematology/Oncology  Consult Note    Patient Name: Saud Mayers  MRN: [id number]  Admission Date: 5/5/2023  Hospital Length of Stay: 0 days  Code Status: Full Code   Attending Provider: Mis Castillo*  Consulting Provider: Sandip Vickers MD  Primary Care Physician: Anson Blunt MD  Principal Problem:Severe sepsis    Inpatient consult to Telemedicine - Oncology  Consult performed by: Sandip Vickers MD  Consult ordered by: Sandip Vickers MD  Reason for consult: lymphoma, fever        Subjective:     HPI:  58 year-old male with diffuse large B-cell lymphoma s/p 6 cycles of R-CHOP (cycle #6 was 4/13/23) was admitted on 5/5/23 for fever. Consult is for lymphoma, fever.      VIRTUAL TELENOTE    Start time:2:30pm  Chief complaint: lymphoma, fever  The patient location is: ED 11  The patient arrived at: 2:30pm  Present with the patient at the time of the telemed/virtual assessment: wife, treatment team  End time:  2:40pm    Total time spent with patient: 10 minutes    The attending portion of this evaluation, treatment, and documentation was performed per Sandip Vickers MD via Telemedicine AudioVisual using the secure [employer] software platform with 2 way audio/video. The provider was located off-site and the patient is located in the hospital. The aforementioned video software was utilized to document the relevant history and physical exam.      - he endorses fatigue, fever, chills. He denies shortness of breath, chest pain, nausea, vomiting, diarrhea, constipation.        Oncology Treatment Plan:   OP RCHOP WITH SUBQ RITUXIMAB Q3W    Medications:  Continuous Infusions:   sodium chloride 0.9% 125 mL/hr at 05/05/23 1433     Scheduled Meds:   cyanocobalamin  100 mcg Oral Daily    enoxaparin  40 mg Subcutaneous Daily    folic acid  1 mg Oral Daily    magnesium sulfate IVPB  2 g Intravenous Once    piperacillin-tazobactam (ZOSYN) IVPB  4.5 g Intravenous Q8H    potassium, sodium  phosphates  2 packet Oral QID (AC & HS)    vancomycin (VANCOCIN) IVPB  1,750 mg Intravenous Q12H     PRN Meds:acetaminophen, dextrose 10%, dextrose 10%, dextrose, dextrose, glucagon (human recombinant), insulin aspart U-100, naloxone, ondansetron, sodium chloride 0.9%, Pharmacy to dose Vancomycin consult **AND** vancomycin - pharmacy to dose     Review of patient's allergies indicates:   Allergen Reactions    Gabapentin Hallucinations    Tizanidine Other (See Comments)     somnolence        Past Medical History:   Diagnosis Date    Asthma     Cancer     Diabetes mellitus     High cholesterol     Hypertension     ANAMIKA on CPAP     Testicular hypofunction      Past Surgical History:   Procedure Laterality Date    CARPAL TUNNEL RELEASE Bilateral     CHOLECYSTECTOMY  1990    COLONOSCOPY  2013    COLONOSCOPY N/A 2022    Procedure: COLONOSCOPY sutab;  Surgeon: Jeovany Cisse MD;  Location: [facility];  Service: Endoscopy;  Laterality: N/A;    ESOPHAGOGASTRODUODENOSCOPY N/A 2022    Procedure: EGD (ESOPHAGOGASTRODUODENOSCOPY);  Surgeon: Jeovany Cisse MD;  Location: [facility];  Service: Endoscopy;  Laterality: N/A;    INSERTION OF TUNNELED CENTRAL VENOUS CATHETER (CVC) WITH SUBCUTANEOUS PORT N/A 2022    Procedure: VPRIUDXFV-PLBN-B-CATH;  Surgeon: Francisco Velazquez MD;  Location: [facility];  Service: General;  Laterality: N/A;    ORTHOPEDIC SURGERY Bilateral 2007    feet plantar    ORTHOPEDIC SURGERY Right 2006    knee    ROTATOR CUFF REPAIR Left 1997    SURGICAL REMOVAL OF LYMPH NODE Right 2022    Procedure: EXCISION, LYMPH NODE;  Surgeon: Francisco Velazquez MD;  Location: [facility] OR;  Service: General;  Laterality: Right;   (right neck)     Family History       Problem Relation (Age of Onset)    Heart attack Father          Tobacco Use    Smoking status: Former     Types: Cigarettes     Quit date: 1/15/1992     Years since quittin.3    Smokeless  tobacco: Never   Substance and Sexual Activity    Alcohol use: Never    Drug use: Never    Sexual activity: Yes     Partners: Female       Review of Systems   Constitutional:  Positive for fatigue and fever.   HENT:  Negative for sore throat.    Eyes:  Negative for visual disturbance.   Respiratory:  Negative for shortness of breath.    Cardiovascular:  Negative for chest pain.   Gastrointestinal:  Negative for abdominal pain.   Genitourinary:  Negative for dysuria.   Musculoskeletal:  Negative for back pain.   Skin:  Negative for rash.   Neurological:  Negative for headaches.   Hematological:  Negative for adenopathy.   Psychiatric/Behavioral:  The patient is not nervous/anxious.    Objective:     Vital Signs (Most Recent):  Temp: (!) 102 °F (38.9 °C) (05/05/23 1403)  Pulse: 93 (05/05/23 1408)  Resp: 18 (05/05/23 1408)  BP: (!) 149/77 (05/05/23 1408)  SpO2: 100 % (05/05/23 1408)   Vital Signs (24h Range):  Temp:  [100 °F (37.8 °C)-103.2 °F (39.6 °C)] 102 °F (38.9 °C)  Pulse:  [] 93  Resp:  [8-21] 18  SpO2:  [94 %-100 %] 100 %  BP: (110-149)/(55-77) 149/77     Weight: 99.8 kg (220 lb)  Body mass index is 32.49 kg/m².  Body surface area is 2.2 meters squared.      Intake/Output Summary (Last 24 hours) at 5/5/2023 1442  Last data filed at 5/5/2023 0950  Gross per 24 hour   Intake 3594 ml   Output --   Net 3594 ml        Physical Exam  Deferred due to telemedicine visit.       Significant Labs:   CBC:   Recent Labs   Lab 05/05/23  0845   WBC 6.02   HGB 7.9*   HCT 25.6*   *    and CMP:   Recent Labs   Lab 05/05/23  0845   *   K 3.9      CO2 22*   *   BUN 14   CREATININE 0.9   CALCIUM 9.0   PROT 6.7   ALBUMIN 3.7   BILITOT 0.7   ALKPHOS 85   AST 29   ALT 20   ANIONGAP 10       Diagnostic Results:  CTA chest (5/5/23): I have personally reviewed the images  1. Examination considered diagnostic to the proximal segmental pulmonary arterial level without convincing evidence of acute  "pulmonary embolism.  2. No acute thoracic findings.      Assessment/Plan:     Diffuse large B-cell lymphoma of lymph nodes of neck  - my patient in outpatient setting  - s/p 6 cycles of R-CHOP chemotherapy (last cycle was 4/13/23).  - he has had a great response to therapy. PET scan (3/22/23) revealed "interval resolution of pathologically enlarged, hypermetabolic lymph nodes on both sides of the diaphragm in keeping with excellent response to therapy."  - now presents with fever of unknown etiology  - absolute neutrophil count is 5.1 k/uL, so he does not have neutropenia  - I do not feel it is related to his chemo, sine his chemotherapy was several weeks ago  - CTA chest (5/5/23) was negative for pulmonary embolism/pneumonia  - follow up infectious workup. If no source is found, perhaps the port-a-cath could be a source and could be removed (perhaps cultures from the port will be helpful).        Thank you for your consult.     Sandip Vickers MD  Hematology/Oncology  [facility] - Emergency Dept  "

## 2023-05-05 NOTE — PHARMACY MED REC
"[facility]           Pharmacy  Admission Medication History     The home medication history was taken by Lizy Tee.      Medication history obtained from Medications listed below were obtained from: Patient/family    Based on information gathered for medication list, you may go to "Admission" then "Reconcile Home Medications" tabs to review and/or act upon those items.     The home medication list has been updated by the Pharmacy department.   Please read ALL comments highlighted in yellow.   Please address this information as you see fit.    Feel free to contact us if you have any questions or require assistance.    The medications listed below were removed from the home medication list.  Please reorder if appropriate:    Patient reports NOT TAKING the following medication(s):  Zofran 4mg  Zofran odt 8mg  Prednisone 50mg      No current facility-administered medications on file prior to encounter.     Current Outpatient Medications on File Prior to Encounter   Medication Sig Dispense Refill    acetaminophen (TYLENOL) 500 MG tablet Take 500 mg by mouth every 6 (six) hours as needed for Pain.      allopurinoL (ZYLOPRIM) 300 MG tablet Take 1 tablet (300 mg total) by mouth once daily. 30 tablet 11    atorvastatin (LIPITOR) 20 MG tablet Take 1 tablet (20 mg total) by mouth once daily. 90 tablet 3    cyanocobalamin (VITAMIN B-12) 100 MCG tablet Take 100 mcg by mouth once daily.      folic acid (FOLVITE) 1 MG tablet Take 1 tablet (1 mg total) by mouth once daily. 100 tablet 3    LIDOcaine-prilocaine (EMLA) cream Apply topically as needed. Apply to skin overlying port site 30 minutes before chemotherapy. 30 g 1    linaCLOtide (LINZESS) 72 mcg Cap capsule Take 1 capsule (72 mcg total) by mouth before breakfast. 90 capsule 3    losartan (COZAAR) 25 MG tablet Take 1 tablet (25 mg total) by mouth once daily. 90 tablet 3    magic mouthwash diphen/antac/lidoc/nysta 10 mLs 3 (three) times daily as " needed (chemotherapy-induced mucositis.). 300 mL 1    metFORMIN (GLUCOPHAGE) 1000 MG tablet Take 1 tablet (1,000 mg total) by mouth 2 (two) times daily with meals. 180 tablet 3    metoprolol succinate (TOPROL-XL) 50 MG 24 hr tablet Take 1 tablet (50 mg total) by mouth once daily. 90 tablet 3    multivit-min-folic-vit K-lycop (ONE-A-DAY MEN'S 50 PLUS) 400- mcg Tab Take 1 tablet by mouth once daily.      hydrOXYzine HCL (ATARAX) 10 MG Tab Take 1 tablet (10 mg total) by mouth 3 (three) times daily as needed (pruritis). (Patient not taking: Reported on 5/5/2023) 30 tablet 1       Please address this information as you see fit.  Feel free to contact us if you have any questions or require assistance.    Lizy Tee  [phone]                  .

## 2023-05-05 NOTE — ASSESSMENT & PLAN NOTE
This patient does not have evidence of infective focus  My overall impression is sepsis.  Source: Unknown  Antibiotics given-   Antibiotics (72h ago, onward)    Start     Stop Route Frequency Ordered    05/05/23 2130  vancomycin (VANCOCIN) 1,750 mg in dextrose 5 % (D5W) 500 mL IVPB         -- IV Every 12 hours (non-standard times) 05/05/23 1420    05/05/23 1505  vancomycin - pharmacy to dose  (vancomycin IVPB)        See Tee for full Linked Orders Report.    -- IV pharmacy to manage frequency 05/05/23 1406    05/05/23 0845  piperacillin-tazobactam (ZOSYN) 4.5 g in dextrose 5 % in water (D5W) 5 % 100 mL IVPB (MB+)  (ED Adult Sepsis Treatment)         05/06 0844 IV Every 8 hours (non-standard times) 05/05/23 0842        Latest lactate reviewed-  Recent Labs   Lab 05/05/23  0845 05/05/23  1207   LACTATE 2.7* 4.1*   Procalcitonin 2.01  No leukopenia or leukocytosis  COVID and influenza negative    Organ dysfunction indicated by Thrombocytopenia  and elevated troponin, elevated lactic acid   -troponin elevated suspected as secondary to demand ischemia related to sepsis    Fluid challenge Actual Body weight- Patient will receive 30ml/kg actual body weight to calculate fluid bolus for treatment of septic shock.     Post- resuscitation assessment Yes Perfusion exam was performed within 6 hours of septic shock presentation after bolus shows Adequate tissue perfusion assessed by non-invasive monitoring       Will Not start Pressors- Levophed for MAP of 65  Source control achieved by: IV vancomycin and Zosyn       -elevated D-dimer.  CTA ruled out pulmonary embolism  -pan culture-blood, urine, stool, sputum, respiratory viral panel, blood culture to include from port site    Patient has fever and chills with no other complaints.  Suspect at this time chemotherapy reaction   -consult Hematology/Oncology  -consult Infectious Disease  -trend lactic acid and troponin  -antipyretics for fever

## 2023-05-05 NOTE — SUBJECTIVE & OBJECTIVE
VIRTUAL TELENOTE    Start time:2:30pm  Chief complaint: lymphoma, fever  The patient location is: ED 11  The patient arrived at: 2:30pm  Present with the patient at the time of the telemed/virtual assessment: wife, treatment team  End time:  2:40pm    Total time spent with patient: 10 minutes    The attending portion of this evaluation, treatment, and documentation was performed per Sandip Vickers MD via Telemedicine AudioVisual using the secure [employer] software platform with 2 way audio/video. The provider was located off-site and the patient is located in the hospital. The aforementioned video software was utilized to document the relevant history and physical exam.      - he endorses fatigue, fever, chills. He denies shortness of breath, chest pain, nausea, vomiting, diarrhea, constipation.        Oncology Treatment Plan:   OP RCHOP WITH SUBQ RITUXIMAB Q3W    Medications:  Continuous Infusions:   sodium chloride 0.9% 125 mL/hr at 05/05/23 1433     Scheduled Meds:   cyanocobalamin  100 mcg Oral Daily    enoxaparin  40 mg Subcutaneous Daily    folic acid  1 mg Oral Daily    magnesium sulfate IVPB  2 g Intravenous Once    piperacillin-tazobactam (ZOSYN) IVPB  4.5 g Intravenous Q8H    potassium, sodium phosphates  2 packet Oral QID (AC & HS)    vancomycin (VANCOCIN) IVPB  1,750 mg Intravenous Q12H     PRN Meds:acetaminophen, dextrose 10%, dextrose 10%, dextrose, dextrose, glucagon (human recombinant), insulin aspart U-100, naloxone, ondansetron, sodium chloride 0.9%, Pharmacy to dose Vancomycin consult **AND** vancomycin - pharmacy to dose     Review of patient's allergies indicates:   Allergen Reactions    Gabapentin Hallucinations    Tizanidine Other (See Comments)     somnolence        Past Medical History:   Diagnosis Date    Asthma     Cancer     Diabetes mellitus     High cholesterol     Hypertension     ANAMIKA on CPAP     Testicular hypofunction      Past Surgical History:   Procedure Laterality Date    CARPAL  TUNNEL RELEASE Bilateral     CHOLECYSTECTOMY  1990    COLONOSCOPY  2013    COLONOSCOPY N/A 2022    Procedure: COLONOSCOPY sutab;  Surgeon: Jeovany Cisse MD;  Location: [facility] ENDO;  Service: Endoscopy;  Laterality: N/A;    ESOPHAGOGASTRODUODENOSCOPY N/A 2022    Procedure: EGD (ESOPHAGOGASTRODUODENOSCOPY);  Surgeon: Jeovany Cisse MD;  Location: [facility] ENDO;  Service: Endoscopy;  Laterality: N/A;    INSERTION OF TUNNELED CENTRAL VENOUS CATHETER (CVC) WITH SUBCUTANEOUS PORT N/A 2022    Procedure: [id number]-L-CATH;  Surgeon: Francisco Velazquez MD;  Location: [facility] OR;  Service: General;  Laterality: N/A;    ORTHOPEDIC SURGERY Bilateral 2007    feet plantar    ORTHOPEDIC SURGERY Right 2006    knee    ROTATOR CUFF REPAIR Left 1997    SURGICAL REMOVAL OF LYMPH NODE Right 2022    Procedure: EXCISION, LYMPH NODE;  Surgeon: Francisco Velazquez MD;  Location: [facility] OR;  Service: General;  Laterality: Right;   (right neck)     Family History       Problem Relation (Age of Onset)    Heart attack Father          Tobacco Use    Smoking status: Former     Types: Cigarettes     Quit date: 1/15/1992     Years since quittin.3    Smokeless tobacco: Never   Substance and Sexual Activity    Alcohol use: Never    Drug use: Never    Sexual activity: Yes     Partners: Female       Review of Systems   Constitutional:  Positive for fatigue and fever.   HENT:  Negative for sore throat.    Eyes:  Negative for visual disturbance.   Respiratory:  Negative for shortness of breath.    Cardiovascular:  Negative for chest pain.   Gastrointestinal:  Negative for abdominal pain.   Genitourinary:  Negative for dysuria.   Musculoskeletal:  Negative for back pain.   Skin:  Negative for rash.   Neurological:  Negative for headaches.   Hematological:  Negative for adenopathy.   Psychiatric/Behavioral:  The patient is not nervous/anxious.    Objective:     Vital Signs (Most Recent):  Temp: (!) 102 °F  (38.9 °C) (05/05/23 1403)  Pulse: 93 (05/05/23 1408)  Resp: 18 (05/05/23 1408)  BP: (!) 149/77 (05/05/23 1408)  SpO2: 100 % (05/05/23 1408)   Vital Signs (24h Range):  Temp:  [100 °F (37.8 °C)-103.2 °F (39.6 °C)] 102 °F (38.9 °C)  Pulse:  [] 93  Resp:  [8-21] 18  SpO2:  [94 %-100 %] 100 %  BP: (110-149)/(55-77) 149/77     Weight: 99.8 kg (220 lb)  Body mass index is 32.49 kg/m².  Body surface area is 2.2 meters squared.      Intake/Output Summary (Last 24 hours) at 5/5/2023 1442  Last data filed at 5/5/2023 0950  Gross per 24 hour   Intake 3594 ml   Output --   Net 3594 ml        Physical Exam  Deferred due to telemedicine visit.       Significant Labs:   CBC:   Recent Labs   Lab 05/05/23  0845   WBC 6.02   HGB 7.9*   HCT 25.6*   *    and CMP:   Recent Labs   Lab 05/05/23  0845   *   K 3.9      CO2 22*   *   BUN 14   CREATININE 0.9   CALCIUM 9.0   PROT 6.7   ALBUMIN 3.7   BILITOT 0.7   ALKPHOS 85   AST 29   ALT 20   ANIONGAP 10       Diagnostic Results:  CTA chest (5/5/23): I have personally reviewed the images  1. Examination considered diagnostic to the proximal segmental pulmonary arterial level without convincing evidence of acute pulmonary embolism.  2. No acute thoracic findings.

## 2023-05-06 LAB
ABO + RH BLD: NORMAL
ACINETOBACTER CALCOACETICUS/BAUMANNII COMPLEX: NOT DETECTED
ALBUMIN SERPL BCP-MCNC: 3 G/DL (ref 3.5–5.2)
ALP SERPL-CCNC: 63 U/L (ref 55–135)
ALT SERPL W/O P-5'-P-CCNC: 19 U/L (ref 10–44)
ANION GAP SERPL CALC-SCNC: 11 MMOL/L (ref 8–16)
AST SERPL-CCNC: 25 U/L (ref 10–40)
BACTEROIDES FRAGILIS: NOT DETECTED
BASOPHILS # BLD AUTO: 0.01 K/UL (ref 0–0.2)
BASOPHILS NFR BLD: 0.3 % (ref 0–1.9)
BILIRUB SERPL-MCNC: 1 MG/DL (ref 0.1–1)
BLD GP AB SCN CELLS X3 SERPL QL: NORMAL
BLD PROD TYP BPU: NORMAL
BLOOD UNIT EXPIRATION DATE: NORMAL
BLOOD UNIT TYPE CODE: 5100
BLOOD UNIT TYPE: NORMAL
BUN SERPL-MCNC: 12 MG/DL (ref 6–20)
CALCIUM SERPL-MCNC: 8.3 MG/DL (ref 8.7–10.5)
CANDIDA ALBICANS: NOT DETECTED
CANDIDA AURIS: NOT DETECTED
CANDIDA GLABRATA: NOT DETECTED
CANDIDA KRUSEI: NOT DETECTED
CANDIDA PARAPSILOSIS: NOT DETECTED
CANDIDA TROPICALIS: NOT DETECTED
CHLORIDE SERPL-SCNC: 104 MMOL/L (ref 95–110)
CO2 SERPL-SCNC: 22 MMOL/L (ref 23–29)
CODING SYSTEM: NORMAL
CREAT SERPL-MCNC: 0.8 MG/DL (ref 0.5–1.4)
CROSSMATCH INTERPRETATION: NORMAL
CRYPTOCOCCUS NEOFORMANS/GATTII: NOT DETECTED
CTX-M GENE: NOT DETECTED
DIFFERENTIAL METHOD: ABNORMAL
DISPENSE STATUS: NORMAL
ENTEROBACTER CLOACAE COMPLEX: NOT DETECTED
ENTEROBACTERALES: ABNORMAL
ENTEROCOCCUS FAECALIS: NOT DETECTED
ENTEROCOCCUS FAECIUM: NOT DETECTED
EOSINOPHIL # BLD AUTO: 0 K/UL (ref 0–0.5)
EOSINOPHIL NFR BLD: 0 % (ref 0–8)
ERYTHROCYTE [DISTWIDTH] IN BLOOD BY AUTOMATED COUNT: 20 % (ref 11.5–14.5)
ESCHERICHIA COLI: NOT DETECTED
EST. GFR  (NO RACE VARIABLE): >60 ML/MIN/1.73 M^2
FERRITIN SERPL-MCNC: 600 NG/ML (ref 20–300)
FOLATE SERPL-MCNC: 17.3 NG/ML (ref 4–24)
GLUCOSE SERPL-MCNC: 120 MG/DL (ref 70–110)
HAEMOPHILUS INFLUENZAE: NOT DETECTED
HCT VFR BLD AUTO: 21.8 % (ref 40–54)
HGB BLD-MCNC: 6.8 G/DL (ref 14–18)
IMM GRANULOCYTES # BLD AUTO: 0.02 K/UL (ref 0–0.04)
IMM GRANULOCYTES NFR BLD AUTO: 0.6 % (ref 0–0.5)
IMP GENE: NOT DETECTED
INR PPP: 1.1 (ref 0.8–1.2)
IRON SERPL-MCNC: 17 UG/DL (ref 45–160)
KLEBSIELLA AEROGENES: NOT DETECTED
KLEBSIELLA OXYTOCA: NOT DETECTED
KLEBSIELLA PNEUMONIAE GROUP: DETECTED
KPC: NOT DETECTED
LACTATE SERPL-SCNC: 1.1 MMOL/L (ref 0.5–2.2)
LISTERIA MONOCYTOGENES: NOT DETECTED
LYMPHOCYTES # BLD AUTO: 0.4 K/UL (ref 1–4.8)
LYMPHOCYTES NFR BLD: 13.4 % (ref 18–48)
MAGNESIUM SERPL-MCNC: 1.8 MG/DL (ref 1.6–2.6)
MCH RBC QN AUTO: 32.7 PG (ref 27–31)
MCHC RBC AUTO-ENTMCNC: 31.2 G/DL (ref 32–36)
MCR-1: NOT DETECTED
MCV RBC AUTO: 105 FL (ref 82–98)
MEC A/C AND MREJ (MRSA): ABNORMAL
MEC A/C: ABNORMAL
MONOCYTES # BLD AUTO: 0.4 K/UL (ref 0.3–1)
MONOCYTES NFR BLD: 11.5 % (ref 4–15)
NDM GENE: NOT DETECTED
NEISSERIA MENINGITIDIS: NOT DETECTED
NEUTROPHILS # BLD AUTO: 2.4 K/UL (ref 1.8–7.7)
NEUTROPHILS NFR BLD: 74.2 % (ref 38–73)
NRBC BLD-RTO: 0 /100 WBC
NUM UNITS TRANS PACKED RBC: NORMAL
OXA-48-LIKE: NOT DETECTED
PHOSPHATE SERPL-MCNC: 4.9 MG/DL (ref 2.7–4.5)
PLATELET # BLD AUTO: 101 K/UL (ref 150–450)
PMV BLD AUTO: 9.4 FL (ref 9.2–12.9)
POCT GLUCOSE: 119 MG/DL (ref 70–110)
POCT GLUCOSE: 144 MG/DL (ref 70–110)
POCT GLUCOSE: 145 MG/DL (ref 70–110)
POCT GLUCOSE: 163 MG/DL (ref 70–110)
POTASSIUM SERPL-SCNC: 3.6 MMOL/L (ref 3.5–5.1)
PROT SERPL-MCNC: 5.7 G/DL (ref 6–8.4)
PROTEUS SPECIES: NOT DETECTED
PROTHROMBIN TIME: 11.9 SEC (ref 9–12.5)
PSEUDOMONAS AERUGINOSA: NOT DETECTED
RBC # BLD AUTO: 2.08 M/UL (ref 4.6–6.2)
RETICS/RBC NFR AUTO: 5.2 % (ref 0.4–2)
SALMONELLA SP: NOT DETECTED
SATURATED IRON: 6 % (ref 20–50)
SERRATIA MARCESCENS: NOT DETECTED
SODIUM SERPL-SCNC: 137 MMOL/L (ref 136–145)
SPECIMEN OUTDATE: NORMAL
STAPHYLOCOCCUS AUREUS: NOT DETECTED
STAPHYLOCOCCUS EPIDERMIDIS: NOT DETECTED
STAPHYLOCOCCUS LUGDUNESIS: NOT DETECTED
STAPHYLOCOCCUS SPECIES: NOT DETECTED
STENOTROPHOMONAS MALTOPHILIA: NOT DETECTED
STREPTOCOCCUS AGALACTIAE: NOT DETECTED
STREPTOCOCCUS PNEUMONIAE: NOT DETECTED
STREPTOCOCCUS PYOGENES: NOT DETECTED
STREPTOCOCCUS SPECIES: NOT DETECTED
TOTAL IRON BINDING CAPACITY: 292 UG/DL (ref 250–450)
TRANSFERRIN SERPL-MCNC: 197 MG/DL (ref 200–375)
TROPONIN I SERPL DL<=0.01 NG/ML-MCNC: 0.13 NG/ML (ref 0–0.03)
VAN A/B: ABNORMAL
VANCOMYCIN TROUGH SERPL-MCNC: 21.5 UG/ML (ref 10–22)
VIM GENE: NOT DETECTED
VIT B12 SERPL-MCNC: >2000 PG/ML (ref 210–950)
WBC # BLD AUTO: 3.21 K/UL (ref 3.9–12.7)

## 2023-05-06 PROCEDURE — 25000003 PHARM REV CODE 250: Performed by: EMERGENCY MEDICINE

## 2023-05-06 PROCEDURE — 94761 N-INVAS EAR/PLS OXIMETRY MLT: CPT

## 2023-05-06 PROCEDURE — 82728 ASSAY OF FERRITIN: CPT | Performed by: NURSE PRACTITIONER

## 2023-05-06 PROCEDURE — 80053 COMPREHEN METABOLIC PANEL: CPT | Performed by: NURSE PRACTITIONER

## 2023-05-06 PROCEDURE — 82746 ASSAY OF FOLIC ACID SERUM: CPT | Performed by: NURSE PRACTITIONER

## 2023-05-06 PROCEDURE — 87040 BLOOD CULTURE FOR BACTERIA: CPT | Performed by: NURSE PRACTITIONER

## 2023-05-06 PROCEDURE — 86900 BLOOD TYPING SEROLOGIC ABO: CPT | Performed by: NURSE PRACTITIONER

## 2023-05-06 PROCEDURE — 63600175 PHARM REV CODE 636 W HCPCS: Performed by: NURSE PRACTITIONER

## 2023-05-06 PROCEDURE — 86920 COMPATIBILITY TEST SPIN: CPT | Performed by: NURSE PRACTITIONER

## 2023-05-06 PROCEDURE — 25000003 PHARM REV CODE 250: Performed by: NURSE PRACTITIONER

## 2023-05-06 PROCEDURE — 27201040 HC RC 50 FILTER: Performed by: NURSE PRACTITIONER

## 2023-05-06 PROCEDURE — 85045 AUTOMATED RETICULOCYTE COUNT: CPT | Performed by: NURSE PRACTITIONER

## 2023-05-06 PROCEDURE — 87045 FECES CULTURE AEROBIC BACT: CPT | Performed by: NURSE PRACTITIONER

## 2023-05-06 PROCEDURE — 85025 COMPLETE CBC W/AUTO DIFF WBC: CPT | Performed by: NURSE PRACTITIONER

## 2023-05-06 PROCEDURE — 36415 COLL VENOUS BLD VENIPUNCTURE: CPT | Performed by: NURSE PRACTITIONER

## 2023-05-06 PROCEDURE — 63600175 PHARM REV CODE 636 W HCPCS: Performed by: EMERGENCY MEDICINE

## 2023-05-06 PROCEDURE — 36415 COLL VENOUS BLD VENIPUNCTURE: CPT | Performed by: FAMILY MEDICINE

## 2023-05-06 PROCEDURE — 87427 SHIGA-LIKE TOXIN AG IA: CPT | Mod: 59 | Performed by: NURSE PRACTITIONER

## 2023-05-06 PROCEDURE — 83605 ASSAY OF LACTIC ACID: CPT | Performed by: FAMILY MEDICINE

## 2023-05-06 PROCEDURE — 84484 ASSAY OF TROPONIN QUANT: CPT | Performed by: NURSE PRACTITIONER

## 2023-05-06 PROCEDURE — 85610 PROTHROMBIN TIME: CPT | Performed by: NURSE PRACTITIONER

## 2023-05-06 PROCEDURE — 87449 NOS EACH ORGANISM AG IA: CPT | Performed by: NURSE PRACTITIONER

## 2023-05-06 PROCEDURE — 11000001 HC ACUTE MED/SURG PRIVATE ROOM

## 2023-05-06 PROCEDURE — P9038 RBC IRRADIATED: HCPCS | Performed by: NURSE PRACTITIONER

## 2023-05-06 PROCEDURE — 84466 ASSAY OF TRANSFERRIN: CPT | Performed by: NURSE PRACTITIONER

## 2023-05-06 PROCEDURE — 87046 STOOL CULTR AEROBIC BACT EA: CPT | Performed by: NURSE PRACTITIONER

## 2023-05-06 PROCEDURE — 84100 ASSAY OF PHOSPHORUS: CPT | Performed by: NURSE PRACTITIONER

## 2023-05-06 PROCEDURE — 83735 ASSAY OF MAGNESIUM: CPT | Performed by: NURSE PRACTITIONER

## 2023-05-06 PROCEDURE — 82607 VITAMIN B-12: CPT | Performed by: NURSE PRACTITIONER

## 2023-05-06 PROCEDURE — 99900035 HC TECH TIME PER 15 MIN (STAT)

## 2023-05-06 PROCEDURE — 80202 ASSAY OF VANCOMYCIN: CPT | Performed by: FAMILY MEDICINE

## 2023-05-06 RX ORDER — HYDROCODONE BITARTRATE AND ACETAMINOPHEN 500; 5 MG/1; MG/1
TABLET ORAL
Status: DISCONTINUED | OUTPATIENT
Start: 2023-05-06 | End: 2023-05-08 | Stop reason: HOSPADM

## 2023-05-06 RX ORDER — HEPARIN 100 UNIT/ML
100 SYRINGE INTRAVENOUS
Status: COMPLETED | OUTPATIENT
Start: 2023-05-06 | End: 2023-05-06

## 2023-05-06 RX ADMIN — MEROPENEM 1 G: 1 INJECTION, POWDER, FOR SOLUTION INTRAVENOUS at 05:05

## 2023-05-06 RX ADMIN — HEPARIN 100 UNITS: 100 SYRINGE at 08:05

## 2023-05-06 RX ADMIN — VANCOMYCIN HYDROCHLORIDE 1750 MG: 1 INJECTION, POWDER, LYOPHILIZED, FOR SOLUTION INTRAVENOUS at 11:05

## 2023-05-06 RX ADMIN — IBUPROFEN 400 MG: 400 TABLET ORAL at 11:05

## 2023-05-06 RX ADMIN — ENOXAPARIN SODIUM 40 MG: 40 INJECTION SUBCUTANEOUS at 05:05

## 2023-05-06 RX ADMIN — PIPERACILLIN AND TAZOBACTAM 4.5 G: 4; .5 INJECTION, POWDER, LYOPHILIZED, FOR SOLUTION INTRAVENOUS; PARENTERAL at 02:05

## 2023-05-06 RX ADMIN — POTASSIUM & SODIUM PHOSPHATES POWDER PACK 280-160-250 MG 2 PACKET: 280-160-250 PACK at 06:05

## 2023-05-06 RX ADMIN — VANCOMYCIN HYDROCHLORIDE 1750 MG: 1 INJECTION, POWDER, LYOPHILIZED, FOR SOLUTION INTRAVENOUS at 10:05

## 2023-05-06 RX ADMIN — ACETAMINOPHEN 650 MG: 325 TABLET ORAL at 02:05

## 2023-05-06 RX ADMIN — MEROPENEM 1 G: 1 INJECTION, POWDER, FOR SOLUTION INTRAVENOUS at 09:05

## 2023-05-06 RX ADMIN — FOLIC ACID 1 MG: 1 TABLET ORAL at 09:05

## 2023-05-06 RX ADMIN — Medication 100 MCG: at 09:05

## 2023-05-06 NOTE — ASSESSMENT & PLAN NOTE
Patient's FSGs are controlled on current medication regimen.  Last A1c reviewed-   Lab Results   Component Value Date    HGBA1C 7.7 (H) 03/22/2023     Most recent fingerstick glucose reviewed-   Recent Labs   Lab 05/05/23  1419 05/05/23  1935 05/06/23  0555   POCTGLUCOSE 116* 150* 145*     Current correctional scale  Low  Maintain anti-hyperglycemic dose as follows-   Antihyperglycemics (From admission, onward)    Start     Stop Route Frequency Ordered    05/05/23 1506  insulin aspart U-100 pen 0-5 Units         -- SubQ Before meals & nightly PRN 05/05/23 1406        Hold Oral hypoglycemics while patient is in the hospital.

## 2023-05-06 NOTE — SUBJECTIVE & OBJECTIVE
Interval hx: This am patient states he is feeling somewhat better. He is still febrile. He denies new or major complaints.     Review of Systems  Objective:     Vital Signs (Most Recent):  Temp: (!) 100.5 °F (38.1 °C) (05/06/23 0720)  Pulse: 95 (05/06/23 0720)  Resp: 18 (05/06/23 0720)  BP: 135/87 (05/06/23 0720)  SpO2: 97 % (05/06/23 0807) Vital Signs (24h Range):  Temp:  [98.6 °F (37 °C)-102.6 °F (39.2 °C)] 100.5 °F (38.1 °C)  Pulse:  [] 95  Resp:  [18-19] 18  SpO2:  [94 %-100 %] 97 %  BP: (109-149)/(49-87) 135/87     Weight: 98.7 kg (217 lb 8.2 oz)  Body mass index is 32.12 kg/m².     Physical Exam  Constitutional:       Appearance: He is ill-appearing.      Comments: No longer Trembling   HENT:      Head: Normocephalic and atraumatic.      Nose: Nose normal.      Mouth/Throat:      Mouth: Mucous membranes are moist.      Pharynx: Oropharynx is clear.   Eyes:      Extraocular Movements: Extraocular movements intact.      Conjunctiva/sclera: Conjunctivae normal.   Cardiovascular:      Rate and Rhythm: Normal rate and regular rhythm.      Pulses: Normal pulses.      Heart sounds: Normal heart sounds.   Pulmonary:      Effort: Pulmonary effort is normal. No respiratory distress.      Breath sounds: Normal breath sounds. No wheezing.   Abdominal:      General: Abdomen is flat. Bowel sounds are normal.      Palpations: Abdomen is soft.   Musculoskeletal:         General: Swelling present. Normal range of motion.      Cervical back: Normal range of motion and neck supple.      Comments: 1+ bilateral lower extremity edema   Skin:     General: Skin is warm and dry.      Findings: No lesion or rash.      Comments: Right chest wall port site not accessed.  Site is without erythema edema or drainage   Neurological:      General: No focal deficit present.      Mental Status: He is alert and oriented to person, place, and time.              Significant Labs: All pertinent labs within the past 24 hours have been  reviewed.    Significant Imaging: I have reviewed all pertinent imaging results/findings within the past 24 hours.

## 2023-05-06 NOTE — CONSULTS
[facility] Consult Note    Primary Attending Physician: Dr. Castillo  Consultant Attending: Dr. Yin    Reason for Consult:     Sepsis    Assessment/Recommendations     Saud Mayers is a 58 y.o. male with PMH of diffuse large B-cell lymphoma s/p 6 cycles of R-CHOp (most recently 4/13), HTN, HLD, ANAMIKA, DM2 presenting with fever, fatigue and chills, admitted for sepsis. WBC normal, not neutropenic. CXR without consolidations, CTA without PE. On room air, Tmax 102 in past 24hours. Began on broad spectrum antibiotics. Blood Cultures with Gram negative rods on stain, PCR rapid ID + for Enterobacterales and Klebsiella Pneumonia group.   Unclear source, could have obtained while neurtropenic post chemotherapy, he does have port-a-cath. We will recommend trial of treating to clearance with cath in place, but if not clearing will unfortunately need removal with replacement once BlCx clear.    - Continue to follow daily blood cultures for clearance  - agree with COVID19 test  - Continue Meropenem  - Continue Vancomycin     Thank you for allowing us to participate in the care of this patient. Please contact us if you have any questions regarding this consult.    Annalise Guo   [facility]    Subjective:      History of Present Illness:  Saud Mayers is a 58 y.o. male with PMH of diffuse large B-cell lymphoma s/p 6 cycles of R-CHOp (most recently 4/13), HTN, HLD, ANAMIKA, DM2 presenting with fever, fatigue and chills, admitted for sepsis.  Pt reports he was feeling hot with chills and very weak on presentation> Unable to even get up out of bed. He is now feeling better in terms of energy. No chest pain, SOB, N/V/abdominal pain, or urinary symptoms.     Past Medical History:  Past Medical History:   Diagnosis Date    Asthma     Cancer     Diabetes mellitus     High cholesterol     Hypertension     ANAMIKA on CPAP     Testicular hypofunction        Past Surgical History:  Past Surgical History:    Procedure Laterality Date    CARPAL TUNNEL RELEASE Bilateral 2020    CHOLECYSTECTOMY  01/01/1990    COLONOSCOPY  01/01/2013    COLONOSCOPY N/A 9/2/2022    Procedure: COLONOSCOPY sutab;  Surgeon: Jeovany Cisse MD;  Location: [facility] ENDO;  Service: Endoscopy;  Laterality: N/A;    ESOPHAGOGASTRODUODENOSCOPY N/A 9/2/2022    Procedure: EGD (ESOPHAGOGASTRODUODENOSCOPY);  Surgeon: Jeovany Cisse MD;  Location: [facility] ENDO;  Service: Endoscopy;  Laterality: N/A;    INSERTION OF TUNNELED CENTRAL VENOUS CATHETER (CVC) WITH SUBCUTANEOUS PORT N/A 12/7/2022    Procedure: MVUPLFOEM-WEML-A-CATH;  Surgeon: Francisco Velazquez MD;  Location: [facility];  Service: General;  Laterality: N/A;    ORTHOPEDIC SURGERY Bilateral 01/01/2007    feet plantar    ORTHOPEDIC SURGERY Right 01/01/2006    knee    ROTATOR CUFF REPAIR Left 01/01/1997    SURGICAL REMOVAL OF LYMPH NODE Right 11/23/2022    Procedure: EXCISION, LYMPH NODE;  Surgeon: Francisco Velazquez MD;  Location: [facility] OR;  Service: General;  Laterality: Right;   (right neck)       Allergies:  Review of patient's allergies indicates:   Allergen Reactions    Gabapentin Hallucinations    Tizanidine Other (See Comments)     somnolence       Medications:   In-Hospital Scheduled Medications:   cyanocobalamin  100 mcg Oral Daily    enoxaparin  40 mg Subcutaneous Daily    folic acid  1 mg Oral Daily    meropenem (MERREM) IVPB  1 g Intravenous Q8H    vancomycin (VANCOCIN) IVPB  1,750 mg Intravenous Q12H      In-Hospital PRN Medications:  sodium chloride, acetaminophen, albuterol, dextrose 10%, dextrose 10%, dextrose, dextrose, glucagon (human recombinant), ibuprofen, insulin aspart U-100, naloxone, ondansetron, sodium chloride 0.9%, Pharmacy to dose Vancomycin consult **AND** vancomycin - pharmacy to dose   In-Hospital IV Infusion Medications:     Home Medications:  Prior to Admission medications    Medication Sig Start Date End Date Taking? Authorizing Provider   acetaminophen (TYLENOL) 500 MG  tablet Take 500 mg by mouth every 6 (six) hours as needed for Pain.   Yes Historical Provider   allopurinoL (ZYLOPRIM) 300 MG tablet Take 1 tablet (300 mg total) by mouth once daily. 12/6/22  Yes Sandip Vickers MD   atorvastatin (LIPITOR) 20 MG tablet Take 1 tablet (20 mg total) by mouth once daily. 8/3/22  Yes Anson Blunt MD   cyanocobalamin (VITAMIN B-12) 100 MCG tablet Take 100 mcg by mouth once daily.   Yes Historical Provider   folic acid (FOLVITE) 1 MG tablet Take 1 tablet (1 mg total) by mouth once daily. 12/6/22 12/6/23 Yes Sandip Vickers MD   LIDOcaine-prilocaine (EMLA) cream Apply topically as needed. Apply to skin overlying port site 30 minutes before chemotherapy. 12/6/22  Yes Sandip Vickers MD   linaCLOtide (LINZESS) 72 mcg Cap capsule Take 1 capsule (72 mcg total) by mouth before breakfast. 11/7/22 11/7/23 Yes Jana Cantrell NP   losartan (COZAAR) 25 MG tablet Take 1 tablet (25 mg total) by mouth once daily. 11/11/22 11/11/23 Yes Maggy Mckinney NP   magic mouthwash diphen/antac/lidoc/nysta 10 mLs 3 (three) times daily as needed (chemotherapy-induced mucositis.). 1/11/23  Yes Sandip Vickers MD   metFORMIN (GLUCOPHAGE) 1000 MG tablet Take 1 tablet (1,000 mg total) by mouth 2 (two) times daily with meals. 1/31/23  Yes Anson Blunt MD   metoprolol succinate (TOPROL-XL) 50 MG 24 hr tablet Take 1 tablet (50 mg total) by mouth once daily. 11/6/22  Yes Anson Blunt MD   multivit-min-folic-vit K-lycop (ONE-A-DAY MEN'S 50 PLUS) 400- mcg Tab Take 1 tablet by mouth once daily.   Yes Historical Provider   hydrOXYzine HCL (ATARAX) 10 MG Tab Take 1 tablet (10 mg total) by mouth 3 (three) times daily as needed (pruritis).  Patient not taking: Reported on 5/5/2023 2/9/23   Sandip Vickers MD       Family History:  Family History   Problem Relation Age of Onset    Heart attack Father        Social History:  Social History     Tobacco Use    Smoking status: Former     Types: Cigarettes     Quit date:  "1/15/1992     Years since quittin.3    Smokeless tobacco: Never   Substance Use Topics    Alcohol use: Never    Drug use: Never       ROS as in HPI       Objective:   Last 24 Hour Vital Signs:  BP  Min: 109/49  Max: 149/77  Temp  Av.6 °F (38.1 °C)  Min: 98.6 °F (37 °C)  Max: 102.6 °F (39.2 °C)  Pulse  Av.4  Min: 82  Max: 110  Resp  Av.3  Min: 18  Max: 19  SpO2  Av.7 %  Min: 94 %  Max: 100 %  Height  Av' 9" (175.3 cm)  Min: 5' 9" (175.3 cm)  Max: 5' 9" (175.3 cm)  Weight  Av.7 kg (217 lb 8.2 oz)  Min: 98.7 kg (217 lb 8.2 oz)  Max: 98.7 kg (217 lb 8.2 oz)  I/O last 3 completed shifts:  In: 4074 [P.O.:480; IV Piggyback:3594]  Out: -     Physical Exam  Constitutional:       General: He is not in acute distress.     Appearance: Normal appearance. He is not toxic-appearing.   HENT:      Mouth/Throat:      Mouth: Mucous membranes are moist.      Pharynx: Oropharynx is clear.   Eyes:      Extraocular Movements: Extraocular movements intact.   Cardiovascular:      Rate and Rhythm: Normal rate and regular rhythm.      Heart sounds: Murmur heard.   Pulmonary:      Effort: Pulmonary effort is normal. No respiratory distress.   Abdominal:      General: Abdomen is flat.      Palpations: Abdomen is soft.   Musculoskeletal:         General: Normal range of motion.   Skin:     General: Skin is warm and dry.   Neurological:      Mental Status: He is alert and oriented to person, place, and time.   Psychiatric:         Behavior: Behavior normal.       Laboratory Results:  Trended Lab Data:  Recent Labs   Lab 23  0845 23  0418   WBC 6.02 3.21*   HGB 7.9* 6.8*   HCT 25.6* 21.8*   * 101*   * 105*   RDW 19.7* 20.0*   * 137   K 3.9 3.6    104   CO2 22* 22*   BUN 14 12   * 120*   PROT 6.7 5.7*   ALBUMIN 3.7 3.0*   BILITOT 0.7 1.0   AST 29 25   ALKPHOS 85 63   ALT 20 19     Urinalysis:   Lab Results   Component Value Date    COLORU Yellow 2023    SPECGRAV " 1.015 05/05/2023    NITRITE Positive (A) 05/05/2023    KETONESU Negative 05/05/2023    UROBILINOGEN Negative 05/05/2023       Microbiology Data:  BlCx 5/5: gram stain with Gram Negative Rods   - ID with Klebsiella Pneumoniae  BlCx 5/6: pending    Antimicrobials:  Vanc 5/5 - now  Zosyn 5/5 - 5/6  Meropenem 5/6 - now    Other Results:    Radiology:  CTA Chest Non-Coronary (PE Studies)    Result Date: 5/5/2023  EXAMINATION: CTA CHEST NON CORONARY (PE STUDIES) CLINICAL HISTORY: Pulmonary embolism (PE) suspected, high prob;   1. Examination considered diagnostic to the proximal segmental pulmonary arterial level without convincing evidence of acute pulmonary embolism. 2. No acute thoracic findings. 3. Details of chronic and incidental findings, as provided in the body of the report. Electronically signed by: Jefferson Bustos Date:    05/05/2023 Time:    13:29    X-Ray Chest AP Portable    Result Date: 5/5/2023  EXAMINATION: XR CHEST AP PORTABLE CLINICAL HISTORY: Sepsis;   No convincing evidence of acute cardiopulmonary disease. Electronically signed by: Jefferson Bustos Date:    05/05/2023 Time:    09:51    X-Ray Chest AP Portable    Result Date: 4/21/2023  EXAMINATION: XR CHEST AP PORTABLE CLINICAL HISTORY: Chest Pain; TECHNIQUE: Single frontal view of the chest was performed. COMPARISON: 11/11/2022. FINDINGS: There is a right chest wall port. The lungs are well expanded and clear. No focal opacities are seen. The pleural spaces are clear. The cardiac silhouette is unremarkable. The visualized osseous structures are unremarkable.     No acute abnormality. Electronically signed by: Gianluca Higuera Date:    04/21/2023 Time:    01:31

## 2023-05-06 NOTE — ASSESSMENT & PLAN NOTE
Hemoglobin is 7.9.  It was 10 1 month ago    Lab Results   Component Value Date    IRON 44 (L) 08/03/2022    TRANSFERRIN 226 08/03/2022    TIBC 334 08/03/2022    FESATURATED 13 (L) 08/03/2022    Patient takes vitamin B12 and folic acid supplements   No obvious source of bleeding   Negative for occult blood on UA  Will trend H&H if hemoglobin continues to be below baseline will repeat anemia panel and check stool for occult blood  Transfuse if hemoglobin less than 7 or if patient becomes symptomatic      5/5 hgb 6.8  Anemia panel  Stool for OCB   Type and screen   Transfuse 1 unit PRBC   Blood consent obtained.  IVF stopped while receiving blood products

## 2023-05-06 NOTE — ASSESSMENT & PLAN NOTE
Body mass index is 32.12 kg/m². Morbid obesity complicates all aspects of disease management from diagnostic modalities to treatment. Weight loss encouraged and health benefits explained to patient.

## 2023-05-06 NOTE — ASSESSMENT & PLAN NOTE
Bacteremia   This patient does not have evidence of infective focus  My overall impression is sepsis.  Source: Unknown suspecting port   Antibiotics given-   Antibiotics (72h ago, onward)    Start     Stop Route Frequency Ordered    05/06/23 0900  meropenem (MERREM) 1 g in sodium chloride 0.9 % 100 mL IVPB (MB+)         -- IV Every 8 hours (non-standard times) 05/06/23 0757    05/05/23 2130  vancomycin (VANCOCIN) 1,750 mg in dextrose 5 % (D5W) 500 mL IVPB         -- IV Every 12 hours (non-standard times) 05/05/23 1420    05/05/23 1505  vancomycin - pharmacy to dose  (vancomycin IVPB)        [facility].    -- IV pharmacy to manage frequency 05/05/23 1406        Microbiology Results (last 7 days)     Procedure Component Value Units Date/Time    Blood culture [[id number]] Collected: 05/06/23 0912    Order Status: Sent Specimen: Blood Updated: 05/06/23 0912    Blood culture [[id number]] Collected: 05/06/23 0854    Order Status: Sent Specimen: Blood Updated: 05/06/23 0854    Stool culture [[id number]] Collected: 05/06/23 0641    Order Status: Sent Specimen: Stool Updated: 05/06/23 0846    E. coli 0157 antigen [[id number]] Collected: 05/06/23 0641    Order Status: No result Specimen: Stool Updated: 05/06/23 0846    Clostridium difficile EIA [[id number]] Collected: 05/06/23 0641    Order Status: Canceled Specimen: Stool     Rapid Organism ID by PCR (from Blood culture) [[id number]]  (Abnormal) Collected: 05/05/23 0845    Order Status: Completed Updated: 05/06/23 0316     Enterococcus faecalis Not Detected     Enterococcus faecium Not Detected     Listeria Monocytogenes Not Detected     Staphylococcus spp. Not Detected     Staphylococcus aureus Not Detected     Staphylococcus epidermidis Not Detected     Staphylococcus lugdunensis Not Detected     Streptococcus species Not Detected     Streptococcus agalactiae Not Detected     Streptococcus pneumoniae Not Detected     Streptococcus pyogenes Not  Detected     Acinetobacter calcoaceticus/baumannii complex Not Detected     Bacteroides fragilis Not Detected     Enterobacerales See species for ID     Enterobacter cloacae complex Not Detected     Escherichia Not Detected     Klebsiella aerogenes Not Detected     Klebsiella oxytoca Not Detected     Klebsiella pneumoniae group Detected     Proteus Not Detected     Salmonella sp Not Detected     Serratia marcescens Not Detected     Haemophilus influenzae Not Detected     Neisseria meningtidis Not Detected     Pseudomonas aeruginosa Not Detected     Stenotrophomonas maltophilia Not Detected     Candida albicans Not Detected     Candida auris Not Detected     Candida glabrata Not Detected     Candida krusei Not Detected     Candida parapsilosis Not Detected     Candida tropicalis Not Detected     Cryptococcus neoformans/gattii Not Detected     CTX-M (ESBL ) Not Detected     IMP (Carbapenem resistant) Not Detected     KPC resistance gene (Carbapenem resistant) Not Detected     mcr-1  Not Detected     mec A/C  Test Not Applicable     mec A/C and MREJ (MRSA) gene Test Not Applicable     NDM (Carbapenem resistant) Not Detected     OXA-48-like (Carbapenem resistant) Not Detected     van A/B (VRE gene) Test Not Applicable     VIM (Carbapenem resistant) Not Detected    Narrative:      Aerobic and anaerobic    Blood culture x two cultures. Draw prior to antibiotics. [[id number]] Collected: 05/05/23 0845    Order Status: Completed Specimen: Blood from Peripheral, Forearm, Right Updated: 05/06/23 0151     Blood Culture, Routine Gram stain aer bottle: Gram negative rods      Results called to and read back by:  Momo Araujo RN 05/06/2023  01:50    Narrative:      Aerobic and anaerobic    Blood culture x two cultures. Draw prior to antibiotics. [[id number]] Collected: 05/05/23 0845    Order Status: Completed Specimen: Blood from Peripheral, Antecubital, Left Updated: 05/05/23 2115     Blood Culture, Routine No Growth to  date    Narrative:      Aerobic and anaerobic    Respiratory Infection Panel (PCR), Nasopharyngeal [[id number]]     Order Status: No result Specimen: Nasopharyngeal Swab     Culture, Respiratory with Gram Stain [[id number]]     Order Status: No result Specimen: Sputum, Expectorated     Blood culture [[id number]]     Order Status: No result Specimen: Blood             Latest lactate reviewed-  Recent Labs   Lab 05/05/23  0845 05/05/23  1207 05/06/23  0103   LACTATE 2.7* 4.1* 1.1   Procalcitonin 2.01  No leukopenia or leukocytosis  COVID and influenza negative    Organ dysfunction indicated by Thrombocytopenia  and elevated troponin, elevated lactic acid   -troponin elevated suspected as secondary to demand ischemia related to sepsis    Fluid challenge Actual Body weight- Patient will receive 30ml/kg actual body weight to calculate fluid bolus for treatment of septic shock.     Post- resuscitation assessment Yes Perfusion exam was performed within 6 hours of septic shock presentation after bolus shows Adequate tissue perfusion assessed by non-invasive monitoring       Will Not start Pressors- Levophed for MAP of 65  Source control achieved by: IV vancomycin and Zosyn - zosyn changed to merrem with +klebsiella bacteremia       -elevated D-dimer.  CTA ruled out pulmonary embolism  -pan culture-blood, urine, stool, sputum, respiratory viral panel, blood culture to include from port site    Patient has fever and chills with no other complaints.  hemoc does not Suspect chemotherapy reaction   -consult Hematology/Oncology  -consult Infectious Disease  -trend lactic acid and troponin  -antipyretics for fever  -if blood cx + from port will consult general surgery to remove port

## 2023-05-06 NOTE — PROGRESS NOTES
[facility]  Progress Note    Patient Name: Saud Mayers  MRN: [id number]  Patient Class: IP- Inpatient   Admission Date: 5/5/2023  Length of Stay: 1 days  Attending Physician: Mis Castillo*  Primary Care Provider: Anson Blunt MD        Subjective:     Principal Problem:Severe sepsis        HPI:  58-year-old male with a past medical history of asthma, lymphoma-treated with R-CHOP - now complete as of last dose April 13, 2023, diabetes, hyperlipidemia, hypertension, ANAMIKA, presents with complaints of feeling freezing and having fever onset 8:00 a.m. after going to work today.  Otherwise he has been in his normal state of health.  He has an intermittent dry cough.  He states that after each prior chemotherapy treatment aside from the 1st and 2nd he experienced similar febrile episodes after this same duration from prior chemo treatments.  During past febrile episodes no source of infection was noted.  He denies headache, vision changes, dizziness, lightheadedness, sore throat, nasal congestion, earache, chest pain, shortness of breath, abdominal pain, nausea, vomiting, diarrhea, productive cough, urinary changes, alcohol, tobacco, illicit drug, skin sores or wounds, sick contacts.      Overview/Hospital Course:  No notes on file    Interval hx: This am patient states he is feeling somewhat better. He is still febrile. He denies new or major complaints.     Review of Systems  Objective:     Vital Signs (Most Recent):  Temp: (!) 100.5 °F (38.1 °C) (05/06/23 0720)  Pulse: 95 (05/06/23 0720)  Resp: 18 (05/06/23 0720)  BP: 135/87 (05/06/23 0720)  SpO2: 97 % (05/06/23 0807) Vital Signs (24h Range):  Temp:  [98.6 °F (37 °C)-102.6 °F (39.2 °C)] 100.5 °F (38.1 °C)  Pulse:  [] 95  Resp:  [18-19] 18  SpO2:  [94 %-100 %] 97 %  BP: (109-149)/(49-87) 135/87     Weight: 98.7 kg (217 lb 8.2 oz)  Body mass index is 32.12 kg/m².     Physical Exam  Constitutional:       Appearance: He is  ill-appearing.      Comments: No longer Trembling   HENT:      Head: Normocephalic and atraumatic.      Nose: Nose normal.      Mouth/Throat:      Mouth: Mucous membranes are moist.      Pharynx: Oropharynx is clear.   Eyes:      Extraocular Movements: Extraocular movements intact.      Conjunctiva/sclera: Conjunctivae normal.   Cardiovascular:      Rate and Rhythm: Normal rate and regular rhythm.      Pulses: Normal pulses.      Heart sounds: Normal heart sounds.   Pulmonary:      Effort: Pulmonary effort is normal. No respiratory distress.      Breath sounds: Normal breath sounds. No wheezing.   Abdominal:      General: Abdomen is flat. Bowel sounds are normal.      Palpations: Abdomen is soft.   Musculoskeletal:         General: Swelling present. Normal range of motion.      Cervical back: Normal range of motion and neck supple.      Comments: 1+ bilateral lower extremity edema   Skin:     General: Skin is warm and dry.      Findings: No lesion or rash.      Comments: Right chest wall port site not accessed.  Site is without erythema edema or drainage   Neurological:      General: No focal deficit present.      Mental Status: He is alert and oriented to person, place, and time.              Significant Labs: All pertinent labs within the past 24 hours have been reviewed.    Significant Imaging: I have reviewed all pertinent imaging results/findings within the past 24 hours.      Assessment/Plan:      * Severe sepsis  Bacteremia   This patient does not have evidence of infective focus  My overall impression is sepsis.  Source: Unknown suspecting port   Antibiotics given-   Antibiotics (72h ago, onward)    Start     Stop Route Frequency Ordered    05/06/23 0900  meropenem (MERREM) 1 g in sodium chloride 0.9 % 100 mL IVPB (MB+)         -- IV Every 8 hours (non-standard times) 05/06/23 0757    05/05/23 2130  vancomycin (VANCOCIN) 1,750 mg in dextrose 5 % (D5W) 500 mL IVPB         -- IV Every 12 hours (non-standard  times) 05/05/23 1420    05/05/23 1505  vancomycin - pharmacy to dose  (vancomycin IVPB)        See Hyperspace for full Linked Orders Report.    -- IV pharmacy to manage frequency 05/05/23 1406        Microbiology Results (last 7 days)     Procedure Component Value Units Date/Time    Blood culture [[id number]] Collected: 05/06/23 0912    Order Status: Sent Specimen: Blood Updated: 05/06/23 0912    Blood culture [[id number]] Collected: 05/06/23 0854    Order Status: Sent Specimen: Blood Updated: 05/06/23 0854    Stool culture [[id number]] Collected: 05/06/23 0641    Order Status: Sent Specimen: Stool Updated: 05/06/23 0846    E. coli 0157 antigen [[id number]] Collected: 05/06/23 0641    Order Status: No result Specimen: Stool Updated: 05/06/23 0846    Clostridium difficile EIA [[id number]] Collected: 05/06/23 0641    Order Status: Canceled Specimen: Stool     Rapid Organism ID by PCR (from Blood culture) [[id number]]  (Abnormal) Collected: 05/05/23 0845    Order Status: Completed Updated: 05/06/23 0316     Enterococcus faecalis Not Detected     Enterococcus faecium Not Detected     Listeria Monocytogenes Not Detected     Staphylococcus spp. Not Detected     Staphylococcus aureus Not Detected     Staphylococcus epidermidis Not Detected     Staphylococcus lugdunensis Not Detected     Streptococcus species Not Detected     Streptococcus agalactiae Not Detected     Streptococcus pneumoniae Not Detected     Streptococcus pyogenes Not Detected     Acinetobacter calcoaceticus/baumannii complex Not Detected     Bacteroides fragilis Not Detected     Enterobacerales See species for ID     Enterobacter cloacae complex Not Detected     Escherichia Not Detected     Klebsiella aerogenes Not Detected     Klebsiella oxytoca Not Detected     Klebsiella pneumoniae group Detected     Proteus Not Detected     Salmonella sp Not Detected     Serratia marcescens Not Detected     Haemophilus influenzae Not Detected     Neisseria meningtidis Not  Detected     Pseudomonas aeruginosa Not Detected     Stenotrophomonas maltophilia Not Detected     Candida albicans Not Detected     Candida auris Not Detected     Candida glabrata Not Detected     Candida krusei Not Detected     Candida parapsilosis Not Detected     Candida tropicalis Not Detected     Cryptococcus neoformans/gattii Not Detected     CTX-M (ESBL ) Not Detected     IMP (Carbapenem resistant) Not Detected     KPC resistance gene (Carbapenem resistant) Not Detected     mcr-1  Not Detected     mec A/C  Test Not Applicable     mec A/C and MREJ (MRSA) gene Test Not Applicable     NDM (Carbapenem resistant) Not Detected     OXA-48-like (Carbapenem resistant) Not Detected     van A/B (VRE gene) Test Not Applicable     VIM (Carbapenem resistant) Not Detected    Narrative:      Aerobic and anaerobic    Blood culture x two cultures. Draw prior to antibiotics. [[id number]] Collected: 05/05/23 0845    Order Status: Completed Specimen: Blood from Peripheral, Forearm, Right Updated: 05/06/23 0151     Blood Culture, Routine Gram stain aer bottle: Gram negative rods      Results called to and read back by:  Momo Araujo RN 05/06/2023  01:50    Narrative:      Aerobic and anaerobic    Blood culture x two cultures. Draw prior to antibiotics. [[id number]] Collected: 05/05/23 0845    Order Status: Completed Specimen: Blood from Peripheral, Antecubital, Left Updated: 05/05/23 2115     Blood Culture, Routine No Growth to date    Narrative:      Aerobic and anaerobic    Respiratory Infection Panel (PCR), Nasopharyngeal [[id number]]     Order Status: No result Specimen: Nasopharyngeal Swab     Culture, Respiratory with Gram Stain [[id number]]     Order Status: No result Specimen: Sputum, Expectorated     Blood culture [[id number]]     Order Status: No result Specimen: Blood             Latest lactate reviewed-  Recent Labs   Lab 05/05/23  0845 05/05/23  1207 05/06/23  0103   LACTATE 2.7* 4.1* 1.1   Procalcitonin 2.01  No  leukopenia or leukocytosis  COVID and influenza negative    Organ dysfunction indicated by Thrombocytopenia  and elevated troponin, elevated lactic acid   -troponin elevated suspected as secondary to demand ischemia related to sepsis    Fluid challenge Actual Body weight- Patient will receive 30ml/kg actual body weight to calculate fluid bolus for treatment of septic shock.     Post- resuscitation assessment Yes Perfusion exam was performed within 6 hours of septic shock presentation after bolus shows Adequate tissue perfusion assessed by non-invasive monitoring       Will Not start Pressors- Levophed for MAP of 65  Source control achieved by: IV vancomycin and Zosyn - zosyn changed to merrem with +klebsiella bacteremia       -elevated D-dimer.  CTA ruled out pulmonary embolism  -pan culture-blood, urine, stool, sputum, respiratory viral panel, blood culture to include from port site    Patient has fever and chills with no other complaints.  hemoc does not Suspect chemotherapy reaction   -consult Hematology/Oncology  -consult Infectious Disease  -trend lactic acid and troponin  -antipyretics for fever  -if blood cx + from port will consult general surgery to remove port       Macrocytic anemia    Hemoglobin is 7.9.  It was 10 1 month ago    Lab Results   Component Value Date    IRON 44 (L) 08/03/2022    TRANSFERRIN 226 08/03/2022    TIBC 334 08/03/2022    FESATURATED 13 (L) 08/03/2022    Patient takes vitamin B12 and folic acid supplements   No obvious source of bleeding   Negative for occult blood on UA  Will trend H&H if hemoglobin continues to be below baseline will repeat anemia panel and check stool for occult blood  Transfuse if hemoglobin less than 7 or if patient becomes symptomatic      5/5 hgb 6.8  Anemia panel  Stool for OCB   Type and screen   Transfuse 1 unit PRBC   Blood consent obtained.  IVF stopped while receiving blood products     Hypomagnesemia  Repleted 5/5      Hypophosphatemia  Repleted  5/5      Diffuse large B-cell lymphoma of lymph nodes of neck    diffuse large B-cell lymphoma s/p 6 cycles of R-CHOP (cycle #6 was 4/13/23)- completed treatment    Nonrheumatic aortic valve stenosis    Moderate per 2D echo  Will need to hold beta-blocker at this time with sepsis  Monitor for signs of volume overload  Will need to follow with outpatient Cardiology    Pure hypercholesterolemia    Hold Lipitor at this time with sepsis    Type 2 diabetes mellitus with hyperglycemia, without long-term current use of insulin  Patient's FSGs are controlled on current medication regimen.  Last A1c reviewed-   Lab Results   Component Value Date    HGBA1C 7.7 (H) 03/22/2023     Most recent fingerstick glucose reviewed-   Recent Labs   Lab 05/05/23  1419 05/05/23  1935 05/06/23  0555   POCTGLUCOSE 116* 150* 145*     Current correctional scale  Low  Maintain anti-hyperglycemic dose as follows-   Antihyperglycemics (From admission, onward)    Start     Stop Route Frequency Ordered    05/05/23 1506  insulin aspart U-100 pen 0-5 Units         -- SubQ Before meals & nightly PRN 05/05/23 1406        Hold Oral hypoglycemics while patient is in the hospital.    Asthma, mild intermittent    Lungs are clear  Will order p.r.n. albuterol inhaler    Essential hypertension    Hold medications at this time with sepsis    Class 2 severe obesity due to excess calories with serious comorbidity and body mass index (BMI) of 36.0 to 36.9 in adult  Body mass index is 32.12 kg/m². Morbid obesity complicates all aspects of disease management from diagnostic modalities to treatment. Weight loss encouraged and health benefits explained to patient.         ANAMIKA (obstructive sleep apnea)    Continue CPAP q.h.s.- patient does not wear CPAP      VTE Risk Mitigation (From admission, onward)         Ordered     enoxaparin injection 40 mg  Daily         05/05/23 1406     Place SADI hose  Until discontinued         05/05/23 1520     IP VTE HIGH RISK PATIENT   Once         05/05/23 1406     Place sequential compression device  Until discontinued         05/05/23 1406                Discharge Planning   KAL:      Code Status: Full Code   Is the patient medically ready for discharge?:     Reason for patient still in hospital (select all that apply): Patient trending condition                     Karina Benites NP  Department of [facility]   [facility]

## 2023-05-07 PROBLEM — R78.81 BACTEREMIA: Status: ACTIVE | Noted: 2023-05-07

## 2023-05-07 LAB
ALBUMIN SERPL BCP-MCNC: 3.1 G/DL (ref 3.5–5.2)
ALP SERPL-CCNC: 62 U/L (ref 55–135)
ALT SERPL W/O P-5'-P-CCNC: 16 U/L (ref 10–44)
ANION GAP SERPL CALC-SCNC: 5 MMOL/L (ref 8–16)
AST SERPL-CCNC: 23 U/L (ref 10–40)
BASOPHILS # BLD AUTO: 0.03 K/UL (ref 0–0.2)
BASOPHILS NFR BLD: 1.2 % (ref 0–1.9)
BILIRUB SERPL-MCNC: 0.6 MG/DL (ref 0.1–1)
BLD PROD TYP BPU: NORMAL
BLD PROD TYP BPU: NORMAL
BLOOD UNIT EXPIRATION DATE: NORMAL
BLOOD UNIT EXPIRATION DATE: NORMAL
BLOOD UNIT TYPE CODE: 6200
BLOOD UNIT TYPE CODE: 6200
BLOOD UNIT TYPE: NORMAL
BLOOD UNIT TYPE: NORMAL
BUN SERPL-MCNC: 9 MG/DL (ref 6–20)
CALCIUM SERPL-MCNC: 8.5 MG/DL (ref 8.7–10.5)
CHLORIDE SERPL-SCNC: 106 MMOL/L (ref 95–110)
CO2 SERPL-SCNC: 25 MMOL/L (ref 23–29)
CODING SYSTEM: NORMAL
CODING SYSTEM: NORMAL
CREAT SERPL-MCNC: 0.7 MG/DL (ref 0.5–1.4)
CROSSMATCH INTERPRETATION: NORMAL
CROSSMATCH INTERPRETATION: NORMAL
DIFFERENTIAL METHOD: ABNORMAL
DISPENSE STATUS: NORMAL
DISPENSE STATUS: NORMAL
EOSINOPHIL # BLD AUTO: 0 K/UL (ref 0–0.5)
EOSINOPHIL NFR BLD: 0 % (ref 0–8)
ERYTHROCYTE [DISTWIDTH] IN BLOOD BY AUTOMATED COUNT: 20.4 % (ref 11.5–14.5)
EST. GFR  (NO RACE VARIABLE): >60 ML/MIN/1.73 M^2
GLUCOSE SERPL-MCNC: 132 MG/DL (ref 70–110)
HCT VFR BLD AUTO: 21.1 % (ref 40–54)
HGB BLD-MCNC: 6.7 G/DL (ref 14–18)
IMM GRANULOCYTES # BLD AUTO: 0.01 K/UL (ref 0–0.04)
IMM GRANULOCYTES NFR BLD AUTO: 0.4 % (ref 0–0.5)
LYMPHOCYTES # BLD AUTO: 0.5 K/UL (ref 1–4.8)
LYMPHOCYTES NFR BLD: 20.1 % (ref 18–48)
MAGNESIUM SERPL-MCNC: 1.7 MG/DL (ref 1.6–2.6)
MCH RBC QN AUTO: 31.8 PG (ref 27–31)
MCHC RBC AUTO-ENTMCNC: 31.8 G/DL (ref 32–36)
MCV RBC AUTO: 100 FL (ref 82–98)
MONOCYTES # BLD AUTO: 0.4 K/UL (ref 0.3–1)
MONOCYTES NFR BLD: 16.5 % (ref 4–15)
NEUTROPHILS # BLD AUTO: 1.5 K/UL (ref 1.8–7.7)
NEUTROPHILS NFR BLD: 61.8 % (ref 38–73)
NRBC BLD-RTO: 0 /100 WBC
NUM UNITS TRANS PACKED RBC: NORMAL
NUM UNITS TRANS PACKED RBC: NORMAL
OB PNL STL: NEGATIVE
OB PNL STL: NEGATIVE
PHOSPHATE SERPL-MCNC: 3.6 MG/DL (ref 2.7–4.5)
PLATELET # BLD AUTO: 94 K/UL (ref 150–450)
PMV BLD AUTO: 9.5 FL (ref 9.2–12.9)
POCT GLUCOSE: 119 MG/DL (ref 70–110)
POCT GLUCOSE: 134 MG/DL (ref 70–110)
POCT GLUCOSE: 140 MG/DL (ref 70–110)
POCT GLUCOSE: 170 MG/DL (ref 70–110)
POTASSIUM SERPL-SCNC: 3.8 MMOL/L (ref 3.5–5.1)
PROT SERPL-MCNC: 5.8 G/DL (ref 6–8.4)
RBC # BLD AUTO: 2.11 M/UL (ref 4.6–6.2)
SODIUM SERPL-SCNC: 136 MMOL/L (ref 136–145)
VANCOMYCIN SERPL-MCNC: 11.6 UG/ML
WBC # BLD AUTO: 2.49 K/UL (ref 3.9–12.7)
WBC #/AREA STL HPF: NORMAL /[HPF]

## 2023-05-07 PROCEDURE — 84100 ASSAY OF PHOSPHORUS: CPT | Performed by: NURSE PRACTITIONER

## 2023-05-07 PROCEDURE — P9040 RBC LEUKOREDUCED IRRADIATED: HCPCS | Performed by: NURSE PRACTITIONER

## 2023-05-07 PROCEDURE — 36415 COLL VENOUS BLD VENIPUNCTURE: CPT | Performed by: NURSE PRACTITIONER

## 2023-05-07 PROCEDURE — 94761 N-INVAS EAR/PLS OXIMETRY MLT: CPT

## 2023-05-07 PROCEDURE — 25000003 PHARM REV CODE 250: Performed by: FAMILY MEDICINE

## 2023-05-07 PROCEDURE — 80202 ASSAY OF VANCOMYCIN: CPT | Performed by: FAMILY MEDICINE

## 2023-05-07 PROCEDURE — 36430 TRANSFUSION BLD/BLD COMPNT: CPT

## 2023-05-07 PROCEDURE — 82272 OCCULT BLD FECES 1-3 TESTS: CPT | Performed by: NURSE PRACTITIONER

## 2023-05-07 PROCEDURE — 63600175 PHARM REV CODE 636 W HCPCS: Performed by: FAMILY MEDICINE

## 2023-05-07 PROCEDURE — 89055 LEUKOCYTE ASSESSMENT FECAL: CPT | Performed by: NURSE PRACTITIONER

## 2023-05-07 PROCEDURE — 85025 COMPLETE CBC W/AUTO DIFF WBC: CPT | Performed by: NURSE PRACTITIONER

## 2023-05-07 PROCEDURE — 80053 COMPREHEN METABOLIC PANEL: CPT | Performed by: NURSE PRACTITIONER

## 2023-05-07 PROCEDURE — 25000003 PHARM REV CODE 250: Performed by: NURSE PRACTITIONER

## 2023-05-07 PROCEDURE — 82272 OCCULT BLD FECES 1-3 TESTS: CPT | Mod: 91 | Performed by: NURSE PRACTITIONER

## 2023-05-07 PROCEDURE — 63600175 PHARM REV CODE 636 W HCPCS: Performed by: NURSE PRACTITIONER

## 2023-05-07 PROCEDURE — 83735 ASSAY OF MAGNESIUM: CPT | Performed by: NURSE PRACTITIONER

## 2023-05-07 PROCEDURE — 11000001 HC ACUTE MED/SURG PRIVATE ROOM

## 2023-05-07 PROCEDURE — 36415 COLL VENOUS BLD VENIPUNCTURE: CPT | Performed by: FAMILY MEDICINE

## 2023-05-07 RX ORDER — MUPIROCIN 20 MG/G
OINTMENT TOPICAL 2 TIMES DAILY
Status: DISCONTINUED | OUTPATIENT
Start: 2023-05-07 | End: 2023-05-08 | Stop reason: HOSPADM

## 2023-05-07 RX ORDER — HYDROCODONE BITARTRATE AND ACETAMINOPHEN 500; 5 MG/1; MG/1
TABLET ORAL
Status: DISCONTINUED | OUTPATIENT
Start: 2023-05-07 | End: 2023-05-08 | Stop reason: HOSPADM

## 2023-05-07 RX ADMIN — MEROPENEM 1 G: 1 INJECTION, POWDER, FOR SOLUTION INTRAVENOUS at 09:05

## 2023-05-07 RX ADMIN — MUPIROCIN: 20 OINTMENT TOPICAL at 10:05

## 2023-05-07 RX ADMIN — VANCOMYCIN HYDROCHLORIDE 1500 MG: 1.5 INJECTION, POWDER, LYOPHILIZED, FOR SOLUTION INTRAVENOUS at 11:05

## 2023-05-07 RX ADMIN — MEROPENEM 1 G: 1 INJECTION, POWDER, FOR SOLUTION INTRAVENOUS at 01:05

## 2023-05-07 RX ADMIN — MEROPENEM 1 G: 1 INJECTION, POWDER, FOR SOLUTION INTRAVENOUS at 05:05

## 2023-05-07 RX ADMIN — Medication 100 MCG: at 08:05

## 2023-05-07 RX ADMIN — ENOXAPARIN SODIUM 40 MG: 40 INJECTION SUBCUTANEOUS at 05:05

## 2023-05-07 RX ADMIN — FOLIC ACID 1 MG: 1 TABLET ORAL at 08:05

## 2023-05-07 RX ADMIN — MUPIROCIN: 20 OINTMENT TOPICAL at 08:05

## 2023-05-07 NOTE — ASSESSMENT & PLAN NOTE
Hemoglobin is 7.9.  It was 10 1 month ago    Lab Results   Component Value Date    IRON 17 (L) 05/06/2023    TRANSFERRIN 197 (L) 05/06/2023    TIBC 292 05/06/2023    FESATURATED 6 (L) 05/06/2023    Patient takes vitamin B12 and folic acid supplements   No obvious source of bleeding   Negative for occult blood on UA  Will trend H&H if hemoglobin continues to be below baseline will repeat anemia panel and check stool for occult blood  Transfuse if hemoglobin less than 7 or if patient becomes symptomatic      5/5 hgb 6.8  Anemia panel  Stool for OCB negative   Type and screen   Transfuse 1 unit PRBC   Blood consent obtained.  IVF stopped while receiving blood products     5/7 Hgb 6.7  Lab Results   Component Value Date    IRON 17 (L) 05/06/2023    TRANSFERRIN 197 (L) 05/06/2023    TIBC 292 05/06/2023    FESATURATED 6 (L) 05/06/2023      Ferritin 600  b12 > 2000  Folate 17.3  Transfuse 2 units PRBC   Per hemoc discussion anemia is likely due to inflammation and not bleeding- low iron and high ferritin with sepsis  -transfuse with low threshold for bone marrow bx

## 2023-05-07 NOTE — PROGRESS NOTES
[facility]  Progress Note    Patient Name: Saud Mayers  MRN: [id number]  Patient Class: IP- Inpatient   Admission Date: 5/5/2023  Length of Stay: 2 days  Attending Physician: Mis Castillo*  Primary Care Provider: Anson Blunt MD        Subjective:     Principal Problem:Severe sepsis        HPI:  58-year-old male with a past medical history of asthma, lymphoma-treated with R-CHOP - now complete as of last dose April 13, 2023, diabetes, hyperlipidemia, hypertension, ANAMIKA, presents with complaints of feeling freezing and having fever onset 8:00 a.m. after going to work today.  Otherwise he has been in his normal state of health.  He has an intermittent dry cough.  He states that after each prior chemotherapy treatment aside from the 1st and 2nd he experienced similar febrile episodes after this same duration from prior chemo treatments.  During past febrile episodes no source of infection was noted.  He denies headache, vision changes, dizziness, lightheadedness, sore throat, nasal congestion, earache, chest pain, shortness of breath, abdominal pain, nausea, vomiting, diarrhea, productive cough, urinary changes, alcohol, tobacco, illicit drug, skin sores or wounds, sick contacts.      Overview/Hospital Course:  No notes on file    Interval hx: This am patient states he is feeling somewhat better. Last fever was 5/6 0720. He denies new or major complaints. No signs of bleeding noted.     Review of Systems  Objective:     Vital Signs (Most Recent):  Temp: 97.8 °F (36.6 °C) (05/07/23 0730)  Pulse: 74 (05/07/23 0730)  Resp: 17 (05/07/23 0730)  BP: 127/69 (05/07/23 0730)  SpO2: 97 % (05/07/23 0817) Vital Signs (24h Range):  Temp:  [96.3 °F (35.7 °C)-100.1 °F (37.8 °C)] 97.8 °F (36.6 °C)  Pulse:  [74-90] 74  Resp:  [16-20] 17  SpO2:  [95 %-98 %] 97 %  BP: (110-132)/(51-69) 127/69     Weight: 98.7 kg (217 lb 8.2 oz)  Body mass index is 32.12 kg/m².     Physical Exam  Constitutional:        Appearance: Normal appearance. He is not ill-appearing.      Comments: No longer Trembling   HENT:      Head: Normocephalic and atraumatic.      Nose: Nose normal.      Mouth/Throat:      Mouth: Mucous membranes are moist.      Pharynx: Oropharynx is clear.   Eyes:      Extraocular Movements: Extraocular movements intact.      Conjunctiva/sclera: Conjunctivae normal.   Cardiovascular:      Rate and Rhythm: Normal rate and regular rhythm.      Pulses: Normal pulses.      Heart sounds: Normal heart sounds.   Pulmonary:      Effort: Pulmonary effort is normal. No respiratory distress.      Breath sounds: Normal breath sounds. No wheezing.   Abdominal:      General: Abdomen is flat. Bowel sounds are normal.      Palpations: Abdomen is soft.   Musculoskeletal:         General: No swelling. Normal range of motion.      Cervical back: Normal range of motion and neck supple.   Skin:     General: Skin is warm and dry.      Findings: No lesion or rash.      Comments: Right chest wall port site not accessed.  Site is without erythema edema or drainage   Neurological:      General: No focal deficit present.      Mental Status: He is alert and oriented to person, place, and time.              Significant Labs: All pertinent labs within the past 24 hours have been reviewed.    Significant Imaging: I have reviewed all pertinent imaging results/findings within the past 24 hours.      Assessment/Plan:      * Severe sepsis  Bacteremia   This patient does not have evidence of infective focus  My overall impression is sepsis.  Source: Unknown suspecting port   Antibiotics given-   Antibiotics (72h ago, onward)    Start     Stop Route Frequency Ordered    05/07/23 0900  mupirocin 2 % ointment         05/12 0859 Nasl 2 times daily 05/07/23 0745    05/06/23 0900  meropenem (MERREM) 1 g in sodium chloride 0.9 % 100 mL IVPB (MB+)         -- IV Every 8 hours (non-standard times) 05/06/23 0757    05/05/23 1505  vancomycin - pharmacy to dose   (vancomycin IVPB)        See Hyperspace for full Linked Orders Report.    -- IV pharmacy to manage frequency 05/05/23 1406        Microbiology Results (last 7 days)     Procedure Component Value Units Date/Time    Blood culture x two cultures. Draw prior to antibiotics. [[id number]]  (Abnormal) Collected: 05/05/23 0845    Order Status: Completed Specimen: Blood from Peripheral, Forearm, Right Updated: 05/07/23 0557     Blood Culture, Routine Gram stain aer bottle: Gram negative rods      Results called to and read back by:  Momo Araujo RN 05/06/2023  01:50      GRAM NEGATIVE ESEQUIEL  Identification and susceptibility pending      Narrative:      Aerobic and anaerobic    Blood culture [[id number]] Collected: 05/06/23 0912    Order Status: Completed Specimen: Blood Updated: 05/07/23 0115     Blood Culture, Routine No Growth to date    Blood culture [[id number]] Collected: 05/06/23 0854    Order Status: Completed Specimen: Blood Updated: 05/07/23 0115     Blood Culture, Routine No Growth to date    Blood culture x two cultures. Draw prior to antibiotics. [[id number]] Collected: 05/05/23 0845    Order Status: Completed Specimen: Blood from Peripheral, Antecubital, Left Updated: 05/06/23 1412     Blood Culture, Routine No Growth to date      No Growth to date    Narrative:      Aerobic and anaerobic    Stool culture [[id number]] Collected: 05/06/23 0641    Order Status: Sent Specimen: Stool Updated: 05/06/23 0846    E. coli 0157 antigen [[id number]] Collected: 05/06/23 0641    Order Status: No result Specimen: Stool Updated: 05/06/23 0846    Clostridium difficile EIA [[id number]] Collected: 05/06/23 0641    Order Status: Canceled Specimen: Stool     Rapid Organism ID by PCR (from Blood culture) [[id number]]  (Abnormal) Collected: 05/05/23 0845    Order Status: Completed Updated: 05/06/23 0316     Enterococcus faecalis Not Detected     Enterococcus faecium Not Detected     Listeria Monocytogenes Not Detected     Staphylococcus spp.  Not Detected     Staphylococcus aureus Not Detected     Staphylococcus epidermidis Not Detected     Staphylococcus lugdunensis Not Detected     Streptococcus species Not Detected     Streptococcus agalactiae Not Detected     Streptococcus pneumoniae Not Detected     Streptococcus pyogenes Not Detected     Acinetobacter calcoaceticus/baumannii complex Not Detected     Bacteroides fragilis Not Detected     Enterobacerales See species for ID     Enterobacter cloacae complex Not Detected     Escherichia Not Detected     Klebsiella aerogenes Not Detected     Klebsiella oxytoca Not Detected     Klebsiella pneumoniae group Detected     Proteus Not Detected     Salmonella sp Not Detected     Serratia marcescens Not Detected     Haemophilus influenzae Not Detected     Neisseria meningtidis Not Detected     Pseudomonas aeruginosa Not Detected     Stenotrophomonas maltophilia Not Detected     Candida albicans Not Detected     Candida auris Not Detected     Candida glabrata Not Detected     Candida krusei Not Detected     Candida parapsilosis Not Detected     Candida tropicalis Not Detected     Cryptococcus neoformans/gattii Not Detected     CTX-M (ESBL ) Not Detected     IMP (Carbapenem resistant) Not Detected     KPC resistance gene (Carbapenem resistant) Not Detected     mcr-1  Not Detected     mec A/C  Test Not Applicable     mec A/C and MREJ (MRSA) gene Test Not Applicable     NDM (Carbapenem resistant) Not Detected     OXA-48-like (Carbapenem resistant) Not Detected     van A/B (VRE gene) Test Not Applicable     VIM (Carbapenem resistant) Not Detected    Narrative:      Aerobic and anaerobic    Respiratory Infection Panel (PCR), Nasopharyngeal [[id number]]     Order Status: No result Specimen: Nasopharyngeal Swab     Culture, Respiratory with Gram Stain [[id number]]     Order Status: No result Specimen: Sputum, Expectorated     Blood culture [[id number]]     Order Status: No result Specimen: Blood             Latest  lactate reviewed-  Recent Labs   Lab 05/05/23  0845 05/05/23  1207 05/06/23  0103   LACTATE 2.7* 4.1* 1.1   Procalcitonin 2.01  No leukopenia or leukocytosis  COVID and influenza negative    Organ dysfunction indicated by Thrombocytopenia  and elevated troponin, elevated lactic acid   -troponin elevated suspected as secondary to demand ischemia related to sepsis    Fluid challenge Actual Body weight- Patient will receive 30ml/kg actual body weight to calculate fluid bolus for treatment of septic shock.     Post- resuscitation assessment Yes Perfusion exam was performed within 6 hours of septic shock presentation after bolus shows Adequate tissue perfusion assessed by non-invasive monitoring       Will Not start Pressors- Levophed for MAP of 65  Source control achieved by: IV vancomycin and Zosyn - zosyn changed to merrem with +klebsiella bacteremia       -elevated D-dimer.  CTA ruled out pulmonary embolism  -pan culture-blood, urine, stool, sputum, respiratory viral panel, blood culture to include from port site    Patient has fever and chills with no other complaints.  hemoc does not Suspect chemotherapy reaction   -consult Hematology/Oncology  -consult Infectious Disease  -trend lactic acid and troponin- trended down  -antipyretics for fever  -if blood cx + from port will consult general surgery to remove port       Macrocytic anemia    Hemoglobin is 7.9.  It was 10 1 month ago    Lab Results   Component Value Date    IRON 17 (L) 05/06/2023    TRANSFERRIN 197 (L) 05/06/2023    TIBC 292 05/06/2023    FESATURATED 6 (L) 05/06/2023    Patient takes vitamin B12 and folic acid supplements   No obvious source of bleeding   Negative for occult blood on UA  Will trend H&H if hemoglobin continues to be below baseline will repeat anemia panel and check stool for occult blood  Transfuse if hemoglobin less than 7 or if patient becomes symptomatic      5/5 hgb 6.8  Anemia panel  Stool for OCB negative   Type and screen    Transfuse 1 unit PRBC   Blood consent obtained.  IVF stopped while receiving blood products     5/7 Hgb 6.7  Lab Results   Component Value Date    IRON 17 (L) 05/06/2023    TRANSFERRIN 197 (L) 05/06/2023    TIBC 292 05/06/2023    FESATURATED 6 (L) 05/06/2023      Ferritin 600  b12 > 2000  Folate 17.3  Transfuse 2 units PRBC   Per hemoc discussion anemia is likely due to inflammation and not bleeding- low iron and high ferritin with sepsis  -transfuse with low threshold for bone marrow bx     Hypomagnesemia  Repleted 5/5      Hypophosphatemia  Repleted 5/5      Diffuse large B-cell lymphoma of lymph nodes of neck    diffuse large B-cell lymphoma s/p 6 cycles of R-CHOP (cycle #6 was 4/13/23)- completed treatment    Nonrheumatic aortic valve stenosis    Moderate per 2D echo  Will need to hold beta-blocker at this time with sepsis  Monitor for signs of volume overload  Will need to follow with outpatient Cardiology    Pure hypercholesterolemia    Hold Lipitor at this time with sepsis    Type 2 diabetes mellitus with hyperglycemia, without long-term current use of insulin  Patient's FSGs are controlled on current medication regimen.  Last A1c reviewed-   Lab Results   Component Value Date    HGBA1C 7.7 (H) 03/22/2023     Most recent fingerstick glucose reviewed-   Recent Labs   Lab 05/06/23  1135 05/06/23  1614 05/06/23  2045 05/07/23  0549   POCTGLUCOSE 163* 144* 119* 134*     Current correctional scale  Low  Maintain anti-hyperglycemic dose as follows-   Antihyperglycemics (From admission, onward)    Start     Stop Route Frequency Ordered    05/05/23 1506  insulin aspart U-100 pen 0-5 Units         -- SubQ Before meals & nightly PRN 05/05/23 1406        Hold Oral hypoglycemics while patient is in the hospital.    Asthma, mild intermittent    Lungs are clear  Will order p.r.n. albuterol inhaler    Essential hypertension    Hold medications at this time with sepsis    Class 2 severe obesity due to excess calories with  serious comorbidity and body mass index (BMI) of 36.0 to 36.9 in adult  Body mass index is 32.12 kg/m². Morbid obesity complicates all aspects of disease management from diagnostic modalities to treatment. Weight loss encouraged and health benefits explained to patient.         ANAMIKA (obstructive sleep apnea)    Continue CPAP q.h.s.- patient does not wear CPAP      VTE Risk Mitigation (From admission, onward)         Ordered     enoxaparin injection 40 mg  Daily         05/05/23 1406     Place SADI hose  Until discontinued         05/05/23 1520     IP VTE HIGH RISK PATIENT  Once         05/05/23 1406     Place sequential compression device  Until discontinued         05/05/23 1406                Discharge Planning   KAL:      Code Status: Full Code   Is the patient medically ready for discharge?:     Reason for patient still in hospital (select all that apply): Patient trending condition                     Karina Benites NP  Department of [facility]   [facility]

## 2023-05-07 NOTE — ASSESSMENT & PLAN NOTE
Bacteremia   This patient does not have evidence of infective focus  My overall impression is sepsis.  Source: Unknown suspecting port   Antibiotics given-   Antibiotics (72h ago, onward)    Start     Stop Route Frequency Ordered    05/07/23 0900  mupirocin 2 % ointment         05/12 0859 Nasl 2 times daily 05/07/23 0745    05/06/23 0900  meropenem (MERREM) 1 g in sodium chloride 0.9 % 100 mL IVPB (MB+)         -- IV Every 8 hours (non-standard times) 05/06/23 0757    05/05/23 1505  vancomycin - pharmacy to dose  (vancomycin IVPB)        See Hyperspace for full Linked Orders Report.    -- IV pharmacy to manage frequency 05/05/23 1406        Microbiology Results (last 7 days)     Procedure Component Value Units Date/Time    Blood culture x two cultures. Draw prior to antibiotics. [[id number]]  (Abnormal) Collected: 05/05/23 0845    Order Status: Completed Specimen: Blood from Peripheral, Forearm, Right Updated: 05/07/23 0557     Blood Culture, Routine Gram stain aer bottle: Gram negative rods      Results called to and read back by:  Momo Araujo RN 05/06/2023  01:50      GRAM NEGATIVE ESEQUIEL  Identification and susceptibility pending      Narrative:      Aerobic and anaerobic    Blood culture [[id number]] Collected: 05/06/23 0912    Order Status: Completed Specimen: Blood Updated: 05/07/23 0115     Blood Culture, Routine No Growth to date    Blood culture [[id number]] Collected: 05/06/23 0854    Order Status: Completed Specimen: Blood Updated: 05/07/23 0115     Blood Culture, Routine No Growth to date    Blood culture x two cultures. Draw prior to antibiotics. [[id number]] Collected: 05/05/23 0845    Order Status: Completed Specimen: Blood from Peripheral, Antecubital, Left Updated: 05/06/23 1412     Blood Culture, Routine No Growth to date      No Growth to date    Narrative:      Aerobic and anaerobic    Stool culture [[id number]] Collected: 05/06/23 0641    Order Status: Sent Specimen: Stool Updated: 05/06/23 0846    SOHEILA  coli 0157 antigen [[id number]] Collected: 05/06/23 0641    Order Status: No result Specimen: Stool Updated: 05/06/23 0846    Clostridium difficile EIA [[id number]] Collected: 05/06/23 0641    Order Status: Canceled Specimen: Stool     Rapid Organism ID by PCR (from Blood culture) [[id number]]  (Abnormal) Collected: 05/05/23 0845    Order Status: Completed Updated: 05/06/23 0316     Enterococcus faecalis Not Detected     Enterococcus faecium Not Detected     Listeria Monocytogenes Not Detected     Staphylococcus spp. Not Detected     Staphylococcus aureus Not Detected     Staphylococcus epidermidis Not Detected     Staphylococcus lugdunensis Not Detected     Streptococcus species Not Detected     Streptococcus agalactiae Not Detected     Streptococcus pneumoniae Not Detected     Streptococcus pyogenes Not Detected     Acinetobacter calcoaceticus/baumannii complex Not Detected     Bacteroides fragilis Not Detected     Enterobacerales See species for ID     Enterobacter cloacae complex Not Detected     Escherichia Not Detected     Klebsiella aerogenes Not Detected     Klebsiella oxytoca Not Detected     Klebsiella pneumoniae group Detected     Proteus Not Detected     Salmonella sp Not Detected     Serratia marcescens Not Detected     Haemophilus influenzae Not Detected     Neisseria meningtidis Not Detected     Pseudomonas aeruginosa Not Detected     Stenotrophomonas maltophilia Not Detected     Candida albicans Not Detected     Candida auris Not Detected     Candida glabrata Not Detected     Candida krusei Not Detected     Candida parapsilosis Not Detected     Candida tropicalis Not Detected     Cryptococcus neoformans/gattii Not Detected     CTX-M (ESBL ) Not Detected     IMP (Carbapenem resistant) Not Detected     KPC resistance gene (Carbapenem resistant) Not Detected     mcr-1  Not Detected     mec A/C  Test Not Applicable     mec A/C and MREJ (MRSA) gene Test Not Applicable     NDM (Carbapenem resistant)  Not Detected     OXA-48-like (Carbapenem resistant) Not Detected     van A/B (VRE gene) Test Not Applicable     VIM (Carbapenem resistant) Not Detected    Narrative:      Aerobic and anaerobic    Respiratory Infection Panel (PCR), Nasopharyngeal [[id number]]     Order Status: No result Specimen: Nasopharyngeal Swab     Culture, Respiratory with Gram Stain [[id number]]     Order Status: No result Specimen: Sputum, Expectorated     Blood culture [[id number]]     Order Status: No result Specimen: Blood             Latest lactate reviewed-  Recent Labs   Lab 05/05/23  0845 05/05/23  1207 05/06/23  0103   LACTATE 2.7* 4.1* 1.1   Procalcitonin 2.01  No leukopenia or leukocytosis  COVID and influenza negative    Organ dysfunction indicated by Thrombocytopenia  and elevated troponin, elevated lactic acid   -troponin elevated suspected as secondary to demand ischemia related to sepsis    Fluid challenge Actual Body weight- Patient will receive 30ml/kg actual body weight to calculate fluid bolus for treatment of septic shock.     Post- resuscitation assessment Yes Perfusion exam was performed within 6 hours of septic shock presentation after bolus shows Adequate tissue perfusion assessed by non-invasive monitoring       Will Not start Pressors- Levophed for MAP of 65  Source control achieved by: IV vancomycin and Zosyn - zosyn changed to merrem with +klebsiella bacteremia       -elevated D-dimer.  CTA ruled out pulmonary embolism  -pan culture-blood, urine, stool, sputum, respiratory viral panel, blood culture to include from port site    Patient has fever and chills with no other complaints.  hemoc does not Suspect chemotherapy reaction   -consult Hematology/Oncology  -consult Infectious Disease  -trend lactic acid and troponin- trended down  -antipyretics for fever  -if blood cx + from port will consult general surgery to remove port

## 2023-05-07 NOTE — CODE/ RAPID DOCUMENTATION
"RAPID RESPONSE NURSE PROACTIVE ROUNDING NOTE     Time of Visit:     Admit Date: 2023  LOS: 1  Code Status: Full Code   Date of Visit: 2023  : 1965  Age: 58 y.o.  Sex: male  Race: White  Bed: K421/K421 A:   MRN: [id number]  Was the patient discharged from an ICU this admission? No   Was the patient discharged from a PACU within last 24 hours?  No  Did the patient receive conscious sedation/general anesthesia in last 24 hours?  No  Was the patient in the ED within the past 24 hours?  Yes  Was the patient started on NIPPV within the past 24 hours?  No  Attending Physician: Mis Castillo*  Primary Service: Networked reference to record PCT     ASSESSMENT     Notified by charge RN during rounding.  Reason for alert: PIV placement    Diagnosis: Severe sepsis    Abnormal Vital Signs: BP (!) 110/58 (BP Location: Left arm, Patient Position: Lying)   Pulse 79   Temp 97.2 °F (36.2 °C) (Oral)   Resp 20   Ht 5' 9" (1.753 m)   Wt 98.7 kg (217 lb 8.2 oz)   SpO2 97%   BMI 32.12 kg/m²      Clinical Issues:  Sepsis    Patient  has a past medical history of Asthma, Cancer, Diabetes mellitus, High cholesterol, Hypertension, ANAMIKA on CPAP, and Testicular hypofunction.      Notified by bedside RN of need for PIV, pt receiving blood. 20g PIV placed to R hand. RN notified.     INTERVENTIONS/ RECOMMENDATIONS     Discussed plan of care with RNChava.    FOLLOW-UP     Call back the Rapid Response Nurse, RONY REYES RN at [facility] Phone: [phone] for additional questions or concerns.         "

## 2023-05-07 NOTE — ASSESSMENT & PLAN NOTE
Patient's FSGs are controlled on current medication regimen.  Last A1c reviewed-   Lab Results   Component Value Date    HGBA1C 7.7 (H) 03/22/2023     Most recent fingerstick glucose reviewed-   Recent Labs   Lab 05/06/23  1135 05/06/23  1614 05/06/23  2045 05/07/23  0549   POCTGLUCOSE 163* 144* 119* 134*     Current correctional scale  Low  Maintain anti-hyperglycemic dose as follows-   Antihyperglycemics (From admission, onward)    Start     Stop Route Frequency Ordered    05/05/23 1506  insulin aspart U-100 pen 0-5 Units         -- SubQ Before meals & nightly PRN 05/05/23 1406        Hold Oral hypoglycemics while patient is in the hospital.

## 2023-05-07 NOTE — PROGRESS NOTES
Pharmacokinetic Assessment Follow Up: IV Vancomycin    Vancomycin serum concentration assessment(s):    The random level was drawn correctly and can be used to guide therapy at this time. The measurement is below the desired definitive target range of 15 to 20 mcg/mL.    Vancomycin Regimen Plan:    Re-start Vancomycin regimen with 1500 mg IV every 12 hours with next serum trough concentration measured at 2300 prior to 4th dose on 5-8-23    Drug levels (last 3 results):  Recent Labs   Lab Result Units 05/06/23  2306 05/07/23  1007   Vancomycin, Random ug/mL  --  11.6   Vancomycin-Trough ug/mL 21.5  --        Pharmacy will continue to follow and monitor vancomycin.    Please contact pharmacy at extension [phone] for questions regarding this assessment.    Thank you for the consult,   Ariel Bronson       Patient brief summary:  Saud Mayers is a 58 y.o. male initiated on antimicrobial therapy with IV Vancomycin for treatment of sepsis        Drug Allergies:   Review of patient's allergies indicates:   Allergen Reactions    Gabapentin Hallucinations    Tizanidine Other (See Comments)     somnolence       Actual Body Weight:   98.7 kg    Renal Function:   Estimated Creatinine Clearance: 130 mL/min (based on SCr of 0.7 mg/dL).,     Dialysis Method (if applicable):  N/A    CBC (last 72 hours):  Recent Labs   Lab Result Units 05/05/23  0845 05/06/23 0418 05/07/23  0527   WBC K/uL 6.02 3.21* 2.49*   Hemoglobin g/dL 7.9* 6.8* 6.7*   Hematocrit % 25.6* 21.8* 21.1*   Platelets K/uL 128* 101* 94*   Gran % % 84.8* 74.2* 61.8   Lymph % % 6.1* 13.4* 20.1   Mono % % 8.3 11.5 16.5*   Eosinophil % % 0.0 0.0 0.0   Basophil % % 0.5 0.3 1.2   Differential Method  Automated Automated Automated       Metabolic Panel (last 72 hours):  Recent Labs   Lab Result Units 05/05/23  0845 05/05/23  1033 05/06/23  0418 05/07/23  0527   Sodium mmol/L 135*  --  137 136   Potassium mmol/L 3.9  --  3.6 3.8   Chloride mmol/L 103  --  104 106   CO2 mmol/L  22*  --  22* 25   Glucose mg/dL 166*  --  120* 132*   Glucose, UA   --  Negative  --   --    BUN mg/dL 14  --  12 9   Creatinine mg/dL 0.9  --  0.8 0.7   Albumin g/dL 3.7  --  3.0* 3.1*   Total Bilirubin mg/dL 0.7  --  1.0 0.6   Alkaline Phosphatase U/L 85  --  63 62   AST U/L 29  --  25 23   ALT U/L 20  --  19 16   Magnesium mg/dL 1.3*  --  1.8 1.7   Phosphorus mg/dL 1.9*  --  4.9* 3.6       Vancomycin Administrations:  vancomycin given in the last 96 hours                     vancomycin (VANCOCIN) 1,750 mg in dextrose 5 % (D5W) 500 mL IVPB (mg) 1,750 mg New Bag 05/06/23 2247     1,750 mg New Bag  1103     1,750 mg New Bag 05/05/23 2238    vancomycin 2 g in dextrose 5 % 500 mL IVPB (mg) 2,000 mg New Bag 05/05/23 0933                    Microbiologic Results:  Microbiology Results (last 7 days)       Procedure Component Value Units Date/Time    Blood culture x two cultures. Draw prior to antibiotics. [[id number]]  (Abnormal) Collected: 05/05/23 0845    Order Status: Completed Specimen: Blood from Peripheral, Forearm, Right Updated: 05/07/23 1027     Blood Culture, Routine Gram stain aer bottle: Gram negative rods      Results called to and read back by:  Momo Araujo RN 05/06/2023  01:50      KLEBSIELLA PNEUMONIAE  Susceptibility pending      Narrative:      Aerobic and anaerobic    Blood culture [[id number]] Collected: 05/06/23 0912    Order Status: Completed Specimen: Blood Updated: 05/07/23 0115     Blood Culture, Routine No Growth to date    Blood culture [[id number]] Collected: 05/06/23 0854    Order Status: Completed Specimen: Blood Updated: 05/07/23 0115     Blood Culture, Routine No Growth to date    Blood culture x two cultures. Draw prior to antibiotics. [[id number]] Collected: 05/05/23 0845    Order Status: Completed Specimen: Blood from Peripheral, Antecubital, Left Updated: 05/06/23 1412     Blood Culture, Routine No Growth to date      No Growth to date    Narrative:      Aerobic and anaerobic    Stool  culture [[id number]] Collected: 05/06/23 0641    Order Status: Sent Specimen: Stool Updated: 05/06/23 0846    E. coli 0157 antigen [[id number]] Collected: 05/06/23 0641    Order Status: No result Specimen: Stool Updated: 05/06/23 0846    Clostridium difficile EIA [[id number]] Collected: 05/06/23 0641    Order Status: Canceled Specimen: Stool     Rapid Organism ID by PCR (from Blood culture) [[id number]]  (Abnormal) Collected: 05/05/23 0845    Order Status: Completed Updated: 05/06/23 0316     Enterococcus faecalis Not Detected     Enterococcus faecium Not Detected     Listeria Monocytogenes Not Detected     Staphylococcus spp. Not Detected     Staphylococcus aureus Not Detected     Staphylococcus epidermidis Not Detected     Staphylococcus lugdunensis Not Detected     Streptococcus species Not Detected     Streptococcus agalactiae Not Detected     Streptococcus pneumoniae Not Detected     Streptococcus pyogenes Not Detected     Acinetobacter calcoaceticus/baumannii complex Not Detected     Bacteroides fragilis Not Detected     Enterobacerales See species for ID     Enterobacter cloacae complex Not Detected     Escherichia Not Detected     Klebsiella aerogenes Not Detected     Klebsiella oxytoca Not Detected     Klebsiella pneumoniae group Detected     Proteus Not Detected     Salmonella sp Not Detected     Serratia marcescens Not Detected     Haemophilus influenzae Not Detected     Neisseria meningtidis Not Detected     Pseudomonas aeruginosa Not Detected     Stenotrophomonas maltophilia Not Detected     Candida albicans Not Detected     Candida auris Not Detected     Candida glabrata Not Detected     Candida krusei Not Detected     Candida parapsilosis Not Detected     Candida tropicalis Not Detected     Cryptococcus neoformans/gattii Not Detected     CTX-M (ESBL ) Not Detected     IMP (Carbapenem resistant) Not Detected     KPC resistance gene (Carbapenem resistant) Not Detected     mcr-1  Not Detected     mec  A/C  Test Not Applicable     mec A/C and MREJ (MRSA) gene Test Not Applicable     NDM (Carbapenem resistant) Not Detected     OXA-48-like (Carbapenem resistant) Not Detected     van A/B (VRE gene) Test Not Applicable     VIM (Carbapenem resistant) Not Detected    Narrative:      Aerobic and anaerobic    Respiratory Infection Panel (PCR), Nasopharyngeal [[id number]]     Order Status: No result Specimen: Nasopharyngeal Swab     Culture, Respiratory with Gram Stain [[id number]]     Order Status: No result Specimen: Sputum, Expectorated     Blood culture [[id number]]     Order Status: No result Specimen: Blood

## 2023-05-07 NOTE — PROGRESS NOTES
Pharmacokinetic Assessment Follow Up: IV Vancomycin    Vancomycin serum concentration assessment(s):    Unclear if Vancomycin was administered as the trough level was being drawn.   Vancomycin 1750 mg dose charted administered 5/6 @ 2247, lab drawn 5/6 @ 2306.     Vancomycin result 21.5 ug/mL (goal 15-20 ug/mL).   If lab was drawn correctly then level is slightly above goal.   If lab was drawn shortly after start of vancomycin infusion then level was falsely elevated and likely that true trough was within goal and therapeutic.     Dose was completed. Will check another vancomycin level 5/7 @ 1000 prior to next dose due.   If level is within goal range 15-20 ug/mL will continue vancomycin 1750 mg q12h.   If level is elevated then dose will be reduced.     Vancomycin Regimen Plan:    Discontinue the scheduled vancomycin regimen and re-dose when the random level is less than 20 mcg/mL, next level to be drawn at 1000 on 5/7.    Drug levels (last 3 results):  Recent Labs   Lab Result Units 05/06/23  2306   Vancomycin-Trough ug/mL 21.5       Pharmacy will continue to follow and monitor vancomycin.    Please contact pharmacy at extension [phone] for questions regarding this assessment.    Thank you for the consult,   Shakila Martin       Patient brief summary:  Saud Mayers is a 58 y.o. male initiated on antimicrobial therapy with IV Vancomycin for treatment of sepsis    The patient's current regimen is 1750 mg q12h    Drug Allergies:   Review of patient's allergies indicates:   Allergen Reactions    Gabapentin Hallucinations    Tizanidine Other (See Comments)     somnolence       Actual Body Weight:   98.7 kg    Renal Function:   Estimated Creatinine Clearance: 113.8 mL/min (based on SCr of 0.8 mg/dL).,     Dialysis Method (if applicable):  N/A    CBC (last 72 hours):  Recent Labs   Lab Result Units 05/05/23  0845 05/06/23  0418   WBC K/uL 6.02 3.21*   Hemoglobin g/dL 7.9* 6.8*   Hematocrit % 25.6* 21.8*   Platelets K/uL  128* 101*   Gran % % 84.8* 74.2*   Lymph % % 6.1* 13.4*   Mono % % 8.3 11.5   Eosinophil % % 0.0 0.0   Basophil % % 0.5 0.3   Differential Method  Automated Automated       Metabolic Panel (last 72 hours):  Recent Labs   Lab Result Units 05/05/23  0845 05/05/23  1033 05/06/23  0418   Sodium mmol/L 135*  --  137   Potassium mmol/L 3.9  --  3.6   Chloride mmol/L 103  --  104   CO2 mmol/L 22*  --  22*   Glucose mg/dL 166*  --  120*   Glucose, UA   --  Negative  --    BUN mg/dL 14  --  12   Creatinine mg/dL 0.9  --  0.8   Albumin g/dL 3.7  --  3.0*   Total Bilirubin mg/dL 0.7  --  1.0   Alkaline Phosphatase U/L 85  --  63   AST U/L 29  --  25   ALT U/L 20  --  19   Magnesium mg/dL 1.3*  --  1.8   Phosphorus mg/dL 1.9*  --  4.9*       Vancomycin Administrations:  vancomycin given in the last 96 hours                     vancomycin (VANCOCIN) 1,750 mg in dextrose 5 % (D5W) 500 mL IVPB (mg) 1,750 mg New Bag 05/06/23 2247     1,750 mg New Bag  1103     1,750 mg New Bag 05/05/23 2238    vancomycin 2 g in dextrose 5 % 500 mL IVPB (mg) 2,000 mg New Bag 05/05/23 0933                    Microbiologic Results:  Microbiology Results (last 7 days)       Procedure Component Value Units Date/Time    Blood culture [[id number]] Collected: 05/06/23 0912    Order Status: Sent Specimen: Blood Updated: 05/06/23 1539    Blood culture [[id number]] Collected: 05/06/23 0854    Order Status: Sent Specimen: Blood Updated: 05/06/23 1539    Blood culture x two cultures. Draw prior to antibiotics. [[id number]] Collected: 05/05/23 0845    Order Status: Completed Specimen: Blood from Peripheral, Antecubital, Left Updated: 05/06/23 1412     Blood Culture, Routine No Growth to date      No Growth to date    Narrative:      Aerobic and anaerobic    Stool culture [[id number]] Collected: 05/06/23 0641    Order Status: Sent Specimen: Stool Updated: 05/06/23 0846    E. coli 0157 antigen [[id number]] Collected: 05/06/23 0641    Order Status: No result Specimen:  Stool Updated: 05/06/23 0846    Clostridium difficile EIA [[id number]] Collected: 05/06/23 0641    Order Status: Canceled Specimen: Stool     Rapid Organism ID by PCR (from Blood culture) [[id number]]  (Abnormal) Collected: 05/05/23 0845    Order Status: Completed Updated: 05/06/23 0316     Enterococcus faecalis Not Detected     Enterococcus faecium Not Detected     Listeria Monocytogenes Not Detected     Staphylococcus spp. Not Detected     Staphylococcus aureus Not Detected     Staphylococcus epidermidis Not Detected     Staphylococcus lugdunensis Not Detected     Streptococcus species Not Detected     Streptococcus agalactiae Not Detected     Streptococcus pneumoniae Not Detected     Streptococcus pyogenes Not Detected     Acinetobacter calcoaceticus/baumannii complex Not Detected     Bacteroides fragilis Not Detected     Enterobacerales See species for ID     Enterobacter cloacae complex Not Detected     Escherichia Not Detected     Klebsiella aerogenes Not Detected     Klebsiella oxytoca Not Detected     Klebsiella pneumoniae group Detected     Proteus Not Detected     Salmonella sp Not Detected     Serratia marcescens Not Detected     Haemophilus influenzae Not Detected     Neisseria meningtidis Not Detected     Pseudomonas aeruginosa Not Detected     Stenotrophomonas maltophilia Not Detected     Candida albicans Not Detected     Candida auris Not Detected     Candida glabrata Not Detected     Candida krusei Not Detected     Candida parapsilosis Not Detected     Candida tropicalis Not Detected     Cryptococcus neoformans/gattii Not Detected     CTX-M (ESBL ) Not Detected     IMP (Carbapenem resistant) Not Detected     KPC resistance gene (Carbapenem resistant) Not Detected     mcr-1  Not Detected     mec A/C  Test Not Applicable     mec A/C and MREJ (MRSA) gene Test Not Applicable     NDM (Carbapenem resistant) Not Detected     OXA-48-like (Carbapenem resistant) Not Detected     van A/B (VRE gene) Test  Not Applicable     VIM (Carbapenem resistant) Not Detected    Narrative:      Aerobic and anaerobic    Blood culture x two cultures. Draw prior to antibiotics. [[id number]] Collected: 05/05/23 0845    Order Status: Completed Specimen: Blood from Peripheral, Forearm, Right Updated: 05/06/23 0151     Blood Culture, Routine Gram stain aer bottle: Gram negative rods      Results called to and read back by:  Momo Araujo RN 05/06/2023  01:50    Narrative:      Aerobic and anaerobic    Respiratory Infection Panel (PCR), Nasopharyngeal [[id number]]     Order Status: No result Specimen: Nasopharyngeal Swab     Culture, Respiratory with Gram Stain [[id number]]     Order Status: No result Specimen: Sputum, Expectorated     Blood culture [[id number]]     Order Status: No result Specimen: Blood

## 2023-05-07 NOTE — PLAN OF CARE
05/07/23 0730   Provider Notification   Provider Notified? Yes   Name of Provider Dr Flowers   Provider Notification Method Secure Chat   Provider Notified Of Lab Values  (Post transfusion H&H 6.7/21.1)

## 2023-05-07 NOTE — PLAN OF CARE
Problem: Adult Inpatient Plan of Care  Goal: Plan of Care Review  Outcome: Ongoing, Progressing        05/06/23 9361   Patient Request   Patient Requested c diff precautions d/c'ed; stool sample sent to lab was formed   Nurse Notification   Bedside Nurse Notified? Yes   Name of Bedside Nurse MARLON Nicholas   Nurse Notfication Method Secure Chat   Nurse Notified Of Other  (c diff precautions d/c'ed)       Labs, notes, orders, and careplan reviewed.

## 2023-05-07 NOTE — SUBJECTIVE & OBJECTIVE
Interval hx: This am patient states he is feeling somewhat better. Last fever was 5/6 0720. He denies new or major complaints. No signs of bleeding noted.     Review of Systems  Objective:     Vital Signs (Most Recent):  Temp: 97.8 °F (36.6 °C) (05/07/23 0730)  Pulse: 74 (05/07/23 0730)  Resp: 17 (05/07/23 0730)  BP: 127/69 (05/07/23 0730)  SpO2: 97 % (05/07/23 0817) Vital Signs (24h Range):  Temp:  [96.3 °F (35.7 °C)-100.1 °F (37.8 °C)] 97.8 °F (36.6 °C)  Pulse:  [74-90] 74  Resp:  [16-20] 17  SpO2:  [95 %-98 %] 97 %  BP: (110-132)/(51-69) 127/69     Weight: 98.7 kg (217 lb 8.2 oz)  Body mass index is 32.12 kg/m².     Physical Exam  Constitutional:       Appearance: Normal appearance. He is not ill-appearing.      Comments: No longer Trembling   HENT:      Head: Normocephalic and atraumatic.      Nose: Nose normal.      Mouth/Throat:      Mouth: Mucous membranes are moist.      Pharynx: Oropharynx is clear.   Eyes:      Extraocular Movements: Extraocular movements intact.      Conjunctiva/sclera: Conjunctivae normal.   Cardiovascular:      Rate and Rhythm: Normal rate and regular rhythm.      Pulses: Normal pulses.      Heart sounds: Normal heart sounds.   Pulmonary:      Effort: Pulmonary effort is normal. No respiratory distress.      Breath sounds: Normal breath sounds. No wheezing.   Abdominal:      General: Abdomen is flat. Bowel sounds are normal.      Palpations: Abdomen is soft.   Musculoskeletal:         General: No swelling. Normal range of motion.      Cervical back: Normal range of motion and neck supple.   Skin:     General: Skin is warm and dry.      Findings: No lesion or rash.      Comments: Right chest wall port site not accessed.  Site is without erythema edema or drainage   Neurological:      General: No focal deficit present.      Mental Status: He is alert and oriented to person, place, and time.              Significant Labs: All pertinent labs within the past 24 hours have been  reviewed.    Significant Imaging: I have reviewed all pertinent imaging results/findings within the past 24 hours.

## 2023-05-08 ENCOUNTER — TELEPHONE (OUTPATIENT)
Dept: FAMILY MEDICINE | Facility: CLINIC | Age: 58
End: 2023-05-08
Payer: COMMERCIAL

## 2023-05-08 VITALS
BODY MASS INDEX: 32.22 KG/M2 | HEART RATE: 65 BPM | RESPIRATION RATE: 17 BRPM | OXYGEN SATURATION: 97 % | TEMPERATURE: 97 F | DIASTOLIC BLOOD PRESSURE: 81 MMHG | SYSTOLIC BLOOD PRESSURE: 124 MMHG | WEIGHT: 217.5 LBS | HEIGHT: 69 IN

## 2023-05-08 LAB
ALBUMIN SERPL BCP-MCNC: 3.2 G/DL (ref 3.5–5.2)
ALP SERPL-CCNC: 63 U/L (ref 55–135)
ALT SERPL W/O P-5'-P-CCNC: 17 U/L (ref 10–44)
ANION GAP SERPL CALC-SCNC: 7 MMOL/L (ref 8–16)
ANISOCYTOSIS BLD QL SMEAR: SLIGHT
AST SERPL-CCNC: 20 U/L (ref 10–40)
BACTERIA BLD CULT: ABNORMAL
BASOPHILS # BLD AUTO: 0.02 K/UL (ref 0–0.2)
BASOPHILS NFR BLD: 0.9 % (ref 0–1.9)
BILIRUB SERPL-MCNC: 0.7 MG/DL (ref 0.1–1)
BUN SERPL-MCNC: 8 MG/DL (ref 6–20)
CALCIUM SERPL-MCNC: 8.8 MG/DL (ref 8.7–10.5)
CHLORIDE SERPL-SCNC: 107 MMOL/L (ref 95–110)
CO2 SERPL-SCNC: 25 MMOL/L (ref 23–29)
CREAT SERPL-MCNC: 0.7 MG/DL (ref 0.5–1.4)
DACRYOCYTES BLD QL SMEAR: ABNORMAL
DIFFERENTIAL METHOD: ABNORMAL
E COLI SXT1 STL QL IA: NEGATIVE
E COLI SXT2 STL QL IA: NEGATIVE
EOSINOPHIL # BLD AUTO: 0 K/UL (ref 0–0.5)
EOSINOPHIL NFR BLD: 0 % (ref 0–8)
ERYTHROCYTE [DISTWIDTH] IN BLOOD BY AUTOMATED COUNT: 21.1 % (ref 11.5–14.5)
EST. GFR  (NO RACE VARIABLE): >60 ML/MIN/1.73 M^2
GLUCOSE SERPL-MCNC: 125 MG/DL (ref 70–110)
HCT VFR BLD AUTO: 26.4 % (ref 40–54)
HGB BLD-MCNC: 8.7 G/DL (ref 14–18)
HYPOCHROMIA BLD QL SMEAR: ABNORMAL
IMM GRANULOCYTES # BLD AUTO: 0.01 K/UL (ref 0–0.04)
IMM GRANULOCYTES NFR BLD AUTO: 0.5 % (ref 0–0.5)
LYMPHOCYTES # BLD AUTO: 0.5 K/UL (ref 1–4.8)
LYMPHOCYTES NFR BLD: 22.8 % (ref 18–48)
MAGNESIUM SERPL-MCNC: 1.8 MG/DL (ref 1.6–2.6)
MCH RBC QN AUTO: 31.2 PG (ref 27–31)
MCHC RBC AUTO-ENTMCNC: 33 G/DL (ref 32–36)
MCV RBC AUTO: 95 FL (ref 82–98)
MONOCYTES # BLD AUTO: 0.4 K/UL (ref 0.3–1)
MONOCYTES NFR BLD: 16.3 % (ref 4–15)
NEUTROPHILS # BLD AUTO: 1.3 K/UL (ref 1.8–7.7)
NEUTROPHILS NFR BLD: 59.5 % (ref 38–73)
NRBC BLD-RTO: 0 /100 WBC
OVALOCYTES BLD QL SMEAR: ABNORMAL
PHOSPHATE SERPL-MCNC: 3.7 MG/DL (ref 2.7–4.5)
PLATELET # BLD AUTO: 97 K/UL (ref 150–450)
PLATELET BLD QL SMEAR: ABNORMAL
PMV BLD AUTO: 9.2 FL (ref 9.2–12.9)
POCT GLUCOSE: 123 MG/DL (ref 70–110)
POCT GLUCOSE: 126 MG/DL (ref 70–110)
POIKILOCYTOSIS BLD QL SMEAR: SLIGHT
POTASSIUM SERPL-SCNC: 3.9 MMOL/L (ref 3.5–5.1)
PROT SERPL-MCNC: 6.1 G/DL (ref 6–8.4)
RBC # BLD AUTO: 2.79 M/UL (ref 4.6–6.2)
SODIUM SERPL-SCNC: 139 MMOL/L (ref 136–145)
WBC # BLD AUTO: 2.15 K/UL (ref 3.9–12.7)

## 2023-05-08 PROCEDURE — 84100 ASSAY OF PHOSPHORUS: CPT | Performed by: NURSE PRACTITIONER

## 2023-05-08 PROCEDURE — 25000003 PHARM REV CODE 250: Performed by: NURSE PRACTITIONER

## 2023-05-08 PROCEDURE — 99233 PR SUBSEQUENT HOSPITAL CARE,LEVL III: ICD-10-PCS | Mod: ,,, | Performed by: INTERNAL MEDICINE

## 2023-05-08 PROCEDURE — 80053 COMPREHEN METABOLIC PANEL: CPT | Performed by: NURSE PRACTITIONER

## 2023-05-08 PROCEDURE — 94761 N-INVAS EAR/PLS OXIMETRY MLT: CPT

## 2023-05-08 PROCEDURE — 63600175 PHARM REV CODE 636 W HCPCS: Performed by: FAMILY MEDICINE

## 2023-05-08 PROCEDURE — 83735 ASSAY OF MAGNESIUM: CPT | Performed by: NURSE PRACTITIONER

## 2023-05-08 PROCEDURE — 99233 SBSQ HOSP IP/OBS HIGH 50: CPT | Mod: ,,, | Performed by: INTERNAL MEDICINE

## 2023-05-08 PROCEDURE — 36415 COLL VENOUS BLD VENIPUNCTURE: CPT | Performed by: NURSE PRACTITIONER

## 2023-05-08 PROCEDURE — 85025 COMPLETE CBC W/AUTO DIFF WBC: CPT | Performed by: NURSE PRACTITIONER

## 2023-05-08 PROCEDURE — 63600175 PHARM REV CODE 636 W HCPCS: Performed by: NURSE PRACTITIONER

## 2023-05-08 PROCEDURE — 25000003 PHARM REV CODE 250: Performed by: FAMILY MEDICINE

## 2023-05-08 RX ORDER — CIPROFLOXACIN 750 MG/1
750 TABLET, FILM COATED ORAL EVERY 12 HOURS
Qty: 24 TABLET | Refills: 0 | Status: SHIPPED | OUTPATIENT
Start: 2023-05-08 | End: 2023-05-20

## 2023-05-08 RX ORDER — CIPROFLOXACIN 250 MG/1
750 TABLET, FILM COATED ORAL EVERY 12 HOURS
Status: DISCONTINUED | OUTPATIENT
Start: 2023-05-08 | End: 2023-05-08 | Stop reason: HOSPADM

## 2023-05-08 RX ADMIN — MEROPENEM 1 G: 1 INJECTION, POWDER, FOR SOLUTION INTRAVENOUS at 01:05

## 2023-05-08 RX ADMIN — FOLIC ACID 1 MG: 1 TABLET ORAL at 08:05

## 2023-05-08 RX ADMIN — VANCOMYCIN HYDROCHLORIDE 1500 MG: 1.5 INJECTION, POWDER, LYOPHILIZED, FOR SOLUTION INTRAVENOUS at 11:05

## 2023-05-08 RX ADMIN — MEROPENEM 1 G: 1 INJECTION, POWDER, FOR SOLUTION INTRAVENOUS at 09:05

## 2023-05-08 RX ADMIN — VANCOMYCIN HYDROCHLORIDE 1500 MG: 1.5 INJECTION, POWDER, LYOPHILIZED, FOR SOLUTION INTRAVENOUS at 02:05

## 2023-05-08 RX ADMIN — MUPIROCIN: 20 OINTMENT TOPICAL at 08:05

## 2023-05-08 RX ADMIN — Medication 100 MCG: at 08:05

## 2023-05-08 NOTE — SUBJECTIVE & OBJECTIVE
Interval History:   - he feels better. He is hoping to be discharged today.    Oncology Treatment Plan:   OP RCHOP WITH SUBQ RITUXIMAB Q3W    Medications:  Continuous Infusions:  Scheduled Meds:   cyanocobalamin  100 mcg Oral Daily    enoxaparin  40 mg Subcutaneous Daily    folic acid  1 mg Oral Daily    meropenem (MERREM) IVPB  1 g Intravenous Q8H    mupirocin   Nasal BID    vancomycin (VANCOCIN) IVPB  1,500 mg Intravenous Q12H     PRN Meds:sodium chloride, sodium chloride, acetaminophen, albuterol, dextrose 10%, dextrose 10%, dextrose, dextrose, glucagon (human recombinant), ibuprofen, insulin aspart U-100, naloxone, ondansetron, sodium chloride 0.9%, Pharmacy to dose Vancomycin consult **AND** vancomycin - pharmacy to dose     Review of Systems   Constitutional:  Positive for fatigue.   HENT:  Negative for sore throat.    Eyes:  Negative for visual disturbance.   Respiratory:  Negative for shortness of breath.    Cardiovascular:  Negative for chest pain.   Gastrointestinal:  Negative for abdominal pain.   Genitourinary:  Negative for dysuria.   Musculoskeletal:  Negative for back pain.   Skin:  Negative for rash.   Neurological:  Negative for headaches.   Hematological:  Negative for adenopathy.   Psychiatric/Behavioral:  The patient is not nervous/anxious.    Objective:     Vital Signs (Most Recent):  Temp: 97.7 °F (36.5 °C) (05/08/23 0719)  Pulse: 69 (05/08/23 0719)  Resp: 17 (05/08/23 0719)  BP: 133/63 (05/08/23 0719)  SpO2: 97 % (05/08/23 0738) Vital Signs (24h Range):  Temp:  [96.1 °F (35.6 °C)-98.1 °F (36.7 °C)] 97.7 °F (36.5 °C)  Pulse:  [60-83] 69  Resp:  [17-20] 17  SpO2:  [95 %-100 %] 97 %  BP: (112-137)/(56-63) 133/63     Weight: 98.7 kg (217 lb 8.2 oz)  Body mass index is 32.12 kg/m².  Body surface area is 2.19 meters squared.      Intake/Output Summary (Last 24 hours) at 5/8/2023 0832  Last data filed at 5/8/2023 0131  Gross per 24 hour   Intake 1595.67 ml   Output --   Net 1595.67 ml        Physical  Exam  Vitals and nursing note reviewed.   Constitutional:       Appearance: He is well-developed.   HENT:      Head: Normocephalic and atraumatic.   Eyes:      Pupils: Pupils are equal, round, and reactive to light.   Cardiovascular:      Rate and Rhythm: Normal rate and regular rhythm.   Pulmonary:      Effort: Pulmonary effort is normal.      Breath sounds: Normal breath sounds.   Abdominal:      General: Bowel sounds are normal.      Palpations: Abdomen is soft.   Musculoskeletal:         General: Normal range of motion.      Cervical back: Normal range of motion and neck supple.   Skin:     General: Skin is warm and dry.   Neurological:      Mental Status: He is alert and oriented to person, place, and time.   Psychiatric:         Behavior: Behavior normal.         Thought Content: Thought content normal.         Judgment: Judgment normal.        Significant Labs:   CBC:   Recent Labs   Lab 05/07/23  0527 05/08/23  0305   WBC 2.49* 2.15*   HGB 6.7* 8.7*   HCT 21.1* 26.4*   PLT 94* 97*    and CMP:   Recent Labs   Lab 05/07/23  0527 05/08/23  0305    139   K 3.8 3.9    107   CO2 25 25   * 125*   BUN 9 8   CREATININE 0.7 0.7   CALCIUM 8.5* 8.8   PROT 5.8* 6.1   ALBUMIN 3.1* 3.2*   BILITOT 0.6 0.7   ALKPHOS 62 63   AST 23 20   ALT 16 17   ANIONGAP 5* 7*       Diagnostic Results:  Blood culture (5/5/23):        Abnormal   KLEBSIELLA PNEUMONIAE

## 2023-05-08 NOTE — PLAN OF CARE
"[facility] - Telemetry  Discharge Final Note    Primary Care Provider: Anson Blunt MD    Expected Discharge Date: 5/8/2023    Pharmacist will go over home medications and reasons for medications. VN and bedside nurse to reiterate final discharge instructions.     Cleared from CM . Bedside Nurse and VN notified.'    Final Discharge Note (most recent)       Final Note - 05/08/23 1300          Final Note    Assessment Type Final Discharge Note (P)      Anticipated Discharge Disposition Home or Self Care (P)      Hospital Resources/Appts/Education Provided Appointments scheduled and added to AVS (P)         Post-Acute Status    Post-Acute Authorization Other (P)      Other Status No Post-Acute Service Needs (P)      Discharge Delays None known at this time (P)                    Future Appointments   Date Time Provider Department Center   5/15/2023 10:00 AM Anson Blunt MD [facility] FAM MED [place] [facility]   7/12/2023  1:20 PM APPOINTMENT LAB, [facility]   7/12/2023  2:20 PM Sandip Vickers MD [facility] HEM ONC [facility]     /81   Pulse 65   Temp 97.4 °F (36.3 °C)   Resp 17   Ht 5' 9" (1.753 m)   Wt 98.7 kg (217 lb 8.2 oz)   SpO2 97%   BMI 32.12 kg/m²       Contact Info       Anson Blunt MD   Specialty: Internal Medicine   Relationship: PCP - General    [address] [place] [address]   Phone: [phone]       Next Steps: Go in 5 day(s)    Instructions: FOLLOW UP WITH PCP             Medication List        START taking these medications      ciprofloxacin HCl 750 MG tablet  Commonly known as: CIPRO  Take 1 tablet (750 mg total) by mouth every 12 (twelve) hours. for 12 days            CONTINUE taking these medications      acetaminophen 500 MG tablet  Commonly known as: TYLENOL     allopurinoL 300 MG tablet  Commonly known as: ZYLOPRIM  Take 1 tablet (300 mg total) by mouth once daily.     atorvastatin 20 MG tablet  Commonly known as: LIPITOR  Take 1 tablet (20 mg total) by mouth once " daily.     cyanocobalamin 100 MCG tablet  Commonly known as: VITAMIN B-12     folic acid 1 MG tablet  Commonly known as: FOLVITE  Take 1 tablet (1 mg total) by mouth once daily.     LIDOcaine-prilocaine cream  Commonly known as: EMLA  Apply topically as needed. Apply to skin overlying port site 30 minutes before chemotherapy.     linaCLOtide 72 mcg Cap capsule  Commonly known as: LINZESS  Take 1 capsule (72 mcg total) by mouth before breakfast.     losartan 25 MG tablet  Commonly known as: COZAAR  Take 1 tablet (25 mg total) by mouth once daily.     magic mouthwash diphen/antac/lidoc/nysta  10 mLs 3 (three) times daily as needed (chemotherapy-induced mucositis.).     metFORMIN 1000 MG tablet  Commonly known as: GLUCOPHAGE  Take 1 tablet (1,000 mg total) by mouth 2 (two) times daily with meals.     metoprolol succinate 50 MG 24 hr tablet  Commonly known as: TOPROL-XL  Take 1 tablet (50 mg total) by mouth once daily.     ONE-A-DAY MEN'S 50 PLUS(VIT K) 400- mcg Tab  Generic drug: multivit-min-folic-vit K-lycop            STOP taking these medications      hydrOXYzine HCL 10 MG Tab  Commonly known as: ATARAX               Where to Get Your Medications        These medications were sent to [facility] [address], [place] [address]      Hours: Mon-Fri, 8a-5:30p Phone: [phone]   ciprofloxacin HCl 750 MG tablet

## 2023-05-08 NOTE — SUBJECTIVE & OBJECTIVE
Interval hx:  This a.m. patient states he is feeling well.  He is no longer afebrile are having chills.  He denies further complaints.      Review of Systems  Objective:     Vital Signs (Most Recent):  Temp: 97.7 °F (36.5 °C) (05/08/23 0719)  Pulse: 69 (05/08/23 0719)  Resp: 17 (05/08/23 0719)  BP: 133/63 (05/08/23 0719)  SpO2: 97 % (05/08/23 0738) Vital Signs (24h Range):  Temp:  [96.1 °F (35.6 °C)-98.1 °F (36.7 °C)] 97.7 °F (36.5 °C)  Pulse:  [60-83] 69  Resp:  [17-20] 17  SpO2:  [95 %-100 %] 97 %  BP: (112-137)/(56-63) 133/63     Weight: 98.7 kg (217 lb 8.2 oz)  Body mass index is 32.12 kg/m².     Physical Exam  Constitutional:       Appearance: Normal appearance. He is not ill-appearing.      Comments: No longer Trembling   HENT:      Head: Normocephalic and atraumatic.      Nose: Nose normal.      Mouth/Throat:      Mouth: Mucous membranes are moist.      Pharynx: Oropharynx is clear.   Eyes:      Extraocular Movements: Extraocular movements intact.      Conjunctiva/sclera: Conjunctivae normal.   Cardiovascular:      Rate and Rhythm: Normal rate and regular rhythm.      Pulses: Normal pulses.      Heart sounds: Normal heart sounds.   Pulmonary:      Effort: Pulmonary effort is normal. No respiratory distress.      Breath sounds: Normal breath sounds. No wheezing.   Abdominal:      General: Abdomen is flat. Bowel sounds are normal.      Palpations: Abdomen is soft.   Musculoskeletal:         General: No swelling. Normal range of motion.      Cervical back: Normal range of motion and neck supple.   Skin:     General: Skin is warm and dry.      Findings: No lesion or rash.      Comments: Right chest wall port site not accessed.  Site is without erythema edema or drainage   Neurological:      General: No focal deficit present.      Mental Status: He is alert and oriented to person, place, and time.              Significant Labs: All pertinent labs within the past 24 hours have been reviewed.    Significant Imaging:  I have reviewed all pertinent imaging results/findings within the past 24 hours.

## 2023-05-08 NOTE — PROGRESS NOTES
[facility] - Telemetry  Hematology/Oncology  Progress Note    Patient Name: Saud Mayers  Admission Date: 5/5/2023  Hospital Length of Stay: 3 days  Code Status: Full Code     Subjective:     HPI:  58 year-old male with diffuse large B-cell lymphoma s/p 6 cycles of R-CHOP (cycle #6 was 4/13/23) was admitted on 5/5/23 for fever. Consult is for lymphoma, fever.      Interval History:   - he feels better. He is hoping to be discharged today.    Oncology Treatment Plan:   OP RCHOP WITH SUBQ RITUXIMAB Q3W    Medications:  Continuous Infusions:  Scheduled Meds:   cyanocobalamin  100 mcg Oral Daily    enoxaparin  40 mg Subcutaneous Daily    folic acid  1 mg Oral Daily    meropenem (MERREM) IVPB  1 g Intravenous Q8H    mupirocin   Nasal BID    vancomycin (VANCOCIN) IVPB  1,500 mg Intravenous Q12H     PRN Meds:sodium chloride, sodium chloride, acetaminophen, albuterol, dextrose 10%, dextrose 10%, dextrose, dextrose, glucagon (human recombinant), ibuprofen, insulin aspart U-100, naloxone, ondansetron, sodium chloride 0.9%, Pharmacy to dose Vancomycin consult **AND** vancomycin - pharmacy to dose     Review of Systems   Constitutional:  Positive for fatigue.   HENT:  Negative for sore throat.    Eyes:  Negative for visual disturbance.   Respiratory:  Negative for shortness of breath.    Cardiovascular:  Negative for chest pain.   Gastrointestinal:  Negative for abdominal pain.   Genitourinary:  Negative for dysuria.   Musculoskeletal:  Negative for back pain.   Skin:  Negative for rash.   Neurological:  Negative for headaches.   Hematological:  Negative for adenopathy.   Psychiatric/Behavioral:  The patient is not nervous/anxious.    Objective:     Vital Signs (Most Recent):  Temp: 97.7 °F (36.5 °C) (05/08/23 0719)  Pulse: 69 (05/08/23 0719)  Resp: 17 (05/08/23 0719)  BP: 133/63 (05/08/23 0719)  SpO2: 97 % (05/08/23 0738) Vital Signs (24h Range):  Temp:  [96.1 °F (35.6 °C)-98.1 °F (36.7 °C)] 97.7 °F (36.5 °C)  Pulse:   [60-83] 69  Resp:  [17-20] 17  SpO2:  [95 %-100 %] 97 %  BP: (112-137)/(56-63) 133/63     Weight: 98.7 kg (217 lb 8.2 oz)  Body mass index is 32.12 kg/m².  Body surface area is 2.19 meters squared.      Intake/Output Summary (Last 24 hours) at 5/8/2023 0832  Last data filed at 5/8/2023 0131  Gross per 24 hour   Intake 1595.67 ml   Output --   Net 1595.67 ml        Physical Exam  Vitals and nursing note reviewed.   Constitutional:       Appearance: He is well-developed.   HENT:      Head: Normocephalic and atraumatic.   Eyes:      Pupils: Pupils are equal, round, and reactive to light.   Cardiovascular:      Rate and Rhythm: Normal rate and regular rhythm.   Pulmonary:      Effort: Pulmonary effort is normal.      Breath sounds: Normal breath sounds.   Abdominal:      General: Bowel sounds are normal.      Palpations: Abdomen is soft.   Musculoskeletal:         General: Normal range of motion.      Cervical back: Normal range of motion and neck supple.   Skin:     General: Skin is warm and dry.   Neurological:      Mental Status: He is alert and oriented to person, place, and time.   Psychiatric:         Behavior: Behavior normal.         Thought Content: Thought content normal.         Judgment: Judgment normal.        Significant Labs:   CBC:   Recent Labs   Lab 05/07/23  0527 05/08/23  0305   WBC 2.49* 2.15*   HGB 6.7* 8.7*   HCT 21.1* 26.4*   PLT 94* 97*    and CMP:   Recent Labs   Lab 05/07/23  0527 05/08/23  0305    139   K 3.8 3.9    107   CO2 25 25   * 125*   BUN 9 8   CREATININE 0.7 0.7   CALCIUM 8.5* 8.8   PROT 5.8* 6.1   ALBUMIN 3.1* 3.2*   BILITOT 0.6 0.7   ALKPHOS 62 63   AST 23 20   ALT 16 17   ANIONGAP 5* 7*       Diagnostic Results:  Blood culture (5/5/23):        Abnormal   KLEBSIELLA PNEUMONIAE         Assessment/Plan:     Diffuse large B-cell lymphoma of lymph nodes of neck  - my patient in outpatient setting  - s/p 6 cycles of R-CHOP chemotherapy (last cycle was 4/13/23).  - he  "has had a great response to therapy. PET scan (3/22/23) revealed "interval resolution of pathologically enlarged, hypermetabolic lymph nodes on both sides of the diaphragm in keeping with excellent response to therapy."  - CTA chest (5/5/23) was negative for pulmonary embolism/pneumonia  - blood culture (5/5/23) revealed Klebsiella pneumoniae. Follow up sensitivities, so his antibiotic regimen can be switched to oral regimen.  - he has completed chemotherapy, so if follow-up blood cultures reveal persistence of infection, his port-a-cath can be removed.      Sandip Vickers MD  Hematology/Oncology  [facility] - Telemetry    "

## 2023-05-08 NOTE — PROGRESS NOTES
[facility] Medicine  Progress Note    Patient Name: Saud Mayers  MRN: [id number]  Patient Class: IP- Inpatient   Admission Date: 5/5/2023  Length of Stay: 3 days  Attending Physician: Mis Castillo*  Primary Care Provider: Anson Blunt MD        Subjective:     Principal Problem:Severe sepsis        HPI:  58-year-old male with a past medical history of asthma, lymphoma-treated with R-CHOP - now complete as of last dose April 13, 2023, diabetes, hyperlipidemia, hypertension, ANAMIKA, presents with complaints of feeling freezing and having fever onset 8:00 a.m. after going to work today.  Otherwise he has been in his normal state of health.  He has an intermittent dry cough.  He states that after each prior chemotherapy treatment aside from the 1st and 2nd he experienced similar febrile episodes after this same duration from prior chemo treatments.  During past febrile episodes no source of infection was noted.  He denies headache, vision changes, dizziness, lightheadedness, sore throat, nasal congestion, earache, chest pain, shortness of breath, abdominal pain, nausea, vomiting, diarrhea, productive cough, urinary changes, alcohol, tobacco, illicit drug, skin sores or wounds, sick contacts.      Overview/Hospital Course:  No notes on file    Interval hx:  This a.m. patient states he is feeling well.  He is no longer afebrile or having chills.  He denies further complaints.      Review of Systems  Objective:     Vital Signs (Most Recent):  Temp: 97.7 °F (36.5 °C) (05/08/23 0719)  Pulse: 69 (05/08/23 0719)  Resp: 17 (05/08/23 0719)  BP: 133/63 (05/08/23 0719)  SpO2: 97 % (05/08/23 0738) Vital Signs (24h Range):  Temp:  [96.1 °F (35.6 °C)-98.1 °F (36.7 °C)] 97.7 °F (36.5 °C)  Pulse:  [60-83] 69  Resp:  [17-20] 17  SpO2:  [95 %-100 %] 97 %  BP: (112-137)/(56-63) 133/63     Weight: 98.7 kg (217 lb 8.2 oz)  Body mass index is 32.12 kg/m².     Physical Exam  Constitutional:       Appearance: Normal  appearance. He is not ill-appearing.      Comments: No longer Trembling   HENT:      Head: Normocephalic and atraumatic.      Nose: Nose normal.      Mouth/Throat:      Mouth: Mucous membranes are moist.      Pharynx: Oropharynx is clear.   Eyes:      Extraocular Movements: Extraocular movements intact.      Conjunctiva/sclera: Conjunctivae normal.   Cardiovascular:      Rate and Rhythm: Normal rate and regular rhythm.      Pulses: Normal pulses.      Heart sounds: Normal heart sounds.   Pulmonary:      Effort: Pulmonary effort is normal. No respiratory distress.      Breath sounds: Normal breath sounds. No wheezing.   Abdominal:      General: Abdomen is flat. Bowel sounds are normal.      Palpations: Abdomen is soft.   Musculoskeletal:         General: No swelling. Normal range of motion.      Cervical back: Normal range of motion and neck supple.   Skin:     General: Skin is warm and dry.      Findings: No lesion or rash.      Comments: Right chest wall port site not accessed.  Site is without erythema edema or drainage   Neurological:      General: No focal deficit present.      Mental Status: He is alert and oriented to person, place, and time.              Significant Labs: All pertinent labs within the past 24 hours have been reviewed.    Significant Imaging: I have reviewed all pertinent imaging results/findings within the past 24 hours.      Assessment/Plan:      * Severe sepsis  Bacteremia   This patient does have evidence of infective focus  My overall impression is sepsis.  Source: Unknown suspecting port   Antibiotics given-   Antibiotics (72h ago, onward)    Start     Stop Route Frequency Ordered    05/07/23 1200  vancomycin 1,500 mg in dextrose 5 % (D5W) 250 mL IVPB (Vial-Mate)         -- IV Every 12 hours (non-standard times) 05/07/23 1103    05/07/23 0900  mupirocin 2 % ointment         05/12 0859 Nasl 2 times daily 05/07/23 0745    05/06/23 0900  meropenem (MERREM) 1 g in sodium chloride 0.9 % 100  mL IVPB (MB+)         -- IV Every 8 hours (non-standard times) 05/06/23 0757    05/05/23 1505  vancomycin - pharmacy to dose  (vancomycin IVPB)        See Hyperspace for full Linked Orders Report.    -- IV pharmacy to manage frequency 05/05/23 1406        Microbiology Results (last 7 days)     Procedure Component Value Units Date/Time    E. coli 0157 antigen [[id number]] Collected: 05/06/23 0641    Order Status: Completed Specimen: Stool Updated: 05/08/23 0443     Shiga Toxin 1 E.coli Negative     Shiga Toxin 2 E.coli Negative    Blood culture [[id number]] Collected: 05/06/23 0912    Order Status: Completed Specimen: Blood Updated: 05/07/23 1622     Blood Culture, Routine No Growth to date      No Growth to date    Blood culture [[id number]] Collected: 05/06/23 0854    Order Status: Completed Specimen: Blood Updated: 05/07/23 1622     Blood Culture, Routine No Growth to date      No Growth to date    Blood culture x two cultures. Draw prior to antibiotics. [[id number]] Collected: 05/05/23 0845    Order Status: Completed Specimen: Blood from Peripheral, Antecubital, Left Updated: 05/07/23 1412     Blood Culture, Routine No Growth to date      No Growth to date      No Growth to date    Narrative:      Aerobic and anaerobic    Stool culture [[id number]] Collected: 05/06/23 0641    Order Status: Completed Specimen: Stool Updated: 05/07/23 1200     Stool Culture Nothing significant to date    Blood culture x two cultures. Draw prior to antibiotics. [[id number]]  (Abnormal) Collected: 05/05/23 0845    Order Status: Completed Specimen: Blood from Peripheral, Forearm, Right Updated: 05/07/23 1027     Blood Culture, Routine Gram stain aer bottle: Gram negative rods      Results called to and read back by:  Momo Araujo RN 05/06/2023  01:50      KLEBSIELLA PNEUMONIAE  Susceptibility pending      Narrative:      Aerobic and anaerobic    Clostridium difficile EIA [[id number]] Collected: 05/06/23 0641    Order Status: Canceled  Specimen: Stool     Rapid Organism ID by PCR (from Blood culture) [[id number]]  (Abnormal) Collected: 05/05/23 0845    Order Status: Completed Updated: 05/06/23 0316     Enterococcus faecalis Not Detected     Enterococcus faecium Not Detected     Listeria Monocytogenes Not Detected     Staphylococcus spp. Not Detected     Staphylococcus aureus Not Detected     Staphylococcus epidermidis Not Detected     Staphylococcus lugdunensis Not Detected     Streptococcus species Not Detected     Streptococcus agalactiae Not Detected     Streptococcus pneumoniae Not Detected     Streptococcus pyogenes Not Detected     Acinetobacter calcoaceticus/baumannii complex Not Detected     Bacteroides fragilis Not Detected     Enterobacerales See species for ID     Enterobacter cloacae complex Not Detected     Escherichia Not Detected     Klebsiella aerogenes Not Detected     Klebsiella oxytoca Not Detected     Klebsiella pneumoniae group Detected     Proteus Not Detected     Salmonella sp Not Detected     Serratia marcescens Not Detected     Haemophilus influenzae Not Detected     Neisseria meningtidis Not Detected     Pseudomonas aeruginosa Not Detected     Stenotrophomonas maltophilia Not Detected     Candida albicans Not Detected     Candida auris Not Detected     Candida glabrata Not Detected     Candida krusei Not Detected     Candida parapsilosis Not Detected     Candida tropicalis Not Detected     Cryptococcus neoformans/gattii Not Detected     CTX-M (ESBL ) Not Detected     IMP (Carbapenem resistant) Not Detected     KPC resistance gene (Carbapenem resistant) Not Detected     mcr-1  Not Detected     mec A/C  Test Not Applicable     mec A/C and MREJ (MRSA) gene Test Not Applicable     NDM (Carbapenem resistant) Not Detected     OXA-48-like (Carbapenem resistant) Not Detected     van A/B (VRE gene) Test Not Applicable     VIM (Carbapenem resistant) Not Detected    Narrative:      Aerobic and anaerobic    Respiratory  Infection Panel (PCR), Nasopharyngeal [[id number]]     Order Status: No result Specimen: Nasopharyngeal Swab     Culture, Respiratory with Gram Stain [[id number]]     Order Status: No result Specimen: Sputum, Expectorated     Blood culture [[id number]]     Order Status: No result Specimen: Blood             Latest lactate reviewed-  Recent Labs   Lab 05/05/23  1207 05/06/23  0103   LACTATE 4.1* 1.1   Procalcitonin 2.01  No leukopenia or leukocytosis  COVID and influenza negative    Organ dysfunction indicated by Thrombocytopenia  and elevated troponin, elevated lactic acid   -troponin elevated suspected as secondary to demand ischemia related to sepsis    Fluid challenge Actual Body weight- Patient will receive 30ml/kg actual body weight to calculate fluid bolus for treatment of septic shock.     Post- resuscitation assessment Yes Perfusion exam was performed within 6 hours of septic shock presentation after bolus shows Adequate tissue perfusion assessed by non-invasive monitoring       Will Not start Pressors- Levophed for MAP of 65  Source control achieved by: IV vancomycin and Zosyn - zosyn changed to merrem with +klebsiella bacteremia       -elevated D-dimer.  CTA ruled out pulmonary embolism  -pan culture-blood, urine, stool, sputum, respiratory viral panel, blood culture to include from port site    Patient has fever and chills with no other complaints.  hemoc does not Suspect chemotherapy reaction   -consult Hematology/Oncology  -consult Infectious Disease  -trend lactic acid and troponin- trended down  -antipyretics for fever  -if blood cx + from port will consult general surgery to remove port     -patient no longer febrile, repeat blood cx NGTD  - awaiting cx speciation to de-escalate abx       Macrocytic anemia    Hemoglobin is 7.9.  It was 10 1 month ago    Lab Results   Component Value Date    IRON 17 (L) 05/06/2023    TRANSFERRIN 197 (L) 05/06/2023    TIBC 292 05/06/2023    FESATURATED 6 (L) 05/06/2023     Patient takes vitamin B12 and folic acid supplements   No obvious source of bleeding   Negative for occult blood on UA  Will trend H&H if hemoglobin continues to be below baseline will repeat anemia panel and check stool for occult blood  Transfuse if hemoglobin less than 7 or if patient becomes symptomatic      5/5 hgb 6.8  Anemia panel  Stool for OCB negative   Type and screen   Transfuse 1 unit PRBC   Blood consent obtained.  IVF stopped while receiving blood products     5/7 Hgb 6.7  Lab Results   Component Value Date    IRON 17 (L) 05/06/2023    TRANSFERRIN 197 (L) 05/06/2023    TIBC 292 05/06/2023    FESATURATED 6 (L) 05/06/2023      Ferritin 600  b12 > 2000  Folate 17.3  Transfuse 2 units PRBC   Per hemoc discussion anemia is likely due to inflammation and not bleeding- low iron and high ferritin with sepsis  -transfuse with low threshold for bone marrow bx     5/8  hgb 8.7    Hypomagnesemia  Repleted 5/5      Hypophosphatemia  Repleted 5/5      Diffuse large B-cell lymphoma of lymph nodes of neck    diffuse large B-cell lymphoma s/p 6 cycles of R-CHOP (cycle #6 was 4/13/23)- completed treatment    Nonrheumatic aortic valve stenosis    Moderate per 2D echo  Will need to hold beta-blocker at this time with sepsis  Monitor for signs of volume overload  Will need to follow with outpatient Cardiology    Pure hypercholesterolemia    Hold Lipitor at this time with sepsis    Type 2 diabetes mellitus with hyperglycemia, without long-term current use of insulin  Patient's FSGs are controlled on current medication regimen.  Last A1c reviewed-   Lab Results   Component Value Date    HGBA1C 7.7 (H) 03/22/2023     Most recent fingerstick glucose reviewed-   Recent Labs   Lab 05/07/23  1559 05/07/23 2023 05/08/23  0624   POCTGLUCOSE 119* 140* 126*     Current correctional scale  Low  Maintain anti-hyperglycemic dose as follows-   Antihyperglycemics (From admission, onward)    Start     Stop Route Frequency Ordered     05/05/23 1506  insulin aspart U-100 pen 0-5 Units         -- SubQ Before meals & nightly PRN 05/05/23 1406        Hold Oral hypoglycemics while patient is in the hospital.    Asthma, mild intermittent    Lungs are clear  Will order p.r.n. albuterol inhaler    Essential hypertension    Hold medications at this time with sepsis    Class 2 severe obesity due to excess calories with serious comorbidity and body mass index (BMI) of 36.0 to 36.9 in adult  Body mass index is 32.12 kg/m². Morbid obesity complicates all aspects of disease management from diagnostic modalities to treatment. Weight loss encouraged and health benefits explained to patient.         ANAMIKA (obstructive sleep apnea)    Continue CPAP q.h.s.- patient does not wear CPAP      VTE Risk Mitigation (From admission, onward)         Ordered     enoxaparin injection 40 mg  Daily         05/05/23 1406     Place SADI hose  Until discontinued         05/05/23 1520     IP VTE HIGH RISK PATIENT  Once         05/05/23 1406     Place sequential compression device  Until discontinued         05/05/23 1406                Discharge Planning   KAL:      Code Status: Full Code   Is the patient medically ready for discharge?:     Reason for patient still in hospital (select all that apply): Patient trending condition  Discharge Plan A: Home, Home with family                  Karina Benites NP  Department of Hospital Medicine   [facility]

## 2023-05-08 NOTE — PROGRESS NOTES
[facility]                   Pharmacy  Pharmacy Vancomycin/AG Sign-off    Therapy with vancomycin completed and/or consult discontinued by provider.  Pharmacy will sign off, please re-consult as needed. Thank you for allowing us to participate in this patient's care.     Hank Bella, PharmD    [phone]

## 2023-05-08 NOTE — PROGRESS NOTES
Pharmacist Renal Dose Adjustment Note    Saud Mayers is a 58 y.o. male being treated with the medication ciprofloxacin    Patient Data:    Vital Signs (Most Recent):  Temp: 97.4 °F (36.3 °C) (05/08/23 1109)  Pulse: 65 (05/08/23 1222)  Resp: 17 (05/08/23 1109)  BP: 124/81 (05/08/23 1109)  SpO2: 97 % (05/08/23 1109) Vital Signs (72h Range):  Temp:  [96.1 °F (35.6 °C)-102.6 °F (39.2 °C)]   Pulse:  [60-96]   Resp:  [16-20]   BP: (109-149)/(49-87)   SpO2:  [94 %-100 %]      Recent Labs   Lab 05/06/23  0418 05/07/23  0527 05/08/23  0305   CREATININE 0.8 0.7 0.7     Serum creatinine: 0.7 mg/dL 05/08/23 0305  Estimated creatinine clearance: 130 mL/min    Medication:ciprofloxacin dose: 500 mg frequency every 12 hours will be changed to medication:ciprofloxacin dose:750 mg frequency:every 12 hours    Pharmacist's Name: Aletha Landers  Pharmacist's Extension: [phone]

## 2023-05-08 NOTE — ASSESSMENT & PLAN NOTE
Patient's FSGs are controlled on current medication regimen.  Last A1c reviewed-   Lab Results   Component Value Date    HGBA1C 7.7 (H) 03/22/2023     Most recent fingerstick glucose reviewed-   Recent Labs   Lab 05/07/23  1559 05/07/23 2023 05/08/23  0624   POCTGLUCOSE 119* 140* 126*     Current correctional scale  Low  Maintain anti-hyperglycemic dose as follows-   Antihyperglycemics (From admission, onward)    Start     Stop Route Frequency Ordered    05/05/23 1506  insulin aspart U-100 pen 0-5 Units         -- SubQ Before meals & nightly PRN 05/05/23 1406        Hold Oral hypoglycemics while patient is in the hospital.

## 2023-05-08 NOTE — ASSESSMENT & PLAN NOTE
Patient's FSGs are controlled on current medication regimen.  Last A1c reviewed-   Lab Results   Component Value Date    HGBA1C 7.7 (H) 03/22/2023     Most recent fingerstick glucose reviewed-   Recent Labs   Lab 05/07/23  1559 05/07/23 2023 05/08/23  0624 05/08/23  1106   POCTGLUCOSE 119* 140* 126* 123*     Current correctional scale  Low  Maintain anti-hyperglycemic dose as follows-   Antihyperglycemics (From admission, onward)    Start     Stop Route Frequency Ordered    05/05/23 1506  insulin aspart U-100 pen 0-5 Units         -- SubQ Before meals & nightly PRN 05/05/23 1406        Hold Oral hypoglycemics while patient is in the hospital.

## 2023-05-08 NOTE — DISCHARGE SUMMARY
[facility] Medicine  Discharge Summary      Patient Name: Saud Mayers  MRN: [id number]  SYDNEY: [id number]  Patient Class: IP- Inpatient  Admission Date: 5/5/2023  Hospital Length of Stay: 3 days  Discharge Date and Time: No discharge date for patient encounter.  Attending Physician: Mis Castillo*   Discharging Provider: Carolin Ray NP  Primary Care Provider: Anson Blunt MD    Primary Care Team: Networked reference to record PCT     HPI:   58-year-old male with a past medical history of asthma, lymphoma-treated with R-CHOP - now complete as of last dose April 13, 2023, diabetes, hyperlipidemia, hypertension, ANAMIKA, presents with complaints of feeling freezing and having fever onset 8:00 a.m. after going to work today.  Otherwise he has been in his normal state of health.  He has an intermittent dry cough.  He states that after each prior chemotherapy treatment aside from the 1st and 2nd he experienced similar febrile episodes after this same duration from prior chemo treatments.  During past febrile episodes no source of infection was noted.  He denies headache, vision changes, dizziness, lightheadedness, sore throat, nasal congestion, earache, chest pain, shortness of breath, abdominal pain, nausea, vomiting, diarrhea, productive cough, urinary changes, alcohol, tobacco, illicit drug, skin sores or wounds, sick contacts.      * No surgery found *      Hospital Course:   No notes on file     Goals of Care Treatment Preferences:  Code Status: Full Code      Consults:   Consults (From admission, onward)        Status Ordering Provider     Inpatient consult to Telemedicine - Oncology  Once        Provider:  (Not yet assigned)    Completed LOVE CLAYTON     Inpatient consult to Infectious Diseases  Once        Provider:  (Not yet assigned)    Completed CAROLIN RAY          Pulmonary  Asthma, mild intermittent    Lungs are clear  Will order p.r.n. albuterol  inhaler    Cardiac/Vascular  Nonrheumatic aortic valve stenosis    Moderate per 2D echo  Will need to hold beta-blocker at this time with sepsis- resume at discharge   Monitor for signs of volume overload  Will need to follow with outpatient Cardiology    Pure hypercholesterolemia    Hold Lipitor at this time with sepsis- resume at discharge     Essential hypertension    Hold medications at this time with sepsis- resume at discharge     Renal/  Hypomagnesemia  Repleted 5/5      Hypophosphatemia  Repleted 5/5      ID  * Severe sepsis  Bacteremia   This patient does have evidence of infective focus  My overall impression is sepsis.  Source: Unknown suspecting port   Antibiotics given-   Antibiotics (72h ago, onward)    Start     Stop Route Frequency Ordered    05/08/23 2100  ciprofloxacin HCl tablet 750 mg         -- Oral Every 12 hours 05/08/23 1239    05/07/23 0900  mupirocin 2 % ointment         05/12 0859 Nasl 2 times daily 05/07/23 0745        Microbiology Results (last 7 days)     Procedure Component Value Units Date/Time    Blood culture x two cultures. Draw prior to antibiotics. [[id number]] Collected: 05/05/23 0845    Order Status: Completed Specimen: Blood from Peripheral, Antecubital, Left Updated: 05/08/23 1412     Blood Culture, Routine No Growth to date      No Growth to date      No Growth to date      No Growth to date    Narrative:      Aerobic and anaerobic    Stool culture [[id number]] Collected: 05/06/23 0641    Order Status: Completed Specimen: Stool Updated: 05/08/23 1302     Stool Culture Nothing significant to date    Blood culture x two cultures. Draw prior to antibiotics. [[id number]]  (Abnormal)  (Susceptibility) Collected: 05/05/23 0845    Order Status: Completed Specimen: Blood from Peripheral, Forearm, Right Updated: 05/08/23 1108     Blood Culture, Routine Gram stain aer bottle: Gram negative rods      Results called to and read back by:  Momo Araujo RN 05/06/2023  01:50      KLEBSIELLA  PNEUMONIAE    Narrative:      Aerobic and anaerobic    E. coli 0157 antigen [[id number]] Collected: 05/06/23 0641    Order Status: Completed Specimen: Stool Updated: 05/08/23 0443     Shiga Toxin 1 E.coli Negative     Shiga Toxin 2 E.coli Negative    Blood culture [[id number]] Collected: 05/06/23 0912    Order Status: Completed Specimen: Blood Updated: 05/07/23 1622     Blood Culture, Routine No Growth to date      No Growth to date    Blood culture [[id number]] Collected: 05/06/23 0854    Order Status: Completed Specimen: Blood Updated: 05/07/23 1622     Blood Culture, Routine No Growth to date      No Growth to date    Clostridium difficile EIA [[id number]] Collected: 05/06/23 0641    Order Status: Canceled Specimen: Stool     Rapid Organism ID by PCR (from Blood culture) [[id number]]  (Abnormal) Collected: 05/05/23 0845    Order Status: Completed Updated: 05/06/23 0316     Enterococcus faecalis Not Detected     Enterococcus faecium Not Detected     Listeria Monocytogenes Not Detected     Staphylococcus spp. Not Detected     Staphylococcus aureus Not Detected     Staphylococcus epidermidis Not Detected     Staphylococcus lugdunensis Not Detected     Streptococcus species Not Detected     Streptococcus agalactiae Not Detected     Streptococcus pneumoniae Not Detected     Streptococcus pyogenes Not Detected     Acinetobacter calcoaceticus/baumannii complex Not Detected     Bacteroides fragilis Not Detected     Enterobacerales See species for ID     Enterobacter cloacae complex Not Detected     Escherichia Not Detected     Klebsiella aerogenes Not Detected     Klebsiella oxytoca Not Detected     Klebsiella pneumoniae group Detected     Proteus Not Detected     Salmonella sp Not Detected     Serratia marcescens Not Detected     Haemophilus influenzae Not Detected     Neisseria meningtidis Not Detected     Pseudomonas aeruginosa Not Detected     Stenotrophomonas maltophilia Not Detected     Candida albicans Not Detected      Candida auris Not Detected     Candida glabrata Not Detected     Candida krusei Not Detected     Candida parapsilosis Not Detected     Candida tropicalis Not Detected     Cryptococcus neoformans/gattii Not Detected     CTX-M (ESBL ) Not Detected     IMP (Carbapenem resistant) Not Detected     KPC resistance gene (Carbapenem resistant) Not Detected     mcr-1  Not Detected     mec A/C  Test Not Applicable     mec A/C and MREJ (MRSA) gene Test Not Applicable     NDM (Carbapenem resistant) Not Detected     OXA-48-like (Carbapenem resistant) Not Detected     van A/B (VRE gene) Test Not Applicable     VIM (Carbapenem resistant) Not Detected    Narrative:      Aerobic and anaerobic    Respiratory Infection Panel (PCR), Nasopharyngeal [[id number]]     Order Status: No result Specimen: Nasopharyngeal Swab     Culture, Respiratory with Gram Stain [[id number]]     Order Status: No result Specimen: Sputum, Expectorated     Blood culture [[id number]]     Order Status: No result Specimen: Blood             Latest lactate reviewed-  Recent Labs   Lab 05/06/23  0103   LACTATE 1.1   Procalcitonin 2.01  No leukopenia or leukocytosis  COVID and influenza negative    Organ dysfunction indicated by Thrombocytopenia  and elevated troponin, elevated lactic acid   -troponin elevated suspected as secondary to demand ischemia related to sepsis    Fluid challenge Actual Body weight- Patient will receive 30ml/kg actual body weight to calculate fluid bolus for treatment of septic shock.     Post- resuscitation assessment Yes Perfusion exam was performed within 6 hours of septic shock presentation after bolus shows Adequate tissue perfusion assessed by non-invasive monitoring       Will Not start Pressors- Levophed for MAP of 65  Source control achieved by: IV vancomycin and Zosyn - zosyn changed to merrem with +klebsiella bacteremia       -elevated D-dimer.  CTA ruled out pulmonary embolism  -pan culture-blood, urine, stool, sputum,  respiratory viral panel, blood culture to include from port site    Patient has fever and chills with no other complaints.  hemoc does not Suspect chemotherapy reaction   -consult Hematology/Oncology  -consult Infectious Disease  -trend lactic acid and troponin- trended down  -antipyretics for fever  -if blood cx + from port will consult general surgery to remove port     -patient no longer febrile, repeat blood cx NGTD  - awaiting cx speciation to de-escalate abx     Blood cultures cleared, will not need port removed urgently. Plan for treatment with Ciprofloxacin  Klebsiella Bacteremia  - BlCx from 5/5 with growth, 5/6 NGTD >48hrs              - Pan sensitive klebsiella  - Discontinue meropenem and vancomycin  - Begin Ciprofloxacin for 14 days Duration 5/6 until 5/20  Ok to DC Home   Close PCP follow up       Oncology  Macrocytic anemia    Hemoglobin is 7.9.  It was 10 1 month ago    Lab Results   Component Value Date    IRON 17 (L) 05/06/2023    TRANSFERRIN 197 (L) 05/06/2023    TIBC 292 05/06/2023    FESATURATED 6 (L) 05/06/2023    Patient takes vitamin B12 and folic acid supplements   No obvious source of bleeding   Negative for occult blood on UA  Will trend H&H if hemoglobin continues to be below baseline will repeat anemia panel and check stool for occult blood  Transfuse if hemoglobin less than 7 or if patient becomes symptomatic      5/5 hgb 6.8  Anemia panel  Stool for OCB negative   Type and screen   Transfuse 1 unit PRBC   Blood consent obtained.  IVF stopped while receiving blood products     5/7 Hgb 6.7  Lab Results   Component Value Date    IRON 17 (L) 05/06/2023    TRANSFERRIN 197 (L) 05/06/2023    TIBC 292 05/06/2023    FESATURATED 6 (L) 05/06/2023      Ferritin 600  b12 > 2000  Folate 17.3  Transfuse 2 units PRBC   Per hemoc discussion anemia is likely due to inflammation and not bleeding- low iron and high ferritin with sepsis  -transfuse with low threshold for bone marrow bx     5/8  hgb  8.7    Diffuse large B-cell lymphoma of lymph nodes of neck    diffuse large B-cell lymphoma s/p 6 cycles of R-CHOP (cycle #6 was 4/13/23)- completed treatment    Endocrine  Type 2 diabetes mellitus with hyperglycemia, without long-term current use of insulin  Patient's FSGs are controlled on current medication regimen.  Last A1c reviewed-   Lab Results   Component Value Date    HGBA1C 7.7 (H) 03/22/2023     Most recent fingerstick glucose reviewed-   Recent Labs   Lab 05/07/23  1559 05/07/23 2023 05/08/23  0624 05/08/23  1106   POCTGLUCOSE 119* 140* 126* 123*     Current correctional scale  Low  Maintain anti-hyperglycemic dose as follows-   Antihyperglycemics (From admission, onward)    Start     Stop Route Frequency Ordered    05/05/23 1506  insulin aspart U-100 pen 0-5 Units         -- SubQ Before meals & nightly PRN 05/05/23 1406        Hold Oral hypoglycemics while patient is in the hospital.    Class 2 severe obesity due to excess calories with serious comorbidity and body mass index (BMI) of 36.0 to 36.9 in adult  Body mass index is 32.12 kg/m². Morbid obesity complicates all aspects of disease management from diagnostic modalities to treatment. Weight loss encouraged and health benefits explained to patient.         Other  ANAMIKA (obstructive sleep apnea)    Continue CPAP q.h.s.- patient does not wear CPAP      Final Active Diagnoses:    Diagnosis Date Noted POA    PRINCIPAL PROBLEM:  Severe sepsis [A41.9, R65.20] 05/05/2023 Unknown    Bacteremia [R78.81] 05/07/2023 Unknown    Hypophosphatemia [E83.39] 05/05/2023 Unknown    Hypomagnesemia [E83.42] 05/05/2023 Unknown    Macrocytic anemia [D53.9] 05/05/2023 Unknown    Diffuse large B-cell lymphoma of lymph nodes of neck [C83.31] 12/01/2022 Yes    Nonrheumatic aortic valve stenosis [I35.0] 07/07/2021 Yes    Pure hypercholesterolemia [E78.00] 07/07/2017 Yes    Type 2 diabetes mellitus with hyperglycemia, without long-term current use of insulin [E11.65]  03/24/2016 Yes    ANAMIKA (obstructive sleep apnea) [G47.33] 04/27/2015 Yes    Class 2 severe obesity due to excess calories with serious comorbidity and body mass index (BMI) of 36.0 to 36.9 in adult [E66.01, Z68.36] 04/27/2015 Not Applicable    Essential hypertension [I10] 04/27/2015 Yes    Asthma, mild intermittent [J45.20] 04/27/2015 Yes      Problems Resolved During this Admission:       Discharged Condition: stable    Disposition: Home or Self Care    Follow Up:   Follow-up Information     Anson Blunt MD. Go in 5 day(s).    Specialty: Internal Medicine  Why: FOLLOW UP WITH PCP  Contact information:  [address] [address] [phone]             Sandip Vickers MD Follow up in 1 week(s).    Specialty: Hematology and Oncology  Contact information:  [address]  [address]  [facility] [address] [phone]                       Patient Instructions:      Ambulatory referral/consult to Cardiology   Standing Status: Future   Referral Priority: Routine Referral Type: Consultation   Referral Reason: Specialty Services Required   Requested Specialty: Cardiology   Number of Visits Requested: 1     Diet Cardiac     Diet diabetic     Notify your health care provider if you experience any of the following:  temperature >100.4     Notify your health care provider if you experience any of the following:  persistent nausea and vomiting or diarrhea     Notify your health care provider if you experience any of the following:  severe uncontrolled pain     Notify your health care provider if you experience any of the following:  redness, tenderness, or signs of infection (pain, swelling, redness, odor or green/yellow discharge around incision site)     Notify your health care provider if you experience any of the following:  difficulty breathing or increased cough     Notify your health care provider if you experience any of the following:  severe persistent headache     Notify your health care provider  if you experience any of the following:  worsening rash     Notify your health care provider if you experience any of the following:  persistent dizziness, light-headedness, or visual disturbances     Notify your health care provider if you experience any of the following:  increased confusion or weakness     Activity as tolerated       Significant Diagnostic Studies: Labs:   CMP   Recent Labs   Lab 05/07/23 0527 05/08/23  0305    139   K 3.8 3.9    107   CO2 25 25   * 125*   BUN 9 8   CREATININE 0.7 0.7   CALCIUM 8.5* 8.8   PROT 5.8* 6.1   ALBUMIN 3.1* 3.2*   BILITOT 0.6 0.7   ALKPHOS 62 63   AST 23 20   ALT 16 17   ANIONGAP 5* 7*    and CBC   Recent Labs   Lab 05/07/23 0527 05/08/23  0305   WBC 2.49* 2.15*   HGB 6.7* 8.7*   HCT 21.1* 26.4*   PLT 94* 97*     Microbiology:   Blood Culture   Lab Results   Component Value Date    LABBLOO No Growth to date 05/06/2023    LABBLOO No Growth to date 05/06/2023   , Sputum Culture No results found for: GSRESP, RESPIRATORYC and Urine Culture  No results found for: LABURIN    Pending Diagnostic Studies:     None         Medications:  Reconciled Home Medications:      Medication List      START taking these medications    ciprofloxacin HCl 750 MG tablet  Commonly known as: CIPRO  Take 1 tablet (750 mg total) by mouth every 12 (twelve) hours. for 12 days        CONTINUE taking these medications    acetaminophen 500 MG tablet  Commonly known as: TYLENOL  Take 500 mg by mouth every 6 (six) hours as needed for Pain.     allopurinoL 300 MG tablet  Commonly known as: ZYLOPRIM  Take 1 tablet (300 mg total) by mouth once daily.     atorvastatin 20 MG tablet  Commonly known as: LIPITOR  Take 1 tablet (20 mg total) by mouth once daily.     cyanocobalamin 100 MCG tablet  Commonly known as: VITAMIN B-12  Take 100 mcg by mouth once daily.     folic acid 1 MG tablet  Commonly known as: FOLVITE  Take 1 tablet (1 mg total) by mouth once daily.     LIDOcaine-prilocaine  cream  Commonly known as: EMLA  Apply topically as needed. Apply to skin overlying port site 30 minutes before chemotherapy.     linaCLOtide 72 mcg Cap capsule  Commonly known as: LINZESS  Take 1 capsule (72 mcg total) by mouth before breakfast.     losartan 25 MG tablet  Commonly known as: COZAAR  Take 1 tablet (25 mg total) by mouth once daily.     magic mouthwash diphen/antac/lidoc/nysta  10 mLs 3 (three) times daily as needed (chemotherapy-induced mucositis.).     metFORMIN 1000 MG tablet  Commonly known as: GLUCOPHAGE  Take 1 tablet (1,000 mg total) by mouth 2 (two) times daily with meals.     metoprolol succinate 50 MG 24 hr tablet  Commonly known as: TOPROL-XL  Take 1 tablet (50 mg total) by mouth once daily.     ONE-A-DAY MEN'S 50 PLUS(VIT K) 400- mcg Tab  Generic drug: multivit-min-folic-vit K-lycop  Take 1 tablet by mouth once daily.        STOP taking these medications    hydrOXYzine HCL 10 MG Tab  Commonly known as: ATARAX            Indwelling Lines/Drains at time of discharge:   Lines/Drains/Airways     Central Venous Catheter Line  Duration                PowerPort A Cath Single Lumen 12/07/22 1310 right internal jugular 152 days                Time spent on the discharge of patient: 60 minutes         Karina Benites NP  Department of [facility] Medicine  [facility]

## 2023-05-08 NOTE — PLAN OF CARE
[facility] - Telemetry  Initial Discharge Assessment       Primary Care Provider: Anson Blunt MD    Admission Diagnosis: Sepsis [A41.9]  Severe sepsis [A41.9, R65.20]    Admission Date: 5/5/2023  Expected Discharge Date:     Pt oriented. DCA done via telephone with pt's spouse Ernestine on room telephone. Demographics/Ins/NextofKin/PCP verified with patient/family. Denies any DME needs at present from pt/family. Patient/family plans to return home with family. Educated on bedside RX. Patient consents for [place] to discuss discharge plan with next of kin. Preferences appointment times and location obtained. Patient reports they will have transportation home upon discharge.        Discharge Barriers Identified: (P) None    Payor: [employer] / Plan: [employer] ([employer]) / Product Type: Commercial /     Extended Emergency Contact Information  Primary Emergency Contact: Ernestine Mayers  Address: [address] [place] [address] [facility]  Home Phone: [phone]  Mobile Phone: [phone]  Relation: Spouse    Discharge Plan A: (P) Home, Home with family         [id number] - [place], [place] - [address] AT [facility] OF [facility] [address]  [address] [facility] [address]  Phone: [phone] Fax: [phone]    [id number] - [place], [place] - [address]  [address] [facility] [address]  Phone: [phone] Fax: [phone]    [facility]  [address]  [facility] [address]  Phone: [phone] Fax: [phone]      Initial Assessment (most recent)       Adult Discharge Assessment - 05/08/23 1025          Discharge Assessment    Assessment Type Discharge Planning Assessment (P)      Confirmed/corrected address, phone number and insurance Yes (P)      Confirmed Demographics Correct on Facesheet (P)      Source of Information patient (P)      Communicated KAL with patient/caregiver Yes (P)      People in Home  spouse (P)      Do you expect to return to your current living situation? Yes (P)      Prior to hospitilization cognitive status: Alert/Oriented;No Deficits (P)      Current cognitive status: Alert/Oriented;No Deficits (P)      Equipment Currently Used at Home glucometer (P)      Patient currently being followed by outpatient case management? No (P)      Do you currently have service(s) that help you manage your care at home? No (P)      Do you take prescription medications? Yes (P)      Do you have any problems affording any of your prescribed medications? No (P)      Is the patient taking medications as prescribed? yes (P)      How do you get to doctors appointments? car, drives self;family or friend will provide (P)      Are you on dialysis? No (P)      Discharge Plan A Home;Home with family (P)      DME Needed Upon Discharge  none (P)      Discharge Plan discussed with: Spouse/sig other (P)      Discharge Barriers Identified None (P)         Housing Stability    In the last 12 months, was there a time when you were not able to pay the mortgage or rent on time? No (P)      In the last 12 months, was there a time when you did not have a steady place to sleep or slept in a shelter (including now)? No (P)         Transportation Needs    In the past 12 months, has lack of transportation kept you from medical appointments or from getting medications? No (P)      In the past 12 months, has lack of transportation kept you from meetings, work, or from getting things needed for daily living? No (P)         Food Insecurity    Within the past 12 months, you worried that your food would run out before you got the money to buy more. Never true (P)      Within the past 12 months, the food you bought just didn't last and you didn't have money to get more. Never true (P)                    Future Appointments   Date Time Provider Department Center   7/12/2023  1:20 PM APPOINTMENT [facility]   7/12/2023  " 2:20 PM Sandip Vickers MD [facility] HEM ON[facility]     /63   Pulse 69   Temp 97.7 °F (36.5 °C)   Resp 17   Ht 5' 9" (1.753 m)   Wt 98.7 kg (217 lb 8.2 oz)   SpO2 97%   BMI 32.12 kg/m²      cyanocobalamin  100 mcg Oral Daily    enoxaparin  40 mg Subcutaneous Daily    folic acid  1 mg Oral Daily    meropenem (MERREM) IVPB  1 g Intravenous Q8H    mupirocin   Nasal BID    vancomycin (VANCOCIN) IVPB  1,500 mg Intravenous Q12H                  "

## 2023-05-08 NOTE — ASSESSMENT & PLAN NOTE
Moderate per 2D echo  Will need to hold beta-blocker at this time with sepsis- resume at discharge   Monitor for signs of volume overload  Will need to follow with outpatient Cardiology

## 2023-05-08 NOTE — SUBJECTIVE & OBJECTIVE
Interval History: Patient feels much better     Review of Systems   Constitutional:  Positive for activity change. Negative for appetite change, chills, diaphoresis and fatigue.   HENT: Negative.     Eyes: Negative.    Respiratory: Negative.     Cardiovascular: Negative.    Gastrointestinal: Negative.    Genitourinary: Negative.    Musculoskeletal: Negative.    Skin: Negative.    Objective:     Vital Signs (Most Recent):  Temp: 96.5 °F (35.8 °C) (05/07/23 2317)  Pulse: 67 (05/07/23 2317)  Resp: 20 (05/07/23 2317)  BP: (!) 119/57 (05/07/23 2317)  SpO2: 96 % (05/07/23 2317) Vital Signs (24h Range):  Temp:  [96.1 °F (35.6 °C)-99 °F (37.2 °C)] 96.5 °F (35.8 °C)  Pulse:  [67-84] 67  Resp:  [16-20] 20  SpO2:  [95 %-100 %] 96 %  BP: (112-137)/(56-69) 119/57     Weight: 98.7 kg (217 lb 8.2 oz)  Body mass index is 32.12 kg/m².    Estimated Creatinine Clearance: 130 mL/min (based on SCr of 0.7 mg/dL).     Physical Exam  Constitutional:       Appearance: Normal appearance.   HENT:      Head: Normocephalic.   Cardiovascular:      Rate and Rhythm: Normal rate and regular rhythm.   Pulmonary:      Effort: Pulmonary effort is normal.      Breath sounds: Normal breath sounds.   Abdominal:      General: Abdomen is flat.      Palpations: Abdomen is soft.   Musculoskeletal:         General: Normal range of motion.      Cervical back: Normal range of motion and neck supple.   Skin:     General: Skin is warm and dry.   Neurological:      General: No focal deficit present.      Mental Status: He is alert.        Significant Labs: All pertinent labs within the past 24 hours have been reviewed.    Significant Imaging: I have reviewed all pertinent imaging results/findings within the past 24 hours.

## 2023-05-08 NOTE — TELEPHONE ENCOUNTER
----- Message from Sherly Cr sent at 5/8/2023 11:06 AM CDT -----  Regarding: HFU  Patient is being discharged from [facility] [place] [facility] this morning and is requiring a hospital follow up appointment with their Primary Care Provider in 7 days. Patient is established. I am unable to schedule an appointment in that time frame. Please schedule patient a sooner appointment and message me back so Discharge Nurse can advise patient prior to discharge.    DX:Severe Sepsis      Thank you, Bindu  Physician Referral Specialist/Discharge

## 2023-05-08 NOTE — ASSESSMENT & PLAN NOTE
Bacteremia   This patient does have evidence of infective focus  My overall impression is sepsis.  Source: Unknown suspecting port   Antibiotics given-   Antibiotics (72h ago, onward)    Start     Stop Route Frequency Ordered    05/08/23 2100  ciprofloxacin HCl tablet 750 mg         -- Oral Every 12 hours 05/08/23 1239    05/07/23 0900  mupirocin 2 % ointment         05/12 0859 Nasl 2 times daily 05/07/23 0745        Microbiology Results (last 7 days)     Procedure Component Value Units Date/Time    Blood culture x two cultures. Draw prior to antibiotics. [[id number]] Collected: 05/05/23 0845    Order Status: Completed Specimen: Blood from Peripheral, Antecubital, Left Updated: 05/08/23 1412     Blood Culture, Routine No Growth to date      No Growth to date      No Growth to date      No Growth to date    Narrative:      Aerobic and anaerobic    Stool culture [[id number]] Collected: 05/06/23 0641    Order Status: Completed Specimen: Stool Updated: 05/08/23 1302     Stool Culture Nothing significant to date    Blood culture x two cultures. Draw prior to antibiotics. [[id number]]  (Abnormal)  (Susceptibility) Collected: 05/05/23 0845    Order Status: Completed Specimen: Blood from Peripheral, Forearm, Right Updated: 05/08/23 1108     Blood Culture, Routine Gram stain aer bottle: Gram negative rods      Results called to and read back by:  Momo Araujo RN 05/06/2023  01:50      KLEBSIELLA PNEUMONIAE    Narrative:      Aerobic and anaerobic    E. coli 0157 antigen [[id number]] Collected: 05/06/23 0641    Order Status: Completed Specimen: Stool Updated: 05/08/23 0443     Shiga Toxin 1 E.coli Negative     Shiga Toxin 2 E.coli Negative    Blood culture [[id number]] Collected: 05/06/23 0912    Order Status: Completed Specimen: Blood Updated: 05/07/23 1622     Blood Culture, Routine No Growth to date      No Growth to date    Blood culture [[id number]] Collected: 05/06/23 0854    Order Status: Completed Specimen: Blood Updated:  05/07/23 1622     Blood Culture, Routine No Growth to date      No Growth to date    Clostridium difficile EIA [[id number]] Collected: 05/06/23 0641    Order Status: Canceled Specimen: Stool     Rapid Organism ID by PCR (from Blood culture) [[id number]]  (Abnormal) Collected: 05/05/23 0845    Order Status: Completed Updated: 05/06/23 0316     Enterococcus faecalis Not Detected     Enterococcus faecium Not Detected     Listeria Monocytogenes Not Detected     Staphylococcus spp. Not Detected     Staphylococcus aureus Not Detected     Staphylococcus epidermidis Not Detected     Staphylococcus lugdunensis Not Detected     Streptococcus species Not Detected     Streptococcus agalactiae Not Detected     Streptococcus pneumoniae Not Detected     Streptococcus pyogenes Not Detected     Acinetobacter calcoaceticus/baumannii complex Not Detected     Bacteroides fragilis Not Detected     Enterobacerales See species for ID     Enterobacter cloacae complex Not Detected     Escherichia Not Detected     Klebsiella aerogenes Not Detected     Klebsiella oxytoca Not Detected     Klebsiella pneumoniae group Detected     Proteus Not Detected     Salmonella sp Not Detected     Serratia marcescens Not Detected     Haemophilus influenzae Not Detected     Neisseria meningtidis Not Detected     Pseudomonas aeruginosa Not Detected     Stenotrophomonas maltophilia Not Detected     Candida albicans Not Detected     Candida auris Not Detected     Candida glabrata Not Detected     Candida krusei Not Detected     Candida parapsilosis Not Detected     Candida tropicalis Not Detected     Cryptococcus neoformans/gattii Not Detected     CTX-M (ESBL ) Not Detected     IMP (Carbapenem resistant) Not Detected     KPC resistance gene (Carbapenem resistant) Not Detected     mcr-1  Not Detected     mec A/C  Test Not Applicable     mec A/C and MREJ (MRSA) gene Test Not Applicable     NDM (Carbapenem resistant) Not Detected     OXA-48-like (Carbapenem  resistant) Not Detected     van A/B (VRE gene) Test Not Applicable     VIM (Carbapenem resistant) Not Detected    Narrative:      Aerobic and anaerobic    Respiratory Infection Panel (PCR), Nasopharyngeal [[id number]]     Order Status: No result Specimen: Nasopharyngeal Swab     Culture, Respiratory with Gram Stain [[id number]]     Order Status: No result Specimen: Sputum, Expectorated     Blood culture [[id number]]     Order Status: No result Specimen: Blood             Latest lactate reviewed-  Recent Labs   Lab 05/06/23  0103   LACTATE 1.1   Procalcitonin 2.01  No leukopenia or leukocytosis  COVID and influenza negative    Organ dysfunction indicated by Thrombocytopenia  and elevated troponin, elevated lactic acid   -troponin elevated suspected as secondary to demand ischemia related to sepsis    Fluid challenge Actual Body weight- Patient will receive 30ml/kg actual body weight to calculate fluid bolus for treatment of septic shock.     Post- resuscitation assessment Yes Perfusion exam was performed within 6 hours of septic shock presentation after bolus shows Adequate tissue perfusion assessed by non-invasive monitoring       Will Not start Pressors- Levophed for MAP of 65  Source control achieved by: IV vancomycin and Zosyn - zosyn changed to merrem with +klebsiella bacteremia       -elevated D-dimer.  CTA ruled out pulmonary embolism  -pan culture-blood, urine, stool, sputum, respiratory viral panel, blood culture to include from port site    Patient has fever and chills with no other complaints.  hemoc does not Suspect chemotherapy reaction   -consult Hematology/Oncology  -consult Infectious Disease  -trend lactic acid and troponin- trended down  -antipyretics for fever  -if blood cx + from port will consult general surgery to remove port     -patient no longer febrile, repeat blood cx NGTD  - awaiting cx speciation to de-escalate abx     Blood cultures cleared, will not need port removed urgently. Plan for  treatment with Ciprofloxacin  Klebsiella Bacteremia  - BlCx from 5/5 with growth, 5/6 NGTD >48hrs              - Pan sensitive klebsiella  - Discontinue meropenem and vancomycin  - Begin Ciprofloxacin for 14 days Duration 5/6 until 5/20  Ok to [place] Home   Close PCP follow up

## 2023-05-08 NOTE — NURSING
Shift assessment done. Pt denies pain or discomfort. No signs of distress. Bed alarm set. Call light within reach. Instructed to call staffs for assistance.

## 2023-05-08 NOTE — PROGRESS NOTES
[facility]  Infectious Disease  Progress Note    Patient Name: Saud Mayers  MRN: [id number]  Admission Date: 5/5/2023  Length of Stay: 2 days  Attending Physician: Mis Castillo*  Primary Care Provider: Anson Blunt MD    Isolation Status: No active isolations  Assessment/Plan:      No new Assessment & Plan notes have been filed under this hospital service since the last note was generated.  Service: Infectious Diseases      Anticipated Disposition:     Thank you for your consult. I will follow-up with patient. Please contact us if you have any additional questions.    Jose Ramon Yin MD  Infectious Disease  [facility]    Subjective:     Principal Problem:Severe sepsis    HPI: Saud Mayers is a 58 y.o. male with PMH of diffuse large B-cell lymphoma s/p 6 cycles of R-CHOp (most recently 4/13), HTN, HLD, ANAMIKA, DM2 presenting with fever, fatigue and chills, admitted for sepsis.  Pt reports he was feeling hot with chills and very weak on presentation> Unable to even get up out of bed. He is now feeling better in terms of energy. No chest pain, SOB, N/V/abdominal pain, or urinary symptoms.     Patient with klebsiella bacteremia - on merrem and doing better - sensitivities pending      Interval History: Patient feels much better     Review of Systems   Constitutional:  Positive for activity change. Negative for appetite change, chills, diaphoresis and fatigue.   HENT: Negative.     Eyes: Negative.    Respiratory: Negative.     Cardiovascular: Negative.    Gastrointestinal: Negative.    Genitourinary: Negative.    Musculoskeletal: Negative.    Skin: Negative.    Objective:     Vital Signs (Most Recent):  Temp: 96.5 °F (35.8 °C) (05/07/23 2317)  Pulse: 67 (05/07/23 2317)  Resp: 20 (05/07/23 2317)  BP: (!) 119/57 (05/07/23 2317)  SpO2: 96 % (05/07/23 2317) Vital Signs (24h Range):  Temp:  [96.1 °F (35.6 °C)-99 °F (37.2 °C)] 96.5 °F (35.8 °C)  Pulse:  [67-84] 67  Resp:  [16-20] 20  SpO2:  [95  %-100 %] 96 %  BP: (112-137)/(56-69) 119/57     Weight: 98.7 kg (217 lb 8.2 oz)  Body mass index is 32.12 kg/m².    Estimated Creatinine Clearance: 130 mL/min (based on SCr of 0.7 mg/dL).     Physical Exam  Constitutional:       Appearance: Normal appearance.   HENT:      Head: Normocephalic.   Cardiovascular:      Rate and Rhythm: Normal rate and regular rhythm.   Pulmonary:      Effort: Pulmonary effort is normal.      Breath sounds: Normal breath sounds.   Abdominal:      General: Abdomen is flat.      Palpations: Abdomen is soft.   Musculoskeletal:         General: Normal range of motion.      Cervical back: Normal range of motion and neck supple.   Skin:     General: Skin is warm and dry.   Neurological:      General: No focal deficit present.      Mental Status: He is alert.        Significant Labs: All pertinent labs within the past 24 hours have been reviewed.    Significant Imaging: I have reviewed all pertinent imaging results/findings within the past 24 hours.

## 2023-05-08 NOTE — HPI
Saud Mayers is a 58 y.o. male with PMH of diffuse large B-cell lymphoma s/p 6 cycles of R-CHOp (most recently 4/13), HTN, HLD, ANAMIKA, DM2 presenting with fever, fatigue and chills, admitted for sepsis.  Pt reports he was feeling hot with chills and very weak on presentation> Unable to even get up out of bed. He is now feeling better in terms of energy. No chest pain, SOB, N/V/abdominal pain, or urinary symptoms.     Patient with klebsiella bacteremia - on merrem and doing better - sensitivities pending

## 2023-05-08 NOTE — ASSESSMENT & PLAN NOTE
Hemoglobin is 7.9.  It was 10 1 month ago    Lab Results   Component Value Date    IRON 17 (L) 05/06/2023    TRANSFERRIN 197 (L) 05/06/2023    TIBC 292 05/06/2023    FESATURATED 6 (L) 05/06/2023    Patient takes vitamin B12 and folic acid supplements   No obvious source of bleeding   Negative for occult blood on UA  Will trend H&H if hemoglobin continues to be below baseline will repeat anemia panel and check stool for occult blood  Transfuse if hemoglobin less than 7 or if patient becomes symptomatic      5/5 hgb 6.8  Anemia panel  Stool for OCB negative   Type and screen   Transfuse 1 unit PRBC   Blood consent obtained.  IVF stopped while receiving blood products     5/7 Hgb 6.7  Lab Results   Component Value Date    IRON 17 (L) 05/06/2023    TRANSFERRIN 197 (L) 05/06/2023    TIBC 292 05/06/2023    FESATURATED 6 (L) 05/06/2023      Ferritin 600  b12 > 2000  Folate 17.3  Transfuse 2 units PRBC   Per hemoc discussion anemia is likely due to inflammation and not bleeding- low iron and high ferritin with sepsis  -transfuse with low threshold for bone marrow bx     5/8  hgb 8.7

## 2023-05-08 NOTE — PLAN OF CARE
VIRTUAL NURSE:  Labs, notes, orders, and careplan reviewed. VN to be available as needed.    Problem: Adult Inpatient Plan of Care  Goal: Plan of Care Review  Outcome: Ongoing, Progressing  Goal: Patient-Specific Goal (Individualized)  Outcome: Ongoing, Progressing  Goal: Absence of Hospital-Acquired Illness or Injury  Outcome: Ongoing, Progressing  Goal: Optimal Comfort and Wellbeing  Outcome: Ongoing, Progressing  Goal: Readiness for Transition of Care  Outcome: Ongoing, Progressing

## 2023-05-08 NOTE — PROGRESS NOTES
[facility] [place]                    Pharmacy       Discharge Medication Education    Patient ACCEPTED medication education. Pharmacy has provided education on the name, indication, and possible side effects of the medication(s) prescribed, using teach-back method.     The following medications have also been discussed, during this admission.        Medication List        START taking these medications      ciprofloxacin HCl 750 MG tablet  Commonly known as: CIPRO  Take 1 tablet (750 mg total) by mouth every 12 (twelve) hours. for 12 days            CONTINUE taking these medications      acetaminophen 500 MG tablet  Commonly known as: TYLENOL     allopurinoL 300 MG tablet  Commonly known as: ZYLOPRIM  Take 1 tablet (300 mg total) by mouth once daily.     atorvastatin 20 MG tablet  Commonly known as: LIPITOR  Take 1 tablet (20 mg total) by mouth once daily.     cyanocobalamin 100 MCG tablet  Commonly known as: VITAMIN B-12     folic acid 1 MG tablet  Commonly known as: FOLVITE  Take 1 tablet (1 mg total) by mouth once daily.     LIDOcaine-prilocaine cream  Commonly known as: EMLA  Apply topically as needed. Apply to skin overlying port site 30 minutes before chemotherapy.     linaCLOtide 72 mcg Cap capsule  Commonly known as: LINZESS  Take 1 capsule (72 mcg total) by mouth before breakfast.     losartan 25 MG tablet  Commonly known as: COZAAR  Take 1 tablet (25 mg total) by mouth once daily.     magic mouthwash diphen/antac/lidoc/nysta  10 mLs 3 (three) times daily as needed (chemotherapy-induced mucositis.).     metFORMIN 1000 MG tablet  Commonly known as: GLUCOPHAGE  Take 1 tablet (1,000 mg total) by mouth 2 (two) times daily with meals.     metoprolol succinate 50 MG 24 hr tablet  Commonly known as: TOPROL-XL  Take 1 tablet (50 mg total) by mouth once daily.     ONE-A-DAY MEN'S 50 PLUS(VIT K) 400- mcg Tab  Generic drug: multivit-min-folic-vit K-lycop            STOP taking these medications       hydrOXYzine HCL 10 MG Tab  Commonly known as: ATARAX               Where to Get Your Medications        These medications were sent to [facility] [address], [place] [address]      Hours: Mon-Fri, 8a-5:30p Phone: [phone]   ciprofloxacin HCl 750 MG tablet          Thank you  Jose Ramon Little, PharmD  [phone]

## 2023-05-08 NOTE — CARE UPDATE
Re:  Saud Mayers, WORK / SCHOOL EXCUSE    Date: 05/08/2023           [facility] [place] [address] [place] [address]  Office: [phone]  Fax: [phone]     To whom it may concern:    Mr. Saud Mayers has been hospitalized at the [facility] [place] since 5/5/2023.  Please excuse the patient from duties.  Patient may return when cleared by primary care provider.  No restrictions.     Please contact me if you have any questions.                __________________________  Karina Benites NP

## 2023-05-08 NOTE — PROGRESS NOTES
[facility] Infectious Diseases Consult Note    Primary Attending Physician: Dr. Castillo  Consultant Attending: Dr. Yin    Reason for Consult:     Sepsis    Assessment/Recommendations     Saud Mayers is a 58 y.o. male with PMH of diffuse large B-cell lymphoma s/p 6 cycles of R-CHOp (most recently ), HTN, HLD, ANAMIKA, DM2 presenting with fever, fatigue and chills, admitted for sepsis. WBC normal, not neutropenic. CXR without consolidations, CTA without PE. On room air, Tmax 102 in past 24hours. Began on broad spectrum antibiotics. Blood Cultures with Gram negative rods on stain, PCR rapid ID + for Klebsiella Pneumonia group. Culture with pan sensitive klebsiella.  Unclear source, could have obtained while neurtropenic post chemotherapy, he does have port-a-cath. Blood cultures cleared, will not need port removed urgently. Plan for treatemnt with Ciprofloxacin  Klebsiella Bacteremia  - BlCx from  with growth,  NGTD >48hrs   - Pan sensitive klebsiella  - Discontinue meropenem and vancomycin  - Begin Ciprofloxacin for 14 days from  -     Thank you for allowing us to participate in the care of this patient. Please contact us if you have any questions regarding this consult. ID will sign off at this time    Annalise Guo MD  [facility] IM/Peds PGY3/HO2  [facility] Infectious Diseases    Subjective:      Today reports feeling significantly better than presentaiton, much more exercise tolerance, no more fevers or chills,. Hopeful to go home today. Wife at bedside.      Objective:   Last 24 Hour Vital Signs:  BP  Min: 112/57  Max: 137/63  Temp  Av.9 °F (36.1 °C)  Min: 96.1 °F (35.6 °C)  Max: 98.1 °F (36.7 °C)  Pulse  Av.7  Min: 60  Max: 82  Resp  Av.2  Min: 17  Max: 20  SpO2  Av.4 %  Min: 96 %  Max: 100 %  I/O last 3 completed shifts:  In: 1595.7 [P.O.:900; Blood:695.7]  Out: -     Physical Exam  Constitutional:       General: He is not in acute distress.     Appearance: Normal appearance. He is  not toxic-appearing.   HENT:      Mouth/Throat:      Mouth: Mucous membranes are moist.      Pharynx: Oropharynx is clear.   Eyes:      Extraocular Movements: Extraocular movements intact.   Cardiovascular:      Rate and Rhythm: Normal rate and regular rhythm.      Heart sounds: Murmur heard.   Pulmonary:      Effort: Pulmonary effort is normal. No respiratory distress.   Abdominal:      General: Abdomen is flat.      Palpations: Abdomen is soft.   Musculoskeletal:         General: Normal range of motion.   Skin:     General: Skin is warm and dry.   Neurological:      Mental Status: He is alert and oriented to person, place, and time.   Psychiatric:         Behavior: Behavior normal.       Laboratory Results:  Trended Lab Data:  Recent Labs   Lab 05/06/23  0418 05/07/23  0527 05/08/23  0305   WBC 3.21* 2.49* 2.15*   HGB 6.8* 6.7* 8.7*   HCT 21.8* 21.1* 26.4*   * 94* 97*   * 100* 95   RDW 20.0* 20.4* 21.1*    136 139   K 3.6 3.8 3.9    106 107   CO2 22* 25 25   BUN 12 9 8   * 132* 125*   PROT 5.7* 5.8* 6.1   ALBUMIN 3.0* 3.1* 3.2*   BILITOT 1.0 0.6 0.7   AST 25 23 20   ALKPHOS 63 62 63   ALT 19 16 17       Urinalysis:   Lab Results   Component Value Date    COLORU Yellow 05/05/2023    SPECGRAV 1.015 05/05/2023    NITRITE Positive (A) 05/05/2023    KETONESU Negative 05/05/2023    UROBILINOGEN Negative 05/05/2023       Microbiology Data:  BlCx 5/5: gram stain with Gram Negative Rods   - ID with Klebsiella Pneumoniae  BlCx 5/6: pending    Antimicrobials:  Vanc 5/5 - now  Zosyn 5/5 - 5/6  Meropenem 5/6 - now    Other Results:    Radiology:  CTA Chest Non-Coronary (PE Studies)    Result Date: 5/5/2023  EXAMINATION: CTA CHEST NON CORONARY (PE STUDIES) CLINICAL HISTORY: Pulmonary embolism (PE) suspected, high prob;   1. Examination considered diagnostic to the proximal segmental pulmonary arterial level without convincing evidence of acute pulmonary embolism. 2. No acute thoracic findings.  3. Details of chronic and incidental findings, as provided in the body of the report. Electronically signed by: Jefferson Bustos Date:    05/05/2023 Time:    13:29    X-Ray Chest AP Portable    Result Date: 5/5/2023  EXAMINATION: XR CHEST AP PORTABLE CLINICAL HISTORY: Sepsis;   No convincing evidence of acute cardiopulmonary disease. Electronically signed by: Jefferson Bustos Date:    05/05/2023 Time:    09:51    X-Ray Chest AP Portable    Result Date: 4/21/2023  EXAMINATION: XR CHEST AP PORTABLE CLINICAL HISTORY: Chest Pain; TECHNIQUE: Single frontal view of the chest was performed. COMPARISON: 11/11/2022. FINDINGS: There is a right chest wall port. The lungs are well expanded and clear. No focal opacities are seen. The pleural spaces are clear. The cardiac silhouette is unremarkable. The visualized osseous structures are unremarkable.     No acute abnormality. Electronically signed by: Gianluca Higuera Date:    04/21/2023 Time:    01:31

## 2023-05-08 NOTE — ASSESSMENT & PLAN NOTE
Bacteremia   This patient does have evidence of infective focus  My overall impression is sepsis.  Source: Unknown suspecting port   Antibiotics given-   Antibiotics (72h ago, onward)    Start     Stop Route Frequency Ordered    05/07/23 1200  vancomycin 1,500 mg in dextrose 5 % (D5W) 250 mL IVPB (Vial-Mate)         -- IV Every 12 hours (non-standard times) 05/07/23 1103    05/07/23 0900  mupirocin 2 % ointment         05/12 0859 Nasl 2 times daily 05/07/23 0745    05/06/23 0900  meropenem (MERREM) 1 g in sodium chloride 0.9 % 100 mL IVPB (MB+)         -- IV Every 8 hours (non-standard times) 05/06/23 0757    05/05/23 1505  vancomycin - pharmacy to dose  (vancomycin IVPB)        See Hyperspace for full Linked Orders Report.    -- IV pharmacy to manage frequency 05/05/23 1406        Microbiology Results (last 7 days)     Procedure Component Value Units Date/Time    E. coli 0157 antigen [[id number]] Collected: 05/06/23 0641    Order Status: Completed Specimen: Stool Updated: 05/08/23 0443     Shiga Toxin 1 E.coli Negative     Shiga Toxin 2 E.coli Negative    Blood culture [[id number]] Collected: 05/06/23 0912    Order Status: Completed Specimen: Blood Updated: 05/07/23 1622     Blood Culture, Routine No Growth to date      No Growth to date    Blood culture [[id number]] Collected: 05/06/23 0854    Order Status: Completed Specimen: Blood Updated: 05/07/23 1622     Blood Culture, Routine No Growth to date      No Growth to date    Blood culture x two cultures. Draw prior to antibiotics. [[id number]] Collected: 05/05/23 0845    Order Status: Completed Specimen: Blood from Peripheral, Antecubital, Left Updated: 05/07/23 1412     Blood Culture, Routine No Growth to date      No Growth to date      No Growth to date    Narrative:      Aerobic and anaerobic    Stool culture [[id number]] Collected: 05/06/23 0641    Order Status: Completed Specimen: Stool Updated: 05/07/23 1200     Stool Culture Nothing significant to date    Blood  culture x two cultures. Draw prior to antibiotics. [[id number]]  (Abnormal) Collected: 05/05/23 0845    Order Status: Completed Specimen: Blood from Peripheral, Forearm, Right Updated: 05/07/23 1027     Blood Culture, Routine Gram stain aer bottle: Gram negative rods      Results called to and read back by:  Momo Araujo RN 05/06/2023  01:50      KLEBSIELLA PNEUMONIAE  Susceptibility pending      Narrative:      Aerobic and anaerobic    Clostridium difficile EIA [[id number]] Collected: 05/06/23 0641    Order Status: Canceled Specimen: Stool     Rapid Organism ID by PCR (from Blood culture) [[id number]]  (Abnormal) Collected: 05/05/23 0845    Order Status: Completed Updated: 05/06/23 0316     Enterococcus faecalis Not Detected     Enterococcus faecium Not Detected     Listeria Monocytogenes Not Detected     Staphylococcus spp. Not Detected     Staphylococcus aureus Not Detected     Staphylococcus epidermidis Not Detected     Staphylococcus lugdunensis Not Detected     Streptococcus species Not Detected     Streptococcus agalactiae Not Detected     Streptococcus pneumoniae Not Detected     Streptococcus pyogenes Not Detected     Acinetobacter calcoaceticus/baumannii complex Not Detected     Bacteroides fragilis Not Detected     Enterobacerales See species for ID     Enterobacter cloacae complex Not Detected     Escherichia Not Detected     Klebsiella aerogenes Not Detected     Klebsiella oxytoca Not Detected     Klebsiella pneumoniae group Detected     Proteus Not Detected     Salmonella sp Not Detected     Serratia marcescens Not Detected     Haemophilus influenzae Not Detected     Neisseria meningtidis Not Detected     Pseudomonas aeruginosa Not Detected     Stenotrophomonas maltophilia Not Detected     Candida albicans Not Detected     Candida auris Not Detected     Candida glabrata Not Detected     Candida krusei Not Detected     Candida parapsilosis Not Detected     Candida tropicalis Not Detected     Cryptococcus  neoformans/gattii Not Detected     CTX-M (ESBL ) Not Detected     IMP (Carbapenem resistant) Not Detected     KPC resistance gene (Carbapenem resistant) Not Detected     mcr-1  Not Detected     mec A/C  Test Not Applicable     mec A/C and MREJ (MRSA) gene Test Not Applicable     NDM (Carbapenem resistant) Not Detected     OXA-48-like (Carbapenem resistant) Not Detected     van A/B (VRE gene) Test Not Applicable     VIM (Carbapenem resistant) Not Detected    Narrative:      Aerobic and anaerobic    Respiratory Infection Panel (PCR), Nasopharyngeal [[id number]]     Order Status: No result Specimen: Nasopharyngeal Swab     Culture, Respiratory with Gram Stain [[id number]]     Order Status: No result Specimen: Sputum, Expectorated     Blood culture [[id number]]     Order Status: No result Specimen: Blood             Latest lactate reviewed-  Recent Labs   Lab 05/05/23  1207 05/06/23  0103   LACTATE 4.1* 1.1   Procalcitonin 2.01  No leukopenia or leukocytosis  COVID and influenza negative    Organ dysfunction indicated by Thrombocytopenia  and elevated troponin, elevated lactic acid   -troponin elevated suspected as secondary to demand ischemia related to sepsis    Fluid challenge Actual Body weight- Patient will receive 30ml/kg actual body weight to calculate fluid bolus for treatment of septic shock.     Post- resuscitation assessment Yes Perfusion exam was performed within 6 hours of septic shock presentation after bolus shows Adequate tissue perfusion assessed by non-invasive monitoring       Will Not start Pressors- Levophed for MAP of 65  Source control achieved by: IV vancomycin and Zosyn - zosyn changed to merrem with +klebsiella bacteremia       -elevated D-dimer.  CTA ruled out pulmonary embolism  -pan culture-blood, urine, stool, sputum, respiratory viral panel, blood culture to include from port site    Patient has fever and chills with no other complaints.  hemoc does not Suspect chemotherapy reaction    -consult Hematology/Oncology  -consult Infectious Disease  -trend lactic acid and troponin- trended down  -antipyretics for fever  -if blood cx + from port will consult general surgery to remove port     -patient no longer febrile, repeat blood cx NGTD  - awaiting cx speciation to de-escalate abx

## 2023-05-08 NOTE — ASSESSMENT & PLAN NOTE
"- my patient in outpatient setting  - s/p 6 cycles of R-CHOP chemotherapy (last cycle was 4/13/23).  - he has had a great response to therapy. PET scan (3/22/23) revealed "interval resolution of pathologically enlarged, hypermetabolic lymph nodes on both sides of the diaphragm in keeping with excellent response to therapy."  - CTA chest (5/5/23) was negative for pulmonary embolism/pneumonia  - blood culture (5/5/23) revealed Klebsiella pneumoniae. Follow up sensitivities, so his antibiotic regimen can be switched to oral regimen.  - he has completed chemotherapy, so if follow-up blood cultures reveal persistence of infection, his port-a-cath can be removed.  "

## 2023-05-09 LAB — BACTERIA STL CULT: NORMAL

## 2023-05-10 ENCOUNTER — PATIENT OUTREACH (OUTPATIENT)
Dept: ADMINISTRATIVE | Facility: HOSPITAL | Age: 58
End: 2023-05-10
Payer: COMMERCIAL

## 2023-05-10 LAB — BACTERIA BLD CULT: NORMAL

## 2023-05-11 LAB
BACTERIA BLD CULT: NORMAL
BACTERIA BLD CULT: NORMAL

## 2023-05-15 ENCOUNTER — LAB VISIT (OUTPATIENT)
Dept: LAB | Facility: HOSPITAL | Age: 58
End: 2023-05-15
Attending: STUDENT IN AN ORGANIZED HEALTH CARE EDUCATION/TRAINING PROGRAM
Payer: COMMERCIAL

## 2023-05-15 ENCOUNTER — OFFICE VISIT (OUTPATIENT)
Dept: FAMILY MEDICINE | Facility: CLINIC | Age: 58
End: 2023-05-15
Payer: COMMERCIAL

## 2023-05-15 ENCOUNTER — PATIENT MESSAGE (OUTPATIENT)
Dept: FAMILY MEDICINE | Facility: CLINIC | Age: 58
End: 2023-05-15

## 2023-05-15 VITALS
WEIGHT: 224 LBS | DIASTOLIC BLOOD PRESSURE: 76 MMHG | OXYGEN SATURATION: 98 % | SYSTOLIC BLOOD PRESSURE: 132 MMHG | TEMPERATURE: 98 F | HEART RATE: 66 BPM | BODY MASS INDEX: 33.18 KG/M2 | HEIGHT: 69 IN

## 2023-05-15 DIAGNOSIS — Z12.5 PROSTATE CANCER SCREENING: ICD-10-CM

## 2023-05-15 DIAGNOSIS — C83.31 DIFFUSE LARGE B-CELL LYMPHOMA OF LYMPH NODES OF NECK: ICD-10-CM

## 2023-05-15 DIAGNOSIS — E11.65 TYPE 2 DIABETES MELLITUS WITH HYPERGLYCEMIA, WITHOUT LONG-TERM CURRENT USE OF INSULIN: ICD-10-CM

## 2023-05-15 DIAGNOSIS — Z09 HOSPITAL DISCHARGE FOLLOW-UP: Primary | ICD-10-CM

## 2023-05-15 LAB
ALBUMIN SERPL BCP-MCNC: 4.2 G/DL (ref 3.5–5.2)
ALP SERPL-CCNC: 68 U/L (ref 38–126)
ALT SERPL W/O P-5'-P-CCNC: 32 U/L (ref 10–44)
ANION GAP SERPL CALC-SCNC: 7 MMOL/L (ref 8–16)
AST SERPL-CCNC: 35 U/L (ref 15–46)
BASOPHILS # BLD AUTO: 0.02 K/UL (ref 0–0.2)
BASOPHILS NFR BLD: 0.6 % (ref 0–1.9)
BILIRUB SERPL-MCNC: 0.6 MG/DL (ref 0.1–1)
CALCIUM SERPL-MCNC: 9.3 MG/DL (ref 8.7–10.5)
CHLORIDE SERPL-SCNC: 106 MMOL/L (ref 95–110)
CO2 SERPL-SCNC: 27 MMOL/L (ref 23–29)
COMPLEXED PSA SERPL-MCNC: 0.38 NG/ML (ref 0–4)
CREAT SERPL-MCNC: 0.61 MG/DL (ref 0.5–1.4)
DIFFERENTIAL METHOD: ABNORMAL
EOSINOPHIL # BLD AUTO: 0 K/UL (ref 0–0.5)
EOSINOPHIL NFR BLD: 0.3 % (ref 0–8)
ERYTHROCYTE [DISTWIDTH] IN BLOOD BY AUTOMATED COUNT: 18 % (ref 11.5–14.5)
EST. GFR  (NO RACE VARIABLE): >60 ML/MIN/1.73 M^2
ESTIMATED AVG GLUCOSE: 108 MG/DL (ref 68–131)
GLUCOSE SERPL-MCNC: 123 MG/DL (ref 70–110)
HBA1C MFR BLD: 5.4 % (ref 4–5.6)
HCT VFR BLD AUTO: 34.1 % (ref 40–54)
HGB BLD-MCNC: 10.8 G/DL (ref 14–18)
IMM GRANULOCYTES # BLD AUTO: 0.01 K/UL (ref 0–0.04)
IMM GRANULOCYTES NFR BLD AUTO: 0.3 % (ref 0–0.5)
LYMPHOCYTES # BLD AUTO: 0.8 K/UL (ref 1–4.8)
LYMPHOCYTES NFR BLD: 23.1 % (ref 18–48)
MCH RBC QN AUTO: 31.4 PG (ref 27–31)
MCHC RBC AUTO-ENTMCNC: 31.7 G/DL (ref 32–36)
MCV RBC AUTO: 99 FL (ref 82–98)
MONOCYTES # BLD AUTO: 0.5 K/UL (ref 0.3–1)
MONOCYTES NFR BLD: 13.5 % (ref 4–15)
NEUTROPHILS # BLD AUTO: 2.1 K/UL (ref 1.8–7.7)
NEUTROPHILS NFR BLD: 62.2 % (ref 38–73)
NRBC BLD-RTO: 0 /100 WBC
PLATELET # BLD AUTO: 128 K/UL (ref 150–450)
PMV BLD AUTO: 9.2 FL (ref 9.2–12.9)
POTASSIUM SERPL-SCNC: 4.3 MMOL/L (ref 3.5–5.1)
PROT SERPL-MCNC: 7 G/DL (ref 6–8.4)
RBC # BLD AUTO: 3.44 M/UL (ref 4.6–6.2)
SODIUM SERPL-SCNC: 140 MMOL/L (ref 136–145)
UUN UR-MCNC: 17 MG/DL (ref 2–20)
WBC # BLD AUTO: 3.42 K/UL (ref 3.9–12.7)

## 2023-05-15 PROCEDURE — 4010F ACE/ARB THERAPY RXD/TAKEN: CPT | Mod: CPTII,S$GLB,, | Performed by: STUDENT IN AN ORGANIZED HEALTH CARE EDUCATION/TRAINING PROGRAM

## 2023-05-15 PROCEDURE — 3008F PR BODY MASS INDEX (BMI) DOCUMENTED: ICD-10-PCS | Mod: CPTII,S$GLB,, | Performed by: STUDENT IN AN ORGANIZED HEALTH CARE EDUCATION/TRAINING PROGRAM

## 2023-05-15 PROCEDURE — 3075F SYST BP GE 130 - 139MM HG: CPT | Mod: CPTII,S$GLB,, | Performed by: STUDENT IN AN ORGANIZED HEALTH CARE EDUCATION/TRAINING PROGRAM

## 2023-05-15 PROCEDURE — 36415 COLL VENOUS BLD VENIPUNCTURE: CPT | Mod: PO | Performed by: STUDENT IN AN ORGANIZED HEALTH CARE EDUCATION/TRAINING PROGRAM

## 2023-05-15 PROCEDURE — 1159F PR MEDICATION LIST DOCUMENTED IN MEDICAL RECORD: ICD-10-PCS | Mod: CPTII,S$GLB,, | Performed by: STUDENT IN AN ORGANIZED HEALTH CARE EDUCATION/TRAINING PROGRAM

## 2023-05-15 PROCEDURE — 3008F BODY MASS INDEX DOCD: CPT | Mod: CPTII,S$GLB,, | Performed by: STUDENT IN AN ORGANIZED HEALTH CARE EDUCATION/TRAINING PROGRAM

## 2023-05-15 PROCEDURE — 1111F DSCHRG MED/CURRENT MED MERGE: CPT | Mod: CPTII,S$GLB,, | Performed by: STUDENT IN AN ORGANIZED HEALTH CARE EDUCATION/TRAINING PROGRAM

## 2023-05-15 PROCEDURE — 1111F PR DISCHARGE MEDS RECONCILED W/ CURRENT OUTPATIENT MED LIST: ICD-10-PCS | Mod: CPTII,S$GLB,, | Performed by: STUDENT IN AN ORGANIZED HEALTH CARE EDUCATION/TRAINING PROGRAM

## 2023-05-15 PROCEDURE — 3078F DIAST BP <80 MM HG: CPT | Mod: CPTII,S$GLB,, | Performed by: STUDENT IN AN ORGANIZED HEALTH CARE EDUCATION/TRAINING PROGRAM

## 2023-05-15 PROCEDURE — 99214 OFFICE O/P EST MOD 30 MIN: CPT | Mod: S$GLB,,, | Performed by: STUDENT IN AN ORGANIZED HEALTH CARE EDUCATION/TRAINING PROGRAM

## 2023-05-15 PROCEDURE — 1159F MED LIST DOCD IN RCRD: CPT | Mod: CPTII,S$GLB,, | Performed by: STUDENT IN AN ORGANIZED HEALTH CARE EDUCATION/TRAINING PROGRAM

## 2023-05-15 PROCEDURE — 84153 ASSAY OF PSA TOTAL: CPT | Performed by: STUDENT IN AN ORGANIZED HEALTH CARE EDUCATION/TRAINING PROGRAM

## 2023-05-15 PROCEDURE — 80053 COMPREHEN METABOLIC PANEL: CPT | Mod: PO | Performed by: STUDENT IN AN ORGANIZED HEALTH CARE EDUCATION/TRAINING PROGRAM

## 2023-05-15 PROCEDURE — 83036 HEMOGLOBIN GLYCOSYLATED A1C: CPT | Performed by: STUDENT IN AN ORGANIZED HEALTH CARE EDUCATION/TRAINING PROGRAM

## 2023-05-15 PROCEDURE — 85025 COMPLETE CBC W/AUTO DIFF WBC: CPT | Mod: PO | Performed by: STUDENT IN AN ORGANIZED HEALTH CARE EDUCATION/TRAINING PROGRAM

## 2023-05-15 PROCEDURE — 4010F PR ACE/ARB THEARPY RXD/TAKEN: ICD-10-PCS | Mod: CPTII,S$GLB,, | Performed by: STUDENT IN AN ORGANIZED HEALTH CARE EDUCATION/TRAINING PROGRAM

## 2023-05-15 PROCEDURE — 3078F PR MOST RECENT DIASTOLIC BLOOD PRESSURE < 80 MM HG: ICD-10-PCS | Mod: CPTII,S$GLB,, | Performed by: STUDENT IN AN ORGANIZED HEALTH CARE EDUCATION/TRAINING PROGRAM

## 2023-05-15 PROCEDURE — 3075F PR MOST RECENT SYSTOLIC BLOOD PRESS GE 130-139MM HG: ICD-10-PCS | Mod: CPTII,S$GLB,, | Performed by: STUDENT IN AN ORGANIZED HEALTH CARE EDUCATION/TRAINING PROGRAM

## 2023-05-15 PROCEDURE — 3051F PR MOST RECENT HEMOGLOBIN A1C LEVEL 7.0 - < 8.0%: ICD-10-PCS | Mod: CPTII,S$GLB,, | Performed by: STUDENT IN AN ORGANIZED HEALTH CARE EDUCATION/TRAINING PROGRAM

## 2023-05-15 PROCEDURE — 1160F RVW MEDS BY RX/DR IN RCRD: CPT | Mod: CPTII,S$GLB,, | Performed by: STUDENT IN AN ORGANIZED HEALTH CARE EDUCATION/TRAINING PROGRAM

## 2023-05-15 PROCEDURE — 99214 PR OFFICE/OUTPT VISIT, EST, LEVL IV, 30-39 MIN: ICD-10-PCS | Mod: S$GLB,,, | Performed by: STUDENT IN AN ORGANIZED HEALTH CARE EDUCATION/TRAINING PROGRAM

## 2023-05-15 PROCEDURE — 3051F HG A1C>EQUAL 7.0%<8.0%: CPT | Mod: CPTII,S$GLB,, | Performed by: STUDENT IN AN ORGANIZED HEALTH CARE EDUCATION/TRAINING PROGRAM

## 2023-05-15 PROCEDURE — 1160F PR REVIEW ALL MEDS BY PRESCRIBER/CLIN PHARMACIST DOCUMENTED: ICD-10-PCS | Mod: CPTII,S$GLB,, | Performed by: STUDENT IN AN ORGANIZED HEALTH CARE EDUCATION/TRAINING PROGRAM

## 2023-05-15 NOTE — PROGRESS NOTES
Transitional Care Note  Subjective:       Patient ID: Saud Mayers is a 58 y.o. male.  Chief Complaint: Follow-up (Hosp f/u)    Family and/or Caretaker present at visit?  No.  Diagnostic tests reviewed/disposition: No diagnosic tests pending after this hospitalization.  Disease/illness education: yes  Home health/community services discussion/referrals: Patient does not have home health established from hospital visit.  They do not need home health.  If needed, we will set up home health for the patient.   Establishment or re-establishment of referral orders for community resources: No other necessary community resources.   Discussion with other health care providers: No discussion with other health care providers necessary.   Follow-up  Pertinent negatives include no chest pain, coughing, fever, headaches, joint swelling, nausea, rash, sore throat or vomiting.   Patient with history of large b-cell lymphoma. He has completed 6 rounds of R-Chop chemo. He was recently admitted with sepsis of unknown source. He spent 4 nights in the hospital. He has been feeling well since discharge. He is afebrile. He is eating well.     Review of Systems   Constitutional:  Negative for fever.   HENT:  Negative for sneezing and sore throat.    Eyes:  Negative for photophobia.   Respiratory:  Negative for cough, shortness of breath and wheezing.    Cardiovascular:  Negative for chest pain.   Gastrointestinal:  Negative for diarrhea, nausea and vomiting.   Genitourinary:  Negative for frequency.   Musculoskeletal:  Negative for joint swelling.   Skin:  Negative for rash.   Neurological:  Negative for dizziness and headaches.   Psychiatric/Behavioral:  Negative for hallucinations.      Objective:      Physical Exam  Vitals and nursing note reviewed.   Constitutional:       General: He is not in acute distress.     Appearance: He is not ill-appearing.   HENT:      Head: Normocephalic and atraumatic.      Right Ear: External ear normal.       Left Ear: External ear normal.      Nose: Nose normal.      Mouth/Throat:      Mouth: Mucous membranes are moist.   Eyes:      Extraocular Movements: Extraocular movements intact.      Conjunctiva/sclera: Conjunctivae normal.   Cardiovascular:      Rate and Rhythm: Normal rate and regular rhythm.      Pulses: Normal pulses.      Heart sounds: No murmur heard.  Pulmonary:      Effort: Pulmonary effort is normal. No respiratory distress.      Breath sounds: No wheezing.   Abdominal:      General: There is no distension.      Palpations: Abdomen is soft. There is no mass.      Tenderness: There is no abdominal tenderness.   Musculoskeletal:         General: No swelling.      Cervical back: Normal range of motion.   Skin:     Coloration: Skin is not jaundiced.      Findings: No rash.   Neurological:      General: No focal deficit present.      Mental Status: He is alert and oriented to person, place, and time.   Psychiatric:         Mood and Affect: Mood normal.         Thought Content: Thought content normal.       Assessment:       1. Hospital discharge follow-up    2. Diffuse large B-cell lymphoma of lymph nodes of neck    3. Prostate cancer screening    4. Type 2 diabetes mellitus with hyperglycemia, without long-term current use of insulin        Plan:         Continue follow up with oncology and cardiology  Labs today to check blood count.

## 2023-05-15 NOTE — LETTER
05/15/2023                 [address] [address]  Phone: [phone]  Fax: [phone]   05/15/2023    Patient: Saud Mayers   YOB: 1965   Date of Visit: 5/15/2023       To Whom it May Concern:    Saud Mayers was seen in my clinic on 5/15/2023. He may return to work on 5/16/23.    If you have any questions or concerns, please don't hesitate to call.    Sincerely,         Anson Blunt MD

## 2023-05-15 NOTE — PROGRESS NOTES
Patient ID: Saud Mayers is a 58 y.o. male.     Chief Complaint: Follow-up (Hosp f/u)    HPI       Review of Systems  ROS    Currently Medications  Current Outpatient Medications on File Prior to Visit   Medication Sig Dispense Refill    acetaminophen (TYLENOL) 500 MG tablet Take 500 mg by mouth every 6 (six) hours as needed for Pain.      atorvastatin (LIPITOR) 20 MG tablet Take 1 tablet (20 mg total) by mouth once daily. 90 tablet 3    ciprofloxacin HCl (CIPRO) 750 MG tablet Take 1 tablet (750 mg total) by mouth every 12 (twelve) hours. for 12 days 24 tablet 0    cyanocobalamin (VITAMIN B-12) 100 MCG tablet Take 100 mcg by mouth once daily.      folic acid (FOLVITE) 1 MG tablet Take 1 tablet (1 mg total) by mouth once daily. 100 tablet 3    linaCLOtide (LINZESS) 72 mcg Cap capsule Take 1 capsule (72 mcg total) by mouth before breakfast. 90 capsule 3    losartan (COZAAR) 25 MG tablet Take 1 tablet (25 mg total) by mouth once daily. 90 tablet 3    metFORMIN (GLUCOPHAGE) 1000 MG tablet Take 1 tablet (1,000 mg total) by mouth 2 (two) times daily with meals. 180 tablet 3    metoprolol succinate (TOPROL-XL) 50 MG 24 hr tablet Take 1 tablet (50 mg total) by mouth once daily. 90 tablet 3    multivit-min-folic-vit K-lycop (ONE-A-DAY MEN'S 50 PLUS) 400- mcg Tab Take 1 tablet by mouth once daily.      allopurinoL (ZYLOPRIM) 300 MG tablet Take 1 tablet (300 mg total) by mouth once daily. (Patient not taking: Reported on 5/15/2023) 30 tablet 11    LIDOcaine-prilocaine (EMLA) cream Apply topically as needed. Apply to skin overlying port site 30 minutes before chemotherapy. (Patient not taking: Reported on 5/15/2023) 30 g 1    magic mouthwash diphen/antac/lidoc/nysta 10 mLs 3 (three) times daily as needed (chemotherapy-induced mucositis.). (Patient not taking: Reported on 5/15/2023) 300 mL 1     No current facility-administered medications on file prior to visit.       Physical  Exam  Vitals:    05/15/23 0947   BP:  "132/76   Pulse: 66   Temp: 98.3 °F (36.8 °C)   SpO2: 98%   Weight: 101.6 kg (223 lb 15.8 oz)   Height: 5' 9" (1.753 m)      Body mass index is 33.08 kg/m².  Wt Readings from Last 3 Encounters:   05/15/23 101.6 kg (223 lb 15.8 oz)   05/06/23 98.7 kg (217 lb 8.2 oz)   04/20/23 98.4 kg (217 lb)       Physical Exam    Labs:    Complete Blood Count  Lab Results   Component Value Date    RBC 2.79 (L) 05/08/2023    HGB 8.7 (L) 05/08/2023    HCT 26.4 (L) 05/08/2023    MCV 95 05/08/2023    MCH 31.2 (H) 05/08/2023    MCHC 33.0 05/08/2023    RDW 21.1 (H) 05/08/2023    PLT 97 (L) 05/08/2023    MPV 9.2 05/08/2023    GRAN 1.3 (L) 05/08/2023    GRAN 59.5 05/08/2023    LYMPH 0.5 (L) 05/08/2023    LYMPH 22.8 05/08/2023    MONO 0.4 05/08/2023    MONO 16.3 (H) 05/08/2023    EOS 0.0 05/08/2023    BASO 0.02 05/08/2023    EOSINOPHIL 0.0 05/08/2023    BASOPHIL 0.9 05/08/2023    DIFFMETHOD Automated 05/08/2023       Comprehensive Metabolic Panel  Lab Results   Component Value Date     (H) 05/08/2023    BUN 8 05/08/2023    CREATININE 0.7 05/08/2023     05/08/2023    K 3.9 05/08/2023     05/08/2023    PROT 6.1 05/08/2023    ALBUMIN 3.2 (L) 05/08/2023    BILITOT 0.7 05/08/2023    AST 20 05/08/2023    ALKPHOS 63 05/08/2023    CO2 25 05/08/2023    ALT 17 05/08/2023    ANIONGAP 7 (L) 05/08/2023       TSH  No results found for: TSH    Imaging:  CTA Chest Non-Coronary (PE Studies)  Narrative: EXAMINATION:  CTA CHEST NON CORONARY (PE STUDIES)    CLINICAL HISTORY:  Pulmonary embolism (PE) suspected, high prob;    TECHNIQUE:  Low dose axial images, sagittal and coronal reformations were obtained from the thoracic inlet to the lung bases following the IV administration of 100 mL of Omnipaque 350.  Contrast timing was optimized to evaluate the pulmonary arteries.  MIP images were performed.    COMPARISON:  Chest radiograph 05/05/2023.  PET/CT performed 03/22/2023.  CT of the chest, abdomen, and pelvis performed " 11/09/2022.    FINDINGS:  Exam quality: Study limited by suboptimal contrast bolus timing.  Study considered diagnostic to the proximal segmental pulmonary arterial level.    Pulmonary embolism: No acute or chronic pulmonary embolism.    Central pulmonary arteries: Normal caliber.    Aorta: Normal caliber.    Heart: No right heart strain. Normal size.    Coronary arteries: No calcifications.    Pericardium: Normal. No effusion, thickening, or calcification.  Ill-defined induration is noted in the anterior mediastinal fat (axial series 2, image 130); adenopathy noted in this region on prior CT 11/09/2022.    Support tubes and lines: None.    Base of neck/thyroid: Normal.    Lymph nodes: No supraclavicular, axillary, internal mammary, mediastinal, or hilar adenopathy.  No definite new or enlarging thoracic nodes.    Esophagus: Normal.    Pleura: No effusion, thickening, or calcification.    Upper abdomen: Status post cholecystectomy.  Ill-defined areas of hypoattenuation are noted in the spleen, measuring up to least 48 mm.  While these are incompletely characterized, no obvious enlargement when compared to attenuation correction CT imaging performed as part of PET/CT of 03/02/2022.    Body wall: Unremarkable    Airways: Central airways appear patent.    Lungs: Assessment lungs is limited due to respiratory motion.  Noting this, no concerning focal or diffuse abnormality is seen.  Minimal dependent atelectasis.    Bones: No definite acute change.  Degenerative findings are noted in the spine.  Impression: 1. Examination considered diagnostic to the proximal segmental pulmonary arterial level without convincing evidence of acute pulmonary embolism.  2. No acute thoracic findings.  3. Details of chronic and incidental findings, as provided in the body of the report.    Electronically signed by: Jefferson Bustos  Date:    05/05/2023  Time:    13:29  X-Ray Chest AP Portable  Narrative: EXAMINATION:  XR CHEST AP  PORTABLE    CLINICAL HISTORY:  Sepsis;    TECHNIQUE:  Single frontal view of the chest was performed.    COMPARISON:  Chest radiograph performed 04/21/2023, 01:22 hours.    FINDINGS:  Monitoring leads over the chest.  Unchanged position of right internal jugular approach tunneled port catheter.    Essentially unchanged appearance of the cardiomediastinal contours.    Lungs appear grossly clear.    No definite pneumothorax or large volume pleural effusion.    No acute findings in the visualized abdomen.  Osseous and soft tissue structures appear without definite acute change.  Impression: No convincing evidence of acute cardiopulmonary disease.    Electronically signed by: Jefferson Bustos  Date:    05/05/2023  Time:    09:51      Assessment/Plan:    1. Diffuse large B-cell lymphoma of lymph nodes of neck  -     CBC Auto Differential; Future  -     Comprehensive metabolic panel; Future; Expected date: 05/15/2023    2. Prostate cancer screening  -     PSA, SCREENING; Future; Expected date: 05/15/2023    3. Type 2 diabetes mellitus with hyperglycemia, without long-term current use of insulin  Overview:  - follows with [facility]  - restart ARB at low dose    Orders:  -     HEMOGLOBIN A1C; Future; Expected date: 05/15/2023         Discussed how to stay healthy including: diet, exercise, refraining from smoking and discussed screening exams / tests needed for age, sex and family Hx.    RTC ***    Anson Blunt MD

## 2023-05-22 ENCOUNTER — OFFICE VISIT (OUTPATIENT)
Dept: CARDIOLOGY | Facility: CLINIC | Age: 58
End: 2023-05-22
Payer: COMMERCIAL

## 2023-05-22 VITALS
HEART RATE: 80 BPM | HEIGHT: 69 IN | SYSTOLIC BLOOD PRESSURE: 130 MMHG | DIASTOLIC BLOOD PRESSURE: 68 MMHG | BODY MASS INDEX: 33.46 KG/M2 | WEIGHT: 225.88 LBS

## 2023-05-22 DIAGNOSIS — E11.59 OBESITY, DIABETES, AND HYPERTENSION SYNDROME: ICD-10-CM

## 2023-05-22 DIAGNOSIS — E66.01 CLASS 2 SEVERE OBESITY DUE TO EXCESS CALORIES WITH SERIOUS COMORBIDITY AND BODY MASS INDEX (BMI) OF 36.0 TO 36.9 IN ADULT: ICD-10-CM

## 2023-05-22 DIAGNOSIS — I15.2 OBESITY, DIABETES, AND HYPERTENSION SYNDROME: ICD-10-CM

## 2023-05-22 DIAGNOSIS — I35.0 NONRHEUMATIC AORTIC VALVE STENOSIS: ICD-10-CM

## 2023-05-22 DIAGNOSIS — A41.9 SEPSIS: ICD-10-CM

## 2023-05-22 DIAGNOSIS — E66.9 OBESITY, DIABETES, AND HYPERTENSION SYNDROME: ICD-10-CM

## 2023-05-22 DIAGNOSIS — C83.31 DIFFUSE LARGE B-CELL LYMPHOMA OF LYMPH NODES OF NECK: ICD-10-CM

## 2023-05-22 DIAGNOSIS — E78.1 PURE HYPERTRIGLYCERIDEMIA: ICD-10-CM

## 2023-05-22 DIAGNOSIS — I10 ESSENTIAL HYPERTENSION: Primary | ICD-10-CM

## 2023-05-22 DIAGNOSIS — E78.00 PURE HYPERCHOLESTEROLEMIA: ICD-10-CM

## 2023-05-22 DIAGNOSIS — E11.69 OBESITY, DIABETES, AND HYPERTENSION SYNDROME: ICD-10-CM

## 2023-05-22 PROCEDURE — 3078F PR MOST RECENT DIASTOLIC BLOOD PRESSURE < 80 MM HG: ICD-10-PCS | Mod: CPTII,S$GLB,, | Performed by: INTERNAL MEDICINE

## 2023-05-22 PROCEDURE — 99999 PR PBB SHADOW E&M-EST. PATIENT-LVL IV: CPT | Mod: PBBFAC,,, | Performed by: INTERNAL MEDICINE

## 2023-05-22 PROCEDURE — 99999 PR PBB SHADOW E&M-EST. PATIENT-LVL IV: ICD-10-PCS | Mod: PBBFAC,,, | Performed by: INTERNAL MEDICINE

## 2023-05-22 PROCEDURE — 3044F HG A1C LEVEL LT 7.0%: CPT | Mod: CPTII,S$GLB,, | Performed by: INTERNAL MEDICINE

## 2023-05-22 PROCEDURE — 3008F PR BODY MASS INDEX (BMI) DOCUMENTED: ICD-10-PCS | Mod: CPTII,S$GLB,, | Performed by: INTERNAL MEDICINE

## 2023-05-22 PROCEDURE — 1159F PR MEDICATION LIST DOCUMENTED IN MEDICAL RECORD: ICD-10-PCS | Mod: CPTII,S$GLB,, | Performed by: INTERNAL MEDICINE

## 2023-05-22 PROCEDURE — 3075F PR MOST RECENT SYSTOLIC BLOOD PRESS GE 130-139MM HG: ICD-10-PCS | Mod: CPTII,S$GLB,, | Performed by: INTERNAL MEDICINE

## 2023-05-22 PROCEDURE — 1111F PR DISCHARGE MEDS RECONCILED W/ CURRENT OUTPATIENT MED LIST: ICD-10-PCS | Mod: CPTII,S$GLB,, | Performed by: INTERNAL MEDICINE

## 2023-05-22 PROCEDURE — 3078F DIAST BP <80 MM HG: CPT | Mod: CPTII,S$GLB,, | Performed by: INTERNAL MEDICINE

## 2023-05-22 PROCEDURE — 3008F BODY MASS INDEX DOCD: CPT | Mod: CPTII,S$GLB,, | Performed by: INTERNAL MEDICINE

## 2023-05-22 PROCEDURE — 1111F DSCHRG MED/CURRENT MED MERGE: CPT | Mod: CPTII,S$GLB,, | Performed by: INTERNAL MEDICINE

## 2023-05-22 PROCEDURE — 99204 PR OFFICE/OUTPT VISIT, NEW, LEVL IV, 45-59 MIN: ICD-10-PCS | Mod: S$GLB,,, | Performed by: INTERNAL MEDICINE

## 2023-05-22 PROCEDURE — 1159F MED LIST DOCD IN RCRD: CPT | Mod: CPTII,S$GLB,, | Performed by: INTERNAL MEDICINE

## 2023-05-22 PROCEDURE — 1160F PR REVIEW ALL MEDS BY PRESCRIBER/CLIN PHARMACIST DOCUMENTED: ICD-10-PCS | Mod: CPTII,S$GLB,, | Performed by: INTERNAL MEDICINE

## 2023-05-22 PROCEDURE — 99204 OFFICE O/P NEW MOD 45 MIN: CPT | Mod: S$GLB,,, | Performed by: INTERNAL MEDICINE

## 2023-05-22 PROCEDURE — 3075F SYST BP GE 130 - 139MM HG: CPT | Mod: CPTII,S$GLB,, | Performed by: INTERNAL MEDICINE

## 2023-05-22 PROCEDURE — 1160F RVW MEDS BY RX/DR IN RCRD: CPT | Mod: CPTII,S$GLB,, | Performed by: INTERNAL MEDICINE

## 2023-05-22 PROCEDURE — 4010F PR ACE/ARB THEARPY RXD/TAKEN: ICD-10-PCS | Mod: CPTII,S$GLB,, | Performed by: INTERNAL MEDICINE

## 2023-05-22 PROCEDURE — 4010F ACE/ARB THERAPY RXD/TAKEN: CPT | Mod: CPTII,S$GLB,, | Performed by: INTERNAL MEDICINE

## 2023-05-22 PROCEDURE — 3044F PR MOST RECENT HEMOGLOBIN A1C LEVEL <7.0%: ICD-10-PCS | Mod: CPTII,S$GLB,, | Performed by: INTERNAL MEDICINE

## 2023-05-22 NOTE — PROGRESS NOTES
Subjective:   @Patient ID:  Saud Mayers is a 58 y.o. male who presents for evaluation of Aortic stenosis      HPI:   Here for initial evaluation   He is here for evaluation of moderate aortic stenosis  No chest pain, no syncope, no palpitations  No chf   B lymphoma s/p chemo. He is in remission  Stopped smoking 27 yrs. He smoked for 11 years  FH: No significant cardiac disease     Prior cardiovascular  Hx  --------------------------------           - ECHO 12/13/2022  The left ventricle is normal in size with concentric remodeling and normal systolic function.  The estimated ejection fraction is 65%.  Normal left ventricular diastolic function.  Normal right ventricular size with normal right ventricular systolic function.  Normal central venous pressure (3 mmHg).  The left ventricular global longitudinal strain is -17.6%.  There is moderate aortic valve stenosis.  Aortic valve area is 2.04 cm2; peak velocity is 3.17 m/s; mean gradient is 26 mmHg.    EKG 5/2023 ST, non specific st changes        Patient Active Problem List    Diagnosis Date Noted    Bacteremia 05/07/2023     Saud Mayers is a 58 y.o. male with PMH of diffuse large B-cell lymphoma s/p 6 cycles of R-CHOp (most recently 4/13), HTN, HLD, ANAMIKA, DM2 presenting with fever, fatigue and chills, admitted for sepsis.  Pt reports he was feeling hot with chills and very weak on presentation> Unable to even get up out of bed. He is now feeling better in terms of energy. No chest pain, SOB, N/V/abdominal pain, or urinary symptoms.     Patient with klebsiella bacteremia - on merrem and doing better - sensitivities pending      Keep on merrem for now - await sensitivities   Should be able to have a final plan in the AM - would not place a PICC at this time - await sensitivities       Severe sepsis 05/05/2023    Hypophosphatemia 05/05/2023    Hypomagnesemia 05/05/2023    Macrocytic anemia 05/05/2023    Diffuse large B-cell lymphoma of lymph nodes of neck  12/01/2022    Lymphadenopathy 11/10/2022    Splenic mass 11/10/2022    Abnormal finding on CT scan 11/10/2022    Pure hypertriglyceridemia 04/14/2022    Systolic murmur 07/07/2021    Nonrheumatic aortic valve stenosis 07/07/2021    Impotence 01/09/2019    Pure hypercholesterolemia 07/07/2017    Eczema 07/07/2017    Obesity, diabetes, and hypertension syndrome 11/08/2016    Type 2 diabetes mellitus with hyperglycemia, without long-term current use of insulin 03/24/2016     - follows with [facility]  - restart ARB at low dose        ANAMIKA (obstructive sleep apnea) 04/27/2015    Class 2 severe obesity due to excess calories with serious comorbidity and body mass index (BMI) of 36.0 to 36.9 in adult 04/27/2015    Essential hypertension 04/27/2015    Asthma, mild intermittent 04/27/2015                    LAST HbA1c  Lab Results   Component Value Date    HGBA1C 5.4 05/15/2023       Lipid panel  Lab Results   Component Value Date    CHOL 111 (L) 07/20/2022    CHOL 136 04/08/2022    CHOL 137 06/23/2021     Lab Results   Component Value Date    HDL 30 (L) 07/20/2022    HDL 35 (L) 04/08/2022    HDL 34 (L) 06/23/2021     Lab Results   Component Value Date    LDLCALC 60.0 (L) 07/20/2022    LDLCALC 64.8 04/08/2022    LDLCALC 71.0 06/23/2021     Lab Results   Component Value Date    TRIG 105 07/20/2022    TRIG 181 (H) 04/08/2022    TRIG 160 (H) 06/23/2021     Lab Results   Component Value Date    CHOLHDL 27.0 07/20/2022    CHOLHDL 25.7 04/08/2022    CHOLHDL 24.8 06/23/2021            Review of Systems   Constitutional: Negative for chills and fever.   HENT:  Negative for hearing loss and nosebleeds.    Eyes:  Negative for blurred vision.   Cardiovascular:  Negative for chest pain, leg swelling and palpitations.   Respiratory:  Negative for hemoptysis and shortness of breath.    Hematologic/Lymphatic: Negative for bleeding problem.   Skin:  Negative for itching.   Musculoskeletal:  Negative for falls.   Gastrointestinal:   Negative for abdominal pain and hematochezia.   Genitourinary:  Negative for hematuria.   Neurological:  Negative for dizziness and loss of balance.   Psychiatric/Behavioral:  Negative for altered mental status and depression.      Objective:   Physical Exam  Constitutional:       Appearance: He is well-developed.   HENT:      Head: Normocephalic and atraumatic.   Eyes:      Conjunctiva/sclera: Conjunctivae normal.   Neck:      Vascular: No carotid bruit or JVD.   Cardiovascular:      Rate and Rhythm: Normal rate and regular rhythm.      Pulses:           Carotid pulses are 2+ on the right side and 2+ on the left side.       Radial pulses are 2+ on the right side and 2+ on the left side.      Heart sounds: Murmur (Grade III systolic) heard.     No friction rub. No gallop.   Pulmonary:      Effort: Pulmonary effort is normal. No respiratory distress.      Breath sounds: Normal breath sounds. No stridor. No wheezing.   Musculoskeletal:      Cervical back: Neck supple.      Right lower leg: No edema.      Left lower leg: No edema.   Skin:     General: Skin is warm and dry.   Neurological:      Mental Status: He is alert and oriented to person, place, and time.   Psychiatric:         Behavior: Behavior normal.       Assessment:     1. Essential hypertension    2. Sepsis    3. Pure hypercholesterolemia    4. Nonrheumatic aortic valve stenosis    5. Pure hypertriglyceridemia    6. Class 2 severe obesity due to excess calories with serious comorbidity and body mass index (BMI) of 36.0 to 36.9 in adult    7. Obesity, diabetes, and hypertension syndrome    8. Diffuse large B-cell lymphoma of lymph nodes of neck        Plan:   Moderate aortic stenosis.  Has been stable over the last year.  We will continue to monitor for time being.  Repeat echocardiogram after 1 year to assess progression or sooner with any change in his clinical status    Blood pressure is well controlled.  Will continue the same treatment  Continue  losartan and Toprol    1 year follow-up or sooner p.r.n.    Pertinent cardiac images and EKG reviewed independently.    Continue with current medical plan and lifestyle changes.  Return sooner for concerns or questions. If symptoms persist go to the ED  I have reviewed all pertinent data including patient's medical history in detail and updated the computerized patient record.     No orders of the defined types were placed in this encounter.      Follow up as scheduled.     He expressed verbal understanding and agreed with the plan    Patient's Medications   New Prescriptions    No medications on file   Previous Medications    ACETAMINOPHEN (TYLENOL) 500 MG TABLET    Take 500 mg by mouth every 6 (six) hours as needed for Pain.    ALLOPURINOL (ZYLOPRIM) 300 MG TABLET    Take 1 tablet (300 mg total) by mouth once daily.    ATORVASTATIN (LIPITOR) 20 MG TABLET    Take 1 tablet (20 mg total) by mouth once daily.    CYANOCOBALAMIN (VITAMIN B-12) 100 MCG TABLET    Take 100 mcg by mouth once daily.    FOLIC ACID (FOLVITE) 1 MG TABLET    Take 1 tablet (1 mg total) by mouth once daily.    LIDOCAINE-PRILOCAINE (EMLA) CREAM    Apply topically as needed. Apply to skin overlying port site 30 minutes before chemotherapy.    LINACLOTIDE (LINZESS) 72 MCG CAP CAPSULE    Take 1 capsule (72 mcg total) by mouth before breakfast.    LOSARTAN (COZAAR) 25 MG TABLET    Take 1 tablet (25 mg total) by mouth once daily.    MAGIC MOUTHWASH DIPHEN/ANTAC/LIDOC/NYSTA    10 mLs 3 (three) times daily as needed (chemotherapy-induced mucositis.).    METFORMIN (GLUCOPHAGE) 1000 MG TABLET    Take 1 tablet (1,000 mg total) by mouth 2 (two) times daily with meals.    METOPROLOL SUCCINATE (TOPROL-XL) 50 MG 24 HR TABLET    Take 1 tablet (50 mg total) by mouth once daily.    MULTIVIT-MIN-FOLIC-VIT K-LYCOP (ONE-A-DAY MEN'S 50 PLUS) 400- MCG TAB    Take 1 tablet by mouth once daily.   Modified Medications    No medications on file   Discontinued  Medications    No medications on file

## 2023-05-23 ENCOUNTER — PATIENT MESSAGE (OUTPATIENT)
Dept: FAMILY MEDICINE | Facility: CLINIC | Age: 58
End: 2023-05-23
Payer: COMMERCIAL

## 2023-05-24 ENCOUNTER — PATIENT MESSAGE (OUTPATIENT)
Dept: SURGERY | Facility: CLINIC | Age: 58
End: 2023-05-24
Payer: COMMERCIAL

## 2023-05-24 ENCOUNTER — PATIENT MESSAGE (OUTPATIENT)
Dept: HEMATOLOGY/ONCOLOGY | Facility: CLINIC | Age: 58
End: 2023-05-24
Payer: COMMERCIAL

## 2023-05-24 DIAGNOSIS — M54.2 NECK PAIN: Primary | ICD-10-CM

## 2023-05-24 RX ORDER — MELOXICAM 7.5 MG/1
7.5 TABLET ORAL DAILY PRN
Qty: 30 TABLET | Refills: 1 | Status: SHIPPED | OUTPATIENT
Start: 2023-05-24

## 2023-05-24 RX ORDER — OXYCODONE AND ACETAMINOPHEN 5; 325 MG/1; MG/1
1 TABLET ORAL EVERY 6 HOURS PRN
Qty: 28 TABLET | Refills: 0 | Status: SHIPPED | OUTPATIENT
Start: 2023-05-24

## 2023-06-01 RX ORDER — LOSARTAN POTASSIUM 25 MG/1
25 TABLET ORAL DAILY
Qty: 90 TABLET | Refills: 3 | Status: SHIPPED | OUTPATIENT
Start: 2023-06-01 | End: 2024-05-31

## 2023-06-16 ENCOUNTER — PATIENT MESSAGE (OUTPATIENT)
Dept: FAMILY MEDICINE | Facility: CLINIC | Age: 58
End: 2023-06-16
Payer: COMMERCIAL

## 2023-06-16 RX ORDER — TADALAFIL 10 MG/1
10 TABLET ORAL DAILY PRN
Qty: 30 TABLET | Refills: 1 | Status: SHIPPED | OUTPATIENT
Start: 2023-06-16 | End: 2023-06-16

## 2023-06-16 RX ORDER — TADALAFIL 10 MG/1
10 TABLET ORAL DAILY PRN
Qty: 30 TABLET | Refills: 1 | Status: SHIPPED | OUTPATIENT
Start: 2023-06-16

## 2023-06-21 ENCOUNTER — PATIENT MESSAGE (OUTPATIENT)
Dept: ADMINISTRATIVE | Facility: HOSPITAL | Age: 58
End: 2023-06-21
Payer: COMMERCIAL

## 2023-07-06 ENCOUNTER — PATIENT OUTREACH (OUTPATIENT)
Dept: ADMINISTRATIVE | Facility: HOSPITAL | Age: 58
End: 2023-07-06
Payer: COMMERCIAL

## 2023-07-11 NOTE — PROGRESS NOTES
"PATIENT: Saud Mayers  MRN: [id number]  DATE: 7/12/2023    Diagnosis:   1. Diffuse large B-cell lymphoma of lymph nodes of multiple regions    2. Anemia in neoplastic disease    3. Tumor lysis syndrome    4. Folate deficiency    5. Type 2 diabetes mellitus with hyperglycemia, without long-term current use of insulin    6. Severe obesity (BMI 35.0-39.9) with comorbidity      Chief Complaint: Lymphoma    Oncologic History:      Oncologic History Diffuse large B-cell lymphoma      Oncologic Treatment R-CHOP (start date 12/21/22)      Pathology 11/23/22:  "LYMPH NODE", RIGHT CERVICAL, EXCISION:   -- DIFFUSE LARGE B-CELL LYMPHOMA, NON-GCB SUBTYPE (SEE COMMENT).     Flow cytometric analysis of right cervical lymph node detects a kappa light   chain restricted B lymphocyte population expressing CD19 and CD20, and CD5   (partial and dim).    CD10 is negative.  T lymphocytes are   immunophenotypically unremarkable.  Correlation with tissue morphology is   required. Flow differential: Lymphocytes 35.8%, Monocytes 0.0%, Granulocytes   4.4%, Blast  0.0%, Debris/nRBC 60.0%,  Viability 43.5%.   Immunohistochemical stains are performed with adequate c ontrols.  The large   lymphoid cells are positive for CD20, BCL6 (>30%), MUM1 (>30%), and BCL2   (>50%); negative for CD5, CD10, CD30, cyclin D1, ALK1 and EBV (VICKY in-situ   hybridization).  They display high proliferation index (Ki-67 90%).   CD3-positive small mature T-cells are seen.   Taken together, the overall findings consistent with diffuse large B-cell   lymphoma, non-GCB subtype.   1. Immunohistochemical stain:  The lymphoma cells are positive for MYC (>40%).   2. Fluorescence in-situ hybridization: The result is abnormal and indicates a   BCL6 rearrangement in 91% of nuclei.  No rearrangement of MYC or BCL2 and no   fusion of MYC and IGH was observed.  Rearrangement of BCL6 has been   associated with several B-cell lymphomas, including follicular lymphoma,   diffuse large " B-cell lymphoma, and high-grade B-cell lymphoma.  As no MYC   rearrangement and no fusion of MYC and IGH was observed, this makes unlikely   the possibility of high-grade B-cell lymphoma with MYC and BCL6 rearrangement.   Taken together, the overall findings are consistent with diffuse large B-cell   lymphoma, NOS (non-GCB subtype).  The lymphoma cells display MYC and BCL2   double expressor phenotype.       Flow cytometry - lymph node (11/23/22):  Flow cytometric analysis of lymph node detects a kappa light chain restricted   B lymphocyte population expressing CD19 and CD20, and CD5 (partial and dim).     CD10 is negative.  T lymphocytes are immunophenotypically unremarkable.   Correlation with tissue morphology is required.   Flow differential:  Lymphocytes 35.8%, Monocytes 0.0%, Granulocytes  4.4%,   Blast  0.0%, Debris/nRBC 60.0%,  Viability 43.5%.           Subjective:    History of Present Illness: Mr. Mayers is a 58 y.o. male who presents for evaluation and management of diffuse large B-cell lymphoma. I had seen him during a hospitalization in November 2022.    - he was admitted for lymphadenopathy/weight loss.  - he underwent excisional lymph node biopsy on 11/23/22. Pathology revealed diffuse large B-cell lymphoma, non-GCB type.  - he underwent port-a-cath placement on 12/7/22 with Dr. Velazquez.  - he underwent pet scan on 12/9/22.  - he underwent port-a-cath placement on 12/7/22 with Dr. Velazquez.  - he underwent pet scan on 12/9/22.  - he underwent PET scan on 3/22/23.    Interval history:  - he presents for a follow-up appointment for his diffuse large B-cell lymphoma.  - he completed 6 cycles of R-CHOP.  - today, he endorses right shoulder/neck pain. He denies chest pain, vomiting, diarrhea, constipation.      Past medical, surgical, family, and social histories have been reviewed and updated below.    Past Medical History:   Past Medical History:   Diagnosis Date    Asthma     Cancer     Diabetes  mellitus     High cholesterol     Hypertension     ANAMIKA on CPAP     Testicular hypofunction        Past Surgical History:   Past Surgical History:   Procedure Laterality Date    CARPAL TUNNEL RELEASE Bilateral 2020    CHOLECYSTECTOMY  01/01/1990    COLONOSCOPY  01/01/2013    COLONOSCOPY N/A 9/2/2022    Procedure: COLONOSCOPY sutab;  Surgeon: Jeovany Cisse MD;  Location: [facility] ENDO;  Service: Endoscopy;  Laterality: N/A;    ESOPHAGOGASTRODUODENOSCOPY N/A 9/2/2022    Procedure: EGD (ESOPHAGOGASTRODUODENOSCOPY);  Surgeon: Jeovany Cisse MD;  Location: [facility] ENDO;  Service: Endoscopy;  Laterality: N/A;    INSERTION OF TUNNELED CENTRAL VENOUS CATHETER (CVC) WITH SUBCUTANEOUS PORT N/A 12/7/2022    Procedure: [id number]-CFPD-W-CATH;  Surgeon: Francisco Velazquez MD;  Location: [facility] OR;  Service: General;  Laterality: N/A;    ORTHOPEDIC SURGERY Bilateral 01/01/2007    feet plantar    ORTHOPEDIC SURGERY Right 01/01/2006    knee    ROTATOR CUFF REPAIR Left 01/01/1997    SURGICAL REMOVAL OF LYMPH NODE Right 11/23/2022    Procedure: EXCISION, LYMPH NODE;  Surgeon: Francisco Velazquez MD;  Location: [facility] OR;  Service: General;  Laterality: Right;   (right neck)       Family History:   Family History   Problem Relation Age of Onset    Heart attack Father        Social History:  reports that he quit smoking about 31 years ago. His smoking use included cigarettes. He has never used smokeless tobacco. He reports that he does not drink alcohol and does not use drugs.    Allergies:  Review of patient's allergies indicates:   Allergen Reactions    Gabapentin Hallucinations    Tizanidine Other (See Comments)     somnolence       Medications:  Current Outpatient Medications   Medication Sig Dispense Refill    acetaminophen (TYLENOL) 500 MG tablet Take 500 mg by mouth every 6 (six) hours as needed for Pain.      allopurinoL (ZYLOPRIM) 300 MG tablet Take 1 tablet (300 mg total) by mouth once daily. (Patient not taking: Reported on 5/15/2023) 30  tablet 11    atorvastatin (LIPITOR) 20 MG tablet Take 1 tablet (20 mg total) by mouth once daily. 90 tablet 3    cyanocobalamin (VITAMIN B-12) 100 MCG tablet Take 100 mcg by mouth once daily.      folic acid (FOLVITE) 1 MG tablet Take 1 tablet (1 mg total) by mouth once daily. 100 tablet 3    LIDOcaine-prilocaine (EMLA) cream Apply topically as needed. Apply to skin overlying port site 30 minutes before chemotherapy. (Patient not taking: Reported on 5/15/2023) 30 g 1    linaCLOtide (LINZESS) 72 mcg Cap capsule Take 1 capsule (72 mcg total) by mouth before breakfast. 90 capsule 3    losartan (COZAAR) 25 MG tablet Take 1 tablet (25 mg total) by mouth once daily. 90 tablet 3    magic mouthwash diphen/antac/lidoc/nysta 10 mLs 3 (three) times daily as needed (chemotherapy-induced mucositis.). (Patient not taking: Reported on 5/15/2023) 300 mL 1    meloxicam (MOBIC) 7.5 MG tablet Take 1 tablet (7.5 mg total) by mouth daily as needed for Pain. 30 tablet 1    metFORMIN (GLUCOPHAGE) 1000 MG tablet Take 1 tablet (1,000 mg total) by mouth 2 (two) times daily with meals. 180 tablet 3    metoprolol succinate (TOPROL-XL) 50 MG 24 hr tablet Take 1 tablet (50 mg total) by mouth once daily. 90 tablet 3    multivit-min-folic-vit K-lycop (ONE-A-DAY MEN'S 50 PLUS) 400- mcg Tab Take 1 tablet by mouth once daily.      oxyCODONE-acetaminophen (PERCOCET) 5-325 mg per tablet Take 1 tablet by mouth every 6 (six) hours as needed for Pain. 28 tablet 0    tadalafiL (CIALIS) 10 MG tablet Take 1 tablet (10 mg total) by mouth daily as needed for Erectile Dysfunction. 30 tablet 1     No current facility-administered medications for this visit.       Review of Systems   Constitutional:  Positive for fatigue. Negative for fever.   HENT:  Negative for sore throat.    Eyes:  Negative for visual disturbance.   Respiratory:  Negative for shortness of breath.    Cardiovascular:  Negative for chest pain.   Gastrointestinal:  Positive for nausea.  Negative for abdominal pain.   Genitourinary:  Negative for dysuria.   Musculoskeletal:  Negative for back pain.        Decreased range of motion of right shoulder.   Skin:  Negative for rash.   Neurological:  Positive for weakness. Negative for headaches.   Hematological:  Negative for adenopathy.   Psychiatric/Behavioral:  The patient is not nervous/anxious.      ECOG Performance Status:   ECOG SCORE             Objective:      Vitals:   There were no vitals filed for this visit.    BMI: There is no height or weight on file to calculate BMI.    Physical Exam  Vitals and nursing note reviewed.   Constitutional:       Appearance: He is well-developed.   HENT:      Head: Normocephalic and atraumatic.   Eyes:      Pupils: Pupils are equal, round, and reactive to light.   Cardiovascular:      Rate and Rhythm: Normal rate and regular rhythm.   Pulmonary:      Effort: Pulmonary effort is normal.      Breath sounds: Normal breath sounds.   Abdominal:      General: Bowel sounds are normal.      Palpations: Abdomen is soft.   Musculoskeletal:         General: Normal range of motion.      Cervical back: Normal range of motion and neck supple.   Lymphadenopathy:      Cervical: No cervical adenopathy.      Upper Body:      Right upper body: No axillary adenopathy.      Left upper body: No axillary adenopathy.   Skin:     General: Skin is warm and dry.   Neurological:      Mental Status: He is alert and oriented to person, place, and time.   Psychiatric:         Behavior: Behavior normal.         Thought Content: Thought content normal.         Judgment: Judgment normal.       Laboratory Data:  Labs have been reviewed.    Lab Results   Component Value Date    WBC 3.42 (L) 05/15/2023    HGB 10.8 (L) 05/15/2023    HCT 34.1 (L) 05/15/2023    MCV 99 (H) 05/15/2023     (L) 05/15/2023           Imaging:    PET/CT scan (3/22/23): I have personally reviewed the images  1.  In this patient with lymphoma, there has been interval  resolution of pathologically enlarged, hypermetabolic lymph nodes on both sides of the diaphragm in keeping with excellent response to therapy.     2.  Improved but persistent splenomegaly, with interval resolution of previously noted hypermetabolic foci.     3.  Diffuse uptake in the bone marrow, consistent with bone marrow hyperplasia.         Echocardiogram (12/13/22):  The left ventricle is normal in size with concentric remodeling and normal systolic function.  The estimated ejection fraction is 65%.  Normal left ventricular diastolic function.  Normal right ventricular size with normal right ventricular systolic function.  Normal central venous pressure (3 mmHg).  The left ventricular global longitudinal strain is -17.6%.  There is moderate aortic valve stenosis.  Aortic valve area is 2.04 cm2; peak velocity is 3.17 m/s; mean gradient is 26 mmHg.      PET/CT scan (12/9/22): I have personally reviewed the images  Quality of the study: Adequate.     Reference values:     Mediastinal blood pool maximum SUV: 2.9     Normal liver background maximum SUV: 3.0     In the head and neck, numerous enlarged hypermetabolic lymph nodes within bilateral cervical periclavicular lesions.  For example, Conglomerate of right cervical and supraclavicular lymph nodes with SUV 14.  Left infraclavicular lymph node measuring 1.8 cm (image 95), with SUV max 10.  There is fluid and gas within the level 2 B measuring 2.1 cm, likely related to recent biopsy.     In the chest, numerous enlarged hypermetabolic mediastinal, hilar, and axilla lymph nodes.  For example subcarinal lymph node measuring 3.2 x 6.5 cm demonstrating SUV max of 9.8 (image 129).     In the abdomen and pelvis, there is physiologic tracer distribution within the abdominal organs and excretion into the genitourinary system.     Numerous enlarged hypermetabolic lymph nodes.  For example, conglomerate of lymph nodes within the anterior abdomen demonstrating SUV max of 18  (fused image 205).  Posterior right pararenal space hypermetabolic node measuring 3.0 x 2.4 cm with SUV max of 16 (image 218).  Conglomerate of right pelvic sidewall nodes measuring 8.0 AP x 4.4 TV x 11.1 CC cm demonstrating SUV max of 19 (image 251).     The spleen has multifocal hypermetabolic uptake within SUV max of 13 and is enlarged at 19.0 cm in craniocaudal dimension.  There are multiple areas of hypoattenuation, better characterized on prior CTs.     In the bones, there are no hypermetabolic lesions worrisome for malignancy.     Additional CT findings     Right-sided chest port tip terminating in the cavoatrial junction.  Trace left pleural fluid, prior cholecystectomy.     Impression:     Multiple hypermetabolic lymph nodes involving both sides of the diaphragm including bulky right iliac conglomerate in keeping with known large B-cell lymphoma.     Splenomegaly and multifocal hypermetabolic foci within the spleen.     The Deauville Score is: 5     Reference values:     Mediastinal blood pool maximum SUV: 2.9     Normal liver maximum SUV: 3.0      CT chest/abdomen/pelvis (11/7/22): I have personally reviewed the images     Innumerable adenopathy above and below the diaphragm and throughout the neck, LEFT axilla and chest wall, retroperitoneum and mesentery as well as in the iliac chain, right greater than left.     Findings are suspicious for lymphoma/leukemia.     Multiple splenic lesions appearing to have increased in number and size since the prior exam.        CT soft tissue neck (11/7/22): I have personally reviewed the images  Extensive adenopathy within the right greater than left neck with also adenopathy within the mediastinum and left axilla.  The largest lymph node in the right posterior triangle/spinal accessory region demonstrates internal necrosis as does a right paratracheal lymph node within the mediastinum..  There is inflammatory change surrounding the necrotic lymph nodes and although an  "infectious/inflammatory process is not excluded, a neoplastic process including lymphoma to be considered.      Assessment:       1. Diffuse large B-cell lymphoma of lymph nodes of multiple regions    2. Anemia in neoplastic disease    3. Tumor lysis syndrome    4. Folate deficiency    5. Type 2 diabetes mellitus with hyperglycemia, without long-term current use of insulin    6. Severe obesity (BMI 35.0-39.9) with comorbidity           Plan:     Diffuse large B-cell lymphoma  - I have reviewed his chart  - CT scans (11/7/22) revealed extensive adenopathy.  - excisional biopsy (11/23/22) revealed diffuse large B-cell lymphoma, non-GCB subtype.  - No rearrangement of MYC or BCL2, so not a double-hit lymphoma. 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  - I and via oncology recommend R-CHOP x 6 cycles.  - The risks and benefits of chemotherapy were discussed, written information was given, and informed consent was obtained.  - hepatitis B core antibody/surface antigen and hepatitis C antibody were negative.  - he underwent port-a-cath placement on 12/7/22 with Dr. Velazquez.  - PET/CT scan (12/9/22) revealed "Multiple hypermetabolic lymph nodes involving both sides of the diaphragm including bulky right iliac conglomerate in keeping with known large B-cell lymphoma."  - I sent emla cream to his pharmacy  - I sent anti-emetics to his pharmacy.  - I sent prednisone to his pharmacy  - echocardiogram (12/13/22) revealed an ejection fraction of 65%  - he received a unit of blood on 12/15/22 for symptomatic anemia  - PET/CT scan (3/22/23) [following 4 cycles of therapy] revealed "interval resolution of pathologically enlarged, hypermetabolic lymph nodes on both sides of the diaphragm in keeping with excellent response to therapy." Since his pet scan on 3/22/23 revealed a complete response to therapy, we will hold off on further PET scans (end-of-treatment pet scan).   - following 6 cycles of " R-CHOP, he was hospitalized for sepsis secondary to Klebsiella  - refer for port removal  - follow up labs today.  - return to clinic in 3 months.     2. Anemia in neoplastic disease   - he received a unit of blood on 12/15/22 for symptomatic anemia  - follow up labs from today.  - continue to monitor    3. Tumor lysis syndrome  - follow up labs from today.  - continue allopurinol     4. Type 2 diabetes  - last hemoglobin A1c (3/22/23) was 7.7%  - continue metformin  - will monitor closely as R-CHOP includes prednisone which can worsen hyperglycemia.  - defer management to primary care    5. Folate deficiency  - continue folic acid    - return to clinic in 3 months.     Sandip Vickers M.D.  Hematology/Oncology  [facility] - [address], [address]  [place] [address]  Phone: [phone]  Fax: [phone]

## 2023-07-12 ENCOUNTER — LAB VISIT (OUTPATIENT)
Dept: LAB | Facility: HOSPITAL | Age: 58
End: 2023-07-12
Attending: INTERNAL MEDICINE
Payer: COMMERCIAL

## 2023-07-12 ENCOUNTER — OFFICE VISIT (OUTPATIENT)
Dept: HEMATOLOGY/ONCOLOGY | Facility: CLINIC | Age: 58
End: 2023-07-12
Payer: COMMERCIAL

## 2023-07-12 VITALS
OXYGEN SATURATION: 99 % | WEIGHT: 239.63 LBS | DIASTOLIC BLOOD PRESSURE: 71 MMHG | SYSTOLIC BLOOD PRESSURE: 159 MMHG | RESPIRATION RATE: 18 BRPM | BODY MASS INDEX: 35.39 KG/M2 | HEART RATE: 78 BPM

## 2023-07-12 DIAGNOSIS — C83.38 DIFFUSE LARGE B-CELL LYMPHOMA OF LYMPH NODES OF MULTIPLE REGIONS: ICD-10-CM

## 2023-07-12 DIAGNOSIS — E53.8 FOLATE DEFICIENCY: ICD-10-CM

## 2023-07-12 DIAGNOSIS — D63.0 ANEMIA IN NEOPLASTIC DISEASE: ICD-10-CM

## 2023-07-12 DIAGNOSIS — C83.38 DIFFUSE LARGE B-CELL LYMPHOMA OF LYMPH NODES OF MULTIPLE REGIONS: Primary | ICD-10-CM

## 2023-07-12 DIAGNOSIS — E66.01 SEVERE OBESITY (BMI 35.0-39.9) WITH COMORBIDITY: ICD-10-CM

## 2023-07-12 DIAGNOSIS — E11.65 TYPE 2 DIABETES MELLITUS WITH HYPERGLYCEMIA, WITHOUT LONG-TERM CURRENT USE OF INSULIN: ICD-10-CM

## 2023-07-12 DIAGNOSIS — E88.3 TUMOR LYSIS SYNDROME: ICD-10-CM

## 2023-07-12 LAB
ALBUMIN SERPL BCP-MCNC: 4.3 G/DL (ref 3.5–5.2)
ALP SERPL-CCNC: 72 U/L (ref 55–135)
ALT SERPL W/O P-5'-P-CCNC: 29 U/L (ref 10–44)
ANION GAP SERPL CALC-SCNC: 12 MMOL/L (ref 8–16)
AST SERPL-CCNC: 27 U/L (ref 10–40)
BASOPHILS # BLD AUTO: 0.04 K/UL (ref 0–0.2)
BASOPHILS NFR BLD: 1 % (ref 0–1.9)
BILIRUB SERPL-MCNC: 0.4 MG/DL (ref 0.1–1)
BUN SERPL-MCNC: 24 MG/DL (ref 6–20)
CALCIUM SERPL-MCNC: 10 MG/DL (ref 8.7–10.5)
CHLORIDE SERPL-SCNC: 107 MMOL/L (ref 95–110)
CO2 SERPL-SCNC: 20 MMOL/L (ref 23–29)
CREAT SERPL-MCNC: 0.8 MG/DL (ref 0.5–1.4)
DIFFERENTIAL METHOD: ABNORMAL
EOSINOPHIL # BLD AUTO: 0.1 K/UL (ref 0–0.5)
EOSINOPHIL NFR BLD: 1.7 % (ref 0–8)
ERYTHROCYTE [DISTWIDTH] IN BLOOD BY AUTOMATED COUNT: 15.1 % (ref 11.5–14.5)
EST. GFR  (NO RACE VARIABLE): >60 ML/MIN/1.73 M^2
GLUCOSE SERPL-MCNC: 105 MG/DL (ref 70–110)
HCT VFR BLD AUTO: 36.1 % (ref 40–54)
HGB BLD-MCNC: 12.2 G/DL (ref 14–18)
IMM GRANULOCYTES # BLD AUTO: 0.02 K/UL (ref 0–0.04)
IMM GRANULOCYTES NFR BLD AUTO: 0.5 % (ref 0–0.5)
LDH SERPL L TO P-CCNC: 256 U/L (ref 110–260)
LYMPHOCYTES # BLD AUTO: 1.2 K/UL (ref 1–4.8)
LYMPHOCYTES NFR BLD: 28 % (ref 18–48)
MCH RBC QN AUTO: 33.4 PG (ref 27–31)
MCHC RBC AUTO-ENTMCNC: 33.8 G/DL (ref 32–36)
MCV RBC AUTO: 99 FL (ref 82–98)
MONOCYTES # BLD AUTO: 0.5 K/UL (ref 0.3–1)
MONOCYTES NFR BLD: 12 % (ref 4–15)
NEUTROPHILS # BLD AUTO: 2.4 K/UL (ref 1.8–7.7)
NEUTROPHILS NFR BLD: 56.8 % (ref 38–73)
NRBC BLD-RTO: 0 /100 WBC
PLATELET # BLD AUTO: 146 K/UL (ref 150–450)
PMV BLD AUTO: 9.3 FL (ref 9.2–12.9)
POTASSIUM SERPL-SCNC: 4.5 MMOL/L (ref 3.5–5.1)
PROT SERPL-MCNC: 7.3 G/DL (ref 6–8.4)
RBC # BLD AUTO: 3.65 M/UL (ref 4.6–6.2)
SODIUM SERPL-SCNC: 139 MMOL/L (ref 136–145)
URATE SERPL-MCNC: 4.7 MG/DL (ref 3.4–7)
WBC # BLD AUTO: 4.18 K/UL (ref 3.9–12.7)

## 2023-07-12 PROCEDURE — 36415 COLL VENOUS BLD VENIPUNCTURE: CPT | Performed by: INTERNAL MEDICINE

## 2023-07-12 PROCEDURE — 1160F RVW MEDS BY RX/DR IN RCRD: CPT | Mod: CPTII,S$GLB,, | Performed by: INTERNAL MEDICINE

## 2023-07-12 PROCEDURE — 3078F DIAST BP <80 MM HG: CPT | Mod: CPTII,S$GLB,, | Performed by: INTERNAL MEDICINE

## 2023-07-12 PROCEDURE — 1160F PR REVIEW ALL MEDS BY PRESCRIBER/CLIN PHARMACIST DOCUMENTED: ICD-10-PCS | Mod: CPTII,S$GLB,, | Performed by: INTERNAL MEDICINE

## 2023-07-12 PROCEDURE — 3044F HG A1C LEVEL LT 7.0%: CPT | Mod: CPTII,S$GLB,, | Performed by: INTERNAL MEDICINE

## 2023-07-12 PROCEDURE — 80053 COMPREHEN METABOLIC PANEL: CPT | Performed by: INTERNAL MEDICINE

## 2023-07-12 PROCEDURE — 99214 OFFICE O/P EST MOD 30 MIN: CPT | Mod: S$GLB,,, | Performed by: INTERNAL MEDICINE

## 2023-07-12 PROCEDURE — 3077F PR MOST RECENT SYSTOLIC BLOOD PRESSURE >= 140 MM HG: ICD-10-PCS | Mod: CPTII,S$GLB,, | Performed by: INTERNAL MEDICINE

## 2023-07-12 PROCEDURE — 3077F SYST BP >= 140 MM HG: CPT | Mod: CPTII,S$GLB,, | Performed by: INTERNAL MEDICINE

## 2023-07-12 PROCEDURE — 3008F BODY MASS INDEX DOCD: CPT | Mod: CPTII,S$GLB,, | Performed by: INTERNAL MEDICINE

## 2023-07-12 PROCEDURE — 83615 LACTATE (LD) (LDH) ENZYME: CPT | Performed by: INTERNAL MEDICINE

## 2023-07-12 PROCEDURE — 84550 ASSAY OF BLOOD/URIC ACID: CPT | Performed by: INTERNAL MEDICINE

## 2023-07-12 PROCEDURE — 3078F PR MOST RECENT DIASTOLIC BLOOD PRESSURE < 80 MM HG: ICD-10-PCS | Mod: CPTII,S$GLB,, | Performed by: INTERNAL MEDICINE

## 2023-07-12 PROCEDURE — 1159F MED LIST DOCD IN RCRD: CPT | Mod: CPTII,S$GLB,, | Performed by: INTERNAL MEDICINE

## 2023-07-12 PROCEDURE — 99214 PR OFFICE/OUTPT VISIT, EST, LEVL IV, 30-39 MIN: ICD-10-PCS | Mod: S$GLB,,, | Performed by: INTERNAL MEDICINE

## 2023-07-12 PROCEDURE — 1159F PR MEDICATION LIST DOCUMENTED IN MEDICAL RECORD: ICD-10-PCS | Mod: CPTII,S$GLB,, | Performed by: INTERNAL MEDICINE

## 2023-07-12 PROCEDURE — 3008F PR BODY MASS INDEX (BMI) DOCUMENTED: ICD-10-PCS | Mod: CPTII,S$GLB,, | Performed by: INTERNAL MEDICINE

## 2023-07-12 PROCEDURE — 4010F PR ACE/ARB THEARPY RXD/TAKEN: ICD-10-PCS | Mod: CPTII,S$GLB,, | Performed by: INTERNAL MEDICINE

## 2023-07-12 PROCEDURE — 99999 PR PBB SHADOW E&M-EST. PATIENT-LVL IV: CPT | Mod: PBBFAC,,, | Performed by: INTERNAL MEDICINE

## 2023-07-12 PROCEDURE — 85025 COMPLETE CBC W/AUTO DIFF WBC: CPT | Performed by: INTERNAL MEDICINE

## 2023-07-12 PROCEDURE — 4010F ACE/ARB THERAPY RXD/TAKEN: CPT | Mod: CPTII,S$GLB,, | Performed by: INTERNAL MEDICINE

## 2023-07-12 PROCEDURE — 3044F PR MOST RECENT HEMOGLOBIN A1C LEVEL <7.0%: ICD-10-PCS | Mod: CPTII,S$GLB,, | Performed by: INTERNAL MEDICINE

## 2023-07-12 PROCEDURE — 99999 PR PBB SHADOW E&M-EST. PATIENT-LVL IV: ICD-10-PCS | Mod: PBBFAC,,, | Performed by: INTERNAL MEDICINE

## 2023-07-13 ENCOUNTER — PATIENT MESSAGE (OUTPATIENT)
Dept: INTERVENTIONAL RADIOLOGY/VASCULAR | Facility: CLINIC | Age: 58
End: 2023-07-13
Payer: COMMERCIAL

## 2023-07-18 DIAGNOSIS — C83.31 DIFFUSE LARGE B-CELL LYMPHOMA OF LYMPH NODES OF NECK: Primary | ICD-10-CM

## 2023-07-26 ENCOUNTER — LAB VISIT (OUTPATIENT)
Dept: LAB | Facility: HOSPITAL | Age: 58
End: 2023-07-26
Attending: PHYSICIAN ASSISTANT
Payer: COMMERCIAL

## 2023-07-26 DIAGNOSIS — C83.31 DIFFUSE LARGE B-CELL LYMPHOMA OF LYMPH NODES OF NECK: ICD-10-CM

## 2023-07-26 LAB
BASOPHILS # BLD AUTO: 0.06 K/UL (ref 0–0.2)
BASOPHILS NFR BLD: 1 % (ref 0–1.9)
DIFFERENTIAL METHOD: ABNORMAL
EOSINOPHIL # BLD AUTO: 0.2 K/UL (ref 0–0.5)
EOSINOPHIL NFR BLD: 2.9 % (ref 0–8)
ERYTHROCYTE [DISTWIDTH] IN BLOOD BY AUTOMATED COUNT: 14.1 % (ref 11.5–14.5)
HCT VFR BLD AUTO: 34.2 % (ref 40–54)
HGB BLD-MCNC: 11.5 G/DL (ref 14–18)
IMM GRANULOCYTES # BLD AUTO: 0.03 K/UL (ref 0–0.04)
IMM GRANULOCYTES NFR BLD AUTO: 0.5 % (ref 0–0.5)
INR PPP: 1 (ref 0.8–1.2)
LYMPHOCYTES # BLD AUTO: 1.3 K/UL (ref 1–4.8)
LYMPHOCYTES NFR BLD: 22 % (ref 18–48)
MCH RBC QN AUTO: 33.5 PG (ref 27–31)
MCHC RBC AUTO-ENTMCNC: 33.6 G/DL (ref 32–36)
MCV RBC AUTO: 100 FL (ref 82–98)
MONOCYTES # BLD AUTO: 0.7 K/UL (ref 0.3–1)
MONOCYTES NFR BLD: 12 % (ref 4–15)
NEUTROPHILS # BLD AUTO: 3.6 K/UL (ref 1.8–7.7)
NEUTROPHILS NFR BLD: 61.6 % (ref 38–73)
NRBC BLD-RTO: 0 /100 WBC
PLATELET # BLD AUTO: 164 K/UL (ref 150–450)
PMV BLD AUTO: 9 FL (ref 9.2–12.9)
PROTHROMBIN TIME: 10.5 SEC (ref 9–12.5)
RBC # BLD AUTO: 3.43 M/UL (ref 4.6–6.2)
WBC # BLD AUTO: 5.81 K/UL (ref 3.9–12.7)

## 2023-07-26 PROCEDURE — 85610 PROTHROMBIN TIME: CPT | Mod: PO | Performed by: PHYSICIAN ASSISTANT

## 2023-07-26 PROCEDURE — 85025 COMPLETE CBC W/AUTO DIFF WBC: CPT | Mod: PO | Performed by: PHYSICIAN ASSISTANT

## 2023-07-26 PROCEDURE — 36415 COLL VENOUS BLD VENIPUNCTURE: CPT | Mod: PO | Performed by: PHYSICIAN ASSISTANT

## 2023-07-27 ENCOUNTER — PATIENT MESSAGE (OUTPATIENT)
Dept: HEMATOLOGY/ONCOLOGY | Facility: CLINIC | Age: 58
End: 2023-07-27
Payer: COMMERCIAL

## 2023-07-27 DIAGNOSIS — D53.9 MACROCYTIC ANEMIA: Primary | ICD-10-CM

## 2023-07-28 DIAGNOSIS — C83.38 DIFFUSE LARGE B-CELL LYMPHOMA OF LYMPH NODES OF MULTIPLE REGIONS: Primary | ICD-10-CM

## 2023-08-04 ENCOUNTER — HOSPITAL ENCOUNTER (OUTPATIENT)
Dept: RADIOLOGY | Facility: HOSPITAL | Age: 58
Discharge: HOME | End: 2023-08-04
Attending: INTERNAL MEDICINE
Payer: COMMERCIAL

## 2023-08-04 DIAGNOSIS — C83.38 DIFFUSE LARGE B-CELL LYMPHOMA OF LYMPH NODES OF MULTIPLE REGIONS: ICD-10-CM

## 2023-08-04 LAB — POCT GLUCOSE: 153 MG/DL (ref 70–110)

## 2023-08-04 PROCEDURE — A9552 F18 FDG: HCPCS

## 2023-08-04 PROCEDURE — 78815 PET IMAGE W/CT SKULL-THIGH: CPT | Mod: 26,PS,, | Performed by: STUDENT IN AN ORGANIZED HEALTH CARE EDUCATION/TRAINING PROGRAM

## 2023-08-04 PROCEDURE — 78815 NM PET CT ROUTINE: ICD-10-PCS | Mod: 26,PS,, | Performed by: STUDENT IN AN ORGANIZED HEALTH CARE EDUCATION/TRAINING PROGRAM

## 2023-08-07 PROBLEM — R65.20 SEVERE SEPSIS: Status: RESOLVED | Noted: 2023-05-05 | Resolved: 2023-08-07

## 2023-08-07 PROBLEM — A41.9 SEVERE SEPSIS: Status: RESOLVED | Noted: 2023-05-05 | Resolved: 2023-08-07

## 2023-08-07 NOTE — PROGRESS NOTES
"PATIENT: Saud Mayers  MRN: [id number]  DATE: 8/9/2023    Diagnosis:   1. Diffuse large B-cell lymphoma of lymph nodes of multiple regions    2. Anemia due to antineoplastic chemotherapy    3. Folate deficiency    4. Type 2 diabetes mellitus with hyperglycemia, without long-term current use of insulin    5. Severe obesity (BMI 35.0-39.9) with comorbidity      Chief Complaint: Lymphoma    Oncologic History:      Oncologic History Diffuse large B-cell lymphoma      Oncologic Treatment R-CHOP (start date 12/21/22)      Pathology 11/23/22:  "LYMPH NODE", RIGHT CERVICAL, EXCISION:   -- DIFFUSE LARGE B-CELL LYMPHOMA, NON-GCB SUBTYPE (SEE COMMENT).     Flow cytometric analysis of right cervical lymph node detects a kappa light   chain restricted B lymphocyte population expressing CD19 and CD20, and CD5   (partial and dim).    CD10 is negative.  T lymphocytes are   immunophenotypically unremarkable.  Correlation with tissue morphology is   required. Flow differential: Lymphocytes 35.8%, Monocytes 0.0%, Granulocytes   4.4%, Blast  0.0%, Debris/nRBC 60.0%,  Viability 43.5%.   Immunohistochemical stains are performed with adequate c ontrols.  The large   lymphoid cells are positive for CD20, BCL6 (>30%), MUM1 (>30%), and BCL2   (>50%); negative for CD5, CD10, CD30, cyclin D1, ALK1 and EBV (VICKY in-situ   hybridization).  They display high proliferation index (Ki-67 90%).   CD3-positive small mature T-cells are seen.   Taken together, the overall findings consistent with diffuse large B-cell   lymphoma, non-GCB subtype.   1. Immunohistochemical stain:  The lymphoma cells are positive for MYC (>40%).   2. Fluorescence in-situ hybridization: The result is abnormal and indicates a   BCL6 rearrangement in 91% of nuclei.  No rearrangement of MYC or BCL2 and no   fusion of MYC and IGH was observed.  Rearrangement of BCL6 has been   associated with several B-cell lymphomas, including follicular lymphoma,   diffuse large B-cell " lymphoma, and high-grade B-cell lymphoma.  As no MYC   rearrangement and no fusion of MYC and IGH was observed, this makes unlikely   the possibility of high-grade B-cell lymphoma with MYC and BCL6 rearrangement.   Taken together, the overall findings are consistent with diffuse large B-cell   lymphoma, NOS (non-GCB subtype).  The lymphoma cells display MYC and BCL2   double expressor phenotype.       Flow cytometry - lymph node (11/23/22):  Flow cytometric analysis of lymph node detects a kappa light chain restricted   B lymphocyte population expressing CD19 and CD20, and CD5 (partial and dim).     CD10 is negative.  T lymphocytes are immunophenotypically unremarkable.   Correlation with tissue morphology is required.   Flow differential:  Lymphocytes 35.8%, Monocytes 0.0%, Granulocytes  4.4%,   Blast  0.0%, Debris/nRBC 60.0%,  Viability 43.5%.           Subjective:    History of Present Illness: Mr. Mayers is a 58 y.o. male who presents for evaluation and management of diffuse large B-cell lymphoma. I had seen him during a hospitalization in November 2022.    - he was admitted for lymphadenopathy/weight loss.  - he underwent excisional lymph node biopsy on 11/23/22. Pathology revealed diffuse large B-cell lymphoma, non-GCB type.  - he underwent port-a-cath placement on 12/7/22 with Dr. Velazquez.  - he underwent pet scan on 12/9/22.  - he underwent port-a-cath placement on 12/7/22 with Dr. Velazquez.  - he underwent pet scan on 12/9/22.  - he underwent PET scan on 3/22/23.  - he completed 6 cycles of R-CHOP.    Interval history:  - he presents for a follow-up appointment for his diffuse large B-cell lymphoma.  - he underwent PET scan on 8/4/23  - today, he endorses fatigue, right shoulder pain. He denies chest pain, vomiting, diarrhea, constipation.      Past medical, surgical, family, and social histories have been reviewed and updated below.    Past Medical History:   Past Medical History:   Diagnosis Date     Asthma     Cancer     Diabetes mellitus     High cholesterol     Hypertension     ANAMIKA on CPAP     Testicular hypofunction        Past Surgical History:   Past Surgical History:   Procedure Laterality Date    CARPAL TUNNEL RELEASE Bilateral 2020    CHOLECYSTECTOMY  01/01/1990    COLONOSCOPY  01/01/2013    COLONOSCOPY N/A 9/2/2022    Procedure: COLONOSCOPY sutab;  Surgeon: Jeovany Cisse MD;  Location: [facility] ENDO;  Service: Endoscopy;  Laterality: N/A;    ESOPHAGOGASTRODUODENOSCOPY N/A 9/2/2022    Procedure: EGD (ESOPHAGOGASTRODUODENOSCOPY);  Surgeon: Jeovany Cisse MD;  Location: [facility] ENDO;  Service: Endoscopy;  Laterality: N/A;    INSERTION OF TUNNELED CENTRAL VENOUS CATHETER (CVC) WITH SUBCUTANEOUS PORT N/A 12/7/2022    Procedure: [id number];  Surgeon: Francisco Velazquez MD;  Location: [facility] OR;  Service: General;  Laterality: N/A;    ORTHOPEDIC SURGERY Bilateral 01/01/2007    feet plantar    ORTHOPEDIC SURGERY Right 01/01/2006    knee    ROTATOR CUFF REPAIR Left 01/01/1997    SURGICAL REMOVAL OF LYMPH NODE Right 11/23/2022    Procedure: EXCISION, LYMPH NODE;  Surgeon: Francisco Velazquez MD;  Location: [facility] OR;  Service: General;  Laterality: Right;   (right neck)       Family History:   Family History   Problem Relation Age of Onset    Heart attack Father        Social History:  reports that he quit smoking about 31 years ago. His smoking use included cigarettes. He has never used smokeless tobacco. He reports that he does not drink alcohol and does not use drugs.    Allergies:  Review of patient's allergies indicates:   Allergen Reactions    Gabapentin Hallucinations    Tizanidine Other (See Comments)     somnolence       Medications:  Current Outpatient Medications   Medication Sig Dispense Refill    acetaminophen (TYLENOL) 500 MG tablet Take 500 mg by mouth every 6 (six) hours as needed for Pain.      allopurinoL (ZYLOPRIM) 300 MG tablet Take 1 tablet (300 mg total) by mouth once daily. 30 tablet 11     atorvastatin (LIPITOR) 20 MG tablet Take 1 tablet (20 mg total) by mouth once daily. 90 tablet 3    cyanocobalamin (VITAMIN B-12) 100 MCG tablet Take 100 mcg by mouth once daily.      folic acid (FOLVITE) 1 MG tablet Take 1 tablet (1 mg total) by mouth once daily. 100 tablet 3    LIDOcaine-prilocaine (EMLA) cream Apply topically as needed. Apply to skin overlying port site 30 minutes before chemotherapy. 30 g 1    linaCLOtide (LINZESS) 72 mcg Cap capsule Take 1 capsule (72 mcg total) by mouth before breakfast. 90 capsule 3    losartan (COZAAR) 25 MG tablet Take 1 tablet (25 mg total) by mouth once daily. 90 tablet 3    magic mouthwash diphen/antac/lidoc/nysta 10 mLs 3 (three) times daily as needed (chemotherapy-induced mucositis.). 300 mL 1    meloxicam (MOBIC) 7.5 MG tablet Take 1 tablet (7.5 mg total) by mouth daily as needed for Pain. 30 tablet 1    metFORMIN (GLUCOPHAGE) 1000 MG tablet Take 1 tablet (1,000 mg total) by mouth 2 (two) times daily with meals. 180 tablet 3    metoprolol succinate (TOPROL-XL) 50 MG 24 hr tablet Take 1 tablet (50 mg total) by mouth once daily. 90 tablet 3    multivit-min-folic-vit K-lycop (ONE-A-DAY MEN'S 50 PLUS) 400- mcg Tab Take 1 tablet by mouth once daily.      oxyCODONE-acetaminophen (PERCOCET) 5-325 mg per tablet Take 1 tablet by mouth every 6 (six) hours as needed for Pain. 28 tablet 0    tadalafiL (CIALIS) 10 MG tablet Take 1 tablet (10 mg total) by mouth daily as needed for Erectile Dysfunction. 30 tablet 1     No current facility-administered medications for this visit.       Review of Systems   Constitutional:  Positive for fatigue. Negative for fever.   HENT:  Negative for sore throat.    Eyes:  Negative for visual disturbance.   Respiratory:  Negative for shortness of breath.    Cardiovascular:  Negative for chest pain.   Gastrointestinal:  Negative for abdominal pain and nausea.   Genitourinary:  Negative for dysuria.   Musculoskeletal:  Negative for back pain.         Decreased range of motion of right shoulder.   Skin:  Negative for rash.   Neurological:  Negative for headaches.   Hematological:  Negative for adenopathy.   Psychiatric/Behavioral:  The patient is not nervous/anxious.        ECOG Performance Status:   ECOG SCORE    0 - Fully active-able to carry on all pre-disease performance without restriction         Objective:      Vitals:   Vitals:    08/09/23 0841   BP: (!) 142/60   BP Location: Left arm   Patient Position: Sitting   BP Method: Large (Automatic)   Pulse: 70   Resp: 18   SpO2: 98%   Weight: 111.7 kg (246 lb 4.1 oz)     BMI: Body mass index is 36.37 kg/m².    Physical Exam  Vitals and nursing note reviewed.   Constitutional:       Appearance: He is well-developed.   HENT:      Head: Normocephalic and atraumatic.   Eyes:      Pupils: Pupils are equal, round, and reactive to light.   Cardiovascular:      Rate and Rhythm: Normal rate and regular rhythm.   Pulmonary:      Effort: Pulmonary effort is normal.      Breath sounds: Normal breath sounds.   Abdominal:      General: Bowel sounds are normal.      Palpations: Abdomen is soft.   Musculoskeletal:         General: Normal range of motion.      Cervical back: Normal range of motion and neck supple.   Lymphadenopathy:      Cervical: No cervical adenopathy.      Upper Body:      Right upper body: No axillary adenopathy.      Left upper body: No axillary adenopathy.   Skin:     General: Skin is warm and dry.   Neurological:      Mental Status: He is alert and oriented to person, place, and time.   Psychiatric:         Behavior: Behavior normal.         Thought Content: Thought content normal.         Judgment: Judgment normal.         Laboratory Data:  Labs have been reviewed.    Lab Results   Component Value Date    WBC 4.20 08/04/2023    HGB 11.9 (L) 08/04/2023    HCT 34.9 (L) 08/04/2023    MCV 97 08/04/2023     (L) 08/04/2023       Imaging:    PET/CT scan (8/4/23): I have personally reviewed the  images  Quality of the study: Adequate.     Reference values:     Mediastinal blood pool maximum SUV: 2.5     Normal liver background maximum SUV: 3     In the head and neck, there are no hypermetabolic lesions worrisome for malignancy. There are no hypermetabolic mucosal lesions, and there are no pathologically enlarged or hypermetabolic lymph nodes.     In the chest, there are no hypermetabolic lesions worrisome for malignancy.  There are no concerning pulmonary nodules or masses, and there are no pathologically enlarged or hypermetabolic lymph nodes.     In the abdomen and pelvis, there is physiologic tracer distribution within the abdominal organs and excretion into the genitourinary system.  No pathologically enlarged or hypermetabolic lymph nodes.  Stable non FDG avid normal-sized mesenteric lymph nodes with mild adjacent mesenteric stranding.     The spleen has normal uptake and is enlarged at 16 cm.  Stable splenic hypodensity.     In the bones, there are no hypermetabolic lesions worrisome for malignancy.     In the extremities, there are no hypermetabolic lesions worrisome for malignancy.     Additional CT findings: Small fat containing right inguinal hernia.  Calcific atherosclerosis of the coronary arteries.  Cholecystectomy.  Right chest port transvenous catheter tip terminates in the right atrium.     Impression:     1.  No hypermetabolic tumor or lymphadenopathy.     2.  Stable splenomegaly.      PET/CT scan (3/22/23): I have personally reviewed the images  1.  In this patient with lymphoma, there has been interval resolution of pathologically enlarged, hypermetabolic lymph nodes on both sides of the diaphragm in keeping with excellent response to therapy.     2.  Improved but persistent splenomegaly, with interval resolution of previously noted hypermetabolic foci.     3.  Diffuse uptake in the bone marrow, consistent with bone marrow hyperplasia.         Echocardiogram (12/13/22):  The left  ventricle is normal in size with concentric remodeling and normal systolic function.  The estimated ejection fraction is 65%.  Normal left ventricular diastolic function.  Normal right ventricular size with normal right ventricular systolic function.  Normal central venous pressure (3 mmHg).  The left ventricular global longitudinal strain is -17.6%.  There is moderate aortic valve stenosis.  Aortic valve area is 2.04 cm2; peak velocity is 3.17 m/s; mean gradient is 26 mmHg.      PET/CT scan (12/9/22): I have personally reviewed the images  Quality of the study: Adequate.     Reference values:     Mediastinal blood pool maximum SUV: 2.9     Normal liver background maximum SUV: 3.0     In the head and neck, numerous enlarged hypermetabolic lymph nodes within bilateral cervical periclavicular lesions.  For example, Conglomerate of right cervical and supraclavicular lymph nodes with SUV 14.  Left infraclavicular lymph node measuring 1.8 cm (image 95), with SUV max 10.  There is fluid and gas within the level 2 B measuring 2.1 cm, likely related to recent biopsy.     In the chest, numerous enlarged hypermetabolic mediastinal, hilar, and axilla lymph nodes.  For example subcarinal lymph node measuring 3.2 x 6.5 cm demonstrating SUV max of 9.8 (image 129).     In the abdomen and pelvis, there is physiologic tracer distribution within the abdominal organs and excretion into the genitourinary system.     Numerous enlarged hypermetabolic lymph nodes.  For example, conglomerate of lymph nodes within the anterior abdomen demonstrating SUV max of 18 (fused image 205).  Posterior right pararenal space hypermetabolic node measuring 3.0 x 2.4 cm with SUV max of 16 (image 218).  Conglomerate of right pelvic sidewall nodes measuring 8.0 AP x 4.4 TV x 11.1 CC cm demonstrating SUV max of 19 (image 251).     The spleen has multifocal hypermetabolic uptake within SUV max of 13 and is enlarged at 19.0 cm in craniocaudal dimension.  There  are multiple areas of hypoattenuation, better characterized on prior CTs.     In the bones, there are no hypermetabolic lesions worrisome for malignancy.     Additional CT findings     Right-sided chest port tip terminating in the cavoatrial junction.  Trace left pleural fluid, prior cholecystectomy.     Impression:     Multiple hypermetabolic lymph nodes involving both sides of the diaphragm including bulky right iliac conglomerate in keeping with known large B-cell lymphoma.     Splenomegaly and multifocal hypermetabolic foci within the spleen.     The Deauville Score is: 5     Reference values:     Mediastinal blood pool maximum SUV: 2.9     Normal liver maximum SUV: 3.0      CT chest/abdomen/pelvis (11/7/22): I have personally reviewed the images     Innumerable adenopathy above and below the diaphragm and throughout the neck, LEFT axilla and chest wall, retroperitoneum and mesentery as well as in the iliac chain, right greater than left.     Findings are suspicious for lymphoma/leukemia.     Multiple splenic lesions appearing to have increased in number and size since the prior exam.        CT soft tissue neck (11/7/22): I have personally reviewed the images  Extensive adenopathy within the right greater than left neck with also adenopathy within the mediastinum and left axilla.  The largest lymph node in the right posterior triangle/spinal accessory region demonstrates internal necrosis as does a right paratracheal lymph node within the mediastinum..  There is inflammatory change surrounding the necrotic lymph nodes and although an infectious/inflammatory process is not excluded, a neoplastic process including lymphoma to be considered.      Assessment:       1. Diffuse large B-cell lymphoma of lymph nodes of multiple regions    2. Anemia due to antineoplastic chemotherapy    3. Folate deficiency    4. Type 2 diabetes mellitus with hyperglycemia, without long-term current use of insulin    5. Severe obesity  "(BMI 35.0-39.9) with comorbidity    6. Splenomegaly    7. Thrombocytopenia, secondary           Plan:     Diffuse large B-cell lymphoma  - I have reviewed his chart  - CT scans (11/7/22) revealed extensive adenopathy.  - excisional biopsy (11/23/22) revealed diffuse large B-cell lymphoma, non-GCB subtype.  - No rearrangement of MYC or BCL2, so not a double-hit lymphoma. 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  - I and via oncology recommend R-CHOP x 6 cycles.  - The risks and benefits of chemotherapy were discussed, written information was given, and informed consent was obtained.  - hepatitis B core antibody/surface antigen and hepatitis C antibody were negative.  - he underwent port-a-cath placement on 12/7/22 with Dr. Velazquez.  - PET/CT scan (12/9/22) revealed "Multiple hypermetabolic lymph nodes involving both sides of the diaphragm including bulky right iliac conglomerate in keeping with known large B-cell lymphoma."  - I sent emla cream to his pharmacy  - I sent anti-emetics to his pharmacy.  - I sent prednisone to his pharmacy  - echocardiogram (12/13/22) revealed an ejection fraction of 65%  - he received a unit of blood on 12/15/22 for symptomatic anemia  - PET/CT scan (3/22/23) [following 4 cycles of therapy] revealed "interval resolution of pathologically enlarged, hypermetabolic lymph nodes on both sides of the diaphragm in keeping with excellent response to therapy." Since his pet scan on 3/22/23 revealed a complete response to therapy, we will hold off on further PET scans (end-of-treatment pet scan).   - following 6 cycles of R-CHOP, he was hospitalized for sepsis secondary to Klebsiella  - refer for port removal.  - pet scan (8/4/23) revealed "no hypermetabolic tumor or lymphadenopathy."  - return to clinic in 3 months.     2. Anemia due to chemotherapy  - Labs have been reviewed. Hemoglobin is 11.9 g/dL  - continue to monitor    3. Thrombocytopenia secondary to " splenomegaly  - Labs have been reviewed. Platelet count is 140 k/uL  - splenomegaly seen on PET scan (8/4/23)  - continue to monitor    4. Type 2 diabetes  - last hemoglobin A1c (5/15/23) was 5.4%  - continue metformin  - defer management to primary care    5. Folate deficiency  - continue folic acid    - return to clinic in 3 months.     Sandip Vickers M.D.  Hematology/Oncology  [facility] - [address], [address]  [place] [address]  Phone: [phone]  Fax: [phone]

## 2023-08-09 ENCOUNTER — OFFICE VISIT (OUTPATIENT)
Dept: HEMATOLOGY/ONCOLOGY | Facility: CLINIC | Age: 58
End: 2023-08-09
Payer: COMMERCIAL

## 2023-08-09 VITALS
RESPIRATION RATE: 18 BRPM | HEART RATE: 70 BPM | BODY MASS INDEX: 36.37 KG/M2 | WEIGHT: 246.25 LBS | DIASTOLIC BLOOD PRESSURE: 60 MMHG | SYSTOLIC BLOOD PRESSURE: 142 MMHG | OXYGEN SATURATION: 98 %

## 2023-08-09 DIAGNOSIS — T45.1X5A ANEMIA DUE TO ANTINEOPLASTIC CHEMOTHERAPY: ICD-10-CM

## 2023-08-09 DIAGNOSIS — C83.38 DIFFUSE LARGE B-CELL LYMPHOMA OF LYMPH NODES OF MULTIPLE REGIONS: Primary | ICD-10-CM

## 2023-08-09 DIAGNOSIS — R16.1 SPLENOMEGALY: ICD-10-CM

## 2023-08-09 DIAGNOSIS — E53.8 FOLATE DEFICIENCY: ICD-10-CM

## 2023-08-09 DIAGNOSIS — E66.01 SEVERE OBESITY (BMI 35.0-39.9) WITH COMORBIDITY: ICD-10-CM

## 2023-08-09 DIAGNOSIS — E11.65 TYPE 2 DIABETES MELLITUS WITH HYPERGLYCEMIA, WITHOUT LONG-TERM CURRENT USE OF INSULIN: ICD-10-CM

## 2023-08-09 DIAGNOSIS — D69.59 THROMBOCYTOPENIA, SECONDARY: ICD-10-CM

## 2023-08-09 DIAGNOSIS — D64.81 ANEMIA DUE TO ANTINEOPLASTIC CHEMOTHERAPY: ICD-10-CM

## 2023-08-09 PROCEDURE — 3008F BODY MASS INDEX DOCD: CPT | Mod: CPTII,S$GLB,, | Performed by: INTERNAL MEDICINE

## 2023-08-09 PROCEDURE — 3077F PR MOST RECENT SYSTOLIC BLOOD PRESSURE >= 140 MM HG: ICD-10-PCS | Mod: CPTII,S$GLB,, | Performed by: INTERNAL MEDICINE

## 2023-08-09 PROCEDURE — 99999 PR PBB SHADOW E&M-EST. PATIENT-LVL IV: ICD-10-PCS | Mod: PBBFAC,,, | Performed by: INTERNAL MEDICINE

## 2023-08-09 PROCEDURE — 3078F DIAST BP <80 MM HG: CPT | Mod: CPTII,S$GLB,, | Performed by: INTERNAL MEDICINE

## 2023-08-09 PROCEDURE — 99999 PR PBB SHADOW E&M-EST. PATIENT-LVL IV: CPT | Mod: PBBFAC,,, | Performed by: INTERNAL MEDICINE

## 2023-08-09 PROCEDURE — 3044F PR MOST RECENT HEMOGLOBIN A1C LEVEL <7.0%: ICD-10-PCS | Mod: CPTII,S$GLB,, | Performed by: INTERNAL MEDICINE

## 2023-08-09 PROCEDURE — 4010F PR ACE/ARB THEARPY RXD/TAKEN: ICD-10-PCS | Mod: CPTII,S$GLB,, | Performed by: INTERNAL MEDICINE

## 2023-08-09 PROCEDURE — 3078F PR MOST RECENT DIASTOLIC BLOOD PRESSURE < 80 MM HG: ICD-10-PCS | Mod: CPTII,S$GLB,, | Performed by: INTERNAL MEDICINE

## 2023-08-09 PROCEDURE — 1159F PR MEDICATION LIST DOCUMENTED IN MEDICAL RECORD: ICD-10-PCS | Mod: CPTII,S$GLB,, | Performed by: INTERNAL MEDICINE

## 2023-08-09 PROCEDURE — 1160F RVW MEDS BY RX/DR IN RCRD: CPT | Mod: CPTII,S$GLB,, | Performed by: INTERNAL MEDICINE

## 2023-08-09 PROCEDURE — 3044F HG A1C LEVEL LT 7.0%: CPT | Mod: CPTII,S$GLB,, | Performed by: INTERNAL MEDICINE

## 2023-08-09 PROCEDURE — 99214 PR OFFICE/OUTPT VISIT, EST, LEVL IV, 30-39 MIN: ICD-10-PCS | Mod: S$GLB,,, | Performed by: INTERNAL MEDICINE

## 2023-08-09 PROCEDURE — 1160F PR REVIEW ALL MEDS BY PRESCRIBER/CLIN PHARMACIST DOCUMENTED: ICD-10-PCS | Mod: CPTII,S$GLB,, | Performed by: INTERNAL MEDICINE

## 2023-08-09 PROCEDURE — 3077F SYST BP >= 140 MM HG: CPT | Mod: CPTII,S$GLB,, | Performed by: INTERNAL MEDICINE

## 2023-08-09 PROCEDURE — 3008F PR BODY MASS INDEX (BMI) DOCUMENTED: ICD-10-PCS | Mod: CPTII,S$GLB,, | Performed by: INTERNAL MEDICINE

## 2023-08-09 PROCEDURE — 4010F ACE/ARB THERAPY RXD/TAKEN: CPT | Mod: CPTII,S$GLB,, | Performed by: INTERNAL MEDICINE

## 2023-08-09 PROCEDURE — 99214 OFFICE O/P EST MOD 30 MIN: CPT | Mod: S$GLB,,, | Performed by: INTERNAL MEDICINE

## 2023-08-09 PROCEDURE — 1159F MED LIST DOCD IN RCRD: CPT | Mod: CPTII,S$GLB,, | Performed by: INTERNAL MEDICINE

## 2023-08-09 NOTE — Clinical Note
Good morning,  He's completed chemo, scans look good. Hoping to get his port out (you had placed it). Can you schedule him for port removal? Dates that work for him include: August: 14, 15, 18, 23, 24, 28, 29  Thanks!

## 2023-08-10 ENCOUNTER — PATIENT MESSAGE (OUTPATIENT)
Dept: SURGERY | Facility: CLINIC | Age: 58
End: 2023-08-10
Payer: COMMERCIAL

## 2023-08-15 ENCOUNTER — OFFICE VISIT (OUTPATIENT)
Dept: SURGERY | Facility: CLINIC | Age: 58
End: 2023-08-15
Payer: COMMERCIAL

## 2023-08-15 VITALS
HEIGHT: 69 IN | HEART RATE: 85 BPM | SYSTOLIC BLOOD PRESSURE: 145 MMHG | WEIGHT: 250.75 LBS | DIASTOLIC BLOOD PRESSURE: 82 MMHG | BODY MASS INDEX: 37.14 KG/M2

## 2023-08-15 DIAGNOSIS — Z95.828 PORT-A-CATH IN PLACE: Primary | ICD-10-CM

## 2023-08-15 PROCEDURE — 4010F ACE/ARB THERAPY RXD/TAKEN: CPT | Mod: CPTII,S$GLB,, | Performed by: SURGERY

## 2023-08-15 PROCEDURE — 1160F RVW MEDS BY RX/DR IN RCRD: CPT | Mod: CPTII,S$GLB,, | Performed by: SURGERY

## 2023-08-15 PROCEDURE — 3044F PR MOST RECENT HEMOGLOBIN A1C LEVEL <7.0%: ICD-10-PCS | Mod: CPTII,S$GLB,, | Performed by: SURGERY

## 2023-08-15 PROCEDURE — 4010F PR ACE/ARB THEARPY RXD/TAKEN: ICD-10-PCS | Mod: CPTII,S$GLB,, | Performed by: SURGERY

## 2023-08-15 PROCEDURE — 1160F PR REVIEW ALL MEDS BY PRESCRIBER/CLIN PHARMACIST DOCUMENTED: ICD-10-PCS | Mod: CPTII,S$GLB,, | Performed by: SURGERY

## 2023-08-15 PROCEDURE — 99024 PR POST-OP FOLLOW-UP VISIT: ICD-10-PCS | Mod: S$GLB,,, | Performed by: SURGERY

## 2023-08-15 PROCEDURE — 3079F PR MOST RECENT DIASTOLIC BLOOD PRESSURE 80-89 MM HG: ICD-10-PCS | Mod: CPTII,S$GLB,, | Performed by: SURGERY

## 2023-08-15 PROCEDURE — 99024 POSTOP FOLLOW-UP VISIT: CPT | Mod: S$GLB,,, | Performed by: SURGERY

## 2023-08-15 PROCEDURE — 99999 PR PBB SHADOW E&M-EST. PATIENT-LVL III: CPT | Mod: PBBFAC,,, | Performed by: SURGERY

## 2023-08-15 PROCEDURE — 3077F SYST BP >= 140 MM HG: CPT | Mod: CPTII,S$GLB,, | Performed by: SURGERY

## 2023-08-15 PROCEDURE — 99999 PR PBB SHADOW E&M-EST. PATIENT-LVL III: ICD-10-PCS | Mod: PBBFAC,,, | Performed by: SURGERY

## 2023-08-15 PROCEDURE — 1159F PR MEDICATION LIST DOCUMENTED IN MEDICAL RECORD: ICD-10-PCS | Mod: CPTII,S$GLB,, | Performed by: SURGERY

## 2023-08-15 PROCEDURE — 3044F HG A1C LEVEL LT 7.0%: CPT | Mod: CPTII,S$GLB,, | Performed by: SURGERY

## 2023-08-15 PROCEDURE — 3008F PR BODY MASS INDEX (BMI) DOCUMENTED: ICD-10-PCS | Mod: CPTII,S$GLB,, | Performed by: SURGERY

## 2023-08-15 PROCEDURE — 3077F PR MOST RECENT SYSTOLIC BLOOD PRESSURE >= 140 MM HG: ICD-10-PCS | Mod: CPTII,S$GLB,, | Performed by: SURGERY

## 2023-08-15 PROCEDURE — 3079F DIAST BP 80-89 MM HG: CPT | Mod: CPTII,S$GLB,, | Performed by: SURGERY

## 2023-08-15 PROCEDURE — 3008F BODY MASS INDEX DOCD: CPT | Mod: CPTII,S$GLB,, | Performed by: SURGERY

## 2023-08-15 PROCEDURE — 36590 REMOVAL TUNNELED CV CATH: CPT | Mod: S$GLB,,, | Performed by: SURGERY

## 2023-08-15 PROCEDURE — 1159F MED LIST DOCD IN RCRD: CPT | Mod: CPTII,S$GLB,, | Performed by: SURGERY

## 2023-08-15 PROCEDURE — 36590 PR REMOVAL TUNNELED CV CATH W SUBQ PORT OR PUMP: ICD-10-PCS | Mod: S$GLB,,, | Performed by: SURGERY

## 2023-08-15 NOTE — PROGRESS NOTES
PATIENT: Saud Mayers    MRN: [id number]    DATE OF PROCEDURE: 08/15/2023    PREOPERATIVE DIAGNOSIS: PAC no longer needed    POSTOPERATIVE DIAGNOSIS:  same    PROCEDURE:  Removal of Port-A-Cath    SURGEON: Francisco Velazquez M.D.    ASSISTANT: None    ANESTHESIA:  Local    ESTIMATED BLOOD LOSS:  Minimal    SPECIMEN:  None    CONDITION:  Stable    COMPLICATIONS: None    INDICATIONS: The patient is a 59 yo WM that no longer requires their port. The risks, benefits, and alternatives to removal of a Port-A-Cath were explained to the patient in detail.  The patient stated clear understanding of all the risks and requested the procedure be done.    PROCEDURE IN DETAIL:  After surgical consent was obtained, the chest was prepped and draped in a standard sterile fashion.  Local anesthetic was injected.  An incision was made through the previous scar and carried down through the port capsule.  The sutures were cut and the complete port and catheter system were removed without incident.  The wound was then closed in 2 layers using interrupted 3 0 Vicryl suture.  A standard sterile dressing was applied.  The patient tolerated the procedure without any complications.      Francisco Velazquez M.D., F.A.C.S.  [id number] and General Surgery  [facility]

## 2023-10-13 ENCOUNTER — LAB VISIT (OUTPATIENT)
Dept: LAB | Facility: HOSPITAL | Age: 58
End: 2023-10-13
Attending: INTERNAL MEDICINE
Payer: COMMERCIAL

## 2023-10-13 ENCOUNTER — OFFICE VISIT (OUTPATIENT)
Dept: HEMATOLOGY/ONCOLOGY | Facility: CLINIC | Age: 58
End: 2023-10-13
Payer: COMMERCIAL

## 2023-10-13 VITALS
WEIGHT: 259.06 LBS | HEART RATE: 71 BPM | RESPIRATION RATE: 16 BRPM | DIASTOLIC BLOOD PRESSURE: 68 MMHG | OXYGEN SATURATION: 99 % | SYSTOLIC BLOOD PRESSURE: 155 MMHG | BODY MASS INDEX: 38.25 KG/M2

## 2023-10-13 DIAGNOSIS — E53.8 FOLATE DEFICIENCY: ICD-10-CM

## 2023-10-13 DIAGNOSIS — C83.38 DIFFUSE LARGE B-CELL LYMPHOMA OF LYMPH NODES OF MULTIPLE REGIONS: Primary | ICD-10-CM

## 2023-10-13 DIAGNOSIS — C83.38 DIFFUSE LARGE B-CELL LYMPHOMA OF LYMPH NODES OF MULTIPLE REGIONS: ICD-10-CM

## 2023-10-13 DIAGNOSIS — E66.01 SEVERE OBESITY (BMI 35.0-39.9) WITH COMORBIDITY: ICD-10-CM

## 2023-10-13 DIAGNOSIS — E11.65 TYPE 2 DIABETES MELLITUS WITH HYPERGLYCEMIA, WITHOUT LONG-TERM CURRENT USE OF INSULIN: ICD-10-CM

## 2023-10-13 LAB
ALBUMIN SERPL BCP-MCNC: 4.2 G/DL (ref 3.5–5.2)
ALP SERPL-CCNC: 66 U/L (ref 38–126)
ALT SERPL W/O P-5'-P-CCNC: 36 U/L (ref 10–44)
ANION GAP SERPL CALC-SCNC: 9 MMOL/L (ref 8–16)
AST SERPL-CCNC: 37 U/L (ref 15–46)
BASOPHILS # BLD AUTO: 0.05 K/UL (ref 0–0.2)
BASOPHILS NFR BLD: 1.3 % (ref 0–1.9)
BILIRUB SERPL-MCNC: 0.6 MG/DL (ref 0.1–1)
CALCIUM SERPL-MCNC: 9.1 MG/DL (ref 8.7–10.5)
CHLORIDE SERPL-SCNC: 102 MMOL/L (ref 95–110)
CO2 SERPL-SCNC: 27 MMOL/L (ref 23–29)
CREAT SERPL-MCNC: 0.78 MG/DL (ref 0.5–1.4)
DIFFERENTIAL METHOD: ABNORMAL
EOSINOPHIL # BLD AUTO: 0.1 K/UL (ref 0–0.5)
EOSINOPHIL NFR BLD: 2.1 % (ref 0–8)
ERYTHROCYTE [DISTWIDTH] IN BLOOD BY AUTOMATED COUNT: 14 % (ref 11.5–14.5)
EST. GFR  (NO RACE VARIABLE): >60 ML/MIN/1.73 M^2
GLUCOSE SERPL-MCNC: 174 MG/DL (ref 70–110)
HCT VFR BLD AUTO: 36.7 % (ref 40–54)
HGB BLD-MCNC: 12.3 G/DL (ref 14–18)
IMM GRANULOCYTES # BLD AUTO: 0.03 K/UL (ref 0–0.04)
IMM GRANULOCYTES NFR BLD AUTO: 0.8 % (ref 0–0.5)
LDH SERPL L TO P-CCNC: 257 U/L (ref 110–260)
LYMPHOCYTES # BLD AUTO: 1.1 K/UL (ref 1–4.8)
LYMPHOCYTES NFR BLD: 27.2 % (ref 18–48)
MCH RBC QN AUTO: 32.5 PG (ref 27–31)
MCHC RBC AUTO-ENTMCNC: 33.5 G/DL (ref 32–36)
MCV RBC AUTO: 97 FL (ref 82–98)
MONOCYTES # BLD AUTO: 0.5 K/UL (ref 0.3–1)
MONOCYTES NFR BLD: 11.9 % (ref 4–15)
NEUTROPHILS # BLD AUTO: 2.2 K/UL (ref 1.8–7.7)
NEUTROPHILS NFR BLD: 56.7 % (ref 38–73)
NRBC BLD-RTO: 0 /100 WBC
PLATELET # BLD AUTO: 153 K/UL (ref 150–450)
PMV BLD AUTO: 9.3 FL (ref 9.2–12.9)
POTASSIUM SERPL-SCNC: 4.4 MMOL/L (ref 3.5–5.1)
PROT SERPL-MCNC: 7.3 G/DL (ref 6–8.4)
RBC # BLD AUTO: 3.78 M/UL (ref 4.6–6.2)
SODIUM SERPL-SCNC: 138 MMOL/L (ref 136–145)
URATE SERPL-MCNC: 4.1 MG/DL (ref 3.4–7)
UUN UR-MCNC: 20 MG/DL (ref 2–20)
WBC # BLD AUTO: 3.86 K/UL (ref 3.9–12.7)

## 2023-10-13 PROCEDURE — 84550 ASSAY OF BLOOD/URIC ACID: CPT | Performed by: INTERNAL MEDICINE

## 2023-10-13 PROCEDURE — 1159F PR MEDICATION LIST DOCUMENTED IN MEDICAL RECORD: ICD-10-PCS | Mod: CPTII,S$GLB,, | Performed by: INTERNAL MEDICINE

## 2023-10-13 PROCEDURE — 99999 PR PBB SHADOW E&M-EST. PATIENT-LVL IV: ICD-10-PCS | Mod: PBBFAC,,, | Performed by: INTERNAL MEDICINE

## 2023-10-13 PROCEDURE — 1159F MED LIST DOCD IN RCRD: CPT | Mod: CPTII,S$GLB,, | Performed by: INTERNAL MEDICINE

## 2023-10-13 PROCEDURE — 3077F PR MOST RECENT SYSTOLIC BLOOD PRESSURE >= 140 MM HG: ICD-10-PCS | Mod: CPTII,S$GLB,, | Performed by: INTERNAL MEDICINE

## 2023-10-13 PROCEDURE — 80053 COMPREHEN METABOLIC PANEL: CPT | Mod: PN | Performed by: INTERNAL MEDICINE

## 2023-10-13 PROCEDURE — 85025 COMPLETE CBC W/AUTO DIFF WBC: CPT | Mod: PN | Performed by: INTERNAL MEDICINE

## 2023-10-13 PROCEDURE — 4010F ACE/ARB THERAPY RXD/TAKEN: CPT | Mod: CPTII,S$GLB,, | Performed by: INTERNAL MEDICINE

## 2023-10-13 PROCEDURE — 3044F HG A1C LEVEL LT 7.0%: CPT | Mod: CPTII,S$GLB,, | Performed by: INTERNAL MEDICINE

## 2023-10-13 PROCEDURE — 36415 COLL VENOUS BLD VENIPUNCTURE: CPT | Mod: PN | Performed by: INTERNAL MEDICINE

## 2023-10-13 PROCEDURE — 3044F PR MOST RECENT HEMOGLOBIN A1C LEVEL <7.0%: ICD-10-PCS | Mod: CPTII,S$GLB,, | Performed by: INTERNAL MEDICINE

## 2023-10-13 PROCEDURE — 99999 PR PBB SHADOW E&M-EST. PATIENT-LVL IV: CPT | Mod: PBBFAC,,, | Performed by: INTERNAL MEDICINE

## 2023-10-13 PROCEDURE — 1160F RVW MEDS BY RX/DR IN RCRD: CPT | Mod: CPTII,S$GLB,, | Performed by: INTERNAL MEDICINE

## 2023-10-13 PROCEDURE — 3008F PR BODY MASS INDEX (BMI) DOCUMENTED: ICD-10-PCS | Mod: CPTII,S$GLB,, | Performed by: INTERNAL MEDICINE

## 2023-10-13 PROCEDURE — 3078F PR MOST RECENT DIASTOLIC BLOOD PRESSURE < 80 MM HG: ICD-10-PCS | Mod: CPTII,S$GLB,, | Performed by: INTERNAL MEDICINE

## 2023-10-13 PROCEDURE — 99214 PR OFFICE/OUTPT VISIT, EST, LEVL IV, 30-39 MIN: ICD-10-PCS | Mod: S$GLB,,, | Performed by: INTERNAL MEDICINE

## 2023-10-13 PROCEDURE — 83615 LACTATE (LD) (LDH) ENZYME: CPT | Performed by: INTERNAL MEDICINE

## 2023-10-13 PROCEDURE — 4010F PR ACE/ARB THEARPY RXD/TAKEN: ICD-10-PCS | Mod: CPTII,S$GLB,, | Performed by: INTERNAL MEDICINE

## 2023-10-13 PROCEDURE — 1160F PR REVIEW ALL MEDS BY PRESCRIBER/CLIN PHARMACIST DOCUMENTED: ICD-10-PCS | Mod: CPTII,S$GLB,, | Performed by: INTERNAL MEDICINE

## 2023-10-13 PROCEDURE — 3078F DIAST BP <80 MM HG: CPT | Mod: CPTII,S$GLB,, | Performed by: INTERNAL MEDICINE

## 2023-10-13 PROCEDURE — 3008F BODY MASS INDEX DOCD: CPT | Mod: CPTII,S$GLB,, | Performed by: INTERNAL MEDICINE

## 2023-10-13 PROCEDURE — 99214 OFFICE O/P EST MOD 30 MIN: CPT | Mod: S$GLB,,, | Performed by: INTERNAL MEDICINE

## 2023-10-13 PROCEDURE — 3077F SYST BP >= 140 MM HG: CPT | Mod: CPTII,S$GLB,, | Performed by: INTERNAL MEDICINE

## 2023-10-13 NOTE — PROGRESS NOTES
"PATIENT: Saud Mayers  MRN: [id number]  DATE: 10/13/2023    Diagnosis:   1. Diffuse large B-cell lymphoma of lymph nodes of multiple regions    2. Folate deficiency    3. Type 2 diabetes mellitus with hyperglycemia, without long-term current use of insulin    4. Severe obesity (BMI 35.0-39.9) with comorbidity      Chief Complaint: Lymphoma    Oncologic History:      Oncologic History Diffuse large B-cell lymphoma      Oncologic Treatment R-CHOP (start date 12/21/22)      Pathology 11/23/22:  "LYMPH NODE", RIGHT CERVICAL, EXCISION:   -- DIFFUSE LARGE B-CELL LYMPHOMA, NON-GCB SUBTYPE (SEE COMMENT).     Flow cytometric analysis of right cervical lymph node detects a kappa light   chain restricted B lymphocyte population expressing CD19 and CD20, and CD5   (partial and dim).    CD10 is negative.  T lymphocytes are   immunophenotypically unremarkable.  Correlation with tissue morphology is   required. Flow differential: Lymphocytes 35.8%, Monocytes 0.0%, Granulocytes   4.4%, Blast  0.0%, Debris/nRBC 60.0%,  Viability 43.5%.   Immunohistochemical stains are performed with adequate c ontrols.  The large   lymphoid cells are positive for CD20, BCL6 (>30%), MUM1 (>30%), and BCL2   (>50%); negative for CD5, CD10, CD30, cyclin D1, ALK1 and EBV (VICKY in-situ   hybridization).  They display high proliferation index (Ki-67 90%).   CD3-positive small mature T-cells are seen.   Taken together, the overall findings consistent with diffuse large B-cell   lymphoma, non-GCB subtype.   1. Immunohistochemical stain:  The lymphoma cells are positive for MYC (>40%).   2. Fluorescence in-situ hybridization: The result is abnormal and indicates a   BCL6 rearrangement in 91% of nuclei.  No rearrangement of MYC or BCL2 and no   fusion of MYC and IGH was observed.  Rearrangement of BCL6 has been   associated with several B-cell lymphomas, including follicular lymphoma,   diffuse large B-cell lymphoma, and high-grade B-cell lymphoma.  As no MYC "   rearrangement and no fusion of MYC and IGH was observed, this makes unlikely   the possibility of high-grade B-cell lymphoma with MYC and BCL6 rearrangement.   Taken together, the overall findings are consistent with diffuse large B-cell   lymphoma, NOS (non-GCB subtype).  The lymphoma cells display MYC and BCL2   double expressor phenotype.       Flow cytometry - lymph node (11/23/22):  Flow cytometric analysis of lymph node detects a kappa light chain restricted   B lymphocyte population expressing CD19 and CD20, and CD5 (partial and dim).     CD10 is negative.  T lymphocytes are immunophenotypically unremarkable.   Correlation with tissue morphology is required.   Flow differential:  Lymphocytes 35.8%, Monocytes 0.0%, Granulocytes  4.4%,   Blast  0.0%, Debris/nRBC 60.0%,  Viability 43.5%.           Subjective:    History of Present Illness: Mr. Mayers is a 58 y.o. male who presents for evaluation and management of diffuse large B-cell lymphoma. I had seen him during a hospitalization in November 2022.    - he was admitted for lymphadenopathy/weight loss.  - he underwent excisional lymph node biopsy on 11/23/22. Pathology revealed diffuse large B-cell lymphoma, non-GCB type.  - he underwent port-a-cath placement on 12/7/22 with Dr. Velazquez.  - he underwent pet scan on 12/9/22.  - he underwent port-a-cath placement on 12/7/22 with Dr. Velazquez.  - he underwent pet scan on 12/9/22.  - he underwent PET scan on 3/22/23.  - he completed 6 cycles of R-CHOP.  - he underwent PET scan on 8/4/23    Interval history:  - he presents for a follow-up appointment for his diffuse large B-cell lymphoma.  - today, he endorses fatigue. He denies chest pain, vomiting, diarrhea, constipation.      Past medical, surgical, family, and social histories have been reviewed and updated below.    Past Medical History:   Past Medical History:   Diagnosis Date    Asthma     Cancer     Diabetes mellitus     High cholesterol     Hypertension      ANAMIKA on CPAP     Testicular hypofunction        Past Surgical History:   Past Surgical History:   Procedure Laterality Date    CARPAL TUNNEL RELEASE Bilateral 2020    CHOLECYSTECTOMY  01/01/1990    COLONOSCOPY  01/01/2013    COLONOSCOPY N/A 9/2/2022    Procedure: COLONOSCOPY sutab;  Surgeon: Jeovany Cisse MD;  Location: [facility] ENDO;  Service: Endoscopy;  Laterality: N/A;    ESOPHAGOGASTRODUODENOSCOPY N/A 9/2/2022    Procedure: EGD (ESOPHAGOGASTRODUODENOSCOPY);  Surgeon: Jeovany Cisse MD;  Location: [facility] ENDO;  Service: Endoscopy;  Laterality: N/A;    INSERTION OF TUNNELED CENTRAL VENOUS CATHETER (CVC) WITH SUBCUTANEOUS PORT N/A 12/7/2022    Procedure: ODONRUPOJ-YBYG-G-CATH;  Surgeon: Francisco Velazquez MD;  Location: [facility] OR;  Service: General;  Laterality: N/A;    ORTHOPEDIC SURGERY Bilateral 01/01/2007    feet plantar    ORTHOPEDIC SURGERY Right 01/01/2006    knee    ROTATOR CUFF REPAIR Left 01/01/1997    SURGICAL REMOVAL OF LYMPH NODE Right 11/23/2022    Procedure: EXCISION, LYMPH NODE;  Surgeon: Francisco Velazquez MD;  Location: [facility] OR;  Service: General;  Laterality: Right;   (right neck)       Family History:   Family History   Problem Relation Age of Onset    Heart attack Father        Social History:  reports that he quit smoking about 31 years ago. His smoking use included cigarettes. He has never used smokeless tobacco. He reports that he does not drink alcohol and does not use drugs.    Allergies:  Review of patient's allergies indicates:   Allergen Reactions    Gabapentin Hallucinations    Tizanidine Other (See Comments)     somnolence       Medications:  Current Outpatient Medications   Medication Sig Dispense Refill    acetaminophen (TYLENOL) 500 MG tablet Take 500 mg by mouth every 6 (six) hours as needed for Pain.      allopurinoL (ZYLOPRIM) 300 MG tablet Take 1 tablet (300 mg total) by mouth once daily. 30 tablet 11    atorvastatin (LIPITOR) 20 MG tablet Take 1 tablet (20 mg total) by mouth once  daily. 90 tablet 3    cyanocobalamin (VITAMIN B-12) 100 MCG tablet Take 100 mcg by mouth once daily.      folic acid (FOLVITE) 1 MG tablet Take 1 tablet (1 mg total) by mouth once daily. 100 tablet 3    LIDOcaine-prilocaine (EMLA) cream Apply topically as needed. Apply to skin overlying port site 30 minutes before chemotherapy. 30 g 1    linaCLOtide (LINZESS) 72 mcg Cap capsule Take 1 capsule (72 mcg total) by mouth before breakfast. 90 capsule 3    losartan (COZAAR) 25 MG tablet Take 1 tablet (25 mg total) by mouth once daily. 90 tablet 3    magic mouthwash diphen/antac/lidoc/nysta 10 mLs 3 (three) times daily as needed (chemotherapy-induced mucositis.). 300 mL 1    meloxicam (MOBIC) 7.5 MG tablet Take 1 tablet (7.5 mg total) by mouth daily as needed for Pain. 30 tablet 1    metFORMIN (GLUCOPHAGE) 1000 MG tablet Take 1 tablet (1,000 mg total) by mouth 2 (two) times daily with meals. 180 tablet 3    metoprolol succinate (TOPROL-XL) 50 MG 24 hr tablet Take 1 tablet (50 mg total) by mouth once daily. 90 tablet 3    multivit-min-folic-vit K-lycop (ONE-A-DAY MEN'S 50 PLUS) 400- mcg Tab Take 1 tablet by mouth once daily.      oxyCODONE-acetaminophen (PERCOCET) 5-325 mg per tablet Take 1 tablet by mouth every 6 (six) hours as needed for Pain. 28 tablet 0    tadalafiL (CIALIS) 10 MG tablet Take 1 tablet (10 mg total) by mouth daily as needed for Erectile Dysfunction. 30 tablet 1     No current facility-administered medications for this visit.       Review of Systems   Constitutional:  Positive for fatigue. Negative for fever.   HENT:  Negative for sore throat.    Eyes:  Negative for visual disturbance.   Respiratory:  Negative for shortness of breath.    Cardiovascular:  Negative for chest pain.   Gastrointestinal:  Negative for abdominal pain and nausea.   Genitourinary:  Negative for dysuria.   Musculoskeletal:  Negative for back pain.   Skin:  Negative for rash.   Neurological:  Negative for headaches.    Hematological:  Negative for adenopathy.   Psychiatric/Behavioral:  The patient is not nervous/anxious.        ECOG Performance Status:   ECOG SCORE    0 - Fully active-able to carry on all pre-disease performance without restriction         Objective:      Vitals:   Vitals:    10/13/23 0846   BP: (!) 155/68   BP Location: Right arm   Patient Position: Sitting   BP Method: Large (Automatic)   Pulse: 71   Resp: 16   SpO2: 99%   Weight: 117.5 kg (259 lb 0.7 oz)     BMI: Body mass index is 38.25 kg/m².    Physical Exam  Vitals and nursing note reviewed.   Constitutional:       Appearance: He is well-developed.   HENT:      Head: Normocephalic and atraumatic.   Eyes:      Pupils: Pupils are equal, round, and reactive to light.   Cardiovascular:      Rate and Rhythm: Normal rate and regular rhythm.   Pulmonary:      Effort: Pulmonary effort is normal.      Breath sounds: Normal breath sounds.   Abdominal:      General: Bowel sounds are normal.      Palpations: Abdomen is soft.   Musculoskeletal:         General: Normal range of motion.      Cervical back: Normal range of motion and neck supple.   Lymphadenopathy:      Cervical: No cervical adenopathy.      Upper Body:      Right upper body: No axillary adenopathy.      Left upper body: No axillary adenopathy.   Skin:     General: Skin is warm and dry.   Neurological:      Mental Status: He is alert and oriented to person, place, and time.   Psychiatric:         Behavior: Behavior normal.         Thought Content: Thought content normal.         Judgment: Judgment normal.         Laboratory Data:  Labs have been reviewed.    Lab Results   Component Value Date    WBC 4.20 08/04/2023    HGB 11.9 (L) 08/04/2023    HCT 34.9 (L) 08/04/2023    MCV 97 08/04/2023     (L) 08/04/2023       Imaging:    PET/CT scan (8/4/23): I have personally reviewed the images  Quality of the study: Adequate.     Reference values:     Mediastinal blood pool maximum SUV: 2.5     Normal liver  background maximum SUV: 3     In the head and neck, there are no hypermetabolic lesions worrisome for malignancy. There are no hypermetabolic mucosal lesions, and there are no pathologically enlarged or hypermetabolic lymph nodes.     In the chest, there are no hypermetabolic lesions worrisome for malignancy.  There are no concerning pulmonary nodules or masses, and there are no pathologically enlarged or hypermetabolic lymph nodes.     In the abdomen and pelvis, there is physiologic tracer distribution within the abdominal organs and excretion into the genitourinary system.  No pathologically enlarged or hypermetabolic lymph nodes.  Stable non FDG avid normal-sized mesenteric lymph nodes with mild adjacent mesenteric stranding.     The spleen has normal uptake and is enlarged at 16 cm.  Stable splenic hypodensity.     In the bones, there are no hypermetabolic lesions worrisome for malignancy.     In the extremities, there are no hypermetabolic lesions worrisome for malignancy.     Additional CT findings: Small fat containing right inguinal hernia.  Calcific atherosclerosis of the coronary arteries.  Cholecystectomy.  Right chest port transvenous catheter tip terminates in the right atrium.     Impression:     1.  No hypermetabolic tumor or lymphadenopathy.     2.  Stable splenomegaly.      PET/CT scan (3/22/23): I have personally reviewed the images  1.  In this patient with lymphoma, there has been interval resolution of pathologically enlarged, hypermetabolic lymph nodes on both sides of the diaphragm in keeping with excellent response to therapy.     2.  Improved but persistent splenomegaly, with interval resolution of previously noted hypermetabolic foci.     3.  Diffuse uptake in the bone marrow, consistent with bone marrow hyperplasia.         Echocardiogram (12/13/22):  The left ventricle is normal in size with concentric remodeling and normal systolic function.  The estimated ejection fraction is  65%.  Normal left ventricular diastolic function.  Normal right ventricular size with normal right ventricular systolic function.  Normal central venous pressure (3 mmHg).  The left ventricular global longitudinal strain is -17.6%.  There is moderate aortic valve stenosis.  Aortic valve area is 2.04 cm2; peak velocity is 3.17 m/s; mean gradient is 26 mmHg.      PET/CT scan (12/9/22): I have personally reviewed the images  Quality of the study: Adequate.     Reference values:     Mediastinal blood pool maximum SUV: 2.9     Normal liver background maximum SUV: 3.0     In the head and neck, numerous enlarged hypermetabolic lymph nodes within bilateral cervical periclavicular lesions.  For example, Conglomerate of right cervical and supraclavicular lymph nodes with SUV 14.  Left infraclavicular lymph node measuring 1.8 cm (image 95), with SUV max 10.  There is fluid and gas within the level 2 B measuring 2.1 cm, likely related to recent biopsy.     In the chest, numerous enlarged hypermetabolic mediastinal, hilar, and axilla lymph nodes.  For example subcarinal lymph node measuring 3.2 x 6.5 cm demonstrating SUV max of 9.8 (image 129).     In the abdomen and pelvis, there is physiologic tracer distribution within the abdominal organs and excretion into the genitourinary system.     Numerous enlarged hypermetabolic lymph nodes.  For example, conglomerate of lymph nodes within the anterior abdomen demonstrating SUV max of 18 (fused image 205).  Posterior right pararenal space hypermetabolic node measuring 3.0 x 2.4 cm with SUV max of 16 (image 218).  Conglomerate of right pelvic sidewall nodes measuring 8.0 AP x 4.4 TV x 11.1 CC cm demonstrating SUV max of 19 (image 251).     The spleen has multifocal hypermetabolic uptake within SUV max of 13 and is enlarged at 19.0 cm in craniocaudal dimension.  There are multiple areas of hypoattenuation, better characterized on prior CTs.     In the bones, there are no hypermetabolic  lesions worrisome for malignancy.     Additional CT findings     Right-sided chest port tip terminating in the cavoatrial junction.  Trace left pleural fluid, prior cholecystectomy.     Impression:     Multiple hypermetabolic lymph nodes involving both sides of the diaphragm including bulky right iliac conglomerate in keeping with known large B-cell lymphoma.     Splenomegaly and multifocal hypermetabolic foci within the spleen.     The Deauville Score is: 5     Reference values:     Mediastinal blood pool maximum SUV: 2.9     Normal liver maximum SUV: 3.0      CT chest/abdomen/pelvis (11/7/22): I have personally reviewed the images     Innumerable adenopathy above and below the diaphragm and throughout the neck, LEFT axilla and chest wall, retroperitoneum and mesentery as well as in the iliac chain, right greater than left.     Findings are suspicious for lymphoma/leukemia.     Multiple splenic lesions appearing to have increased in number and size since the prior exam.        CT soft tissue neck (11/7/22): I have personally reviewed the images  Extensive adenopathy within the right greater than left neck with also adenopathy within the mediastinum and left axilla.  The largest lymph node in the right posterior triangle/spinal accessory region demonstrates internal necrosis as does a right paratracheal lymph node within the mediastinum..  There is inflammatory change surrounding the necrotic lymph nodes and although an infectious/inflammatory process is not excluded, a neoplastic process including lymphoma to be considered.      Assessment:       1. Diffuse large B-cell lymphoma of lymph nodes of multiple regions    2. Folate deficiency    3. Type 2 diabetes mellitus with hyperglycemia, without long-term current use of insulin    4. Severe obesity (BMI 35.0-39.9) with comorbidity           Plan:     Diffuse large B-cell lymphoma  - I have reviewed his chart  - CT scans (11/7/22) revealed extensive adenopathy.  -  "excisional biopsy (11/23/22) revealed diffuse large B-cell lymphoma, non-GCB subtype.  - No rearrangement of MYC or BCL2, so not a double-hit lymphoma. 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  - I and via oncology recommend R-CHOP x 6 cycles.  - The risks and benefits of chemotherapy were discussed, written information was given, and informed consent was obtained.  - hepatitis B core antibody/surface antigen and hepatitis C antibody were negative.  - he underwent port-a-cath placement on 12/7/22 with Dr. Velazquez.  - PET/CT scan (12/9/22) revealed "Multiple hypermetabolic lymph nodes involving both sides of the diaphragm including bulky right iliac conglomerate in keeping with known large B-cell lymphoma."  - I sent emla cream to his pharmacy  - I sent anti-emetics to his pharmacy.  - I sent prednisone to his pharmacy  - echocardiogram (12/13/22) revealed an ejection fraction of 65%  - he received a unit of blood on 12/15/22 for symptomatic anemia  - PET/CT scan (3/22/23) [following 4 cycles of therapy] revealed "interval resolution of pathologically enlarged, hypermetabolic lymph nodes on both sides of the diaphragm in keeping with excellent response to therapy." Since his pet scan on 3/22/23 revealed a complete response to therapy, we will hold off on further PET scans (end-of-treatment pet scan).   - following 6 cycles of R-CHOP, he was hospitalized for sepsis secondary to Klebsiella  - pet scan (8/4/23) revealed "no hypermetabolic tumor or lymphadenopathy."  - he underwent port removal on 8/15/23.  - clinically, he is doing well with no evidence of recurrent lymphoma  - return to clinic in 4 months.     2. Anemia due to chemotherapy  - Labs have been reviewed. Hemoglobin is 12.3 g/dL.  - continue to monitor    3. Thrombocytopenia secondary to splenomegaly  - splenomegaly seen on PET scan (8/4/23)  - Labs have been reviewed. Platelet count is 153 k/uL.  - continue to " monitor    4. Type 2 diabetes  - last hemoglobin A1c (5/15/23) was 5.4%  - continue metformin  - defer management to primary care    5. Folate deficiency  - continue folic acid    6. Severe obesity  - Body mass index is 38.25 kg/m².  - I encouraged weight loss.    - return to clinic in 4 months.     Sandip Vickers M.D.  Hematology/Oncology  [facility] - [address], [address]  [place] [address]  Phone: [phone]  Fax: [phone]

## 2023-11-29 DIAGNOSIS — E11.9 TYPE 2 DIABETES MELLITUS WITHOUT COMPLICATION: ICD-10-CM

## 2023-11-30 ENCOUNTER — PATIENT OUTREACH (OUTPATIENT)
Dept: ADMINISTRATIVE | Facility: HOSPITAL | Age: 58
End: 2023-11-30
Payer: COMMERCIAL

## 2023-11-30 ENCOUNTER — PATIENT MESSAGE (OUTPATIENT)
Dept: ADMINISTRATIVE | Facility: HOSPITAL | Age: 58
End: 2023-11-30
Payer: COMMERCIAL

## 2023-11-30 NOTE — LETTER
December 5, 2023    Saud Mayers  [address] [place] [address]             [facility]  [address]  [place] [address]  Phone: [phone] Dear John [facility] is committed to your overall health.  To help you get the most out of each of your visits, we will review your information to make sure you are up to date on all of your recommended tests and/or procedures.       Anson Blunt MD  has found that your chart shows you may be due for:     Routine Dilated Eye Exam  (Diabetic Retinopathy screening)   Foot Exam  Hemoglobin A1C  Lipid Panel  Diabetes Urine Screening     If you have had any of the above done at another facility, please bring the records or information with you so that your record at [facility] will be complete.  If you would like to schedule any of these, please contact me.     If you are currently taking medication, please bring it with you to your appointment for review.           Thank you for letting us care for you,        Shanika Whitney, Care Coordinator  [facility]  Phone:  [phone]  Fax: [phone]

## 2023-11-30 NOTE — PROGRESS NOTES
Care Everywhere updates requested and reviewed.  Immunizations reconciled. Media reports reviewed.  Duplicate HM overrides and  orders removed.  Overdue HM topic chart audit and/or requested.  Overdue lab testing linked to upcoming lab appointments if applies.    Lab nino and Fed Playbook reviewed for Lab testing         Health Maintenance Due   Topic Date Due    Pneumococcal Vaccines (Age 0-64) (2 - PCV) 2017    Eye Exam  2022    Foot Exam  2022    RSV Vaccine (Age 60+ and Pregnant patients) (1 - 1-dose 60+ series) Never done    Diabetes Urine Screening  2023    Lipid Panel  2023    COVID-19 Vaccine ( - -24 season) 2023    Hemoglobin A1c  11/15/2023

## 2023-12-06 ENCOUNTER — TELEPHONE (OUTPATIENT)
Dept: FAMILY MEDICINE | Facility: CLINIC | Age: 58
End: 2023-12-06
Payer: COMMERCIAL

## 2023-12-06 DIAGNOSIS — I10 ESSENTIAL HYPERTENSION: ICD-10-CM

## 2023-12-06 DIAGNOSIS — Z12.5 PROSTATE CANCER SCREENING: ICD-10-CM

## 2023-12-06 DIAGNOSIS — E11.65 TYPE 2 DIABETES MELLITUS WITH HYPERGLYCEMIA, WITHOUT LONG-TERM CURRENT USE OF INSULIN: Primary | ICD-10-CM

## 2023-12-06 DIAGNOSIS — E78.00 PURE HYPERCHOLESTEROLEMIA: ICD-10-CM

## 2023-12-06 NOTE — TELEPHONE ENCOUNTER
Pt is scheduled to have an A1C drawn on Friday. Do you want to add additional labs? If so, we'll link them to his upcoming appt.

## 2023-12-06 NOTE — TELEPHONE ENCOUNTER
----- Message from Kari Guillory sent at 12/6/2023  2:46 PM CST -----  Regarding: Returning Call  Contact: [phone]  Type:  Needs Medical Advice    Who Called: Saud  Would the patient rather a call back or a response via [facility]? Call  Best Call Back Number: [phone]  Additional Information: Patient stated someone called him. Would like to know if the office called him.

## 2023-12-08 ENCOUNTER — LAB VISIT (OUTPATIENT)
Dept: LAB | Facility: HOSPITAL | Age: 58
End: 2023-12-08
Attending: STUDENT IN AN ORGANIZED HEALTH CARE EDUCATION/TRAINING PROGRAM
Payer: COMMERCIAL

## 2023-12-08 DIAGNOSIS — Z12.5 PROSTATE CANCER SCREENING: ICD-10-CM

## 2023-12-08 DIAGNOSIS — E11.9 TYPE 2 DIABETES MELLITUS WITHOUT COMPLICATION: ICD-10-CM

## 2023-12-08 DIAGNOSIS — E78.00 PURE HYPERCHOLESTEROLEMIA: ICD-10-CM

## 2023-12-08 DIAGNOSIS — I10 ESSENTIAL HYPERTENSION: ICD-10-CM

## 2023-12-08 LAB
ALBUMIN SERPL BCP-MCNC: 4.4 G/DL (ref 3.5–5.2)
ALP SERPL-CCNC: 71 U/L (ref 38–126)
ALT SERPL W/O P-5'-P-CCNC: 39 U/L (ref 10–44)
ANION GAP SERPL CALC-SCNC: 11 MMOL/L (ref 8–16)
AST SERPL-CCNC: 30 U/L (ref 15–46)
BASOPHILS # BLD AUTO: 0.06 K/UL (ref 0–0.2)
BASOPHILS NFR BLD: 1.1 % (ref 0–1.9)
BILIRUB SERPL-MCNC: 0.7 MG/DL (ref 0.1–1)
CALCIUM SERPL-MCNC: 9 MG/DL (ref 8.7–10.5)
CHLORIDE SERPL-SCNC: 100 MMOL/L (ref 95–110)
CHOLEST SERPL-MCNC: 137 MG/DL (ref 120–199)
CHOLEST/HDLC SERPL: 4 {RATIO} (ref 2–5)
CO2 SERPL-SCNC: 27 MMOL/L (ref 23–29)
COMPLEXED PSA SERPL-MCNC: 0.27 NG/ML (ref 0–4)
CREAT SERPL-MCNC: 0.83 MG/DL (ref 0.5–1.4)
DIFFERENTIAL METHOD: ABNORMAL
EOSINOPHIL # BLD AUTO: 0.1 K/UL (ref 0–0.5)
EOSINOPHIL NFR BLD: 2.3 % (ref 0–8)
ERYTHROCYTE [DISTWIDTH] IN BLOOD BY AUTOMATED COUNT: 14.5 % (ref 11.5–14.5)
EST. GFR  (NO RACE VARIABLE): >60 ML/MIN/1.73 M^2
ESTIMATED AVG GLUCOSE: 171 MG/DL (ref 68–131)
GLUCOSE SERPL-MCNC: 223 MG/DL (ref 70–110)
HBA1C MFR BLD: 7.6 % (ref 4–5.6)
HCT VFR BLD AUTO: 37.8 % (ref 40–54)
HDLC SERPL-MCNC: 34 MG/DL (ref 40–75)
HDLC SERPL: 24.8 % (ref 20–50)
HGB BLD-MCNC: 12.8 G/DL (ref 14–18)
IMM GRANULOCYTES # BLD AUTO: 0.03 K/UL (ref 0–0.04)
IMM GRANULOCYTES NFR BLD AUTO: 0.6 % (ref 0–0.5)
LDLC SERPL CALC-MCNC: 66.4 MG/DL (ref 63–159)
LYMPHOCYTES # BLD AUTO: 1.1 K/UL (ref 1–4.8)
LYMPHOCYTES NFR BLD: 21.6 % (ref 18–48)
MCH RBC QN AUTO: 33.2 PG (ref 27–31)
MCHC RBC AUTO-ENTMCNC: 33.9 G/DL (ref 32–36)
MCV RBC AUTO: 98 FL (ref 82–98)
MONOCYTES # BLD AUTO: 0.5 K/UL (ref 0.3–1)
MONOCYTES NFR BLD: 10.3 % (ref 4–15)
NEUTROPHILS # BLD AUTO: 3.4 K/UL (ref 1.8–7.7)
NEUTROPHILS NFR BLD: 64.1 % (ref 38–73)
NONHDLC SERPL-MCNC: 103 MG/DL
NRBC BLD-RTO: 0 /100 WBC
PLATELET # BLD AUTO: 158 K/UL (ref 150–450)
PMV BLD AUTO: 9.4 FL (ref 9.2–12.9)
POTASSIUM SERPL-SCNC: 4.3 MMOL/L (ref 3.5–5.1)
PROT SERPL-MCNC: 7.4 G/DL (ref 6–8.4)
RBC # BLD AUTO: 3.86 M/UL (ref 4.6–6.2)
SODIUM SERPL-SCNC: 138 MMOL/L (ref 136–145)
T4 FREE SERPL-MCNC: 0.8 NG/DL (ref 0.71–1.51)
TRIGL SERPL-MCNC: 183 MG/DL (ref 30–150)
TSH SERPL DL<=0.005 MIU/L-ACNC: 4.53 UIU/ML (ref 0.4–4)
UUN UR-MCNC: 21 MG/DL (ref 2–20)
WBC # BLD AUTO: 5.23 K/UL (ref 3.9–12.7)

## 2023-12-08 PROCEDURE — 84439 ASSAY OF FREE THYROXINE: CPT | Performed by: STUDENT IN AN ORGANIZED HEALTH CARE EDUCATION/TRAINING PROGRAM

## 2023-12-08 PROCEDURE — 85025 COMPLETE CBC W/AUTO DIFF WBC: CPT | Mod: PN | Performed by: STUDENT IN AN ORGANIZED HEALTH CARE EDUCATION/TRAINING PROGRAM

## 2023-12-08 PROCEDURE — 80053 COMPREHEN METABOLIC PANEL: CPT | Mod: PN | Performed by: STUDENT IN AN ORGANIZED HEALTH CARE EDUCATION/TRAINING PROGRAM

## 2023-12-08 PROCEDURE — 83036 HEMOGLOBIN GLYCOSYLATED A1C: CPT | Performed by: STUDENT IN AN ORGANIZED HEALTH CARE EDUCATION/TRAINING PROGRAM

## 2023-12-08 PROCEDURE — 84153 ASSAY OF PSA TOTAL: CPT | Performed by: STUDENT IN AN ORGANIZED HEALTH CARE EDUCATION/TRAINING PROGRAM

## 2023-12-08 PROCEDURE — 80061 LIPID PANEL: CPT | Performed by: STUDENT IN AN ORGANIZED HEALTH CARE EDUCATION/TRAINING PROGRAM

## 2023-12-08 PROCEDURE — 84443 ASSAY THYROID STIM HORMONE: CPT | Mod: PN | Performed by: STUDENT IN AN ORGANIZED HEALTH CARE EDUCATION/TRAINING PROGRAM

## 2023-12-08 PROCEDURE — 36415 COLL VENOUS BLD VENIPUNCTURE: CPT | Mod: PN | Performed by: STUDENT IN AN ORGANIZED HEALTH CARE EDUCATION/TRAINING PROGRAM

## 2023-12-14 ENCOUNTER — OFFICE VISIT (OUTPATIENT)
Dept: FAMILY MEDICINE | Facility: CLINIC | Age: 58
End: 2023-12-14
Payer: COMMERCIAL

## 2023-12-14 VITALS
DIASTOLIC BLOOD PRESSURE: 82 MMHG | BODY MASS INDEX: 38.76 KG/M2 | OXYGEN SATURATION: 97 % | WEIGHT: 261.69 LBS | HEART RATE: 82 BPM | HEIGHT: 69 IN | TEMPERATURE: 99 F | SYSTOLIC BLOOD PRESSURE: 110 MMHG

## 2023-12-14 DIAGNOSIS — E66.9 OBESITY, DIABETES, AND HYPERTENSION SYNDROME: ICD-10-CM

## 2023-12-14 DIAGNOSIS — C83.38 DIFFUSE LARGE B-CELL LYMPHOMA OF LYMPH NODES OF MULTIPLE REGIONS: ICD-10-CM

## 2023-12-14 DIAGNOSIS — E78.00 PURE HYPERCHOLESTEROLEMIA: ICD-10-CM

## 2023-12-14 DIAGNOSIS — E11.69 OBESITY, DIABETES, AND HYPERTENSION SYNDROME: ICD-10-CM

## 2023-12-14 DIAGNOSIS — I15.2 OBESITY, DIABETES, AND HYPERTENSION SYNDROME: ICD-10-CM

## 2023-12-14 DIAGNOSIS — R01.1 SYSTOLIC MURMUR: ICD-10-CM

## 2023-12-14 DIAGNOSIS — G47.33 OSA (OBSTRUCTIVE SLEEP APNEA): ICD-10-CM

## 2023-12-14 DIAGNOSIS — E11.59 OBESITY, DIABETES, AND HYPERTENSION SYNDROME: ICD-10-CM

## 2023-12-14 DIAGNOSIS — Z23 NEED FOR STREPTOCOCCUS PNEUMONIAE VACCINATION: ICD-10-CM

## 2023-12-14 DIAGNOSIS — I10 ESSENTIAL HYPERTENSION: Primary | ICD-10-CM

## 2023-12-14 DIAGNOSIS — E66.01 CLASS 2 SEVERE OBESITY DUE TO EXCESS CALORIES WITH SERIOUS COMORBIDITY AND BODY MASS INDEX (BMI) OF 38.0 TO 38.9 IN ADULT: ICD-10-CM

## 2023-12-14 DIAGNOSIS — E11.65 TYPE 2 DIABETES MELLITUS WITH HYPERGLYCEMIA, WITHOUT LONG-TERM CURRENT USE OF INSULIN: ICD-10-CM

## 2023-12-14 DIAGNOSIS — C83.31 DIFFUSE LARGE B-CELL LYMPHOMA OF LYMPH NODES OF NECK: ICD-10-CM

## 2023-12-14 PROCEDURE — 4010F ACE/ARB THERAPY RXD/TAKEN: CPT | Mod: CPTII,S$GLB,, | Performed by: STUDENT IN AN ORGANIZED HEALTH CARE EDUCATION/TRAINING PROGRAM

## 2023-12-14 PROCEDURE — 99214 OFFICE O/P EST MOD 30 MIN: CPT | Mod: 25,S$GLB,, | Performed by: STUDENT IN AN ORGANIZED HEALTH CARE EDUCATION/TRAINING PROGRAM

## 2023-12-14 PROCEDURE — 1159F PR MEDICATION LIST DOCUMENTED IN MEDICAL RECORD: ICD-10-PCS | Mod: CPTII,S$GLB,, | Performed by: STUDENT IN AN ORGANIZED HEALTH CARE EDUCATION/TRAINING PROGRAM

## 2023-12-14 PROCEDURE — 90677 PNEUMOCOCCAL CONJUGATE VACCINE 20-VALENT: ICD-10-PCS | Mod: S$GLB,,, | Performed by: STUDENT IN AN ORGANIZED HEALTH CARE EDUCATION/TRAINING PROGRAM

## 2023-12-14 PROCEDURE — 3051F PR MOST RECENT HEMOGLOBIN A1C LEVEL 7.0 - < 8.0%: ICD-10-PCS | Mod: CPTII,S$GLB,, | Performed by: STUDENT IN AN ORGANIZED HEALTH CARE EDUCATION/TRAINING PROGRAM

## 2023-12-14 PROCEDURE — 99214 PR OFFICE/OUTPT VISIT, EST, LEVL IV, 30-39 MIN: ICD-10-PCS | Mod: 25,S$GLB,, | Performed by: STUDENT IN AN ORGANIZED HEALTH CARE EDUCATION/TRAINING PROGRAM

## 2023-12-14 PROCEDURE — 90471 IMMUNIZATION ADMIN: CPT | Mod: S$GLB,,, | Performed by: STUDENT IN AN ORGANIZED HEALTH CARE EDUCATION/TRAINING PROGRAM

## 2023-12-14 PROCEDURE — 3051F HG A1C>EQUAL 7.0%<8.0%: CPT | Mod: CPTII,S$GLB,, | Performed by: STUDENT IN AN ORGANIZED HEALTH CARE EDUCATION/TRAINING PROGRAM

## 2023-12-14 PROCEDURE — 1159F MED LIST DOCD IN RCRD: CPT | Mod: CPTII,S$GLB,, | Performed by: STUDENT IN AN ORGANIZED HEALTH CARE EDUCATION/TRAINING PROGRAM

## 2023-12-14 PROCEDURE — 3074F PR MOST RECENT SYSTOLIC BLOOD PRESSURE < 130 MM HG: ICD-10-PCS | Mod: CPTII,S$GLB,, | Performed by: STUDENT IN AN ORGANIZED HEALTH CARE EDUCATION/TRAINING PROGRAM

## 2023-12-14 PROCEDURE — 1160F PR REVIEW ALL MEDS BY PRESCRIBER/CLIN PHARMACIST DOCUMENTED: ICD-10-PCS | Mod: CPTII,S$GLB,, | Performed by: STUDENT IN AN ORGANIZED HEALTH CARE EDUCATION/TRAINING PROGRAM

## 2023-12-14 PROCEDURE — 1160F RVW MEDS BY RX/DR IN RCRD: CPT | Mod: CPTII,S$GLB,, | Performed by: STUDENT IN AN ORGANIZED HEALTH CARE EDUCATION/TRAINING PROGRAM

## 2023-12-14 PROCEDURE — 90677 PCV20 VACCINE IM: CPT | Mod: S$GLB,,, | Performed by: STUDENT IN AN ORGANIZED HEALTH CARE EDUCATION/TRAINING PROGRAM

## 2023-12-14 PROCEDURE — 3008F PR BODY MASS INDEX (BMI) DOCUMENTED: ICD-10-PCS | Mod: CPTII,S$GLB,, | Performed by: STUDENT IN AN ORGANIZED HEALTH CARE EDUCATION/TRAINING PROGRAM

## 2023-12-14 PROCEDURE — 3079F PR MOST RECENT DIASTOLIC BLOOD PRESSURE 80-89 MM HG: ICD-10-PCS | Mod: CPTII,S$GLB,, | Performed by: STUDENT IN AN ORGANIZED HEALTH CARE EDUCATION/TRAINING PROGRAM

## 2023-12-14 PROCEDURE — 3079F DIAST BP 80-89 MM HG: CPT | Mod: CPTII,S$GLB,, | Performed by: STUDENT IN AN ORGANIZED HEALTH CARE EDUCATION/TRAINING PROGRAM

## 2023-12-14 PROCEDURE — 90471 PNEUMOCOCCAL CONJUGATE VACCINE 20-VALENT: ICD-10-PCS | Mod: S$GLB,,, | Performed by: STUDENT IN AN ORGANIZED HEALTH CARE EDUCATION/TRAINING PROGRAM

## 2023-12-14 PROCEDURE — 3074F SYST BP LT 130 MM HG: CPT | Mod: CPTII,S$GLB,, | Performed by: STUDENT IN AN ORGANIZED HEALTH CARE EDUCATION/TRAINING PROGRAM

## 2023-12-14 PROCEDURE — 3008F BODY MASS INDEX DOCD: CPT | Mod: CPTII,S$GLB,, | Performed by: STUDENT IN AN ORGANIZED HEALTH CARE EDUCATION/TRAINING PROGRAM

## 2023-12-14 PROCEDURE — 4010F PR ACE/ARB THEARPY RXD/TAKEN: ICD-10-PCS | Mod: CPTII,S$GLB,, | Performed by: STUDENT IN AN ORGANIZED HEALTH CARE EDUCATION/TRAINING PROGRAM

## 2023-12-14 RX ORDER — FOLIC ACID 1 MG/1
1 TABLET ORAL DAILY
Qty: 100 TABLET | Refills: 3 | Status: SHIPPED | OUTPATIENT
Start: 2023-12-14 | End: 2024-12-13

## 2023-12-14 RX ORDER — ATORVASTATIN CALCIUM 20 MG/1
20 TABLET, FILM COATED ORAL DAILY
Qty: 90 TABLET | Refills: 3 | Status: SHIPPED | OUTPATIENT
Start: 2023-12-14

## 2023-12-14 RX ORDER — SEMAGLUTIDE 0.68 MG/ML
0.5 INJECTION, SOLUTION SUBCUTANEOUS
Qty: 3 ML | Refills: 1 | Status: SHIPPED | OUTPATIENT
Start: 2023-12-14

## 2023-12-14 RX ORDER — MOMETASONE FUROATE 1 MG/G
CREAM TOPICAL DAILY
Qty: 45 G | Refills: 5 | Status: SHIPPED | OUTPATIENT
Start: 2023-12-14 | End: 2023-12-24

## 2023-12-16 PROBLEM — R16.1 SPLENIC MASS: Status: RESOLVED | Noted: 2022-11-10 | Resolved: 2023-12-16

## 2023-12-16 PROBLEM — R59.1 LYMPHADENOPATHY: Status: RESOLVED | Noted: 2022-11-10 | Resolved: 2023-12-16

## 2023-12-16 PROBLEM — R93.89 ABNORMAL FINDING ON CT SCAN: Status: RESOLVED | Noted: 2022-11-10 | Resolved: 2023-12-16

## 2023-12-16 NOTE — PROGRESS NOTES
Patient ID: Saud Mayers is a 58 y.o. male.     Chief Complaint: f/u chronic medical conditions    Follow-up  Pertinent negatives include no chest pain, coughing, fever, headaches, myalgias, nausea, rash, vomiting or weakness.      B cell lymphoma- in remission, no recent infections    DMII- following diabetic diet, taking medications as prescribed    HTN- blood pressure has been at goal      Review of Systems  Review of Systems   Constitutional:  Negative for fever.   HENT:  Negative for ear pain and sinus pain.    Eyes:  Negative for discharge.   Respiratory:  Negative for cough and shortness of breath.    Cardiovascular:  Negative for chest pain and leg swelling.   Gastrointestinal:  Negative for diarrhea, nausea and vomiting.   Genitourinary:  Negative for urgency.   Musculoskeletal:  Negative for myalgias.   Skin:  Negative for rash.   Neurological:  Negative for weakness and headaches.   Psychiatric/Behavioral:  Negative for depression.    All other systems reviewed and are negative.      Currently Medications  Current Outpatient Medications on File Prior to Visit   Medication Sig Dispense Refill    acetaminophen (TYLENOL) 500 MG tablet Take 500 mg by mouth every 6 (six) hours as needed for Pain.      allopurinoL (ZYLOPRIM) 300 MG tablet Take 1 tablet (300 mg total) by mouth once daily. 30 tablet 11    cyanocobalamin (VITAMIN B-12) 100 MCG tablet Take 100 mcg by mouth once daily.      LIDOcaine-prilocaine (EMLA) cream Apply topically as needed. Apply to skin overlying port site 30 minutes before chemotherapy. 30 g 1    losartan (COZAAR) 25 MG tablet Take 1 tablet (25 mg total) by mouth once daily. 90 tablet 3    magic mouthwash diphen/antac/lidoc/nysta 10 mLs 3 (three) times daily as needed (chemotherapy-induced mucositis.). 300 mL 1    meloxicam (MOBIC) 7.5 MG tablet Take 1 tablet (7.5 mg total) by mouth daily as needed for Pain. 30 tablet 1    metFORMIN (GLUCOPHAGE) 1000 MG tablet Take 1 tablet (1,000 mg  "total) by mouth 2 (two) times daily with meals. 180 tablet 3    metoprolol succinate (TOPROL-XL) 50 MG 24 hr tablet Take 1 tablet (50 mg total) by mouth once daily. 90 tablet 3    multivit-min-folic-vit K-lycop (ONE-A-DAY MEN'S 50 PLUS) 400- mcg Tab Take 1 tablet by mouth once daily.      oxyCODONE-acetaminophen (PERCOCET) 5-325 mg per tablet Take 1 tablet by mouth every 6 (six) hours as needed for Pain. 28 tablet 0    tadalafiL (CIALIS) 10 MG tablet Take 1 tablet (10 mg total) by mouth daily as needed for Erectile Dysfunction. 30 tablet 1     No current facility-administered medications on file prior to visit.       Physical  Exam  Vitals:    12/14/23 1002   BP: 110/82   BP Location: Right arm   Patient Position: Sitting   Pulse: 82   Temp: 98.5 °F (36.9 °C)   SpO2: 97%   Weight: 118.7 kg (261 lb 11 oz)   Height: 5' 9" (1.753 m)      Body mass index is 38.64 kg/m².  Wt Readings from Last 3 Encounters:   12/14/23 118.7 kg (261 lb 11 oz)   10/13/23 117.5 kg (259 lb 0.7 oz)   08/15/23 113.7 kg (250 lb 12.4 oz)       Physical Exam  Vitals and nursing note reviewed.   Constitutional:       General: He is not in acute distress.     Appearance: He is not ill-appearing.   HENT:      Head: Normocephalic and atraumatic.      Right Ear: External ear normal.      Left Ear: External ear normal.      Nose: Nose normal.      Mouth/Throat:      Mouth: Mucous membranes are moist.   Eyes:      Extraocular Movements: Extraocular movements intact.      Conjunctiva/sclera: Conjunctivae normal.   Cardiovascular:      Rate and Rhythm: Normal rate and regular rhythm.      Pulses: Normal pulses.      Heart sounds: No murmur heard.  Pulmonary:      Effort: Pulmonary effort is normal. No respiratory distress.      Breath sounds: No wheezing.   Abdominal:      General: There is no distension.      Palpations: Abdomen is soft. There is no mass.      Tenderness: There is no abdominal tenderness.   Musculoskeletal:         General: No " swelling.      Cervical back: Normal range of motion.   Skin:     Coloration: Skin is not jaundiced.      Findings: No rash.   Neurological:      General: No focal deficit present.      Mental Status: He is alert and oriented to person, place, and time.   Psychiatric:         Mood and Affect: Mood normal.         Thought Content: Thought content normal.         Labs:    Complete Blood Count  Lab Results   Component Value Date    RBC 3.86 (L) 12/08/2023    HGB 12.8 (L) 12/08/2023    HCT 37.8 (L) 12/08/2023    MCV 98 12/08/2023    MCH 33.2 (H) 12/08/2023    MCHC 33.9 12/08/2023    RDW 14.5 12/08/2023     12/08/2023    MPV 9.4 12/08/2023    GRAN 3.4 12/08/2023    GRAN 64.1 12/08/2023    LYMPH 1.1 12/08/2023    LYMPH 21.6 12/08/2023    MONO 0.5 12/08/2023    MONO 10.3 12/08/2023    EOS 0.1 12/08/2023    BASO 0.06 12/08/2023    EOSINOPHIL 2.3 12/08/2023    BASOPHIL 1.1 12/08/2023    DIFFMETHOD Automated 12/08/2023       Comprehensive Metabolic Panel  Lab Results   Component Value Date     (H) 12/08/2023    BUN 21 (H) 12/08/2023    CREATININE 0.83 12/08/2023     12/08/2023    K 4.3 12/08/2023     12/08/2023    PROT 7.4 12/08/2023    ALBUMIN 4.4 12/08/2023    BILITOT 0.7 12/08/2023    AST 30 12/08/2023    ALKPHOS 71 12/08/2023    CO2 27 12/08/2023    ALT 39 12/08/2023    ANIONGAP 11 12/08/2023       TSH  Lab Results   Component Value Date    TSH 4.530 (H) 12/08/2023       Imaging:  NM PET CT Routine FDG  Narrative: EXAMINATION:  NM PET CT ROUTINE    CLINICAL HISTORY:  diffuse large b-cell lymphoma. finished chemo. end-of-chemo scan; Diffuse large b-cell lymphoma, lymph nodes of multiple sites    TECHNIQUE:  11.86 mCi of F18-FDG was administered intravenously in the left hand.  After an approximately 60 min distribution time, PET/CT images were acquired from the skull base to the mid thigh. Transmission images were acquired to correct for attenuation using a whole body low-dose CT scan without IV  contrast with the arms positioned above the head. Glycemia at the time of injection was 153 mg/dL.    COMPARISON:  PET-CT 08/04/2023    FINDINGS:  Quality of the study: Adequate.    Reference values:    Mediastinal blood pool maximum SUV: 2.5    Normal liver background maximum SUV: 3    In the head and neck, there are no hypermetabolic lesions worrisome for malignancy. There are no hypermetabolic mucosal lesions, and there are no pathologically enlarged or hypermetabolic lymph nodes.    In the chest, there are no hypermetabolic lesions worrisome for malignancy.  There are no concerning pulmonary nodules or masses, and there are no pathologically enlarged or hypermetabolic lymph nodes.    In the abdomen and pelvis, there is physiologic tracer distribution within the abdominal organs and excretion into the genitourinary system.  No pathologically enlarged or hypermetabolic lymph nodes.  Stable non FDG avid normal-sized mesenteric lymph nodes with mild adjacent mesenteric stranding.    The spleen has normal uptake and is enlarged at 16 cm.  Stable splenic hypodensity.    In the bones, there are no hypermetabolic lesions worrisome for malignancy.    In the extremities, there are no hypermetabolic lesions worrisome for malignancy.    Additional CT findings: Small fat containing right inguinal hernia.  Calcific atherosclerosis of the coronary arteries.  Cholecystectomy.  Right chest port transvenous catheter tip terminates in the right atrium.  Impression: 1.  No hypermetabolic tumor or lymphadenopathy.    2.  Stable splenomegaly.    The Deauville Score is: 1    Electronically signed by: Erick Santizo  Date:    08/04/2023  Time:    10:28      Assessment/Plan:    1. Essential hypertension  -     CBC Auto Differential; Future  -     Comprehensive metabolic panel; Future; Expected date: 12/14/2023  -     TSH; Future; Expected date: 12/14/2023    2. Pure hypercholesterolemia  -     atorvastatin (LIPITOR) 20 MG tablet; Take 1  tablet (20 mg total) by mouth once daily.  Dispense: 90 tablet; Refill: 3  -     CBC Auto Differential; Future  -     Comprehensive metabolic panel; Future; Expected date: 12/14/2023  -     LIPID PANEL; Future; Expected date: 12/14/2023    3. Diffuse large B-cell lymphoma of lymph nodes of multiple regions  -     folic acid (FOLVITE) 1 MG tablet; Take 1 tablet (1 mg total) by mouth once daily.  Dispense: 100 tablet; Refill: 3    4. Type 2 diabetes mellitus with hyperglycemia, without long-term current use of insulin  Overview:  - follows with [facility]  - restart ARB at low dose    Orders:  -     semaglutide (OZEMPIC) 0.25 mg or 0.5 mg (2 mg/3 mL) pen injector; Inject 0.5 mg into the skin every 7 days.  Dispense: 3 mL; Refill: 1  -     CBC Auto Differential; Future  -     Comprehensive metabolic panel; Future; Expected date: 12/14/2023  -     HEMOGLOBIN A1C; Future; Expected date: 12/14/2023    5. Systolic murmur    6. Diffuse large B-cell lymphoma of lymph nodes of neck    7. Obesity, diabetes, and hypertension syndrome    8. ANAMIKA (obstructive sleep apnea)    9. Class 2 severe obesity due to excess calories with serious comorbidity and body mass index (BMI) of 38.0 to 38.9 in adult    10. Need for Streptococcus pneumoniae vaccination  -     (In Office Administered) Pneumococcal Conjugate Vaccine (20 Valent) (IM) (Preferred)    Other orders  -     mometasone 0.1% (ELOCON) 0.1 % cream; Apply topically once daily. Apply bid to affected area for 10 days  Dispense: 45 g; Refill: 5         Discussed how to stay healthy including: diet, exercise, refraining from smoking and discussed screening exams / tests needed for age, sex and family Hx.      Anson Blunt MD

## 2023-12-20 ENCOUNTER — TELEPHONE (OUTPATIENT)
Dept: HEMATOLOGY/ONCOLOGY | Facility: CLINIC | Age: 58
End: 2023-12-20
Payer: COMMERCIAL

## 2024-01-29 ENCOUNTER — PATIENT MESSAGE (OUTPATIENT)
Dept: FAMILY MEDICINE | Facility: CLINIC | Age: 59
End: 2024-01-29
Payer: COMMERCIAL

## 2024-02-05 ENCOUNTER — PATIENT MESSAGE (OUTPATIENT)
Dept: HEMATOLOGY/ONCOLOGY | Facility: CLINIC | Age: 59
End: 2024-02-05
Payer: COMMERCIAL

## 2024-02-08 ENCOUNTER — LAB VISIT (OUTPATIENT)
Dept: LAB | Facility: HOSPITAL | Age: 59
End: 2024-02-08
Attending: INTERNAL MEDICINE
Payer: COMMERCIAL

## 2024-02-08 DIAGNOSIS — C83.38 DIFFUSE LARGE B-CELL LYMPHOMA OF LYMPH NODES OF MULTIPLE REGIONS: ICD-10-CM

## 2024-02-08 LAB
ALBUMIN SERPL BCP-MCNC: 4.1 G/DL (ref 3.5–5.2)
ALP SERPL-CCNC: 69 U/L (ref 38–126)
ALT SERPL W/O P-5'-P-CCNC: 41 U/L (ref 10–44)
ANION GAP SERPL CALC-SCNC: 9 MMOL/L (ref 8–16)
AST SERPL-CCNC: 36 U/L (ref 15–46)
BASOPHILS # BLD AUTO: 0.06 K/UL (ref 0–0.2)
BASOPHILS NFR BLD: 1.2 % (ref 0–1.9)
BILIRUB SERPL-MCNC: 0.5 MG/DL (ref 0.1–1)
CALCIUM SERPL-MCNC: 8.9 MG/DL (ref 8.7–10.5)
CHLORIDE SERPL-SCNC: 100 MMOL/L (ref 95–110)
CO2 SERPL-SCNC: 24 MMOL/L (ref 23–29)
CREAT SERPL-MCNC: 0.73 MG/DL (ref 0.5–1.4)
DIFFERENTIAL METHOD BLD: ABNORMAL
EOSINOPHIL # BLD AUTO: 0.1 K/UL (ref 0–0.5)
EOSINOPHIL NFR BLD: 1.8 % (ref 0–8)
ERYTHROCYTE [DISTWIDTH] IN BLOOD BY AUTOMATED COUNT: 14.2 % (ref 11.5–14.5)
EST. GFR  (NO RACE VARIABLE): >60 ML/MIN/1.73 M^2
GLUCOSE SERPL-MCNC: 305 MG/DL (ref 70–110)
HCT VFR BLD AUTO: 38.7 % (ref 40–54)
HGB BLD-MCNC: 12.8 G/DL (ref 14–18)
IMM GRANULOCYTES # BLD AUTO: 0.02 K/UL (ref 0–0.04)
IMM GRANULOCYTES NFR BLD AUTO: 0.4 % (ref 0–0.5)
LDH SERPL L TO P-CCNC: 190 U/L (ref 110–260)
LYMPHOCYTES # BLD AUTO: 1.1 K/UL (ref 1–4.8)
LYMPHOCYTES NFR BLD: 23 % (ref 18–48)
MCH RBC QN AUTO: 32.5 PG (ref 27–31)
MCHC RBC AUTO-ENTMCNC: 33.1 G/DL (ref 32–36)
MCV RBC AUTO: 98 FL (ref 82–98)
MONOCYTES # BLD AUTO: 0.5 K/UL (ref 0.3–1)
MONOCYTES NFR BLD: 9.8 % (ref 4–15)
NEUTROPHILS # BLD AUTO: 3.1 K/UL (ref 1.8–7.7)
NEUTROPHILS NFR BLD: 63.8 % (ref 38–73)
NRBC BLD-RTO: 0 /100 WBC
PLATELET # BLD AUTO: 184 K/UL (ref 150–450)
PMV BLD AUTO: 9.1 FL (ref 9.2–12.9)
POTASSIUM SERPL-SCNC: 4.3 MMOL/L (ref 3.5–5.1)
PROT SERPL-MCNC: 7.2 G/DL (ref 6–8.4)
RBC # BLD AUTO: 3.94 M/UL (ref 4.6–6.2)
SODIUM SERPL-SCNC: 133 MMOL/L (ref 136–145)
URATE SERPL-MCNC: 3.7 MG/DL (ref 3.4–7)
UUN UR-MCNC: 17 MG/DL (ref 2–20)
WBC # BLD AUTO: 4.88 K/UL (ref 3.9–12.7)

## 2024-02-08 PROCEDURE — 83615 LACTATE (LD) (LDH) ENZYME: CPT | Performed by: INTERNAL MEDICINE

## 2024-02-08 PROCEDURE — 85025 COMPLETE CBC W/AUTO DIFF WBC: CPT | Mod: PN | Performed by: INTERNAL MEDICINE

## 2024-02-08 PROCEDURE — 36415 COLL VENOUS BLD VENIPUNCTURE: CPT | Mod: PN | Performed by: INTERNAL MEDICINE

## 2024-02-08 PROCEDURE — 84550 ASSAY OF BLOOD/URIC ACID: CPT | Performed by: INTERNAL MEDICINE

## 2024-02-08 PROCEDURE — 80053 COMPREHEN METABOLIC PANEL: CPT | Mod: PN | Performed by: INTERNAL MEDICINE

## 2024-02-18 NOTE — PROGRESS NOTES
"PATIENT: Saud Mayers  MRN: [id number]  DATE: 2/21/2024    Diagnosis:   1. Diffuse large B-cell lymphoma of lymph nodes of multiple regions    2. Folate deficiency    3. Type 2 diabetes mellitus with hyperglycemia, without long-term current use of insulin    4. Severe obesity (BMI 35.0-39.9) with comorbidity      Chief Complaint: Lymphoma    Oncologic History:      Oncologic History Diffuse large B-cell lymphoma      Oncologic Treatment R-CHOP (start date 12/21/22)      Pathology 11/23/22:  "LYMPH NODE", RIGHT CERVICAL, EXCISION:   -- DIFFUSE LARGE B-CELL LYMPHOMA, NON-GCB SUBTYPE (SEE COMMENT).     Flow cytometric analysis of right cervical lymph node detects a kappa light   chain restricted B lymphocyte population expressing CD19 and CD20, and CD5   (partial and dim).    CD10 is negative.  T lymphocytes are   immunophenotypically unremarkable.  Correlation with tissue morphology is   required. Flow differential: Lymphocytes 35.8%, Monocytes 0.0%, Granulocytes   4.4%, Blast  0.0%, Debris/nRBC 60.0%,  Viability 43.5%.   Immunohistochemical stains are performed with adequate c ontrols.  The large   lymphoid cells are positive for CD20, BCL6 (>30%), MUM1 (>30%), and BCL2   (>50%); negative for CD5, CD10, CD30, cyclin D1, ALK1 and EBV (VICKY in-situ   hybridization).  They display high proliferation index (Ki-67 90%).   CD3-positive small mature T-cells are seen.   Taken together, the overall findings consistent with diffuse large B-cell   lymphoma, non-GCB subtype.   1. Immunohistochemical stain:  The lymphoma cells are positive for MYC (>40%).   2. Fluorescence in-situ hybridization: The result is abnormal and indicates a   BCL6 rearrangement in 91% of nuclei.  No rearrangement of MYC or BCL2 and no   fusion of MYC and IGH was observed.  Rearrangement of BCL6 has been   associated with several B-cell lymphomas, including follicular lymphoma,   diffuse large B-cell lymphoma, and high-grade B-cell lymphoma.  As no MYC "   rearrangement and no fusion of MYC and IGH was observed, this makes unlikely   the possibility of high-grade B-cell lymphoma with MYC and BCL6 rearrangement.   Taken together, the overall findings are consistent with diffuse large B-cell   lymphoma, NOS (non-GCB subtype).  The lymphoma cells display MYC and BCL2   double expressor phenotype.       Flow cytometry - lymph node (11/23/22):  Flow cytometric analysis of lymph node detects a kappa light chain restricted   B lymphocyte population expressing CD19 and CD20, and CD5 (partial and dim).     CD10 is negative.  T lymphocytes are immunophenotypically unremarkable.   Correlation with tissue morphology is required.   Flow differential:  Lymphocytes 35.8%, Monocytes 0.0%, Granulocytes  4.4%,   Blast  0.0%, Debris/nRBC 60.0%,  Viability 43.5%.           Subjective:    History of Present Illness: Mr. Mayers is a 58 y.o. male who presents for evaluation and management of diffuse large B-cell lymphoma. I had seen him during a hospitalization in November 2022.    - he was admitted for lymphadenopathy/weight loss.  - he underwent excisional lymph node biopsy on 11/23/22. Pathology revealed diffuse large B-cell lymphoma, non-GCB type.  - he underwent port-a-cath placement on 12/7/22 with Dr. Velazquez.  - he underwent pet scan on 12/9/22.  - he underwent port-a-cath placement on 12/7/22 with Dr. Velazquez.  - he underwent pet scan on 12/9/22.  - he underwent PET scan on 3/22/23.  - he completed 6 cycles of R-CHOP.  - he underwent PET scan on 8/4/23    Interval history:  - he presents for a follow-up appointment for his diffuse large B-cell lymphoma.  - today, he is doing well. He denies fever, unintentional weight loss, lymphadenopathy. He denies chest pain, vomiting, diarrhea, constipation.      Past medical, surgical, family, and social histories have been reviewed and updated below.    Past Medical History:   Past Medical History:   Diagnosis Date    Asthma     Cancer      Diabetes mellitus     High cholesterol     Hypertension     ANAMIKA on CPAP     Testicular hypofunction        Past Surgical History:   Past Surgical History:   Procedure Laterality Date    CARPAL TUNNEL RELEASE Bilateral 2020    CHOLECYSTECTOMY  01/01/1990    COLONOSCOPY  01/01/2013    COLONOSCOPY N/A 9/2/2022    Procedure: COLONOSCOPY sutab;  Surgeon: Jeovany Cisse MD;  Location: [facility] ENDO;  Service: Endoscopy;  Laterality: N/A;    ESOPHAGOGASTRODUODENOSCOPY N/A 9/2/2022    Procedure: EGD (ESOPHAGOGASTRODUODENOSCOPY);  Surgeon: Jeovany Cisse MD;  Location: [facility] ENDO;  Service: Endoscopy;  Laterality: N/A;    INSERTION OF TUNNELED CENTRAL VENOUS CATHETER (CVC) WITH SUBCUTANEOUS PORT N/A 12/7/2022    Procedure: [id number]-B-CATH;  Surgeon: Francisco Velazquez MD;  Location: [facility] OR;  Service: General;  Laterality: N/A;    ORTHOPEDIC SURGERY Bilateral 01/01/2007    feet plantar    ORTHOPEDIC SURGERY Right 01/01/2006    knee    ROTATOR CUFF REPAIR Left 01/01/1997    SURGICAL REMOVAL OF LYMPH NODE Right 11/23/2022    Procedure: EXCISION, LYMPH NODE;  Surgeon: Francisco Velazquez MD;  Location: [facility] OR;  Service: General;  Laterality: Right;   (right neck)       Family History:   Family History   Problem Relation Age of Onset    Heart attack Father        Social History:  reports that he quit smoking about 32 years ago. His smoking use included cigarettes. He has never used smokeless tobacco. He reports that he does not drink alcohol and does not use drugs.    Allergies:  Review of patient's allergies indicates:   Allergen Reactions    Gabapentin Hallucinations    Tizanidine Other (See Comments)     somnolence       Medications:  Current Outpatient Medications   Medication Sig Dispense Refill    acetaminophen (TYLENOL) 500 MG tablet Take 500 mg by mouth every 6 (six) hours as needed for Pain.      allopurinoL (ZYLOPRIM) 300 MG tablet Take 1 tablet (300 mg total) by mouth once daily. 30 tablet 11    atorvastatin  (LIPITOR) 20 MG tablet Take 1 tablet (20 mg total) by mouth once daily. 90 tablet 3    cyanocobalamin (VITAMIN B-12) 100 MCG tablet Take 100 mcg by mouth once daily.      folic acid (FOLVITE) 1 MG tablet Take 1 tablet (1 mg total) by mouth once daily. 100 tablet 3    LIDOcaine-prilocaine (EMLA) cream Apply topically as needed. Apply to skin overlying port site 30 minutes before chemotherapy. 30 g 1    losartan (COZAAR) 25 MG tablet Take 1 tablet (25 mg total) by mouth once daily. 90 tablet 3    magic mouthwash diphen/antac/lidoc/nysta 10 mLs 3 (three) times daily as needed (chemotherapy-induced mucositis.). 300 mL 1    meloxicam (MOBIC) 7.5 MG tablet Take 1 tablet (7.5 mg total) by mouth daily as needed for Pain. 30 tablet 1    metFORMIN (GLUCOPHAGE) 1000 MG tablet Take 1 tablet (1,000 mg total) by mouth 2 (two) times daily with meals. 180 tablet 3    metoprolol succinate (TOPROL-XL) 50 MG 24 hr tablet Take 1 tablet (50 mg total) by mouth once daily. 90 tablet 3    mometasone 0.1% (ELOCON) 0.1 % cream Apply topically once daily. Apply bid to affected area for 10 days 45 g 5    multivit-min-folic-vit K-lycop (ONE-A-DAY MEN'S 50 PLUS) 400- mcg Tab Take 1 tablet by mouth once daily.      oxyCODONE-acetaminophen (PERCOCET) 5-325 mg per tablet Take 1 tablet by mouth every 6 (six) hours as needed for Pain. 28 tablet 0    semaglutide (OZEMPIC) 0.25 mg or 0.5 mg (2 mg/3 mL) pen injector Inject 0.5 mg into the skin every 7 days. 3 mL 1    tadalafiL (CIALIS) 10 MG tablet Take 1 tablet (10 mg total) by mouth daily as needed for Erectile Dysfunction. 30 tablet 1     No current facility-administered medications for this visit.       Review of Systems   Constitutional:  Positive for fatigue. Negative for fever.   HENT:  Negative for sore throat.    Eyes:  Negative for visual disturbance.   Respiratory:  Negative for shortness of breath.    Cardiovascular:  Negative for chest pain.   Gastrointestinal:  Negative for abdominal  pain and nausea.   Genitourinary:  Negative for dysuria.   Musculoskeletal:  Negative for back pain.   Skin:  Negative for rash.   Neurological:  Negative for headaches.   Hematological:  Negative for adenopathy.   Psychiatric/Behavioral:  The patient is not nervous/anxious.        ECOG Performance Status:   ECOG SCORE    0 - Fully active-able to carry on all pre-disease performance without restriction         Objective:      Vitals:   Vitals:    02/21/24 0930   BP: (!) 171/77   Pulse: 83   SpO2: 97%   Weight: 119 kg (262 lb 5.6 oz)     BMI: Body mass index is 38.74 kg/m².    Physical Exam  Vitals and nursing note reviewed.   Constitutional:       Appearance: He is well-developed.   HENT:      Head: Normocephalic and atraumatic.   Eyes:      Pupils: Pupils are equal, round, and reactive to light.   Cardiovascular:      Rate and Rhythm: Normal rate and regular rhythm.   Pulmonary:      Effort: Pulmonary effort is normal.      Breath sounds: Normal breath sounds.   Abdominal:      General: Bowel sounds are normal.      Palpations: Abdomen is soft.   Musculoskeletal:         General: Normal range of motion.      Cervical back: Normal range of motion and neck supple.   Lymphadenopathy:      Cervical: No cervical adenopathy.      Upper Body:      Right upper body: No axillary adenopathy.      Left upper body: No axillary adenopathy.   Skin:     General: Skin is warm and dry.   Neurological:      Mental Status: He is alert and oriented to person, place, and time.   Psychiatric:         Behavior: Behavior normal.         Thought Content: Thought content normal.         Judgment: Judgment normal.         Laboratory Data:  Labs have been reviewed.    Lab Results   Component Value Date    WBC 4.88 02/08/2024    HGB 12.8 (L) 02/08/2024    HCT 38.7 (L) 02/08/2024    MCV 98 02/08/2024     02/08/2024       Imaging:    PET/CT scan (8/4/23): I have personally reviewed the images  Quality of the study: Adequate.     Reference  values:     Mediastinal blood pool maximum SUV: 2.5     Normal liver background maximum SUV: 3     In the head and neck, there are no hypermetabolic lesions worrisome for malignancy. There are no hypermetabolic mucosal lesions, and there are no pathologically enlarged or hypermetabolic lymph nodes.     In the chest, there are no hypermetabolic lesions worrisome for malignancy.  There are no concerning pulmonary nodules or masses, and there are no pathologically enlarged or hypermetabolic lymph nodes.     In the abdomen and pelvis, there is physiologic tracer distribution within the abdominal organs and excretion into the genitourinary system.  No pathologically enlarged or hypermetabolic lymph nodes.  Stable non FDG avid normal-sized mesenteric lymph nodes with mild adjacent mesenteric stranding.     The spleen has normal uptake and is enlarged at 16 cm.  Stable splenic hypodensity.     In the bones, there are no hypermetabolic lesions worrisome for malignancy.     In the extremities, there are no hypermetabolic lesions worrisome for malignancy.     Additional CT findings: Small fat containing right inguinal hernia.  Calcific atherosclerosis of the coronary arteries.  Cholecystectomy.  Right chest port transvenous catheter tip terminates in the right atrium.     Impression:     1.  No hypermetabolic tumor or lymphadenopathy.     2.  Stable splenomegaly.      PET/CT scan (3/22/23): I have personally reviewed the images  1.  In this patient with lymphoma, there has been interval resolution of pathologically enlarged, hypermetabolic lymph nodes on both sides of the diaphragm in keeping with excellent response to therapy.     2.  Improved but persistent splenomegaly, with interval resolution of previously noted hypermetabolic foci.     3.  Diffuse uptake in the bone marrow, consistent with bone marrow hyperplasia.         Echocardiogram (12/13/22):  The left ventricle is normal in size with concentric remodeling and  normal systolic function.  The estimated ejection fraction is 65%.  Normal left ventricular diastolic function.  Normal right ventricular size with normal right ventricular systolic function.  Normal central venous pressure (3 mmHg).  The left ventricular global longitudinal strain is -17.6%.  There is moderate aortic valve stenosis.  Aortic valve area is 2.04 cm2; peak velocity is 3.17 m/s; mean gradient is 26 mmHg.      PET/CT scan (12/9/22): I have personally reviewed the images  Quality of the study: Adequate.     Reference values:     Mediastinal blood pool maximum SUV: 2.9     Normal liver background maximum SUV: 3.0     In the head and neck, numerous enlarged hypermetabolic lymph nodes within bilateral cervical periclavicular lesions.  For example, Conglomerate of right cervical and supraclavicular lymph nodes with SUV 14.  Left infraclavicular lymph node measuring 1.8 cm (image 95), with SUV max 10.  There is fluid and gas within the level 2 B measuring 2.1 cm, likely related to recent biopsy.     In the chest, numerous enlarged hypermetabolic mediastinal, hilar, and axilla lymph nodes.  For example subcarinal lymph node measuring 3.2 x 6.5 cm demonstrating SUV max of 9.8 (image 129).     In the abdomen and pelvis, there is physiologic tracer distribution within the abdominal organs and excretion into the genitourinary system.     Numerous enlarged hypermetabolic lymph nodes.  For example, conglomerate of lymph nodes within the anterior abdomen demonstrating SUV max of 18 (fused image 205).  Posterior right pararenal space hypermetabolic node measuring 3.0 x 2.4 cm with SUV max of 16 (image 218).  Conglomerate of right pelvic sidewall nodes measuring 8.0 AP x 4.4 TV x 11.1 CC cm demonstrating SUV max of 19 (image 251).     The spleen has multifocal hypermetabolic uptake within SUV max of 13 and is enlarged at 19.0 cm in craniocaudal dimension.  There are multiple areas of hypoattenuation, better  characterized on prior CTs.     In the bones, there are no hypermetabolic lesions worrisome for malignancy.     Additional CT findings     Right-sided chest port tip terminating in the cavoatrial junction.  Trace left pleural fluid, prior cholecystectomy.     Impression:     Multiple hypermetabolic lymph nodes involving both sides of the diaphragm including bulky right iliac conglomerate in keeping with known large B-cell lymphoma.     Splenomegaly and multifocal hypermetabolic foci within the spleen.     The Deauville Score is: 5     Reference values:     Mediastinal blood pool maximum SUV: 2.9     Normal liver maximum SUV: 3.0      CT chest/abdomen/pelvis (11/7/22): I have personally reviewed the images     Innumerable adenopathy above and below the diaphragm and throughout the neck, LEFT axilla and chest wall, retroperitoneum and mesentery as well as in the iliac chain, right greater than left.     Findings are suspicious for lymphoma/leukemia.     Multiple splenic lesions appearing to have increased in number and size since the prior exam.        CT soft tissue neck (11/7/22): I have personally reviewed the images  Extensive adenopathy within the right greater than left neck with also adenopathy within the mediastinum and left axilla.  The largest lymph node in the right posterior triangle/spinal accessory region demonstrates internal necrosis as does a right paratracheal lymph node within the mediastinum..  There is inflammatory change surrounding the necrotic lymph nodes and although an infectious/inflammatory process is not excluded, a neoplastic process including lymphoma to be considered.      Assessment:       1. Diffuse large B-cell lymphoma of lymph nodes of multiple regions    2. Folate deficiency    3. Type 2 diabetes mellitus with hyperglycemia, without long-term current use of insulin    4. Severe obesity (BMI 35.0-39.9) with comorbidity           Plan:     Diffuse large B-cell lymphoma  - I have  "reviewed his chart  - CT scans (11/7/22) revealed extensive adenopathy.  - excisional biopsy (11/23/22) revealed diffuse large B-cell lymphoma, non-GCB subtype.  - No rearrangement of MYC or BCL2, so not a double-hit lymphoma. 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  - I and via oncology recommend R-CHOP x 6 cycles.  - The risks and benefits of chemotherapy were discussed, written information was given, and informed consent was obtained.  - hepatitis B core antibody/surface antigen and hepatitis C antibody were negative.  - he underwent port-a-cath placement on 12/7/22 with Dr. Velazquez.  - PET/CT scan (12/9/22) revealed "Multiple hypermetabolic lymph nodes involving both sides of the diaphragm including bulky right iliac conglomerate in keeping with known large B-cell lymphoma."  - I sent emla cream to his pharmacy  - I sent anti-emetics to his pharmacy.  - I sent prednisone to his pharmacy  - echocardiogram (12/13/22) revealed an ejection fraction of 65%  - he received a unit of blood on 12/15/22 for symptomatic anemia  - PET/CT scan (3/22/23) [following 4 cycles of therapy] revealed "interval resolution of pathologically enlarged, hypermetabolic lymph nodes on both sides of the diaphragm in keeping with excellent response to therapy." Since his pet scan on 3/22/23 revealed a complete response to therapy, we will hold off on further PET scans (end-of-treatment pet scan).   - following 6 cycles of R-CHOP, he was hospitalized for sepsis secondary to Klebsiella  - pet scan (8/4/23) revealed "no hypermetabolic tumor or lymphadenopathy."  - he underwent port removal on 8/15/23.  - clinically, he is doing well with no evidence of recurrent lymphoma  - return to clinic in 6 months.     2. Anemia due to chemotherapy  - Labs have been reviewed. Hemoglobin is 12.3 g/dL.  - continue to monitor    3. Type 2 diabetes  - last hemoglobin A1c (12/8/23) was 7.6%  - continue diabetic " medications  - defer management to primary care    4. Folate deficiency  - continue folic acid    5. Severe obesity  - Body mass index is 38.74 kg/m².  - I encouraged weight loss.    - return to clinic in 6 months.     Sandip Vickers M.D.  Hematology/Oncology  [facility] - [address], [address]  [place] [address]  Phone: [phone]  Fax: [phone]

## 2024-02-21 ENCOUNTER — OFFICE VISIT (OUTPATIENT)
Dept: HEMATOLOGY/ONCOLOGY | Facility: CLINIC | Age: 59
End: 2024-02-21
Payer: COMMERCIAL

## 2024-02-21 VITALS
WEIGHT: 262.38 LBS | BODY MASS INDEX: 38.74 KG/M2 | SYSTOLIC BLOOD PRESSURE: 171 MMHG | HEART RATE: 83 BPM | OXYGEN SATURATION: 97 % | DIASTOLIC BLOOD PRESSURE: 77 MMHG

## 2024-02-21 DIAGNOSIS — E11.65 TYPE 2 DIABETES MELLITUS WITH HYPERGLYCEMIA, WITHOUT LONG-TERM CURRENT USE OF INSULIN: ICD-10-CM

## 2024-02-21 DIAGNOSIS — C83.38 DIFFUSE LARGE B-CELL LYMPHOMA OF LYMPH NODES OF MULTIPLE REGIONS: Primary | ICD-10-CM

## 2024-02-21 DIAGNOSIS — E66.01 SEVERE OBESITY (BMI 35.0-39.9) WITH COMORBIDITY: ICD-10-CM

## 2024-02-21 DIAGNOSIS — E53.8 FOLATE DEFICIENCY: ICD-10-CM

## 2024-02-21 PROCEDURE — 3078F DIAST BP <80 MM HG: CPT | Mod: CPTII,S$GLB,, | Performed by: INTERNAL MEDICINE

## 2024-02-21 PROCEDURE — 3008F BODY MASS INDEX DOCD: CPT | Mod: CPTII,S$GLB,, | Performed by: INTERNAL MEDICINE

## 2024-02-21 PROCEDURE — 1159F MED LIST DOCD IN RCRD: CPT | Mod: CPTII,S$GLB,, | Performed by: INTERNAL MEDICINE

## 2024-02-21 PROCEDURE — 99214 OFFICE O/P EST MOD 30 MIN: CPT | Mod: S$GLB,,, | Performed by: INTERNAL MEDICINE

## 2024-02-21 PROCEDURE — 99999 PR PBB SHADOW E&M-EST. PATIENT-LVL III: CPT | Mod: PBBFAC,,, | Performed by: INTERNAL MEDICINE

## 2024-02-21 PROCEDURE — 3077F SYST BP >= 140 MM HG: CPT | Mod: CPTII,S$GLB,, | Performed by: INTERNAL MEDICINE

## 2024-02-21 PROCEDURE — 1160F RVW MEDS BY RX/DR IN RCRD: CPT | Mod: CPTII,S$GLB,, | Performed by: INTERNAL MEDICINE

## 2024-02-22 ENCOUNTER — TELEPHONE (OUTPATIENT)
Dept: FAMILY MEDICINE | Facility: CLINIC | Age: 59
End: 2024-02-22
Payer: COMMERCIAL

## 2024-02-22 NOTE — TELEPHONE ENCOUNTER
----- Message from Bonita Luna sent at 2/22/2024  8:55 AM CST -----  Type:  Pharmacy Calling to Clarify an RX    Pharmacy Name:[facility]   Prescription Name:semaglutide (OZEMPIC) 0.25 mg or 0.5 mg (2 mg/3 mL) pen injector  What do they need to clarify?:diagnosis code   Best Call Back Number:[phone]  Additional Information:

## 2024-03-13 DIAGNOSIS — E11.9 TYPE 2 DIABETES MELLITUS WITHOUT COMPLICATION: ICD-10-CM

## 2024-03-18 ENCOUNTER — PATIENT MESSAGE (OUTPATIENT)
Dept: FAMILY MEDICINE | Facility: CLINIC | Age: 59
End: 2024-03-18
Payer: COMMERCIAL

## 2024-03-26 DIAGNOSIS — I10 ESSENTIAL HYPERTENSION: ICD-10-CM

## 2024-03-26 RX ORDER — METOPROLOL SUCCINATE 50 MG/1
50 TABLET, EXTENDED RELEASE ORAL DAILY
Qty: 90 TABLET | Refills: 3 | Status: SHIPPED | OUTPATIENT
Start: 2024-03-26

## 2024-05-09 DIAGNOSIS — E11.65 TYPE 2 DIABETES MELLITUS WITH HYPERGLYCEMIA, WITHOUT LONG-TERM CURRENT USE OF INSULIN: ICD-10-CM

## 2024-05-09 RX ORDER — SEMAGLUTIDE 0.68 MG/ML
0.5 INJECTION, SOLUTION SUBCUTANEOUS
Qty: 3 ML | Refills: 1 | Status: SHIPPED | OUTPATIENT
Start: 2024-05-09 | End: 2024-06-04 | Stop reason: SDUPTHER

## 2024-05-09 NOTE — TELEPHONE ENCOUNTER
Care Due:                  Date            Visit Type   Department     Provider  --------------------------------------------------------------------------------                                MYCHART                              FOLLOWUP/OF  [facility]  Last Visit: 12-      FICE VISIT   MEDICINE       Anson Blunt                              EP -                              PRIMARY      [facility]  Next Visit: 06-      CARE ([facility])   MEDICINE       Anson Blunt                                                            Last  Test          Frequency    Reason                     Performed    Due Date  --------------------------------------------------------------------------------    HBA1C.......  6 months...  metFORMIN, semaglutide...  12- 06-    [facility] Embedded Care Due Messages. Reference number: [id number].   5/09/2024 3:44:08 PM CDT

## 2024-06-04 DIAGNOSIS — E11.9 TYPE 2 DIABETES MELLITUS WITHOUT COMPLICATION, WITHOUT LONG-TERM CURRENT USE OF INSULIN: ICD-10-CM

## 2024-06-04 DIAGNOSIS — E11.65 TYPE 2 DIABETES MELLITUS WITH HYPERGLYCEMIA, WITHOUT LONG-TERM CURRENT USE OF INSULIN: ICD-10-CM

## 2024-06-06 RX ORDER — SEMAGLUTIDE 0.68 MG/ML
0.5 INJECTION, SOLUTION SUBCUTANEOUS
Qty: 3 ML | Refills: 1 | Status: SHIPPED | OUTPATIENT
Start: 2024-06-06 | End: 2024-06-14 | Stop reason: DRUGHIGH

## 2024-06-06 RX ORDER — METFORMIN HYDROCHLORIDE 1000 MG/1
1000 TABLET ORAL 2 TIMES DAILY WITH MEALS
Qty: 180 TABLET | Refills: 3 | Status: SHIPPED | OUTPATIENT
Start: 2024-06-06

## 2024-06-07 ENCOUNTER — LAB VISIT (OUTPATIENT)
Dept: LAB | Facility: HOSPITAL | Age: 59
End: 2024-06-07
Attending: STUDENT IN AN ORGANIZED HEALTH CARE EDUCATION/TRAINING PROGRAM
Payer: COMMERCIAL

## 2024-06-07 DIAGNOSIS — I10 ESSENTIAL HYPERTENSION: ICD-10-CM

## 2024-06-07 DIAGNOSIS — E78.00 PURE HYPERCHOLESTEROLEMIA: ICD-10-CM

## 2024-06-07 DIAGNOSIS — E11.65 TYPE 2 DIABETES MELLITUS WITH HYPERGLYCEMIA, WITHOUT LONG-TERM CURRENT USE OF INSULIN: ICD-10-CM

## 2024-06-07 LAB
ALBUMIN SERPL BCP-MCNC: 4.5 G/DL (ref 3.5–5.2)
ALP SERPL-CCNC: 78 U/L (ref 38–126)
ALT SERPL W/O P-5'-P-CCNC: 51 U/L (ref 10–44)
ANION GAP SERPL CALC-SCNC: 9 MMOL/L (ref 8–16)
AST SERPL-CCNC: 37 U/L (ref 15–46)
BASOPHILS # BLD AUTO: 0.06 K/UL (ref 0–0.2)
BASOPHILS NFR BLD: 1 % (ref 0–1.9)
BILIRUB SERPL-MCNC: 0.7 MG/DL (ref 0.1–1)
CALCIUM SERPL-MCNC: 8.9 MG/DL (ref 8.7–10.5)
CHLORIDE SERPL-SCNC: 102 MMOL/L (ref 95–110)
CHOLEST SERPL-MCNC: 140 MG/DL (ref 120–199)
CHOLEST/HDLC SERPL: 4 {RATIO} (ref 2–5)
CO2 SERPL-SCNC: 25 MMOL/L (ref 23–29)
CREAT SERPL-MCNC: 0.81 MG/DL (ref 0.5–1.4)
DIFFERENTIAL METHOD BLD: ABNORMAL
EOSINOPHIL # BLD AUTO: 0.1 K/UL (ref 0–0.5)
EOSINOPHIL NFR BLD: 1.7 % (ref 0–8)
ERYTHROCYTE [DISTWIDTH] IN BLOOD BY AUTOMATED COUNT: 14.1 % (ref 11.5–14.5)
EST. GFR  (NO RACE VARIABLE): >60 ML/MIN/1.73 M^2
ESTIMATED AVG GLUCOSE: 174 MG/DL (ref 68–131)
GLUCOSE SERPL-MCNC: 269 MG/DL (ref 70–110)
HBA1C MFR BLD: 7.7 % (ref 4–5.6)
HCT VFR BLD AUTO: 37.6 % (ref 40–54)
HDLC SERPL-MCNC: 35 MG/DL (ref 40–75)
HDLC SERPL: 25 % (ref 20–50)
HGB BLD-MCNC: 12.7 G/DL (ref 14–18)
IMM GRANULOCYTES # BLD AUTO: 0.04 K/UL (ref 0–0.04)
IMM GRANULOCYTES NFR BLD AUTO: 0.7 % (ref 0–0.5)
LDLC SERPL CALC-MCNC: 67.6 MG/DL (ref 63–159)
LYMPHOCYTES # BLD AUTO: 1.2 K/UL (ref 1–4.8)
LYMPHOCYTES NFR BLD: 20.3 % (ref 18–48)
MCH RBC QN AUTO: 32.7 PG (ref 27–31)
MCHC RBC AUTO-ENTMCNC: 33.8 G/DL (ref 32–36)
MCV RBC AUTO: 97 FL (ref 82–98)
MONOCYTES # BLD AUTO: 0.4 K/UL (ref 0.3–1)
MONOCYTES NFR BLD: 7.4 % (ref 4–15)
NEUTROPHILS # BLD AUTO: 4 K/UL (ref 1.8–7.7)
NEUTROPHILS NFR BLD: 68.9 % (ref 38–73)
NONHDLC SERPL-MCNC: 105 MG/DL
NRBC BLD-RTO: 0 /100 WBC
PLATELET # BLD AUTO: 151 K/UL (ref 150–450)
PMV BLD AUTO: 9.3 FL (ref 9.2–12.9)
POTASSIUM SERPL-SCNC: 4.7 MMOL/L (ref 3.5–5.1)
PROT SERPL-MCNC: 7.5 G/DL (ref 6–8.4)
RBC # BLD AUTO: 3.88 M/UL (ref 4.6–6.2)
SODIUM SERPL-SCNC: 136 MMOL/L (ref 136–145)
TRIGL SERPL-MCNC: 187 MG/DL (ref 30–150)
TSH SERPL DL<=0.005 MIU/L-ACNC: 3.54 UIU/ML (ref 0.4–4)
UUN UR-MCNC: 23 MG/DL (ref 2–20)
WBC # BLD AUTO: 5.8 K/UL (ref 3.9–12.7)

## 2024-06-07 PROCEDURE — 85025 COMPLETE CBC W/AUTO DIFF WBC: CPT | Mod: PN | Performed by: STUDENT IN AN ORGANIZED HEALTH CARE EDUCATION/TRAINING PROGRAM

## 2024-06-07 PROCEDURE — 83036 HEMOGLOBIN GLYCOSYLATED A1C: CPT | Performed by: STUDENT IN AN ORGANIZED HEALTH CARE EDUCATION/TRAINING PROGRAM

## 2024-06-07 PROCEDURE — 84443 ASSAY THYROID STIM HORMONE: CPT | Mod: PN | Performed by: STUDENT IN AN ORGANIZED HEALTH CARE EDUCATION/TRAINING PROGRAM

## 2024-06-07 PROCEDURE — 80053 COMPREHEN METABOLIC PANEL: CPT | Mod: PN | Performed by: STUDENT IN AN ORGANIZED HEALTH CARE EDUCATION/TRAINING PROGRAM

## 2024-06-07 PROCEDURE — 36415 COLL VENOUS BLD VENIPUNCTURE: CPT | Mod: PN | Performed by: STUDENT IN AN ORGANIZED HEALTH CARE EDUCATION/TRAINING PROGRAM

## 2024-06-07 PROCEDURE — 80061 LIPID PANEL: CPT | Performed by: STUDENT IN AN ORGANIZED HEALTH CARE EDUCATION/TRAINING PROGRAM

## 2024-06-14 ENCOUNTER — OFFICE VISIT (OUTPATIENT)
Dept: FAMILY MEDICINE | Facility: CLINIC | Age: 59
End: 2024-06-14
Payer: COMMERCIAL

## 2024-06-14 VITALS
HEIGHT: 69 IN | TEMPERATURE: 98 F | DIASTOLIC BLOOD PRESSURE: 82 MMHG | HEART RATE: 56 BPM | WEIGHT: 266.75 LBS | OXYGEN SATURATION: 93 % | SYSTOLIC BLOOD PRESSURE: 132 MMHG | BODY MASS INDEX: 39.51 KG/M2

## 2024-06-14 DIAGNOSIS — E78.00 PURE HYPERCHOLESTEROLEMIA: ICD-10-CM

## 2024-06-14 DIAGNOSIS — E11.65 TYPE 2 DIABETES MELLITUS WITH HYPERGLYCEMIA, WITHOUT LONG-TERM CURRENT USE OF INSULIN: Primary | ICD-10-CM

## 2024-06-14 DIAGNOSIS — I10 ESSENTIAL HYPERTENSION: ICD-10-CM

## 2024-06-14 DIAGNOSIS — N52.9 ERECTILE DYSFUNCTION, UNSPECIFIED ERECTILE DYSFUNCTION TYPE: ICD-10-CM

## 2024-06-14 DIAGNOSIS — R09.89 ABDOMINAL BRUIT: ICD-10-CM

## 2024-06-14 PROCEDURE — 3075F SYST BP GE 130 - 139MM HG: CPT | Mod: CPTII,S$GLB,, | Performed by: STUDENT IN AN ORGANIZED HEALTH CARE EDUCATION/TRAINING PROGRAM

## 2024-06-14 PROCEDURE — 99214 OFFICE O/P EST MOD 30 MIN: CPT | Mod: S$GLB,,, | Performed by: STUDENT IN AN ORGANIZED HEALTH CARE EDUCATION/TRAINING PROGRAM

## 2024-06-14 PROCEDURE — 4010F ACE/ARB THERAPY RXD/TAKEN: CPT | Mod: CPTII,S$GLB,, | Performed by: STUDENT IN AN ORGANIZED HEALTH CARE EDUCATION/TRAINING PROGRAM

## 2024-06-14 PROCEDURE — 3051F HG A1C>EQUAL 7.0%<8.0%: CPT | Mod: CPTII,S$GLB,, | Performed by: STUDENT IN AN ORGANIZED HEALTH CARE EDUCATION/TRAINING PROGRAM

## 2024-06-14 PROCEDURE — 1160F RVW MEDS BY RX/DR IN RCRD: CPT | Mod: CPTII,S$GLB,, | Performed by: STUDENT IN AN ORGANIZED HEALTH CARE EDUCATION/TRAINING PROGRAM

## 2024-06-14 PROCEDURE — 3079F DIAST BP 80-89 MM HG: CPT | Mod: CPTII,S$GLB,, | Performed by: STUDENT IN AN ORGANIZED HEALTH CARE EDUCATION/TRAINING PROGRAM

## 2024-06-14 PROCEDURE — 1159F MED LIST DOCD IN RCRD: CPT | Mod: CPTII,S$GLB,, | Performed by: STUDENT IN AN ORGANIZED HEALTH CARE EDUCATION/TRAINING PROGRAM

## 2024-06-14 PROCEDURE — 3008F BODY MASS INDEX DOCD: CPT | Mod: CPTII,S$GLB,, | Performed by: STUDENT IN AN ORGANIZED HEALTH CARE EDUCATION/TRAINING PROGRAM

## 2024-06-14 RX ORDER — LOSARTAN POTASSIUM 25 MG/1
25 TABLET ORAL DAILY
Qty: 90 TABLET | Refills: 3 | Status: SHIPPED | OUTPATIENT
Start: 2024-06-14 | End: 2025-06-14

## 2024-06-14 RX ORDER — TADALAFIL 10 MG/1
10 TABLET ORAL DAILY PRN
Qty: 30 TABLET | Refills: 1 | Status: SHIPPED | OUTPATIENT
Start: 2024-06-14

## 2024-06-14 RX ORDER — SEMAGLUTIDE 1.34 MG/ML
1 INJECTION, SOLUTION SUBCUTANEOUS
Qty: 3 ML | Refills: 11 | Status: SHIPPED | OUTPATIENT
Start: 2024-06-14

## 2024-06-16 NOTE — PROGRESS NOTES
Patient ID: Saud Mayers is a 59 y.o. male.     Chief Complaint: f/u chronic medical conditions    Follow-up  Pertinent negatives include no chest pain, coughing, fever, headaches, myalgias, nausea, rash, vomiting or weakness.      DMII- following diabetic diet, taking medication as prescribed    HLD- denies recent chest pain and shortness of breath    HTN- blood pressure has been at goal    Review of Systems  Review of Systems   Constitutional:  Negative for fever.   HENT:  Negative for ear pain and sinus pain.    Eyes:  Negative for discharge.   Respiratory:  Negative for cough and shortness of breath.    Cardiovascular:  Negative for chest pain and leg swelling.   Gastrointestinal:  Negative for diarrhea, nausea and vomiting.   Genitourinary:  Negative for urgency.   Musculoskeletal:  Negative for myalgias.   Skin:  Negative for rash.   Neurological:  Negative for weakness and headaches.   Psychiatric/Behavioral:  Negative for depression.    All other systems reviewed and are negative.      Currently Medications  Current Outpatient Medications on File Prior to Visit   Medication Sig Dispense Refill    acetaminophen (TYLENOL) 500 MG tablet Take 500 mg by mouth every 6 (six) hours as needed for Pain.      allopurinoL (ZYLOPRIM) 300 MG tablet Take 1 tablet (300 mg total) by mouth once daily. 30 tablet 11    atorvastatin (LIPITOR) 20 MG tablet Take 1 tablet (20 mg total) by mouth once daily. 90 tablet 3    cyanocobalamin (VITAMIN B-12) 100 MCG tablet Take 100 mcg by mouth once daily.      folic acid (FOLVITE) 1 MG tablet Take 1 tablet (1 mg total) by mouth once daily. 100 tablet 3    LIDOcaine-prilocaine (EMLA) cream Apply topically as needed. Apply to skin overlying port site 30 minutes before chemotherapy. 30 g 1    magic mouthwash diphen/antac/lidoc/nysta 10 mLs 3 (three) times daily as needed (chemotherapy-induced mucositis.). 300 mL 1    meloxicam (MOBIC) 7.5 MG tablet Take 1 tablet (7.5 mg total) by mouth  "daily as needed for Pain. 30 tablet 1    metFORMIN (GLUCOPHAGE) 1000 MG tablet Take 1 tablet (1,000 mg total) by mouth 2 (two) times daily with meals. 180 tablet 3    metoprolol succinate (TOPROL-XL) 50 MG 24 hr tablet Take 1 tablet (50 mg total) by mouth once daily. 90 tablet 3    multivit-min-folic-vit K-lycop (ONE-A-DAY MEN'S 50 PLUS) 400- mcg Tab Take 1 tablet by mouth once daily.      oxyCODONE-acetaminophen (PERCOCET) 5-325 mg per tablet Take 1 tablet by mouth every 6 (six) hours as needed for Pain. 28 tablet 0    mometasone 0.1% (ELOCON) 0.1 % cream Apply topically once daily. Apply bid to affected area for 10 days 45 g 5     No current facility-administered medications on file prior to visit.       Physical  Exam  Vitals:    06/14/24 1023   BP: 132/82   BP Location: Left arm   Patient Position: Sitting   Pulse: (!) 56   Temp: 98.4 °F (36.9 °C)   SpO2: (!) 93%   Weight: 121 kg (266 lb 12.1 oz)   Height: 5' 9" (1.753 m)      Body mass index is 39.39 kg/m².  Wt Readings from Last 3 Encounters:   06/14/24 121 kg (266 lb 12.1 oz)   02/21/24 119 kg (262 lb 5.6 oz)   12/14/23 118.7 kg (261 lb 11 oz)       Physical Exam  Vitals and nursing note reviewed.   Constitutional:       General: He is not in acute distress.     Appearance: He is not ill-appearing.   HENT:      Head: Normocephalic and atraumatic.      Right Ear: External ear normal.      Left Ear: External ear normal.      Nose: Nose normal.      Mouth/Throat:      Mouth: Mucous membranes are moist.   Eyes:      Extraocular Movements: Extraocular movements intact.      Conjunctiva/sclera: Conjunctivae normal.   Cardiovascular:      Rate and Rhythm: Normal rate and regular rhythm.      Pulses: Normal pulses.      Heart sounds: No murmur heard.  Pulmonary:      Effort: Pulmonary effort is normal. No respiratory distress.      Breath sounds: No wheezing.   Abdominal:      General: There is no distension.      Palpations: Abdomen is soft. There is no mass. "      Tenderness: There is no abdominal tenderness.      Comments: Abdominal bruit present on ascultation   Musculoskeletal:         General: No swelling.      Cervical back: Normal range of motion.   Skin:     Coloration: Skin is not jaundiced.      Findings: No rash.   Neurological:      General: No focal deficit present.      Mental Status: He is alert and oriented to person, place, and time.   Psychiatric:         Mood and Affect: Mood normal.         Thought Content: Thought content normal.         Labs:    Complete Blood Count  Lab Results   Component Value Date    RBC 3.88 (L) 06/07/2024    HGB 12.7 (L) 06/07/2024    HCT 37.6 (L) 06/07/2024    MCV 97 06/07/2024    MCH 32.7 (H) 06/07/2024    MCHC 33.8 06/07/2024    RDW 14.1 06/07/2024     06/07/2024    MPV 9.3 06/07/2024    GRAN 4.0 06/07/2024    GRAN 68.9 06/07/2024    LYMPH 1.2 06/07/2024    LYMPH 20.3 06/07/2024    MONO 0.4 06/07/2024    MONO 7.4 06/07/2024    EOS 0.1 06/07/2024    BASO 0.06 06/07/2024    EOSINOPHIL 1.7 06/07/2024    BASOPHIL 1.0 06/07/2024    DIFFMETHOD Automated 06/07/2024       Comprehensive Metabolic Panel  Lab Results   Component Value Date     (H) 06/07/2024    BUN 23 (H) 06/07/2024    CREATININE 0.81 06/07/2024     06/07/2024    K 4.7 06/07/2024     06/07/2024    PROT 7.5 06/07/2024    ALBUMIN 4.5 06/07/2024    BILITOT 0.7 06/07/2024    AST 37 06/07/2024    ALKPHOS 78 06/07/2024    CO2 25 06/07/2024    ALT 51 (H) 06/07/2024    ANIONGAP 9 06/07/2024       TSH  Lab Results   Component Value Date    TSH 3.540 06/07/2024       Imaging:  NM PET CT Routine FDG  Narrative: EXAMINATION:  NM PET CT ROUTINE    CLINICAL HISTORY:  diffuse large b-cell lymphoma. finished chemo. end-of-chemo scan; Diffuse large b-cell lymphoma, lymph nodes of multiple sites    TECHNIQUE:  11.86 mCi of F18-FDG was administered intravenously in the left hand.  After an approximately 60 min distribution time, PET/CT images were acquired from  the skull base to the mid thigh. Transmission images were acquired to correct for attenuation using a whole body low-dose CT scan without IV contrast with the arms positioned above the head. Glycemia at the time of injection was 153 mg/dL.    COMPARISON:  PET-CT 08/04/2023    FINDINGS:  Quality of the study: Adequate.    Reference values:    Mediastinal blood pool maximum SUV: 2.5    Normal liver background maximum SUV: 3    In the head and neck, there are no hypermetabolic lesions worrisome for malignancy. There are no hypermetabolic mucosal lesions, and there are no pathologically enlarged or hypermetabolic lymph nodes.    In the chest, there are no hypermetabolic lesions worrisome for malignancy.  There are no concerning pulmonary nodules or masses, and there are no pathologically enlarged or hypermetabolic lymph nodes.    In the abdomen and pelvis, there is physiologic tracer distribution within the abdominal organs and excretion into the genitourinary system.  No pathologically enlarged or hypermetabolic lymph nodes.  Stable non FDG avid normal-sized mesenteric lymph nodes with mild adjacent mesenteric stranding.    The spleen has normal uptake and is enlarged at 16 cm.  Stable splenic hypodensity.    In the bones, there are no hypermetabolic lesions worrisome for malignancy.    In the extremities, there are no hypermetabolic lesions worrisome for malignancy.    Additional CT findings: Small fat containing right inguinal hernia.  Calcific atherosclerosis of the coronary arteries.  Cholecystectomy.  Right chest port transvenous catheter tip terminates in the right atrium.  Impression: 1.  No hypermetabolic tumor or lymphadenopathy.    2.  Stable splenomegaly.    The Deauville Score is: 1    Electronically signed by: Erick Santizo  Date:    08/04/2023  Time:    10:28      Assessment/Plan:    1. Type 2 diabetes mellitus with hyperglycemia, without long-term current use of insulin  Overview:  - follows with [facility]  - restart ARB at low dose    Orders:  -     semaglutide (OZEMPIC) 1 mg/dose (4 mg/3 mL); Inject 1 mg into the skin every 7 days.  Dispense: 3 mL; Refill: 11  -     Microalbumin/Creatinine Ratio, Urine; Future; Expected date: 06/14/2024  -     CBC Auto Differential; Future  -     Comprehensive metabolic panel; Future; Expected date: 06/14/2024  -     HEMOGLOBIN A1C; Future; Expected date: 06/14/2024    2. Essential hypertension  -     TSH; Future; Expected date: 06/14/2024  -     losartan (COZAAR) 25 MG tablet; Take 1 tablet (25 mg total) by mouth once daily.  Dispense: 90 tablet; Refill: 3    3. Pure hypercholesterolemia  -     LIPID PANEL; Future; Expected date: 06/14/2024    4. Abdominal bruit  -     US AAA Screening; Future; Expected date: 06/14/2024    5. Erectile dysfunction, unspecified erectile dysfunction type  -     tadalafiL (CIALIS) 10 MG tablet; Take 1 tablet (10 mg total) by mouth daily as needed for Erectile Dysfunction.  Dispense: 30 tablet; Refill: 1         Discussed how to stay healthy including: diet, exercise, refraining from smoking and discussed screening exams / tests needed for age, sex and family Hx.    RTC 6 mo with labs    Anson Blunt MD

## 2024-06-17 ENCOUNTER — HOSPITAL ENCOUNTER (OUTPATIENT)
Dept: RADIOLOGY | Facility: HOSPITAL | Age: 59
Discharge: HOME | End: 2024-06-17
Attending: STUDENT IN AN ORGANIZED HEALTH CARE EDUCATION/TRAINING PROGRAM
Payer: COMMERCIAL

## 2024-06-17 DIAGNOSIS — R09.89 ABDOMINAL BRUIT: ICD-10-CM

## 2024-06-17 PROCEDURE — 76706 US ABDL AORTA SCREEN AAA: CPT | Mod: 26,,, | Performed by: RADIOLOGY

## 2024-06-17 PROCEDURE — 76706 US ABDL AORTA SCREEN AAA: CPT | Mod: TC,PN

## 2024-06-18 ENCOUNTER — PATIENT OUTREACH (OUTPATIENT)
Dept: ADMINISTRATIVE | Facility: HOSPITAL | Age: 59
End: 2024-06-18
Payer: COMMERCIAL

## 2024-06-18 NOTE — PROGRESS NOTES
Population Health Chart Review & Patient Outreach Details      Additional Pop Health Notes:      MARIAM faxed for eye exam         Updates Requested / Reviewed:      Other    [facility]         Health Maintenance Topics Overdue:      [facility] Score: 2     Eye Exam  Foot Exam    RSV Vaccine                  Health Maintenance Topic(s) Outreach Outcomes & Actions Taken:    Eye Exam - Outreach Outcomes & Actions Taken  : External Records Requested & Care Team Updated if Applicable

## 2024-06-20 ENCOUNTER — TELEPHONE (OUTPATIENT)
Dept: FAMILY MEDICINE | Facility: CLINIC | Age: 59
End: 2024-06-20
Payer: COMMERCIAL

## 2024-06-20 NOTE — TELEPHONE ENCOUNTER
Lm for pt to call back clinic.    ----- Message from Anson Blunt MD sent at 6/19/2024  5:33 PM CDT -----  Diabetes is on the higher side. Watch  your diet and continue on the ozempic. All other labs look good.

## 2024-06-20 NOTE — TELEPHONE ENCOUNTER
Pt notified of lab results.    Theresa Kahn Staff  Caller: [phone] (Today, 11:41 AM)  Type:  Patient Returning Call    Who Called: PT  Who Left Message for Patient: Nurse  Does the patient know what this is regarding?: Yes  Would the patient rather a call back or a response via [facility]? Call back  Best Call Back Number: [phone]  Additional Information:

## 2024-08-18 NOTE — PROGRESS NOTES
"PATIENT: Saud Mayers  MRN: [id number]  DATE: 8/21/2024    Diagnosis:   1. Diffuse large B-cell lymphoma of lymph nodes of multiple regions    2. Severe obesity (BMI 35.0-39.9) with comorbidity    3. Type 2 diabetes mellitus with hyperglycemia, without long-term current use of insulin    4. Folate deficiency      Chief Complaint: Lymphoma    Oncologic History:      Oncologic History Diffuse large B-cell lymphoma      Oncologic Treatment R-CHOP (start date 12/21/22)      Pathology 11/23/22:  "LYMPH NODE", RIGHT CERVICAL, EXCISION:   -- DIFFUSE LARGE B-CELL LYMPHOMA, NON-GCB SUBTYPE (SEE COMMENT).     Flow cytometric analysis of right cervical lymph node detects a kappa light   chain restricted B lymphocyte population expressing CD19 and CD20, and CD5   (partial and dim).    CD10 is negative.  T lymphocytes are   immunophenotypically unremarkable.  Correlation with tissue morphology is   required. Flow differential: Lymphocytes 35.8%, Monocytes 0.0%, Granulocytes   4.4%, Blast  0.0%, Debris/nRBC 60.0%,  Viability 43.5%.   Immunohistochemical stains are performed with adequate c ontrols.  The large   lymphoid cells are positive for CD20, BCL6 (>30%), MUM1 (>30%), and BCL2   (>50%); negative for CD5, CD10, CD30, cyclin D1, ALK1 and EBV (VICKY in-situ   hybridization).  They display high proliferation index (Ki-67 90%).   CD3-positive small mature T-cells are seen.   Taken together, the overall findings consistent with diffuse large B-cell   lymphoma, non-GCB subtype.   1. Immunohistochemical stain:  The lymphoma cells are positive for MYC (>40%).   2. Fluorescence in-situ hybridization: The result is abnormal and indicates a   BCL6 rearrangement in 91% of nuclei.  No rearrangement of MYC or BCL2 and no   fusion of MYC and IGH was observed.  Rearrangement of BCL6 has been   associated with several B-cell lymphomas, including follicular lymphoma,   diffuse large B-cell lymphoma, and high-grade B-cell lymphoma.  As no MYC "   rearrangement and no fusion of MYC and IGH was observed, this makes unlikely   the possibility of high-grade B-cell lymphoma with MYC and BCL6 rearrangement.   Taken together, the overall findings are consistent with diffuse large B-cell   lymphoma, NOS (non-GCB subtype).  The lymphoma cells display MYC and BCL2   double expressor phenotype.       Flow cytometry - lymph node (11/23/22):  Flow cytometric analysis of lymph node detects a kappa light chain restricted   B lymphocyte population expressing CD19 and CD20, and CD5 (partial and dim).     CD10 is negative.  T lymphocytes are immunophenotypically unremarkable.   Correlation with tissue morphology is required.   Flow differential:  Lymphocytes 35.8%, Monocytes 0.0%, Granulocytes  4.4%,   Blast  0.0%, Debris/nRBC 60.0%,  Viability 43.5%.           Subjective:    History of Present Illness: Mr. Mayers is a 59 y.o. male who presents for evaluation and management of diffuse large B-cell lymphoma. I had seen him during a hospitalization in November 2022.    - he was admitted for lymphadenopathy/weight loss.  - he underwent excisional lymph node biopsy on 11/23/22. Pathology revealed diffuse large B-cell lymphoma, non-GCB type.  - he underwent port-a-cath placement on 12/7/22 with Dr. Velazquez.  - he underwent pet scan on 12/9/22.  - he underwent port-a-cath placement on 12/7/22 with Dr. Velazquez.  - he underwent pet scan on 12/9/22.  - he underwent PET scan on 3/22/23.  - he completed 6 cycles of R-CHOP.  - he underwent PET scan on 8/4/23    Interval history:  - he presents for a follow-up appointment for his diffuse large B-cell lymphoma.  - today, he is doing well. He had food poisoning a few weeks ago. He denies fever, unintentional weight loss, lymphadenopathy. He denies chest pain, vomiting, diarrhea, constipation.      Past medical, surgical, family, and social histories have been reviewed and updated below.    Past Medical History:   Past Medical History:    Diagnosis Date    Asthma     Cancer     Diabetes mellitus     High cholesterol     Hypertension     ANAMIKA on CPAP     Testicular hypofunction        Past Surgical History:   Past Surgical History:   Procedure Laterality Date    CARPAL TUNNEL RELEASE Bilateral 2020    CHOLECYSTECTOMY  01/01/1990    COLONOSCOPY  01/01/2013    COLONOSCOPY N/A 9/2/2022    Procedure: COLONOSCOPY sutab;  Surgeon: Jeovany Cisse MD;  Location: [facility] ENDO;  Service: Endoscopy;  Laterality: N/A;    ESOPHAGOGASTRODUODENOSCOPY N/A 9/2/2022    Procedure: EGD (ESOPHAGOGASTRODUODENOSCOPY);  Surgeon: Jeovany Cisse MD;  Location: [facility] ENDO;  Service: Endoscopy;  Laterality: N/A;    INSERTION OF TUNNELED CENTRAL VENOUS CATHETER (CVC) WITH SUBCUTANEOUS PORT N/A 12/7/2022    Procedure: [id number];  Surgeon: Francisco Velazquez MD;  [facility];  Service: General;  Laterality: N/A;    ORTHOPEDIC SURGERY Bilateral 01/01/2007    feet plantar    ORTHOPEDIC SURGERY Right 01/01/2006    knee    ROTATOR CUFF REPAIR Left 01/01/1997    SURGICAL REMOVAL OF LYMPH NODE Right 11/23/2022    Procedure: EXCISION, LYMPH NODE;  Surgeon: Francisco Velazquez MD;  [facility];  Service: General;  Laterality: Right;   (right neck)       Family History:   Family History   Problem Relation Name Age of Onset    Heart attack Father         Social History:  reports that he quit smoking about 32 years ago. His smoking use included cigarettes. He has never used smokeless tobacco. He reports that he does not drink alcohol and does not use drugs.    Allergies:  Review of patient's allergies indicates:   Allergen Reactions    Gabapentin Hallucinations    Tizanidine Other (See Comments)     somnolence       Medications:  Current Outpatient Medications   Medication Sig Dispense Refill    acetaminophen (TYLENOL) 500 MG tablet Take 500 mg by mouth every 6 (six) hours as needed for Pain.      allopurinoL (ZYLOPRIM) 300 MG tablet Take 1 tablet (300 mg total) by mouth  once daily. 30 tablet 11    atorvastatin (LIPITOR) 20 MG tablet Take 1 tablet (20 mg total) by mouth once daily. 90 tablet 3    cyanocobalamin (VITAMIN B-12) 100 MCG tablet Take 100 mcg by mouth once daily.      folic acid (FOLVITE) 1 MG tablet Take 1 tablet (1 mg total) by mouth once daily. 100 tablet 3    LIDOcaine-prilocaine (EMLA) cream Apply topically as needed. Apply to skin overlying port site 30 minutes before chemotherapy. 30 g 1    losartan (COZAAR) 25 MG tablet Take 1 tablet (25 mg total) by mouth once daily. 90 tablet 3    magic mouthwash diphen/antac/lidoc/nysta 10 mLs 3 (three) times daily as needed (chemotherapy-induced mucositis.). 300 mL 1    meloxicam (MOBIC) 7.5 MG tablet Take 1 tablet (7.5 mg total) by mouth daily as needed for Pain. 30 tablet 1    metFORMIN (GLUCOPHAGE) 1000 MG tablet Take 1 tablet (1,000 mg total) by mouth 2 (two) times daily with meals. 180 tablet 3    metoprolol succinate (TOPROL-XL) 50 MG 24 hr tablet Take 1 tablet (50 mg total) by mouth once daily. 90 tablet 3    mometasone 0.1% (ELOCON) 0.1 % cream Apply topically once daily. Apply bid to affected area for 10 days 45 g 5    multivit-min-folic-vit K-lycop (ONE-A-DAY MEN'S 50 PLUS) 400- mcg Tab Take 1 tablet by mouth once daily.      oxyCODONE-acetaminophen (PERCOCET) 5-325 mg per tablet Take 1 tablet by mouth every 6 (six) hours as needed for Pain. 28 tablet 0    semaglutide (OZEMPIC) 1 mg/dose (4 mg/3 mL) Inject 1 mg into the skin every 7 days. 3 mL 11    tadalafiL (CIALIS) 10 MG tablet Take 1 tablet (10 mg total) by mouth daily as needed for Erectile Dysfunction. 30 tablet 1     No current facility-administered medications for this visit.       Review of Systems   Constitutional:  Negative for fatigue and fever.   HENT:  Negative for sore throat.    Eyes:  Negative for visual disturbance.   Respiratory:  Negative for shortness of breath.    Cardiovascular:  Negative for chest pain.   Gastrointestinal:  Negative for  "abdominal pain and nausea.   Genitourinary:  Negative for dysuria.   Musculoskeletal:  Negative for back pain.   Skin:  Negative for rash.   Neurological:  Negative for headaches.   Hematological:  Negative for adenopathy.   Psychiatric/Behavioral:  The patient is not nervous/anxious.        ECOG Performance Status:   ECOG SCORE    0 - Fully active-able to carry on all pre-disease performance without restriction         Objective:      Vitals:   Vitals:    08/21/24 0939   BP: 131/65   BP Location: Left arm   Patient Position: Sitting   BP Method: Large (Automatic)   Pulse: 88   Resp: 20   Temp: 98.2 °F (36.8 °C)   TempSrc: Oral   SpO2: 96%   Weight: 119.4 kg (263 lb 3.7 oz)   Height: 5' 9" (1.753 m)       BMI: Body mass index is 38.87 kg/m².    Physical Exam  Vitals and nursing note reviewed.   Constitutional:       Appearance: He is well-developed.   HENT:      Head: Normocephalic and atraumatic.   Eyes:      Pupils: Pupils are equal, round, and reactive to light.   Cardiovascular:      Rate and Rhythm: Normal rate and regular rhythm.   Pulmonary:      Effort: Pulmonary effort is normal.      Breath sounds: Normal breath sounds.   Abdominal:      General: Bowel sounds are normal.      Palpations: Abdomen is soft.   Musculoskeletal:         General: Normal range of motion.      Cervical back: Normal range of motion and neck supple.   Lymphadenopathy:      Cervical: No cervical adenopathy.      Upper Body:      Right upper body: No axillary adenopathy.      Left upper body: No axillary adenopathy.   Skin:     General: Skin is warm and dry.   Neurological:      Mental Status: He is alert and oriented to person, place, and time.   Psychiatric:         Behavior: Behavior normal.         Thought Content: Thought content normal.         Judgment: Judgment normal.         Laboratory Data:  Labs have been reviewed.    Lab Results   Component Value Date    WBC 6.40 08/20/2024    HGB 13.1 (L) 08/20/2024    HCT 38.9 (L) " 08/20/2024    MCV 95 08/20/2024     08/20/2024       Imaging:    PET/CT scan (8/4/23): I have personally reviewed the images  Quality of the study: Adequate.     Reference values:     Mediastinal blood pool maximum SUV: 2.5     Normal liver background maximum SUV: 3     In the head and neck, there are no hypermetabolic lesions worrisome for malignancy. There are no hypermetabolic mucosal lesions, and there are no pathologically enlarged or hypermetabolic lymph nodes.     In the chest, there are no hypermetabolic lesions worrisome for malignancy.  There are no concerning pulmonary nodules or masses, and there are no pathologically enlarged or hypermetabolic lymph nodes.     In the abdomen and pelvis, there is physiologic tracer distribution within the abdominal organs and excretion into the genitourinary system.  No pathologically enlarged or hypermetabolic lymph nodes.  Stable non FDG avid normal-sized mesenteric lymph nodes with mild adjacent mesenteric stranding.     The spleen has normal uptake and is enlarged at 16 cm.  Stable splenic hypodensity.     In the bones, there are no hypermetabolic lesions worrisome for malignancy.     In the extremities, there are no hypermetabolic lesions worrisome for malignancy.     Additional CT findings: Small fat containing right inguinal hernia.  Calcific atherosclerosis of the coronary arteries.  Cholecystectomy.  Right chest port transvenous catheter tip terminates in the right atrium.     Impression:     1.  No hypermetabolic tumor or lymphadenopathy.     2.  Stable splenomegaly.      PET/CT scan (3/22/23): I have personally reviewed the images  1.  In this patient with lymphoma, there has been interval resolution of pathologically enlarged, hypermetabolic lymph nodes on both sides of the diaphragm in keeping with excellent response to therapy.     2.  Improved but persistent splenomegaly, with interval resolution of previously noted hypermetabolic foci.     3.   Diffuse uptake in the bone marrow, consistent with bone marrow hyperplasia.         Echocardiogram (12/13/22):  The left ventricle is normal in size with concentric remodeling and normal systolic function.  The estimated ejection fraction is 65%.  Normal left ventricular diastolic function.  Normal right ventricular size with normal right ventricular systolic function.  Normal central venous pressure (3 mmHg).  The left ventricular global longitudinal strain is -17.6%.  There is moderate aortic valve stenosis.  Aortic valve area is 2.04 cm2; peak velocity is 3.17 m/s; mean gradient is 26 mmHg.      PET/CT scan (12/9/22): I have personally reviewed the images  Quality of the study: Adequate.     Reference values:     Mediastinal blood pool maximum SUV: 2.9     Normal liver background maximum SUV: 3.0     In the head and neck, numerous enlarged hypermetabolic lymph nodes within bilateral cervical periclavicular lesions.  For example, Conglomerate of right cervical and supraclavicular lymph nodes with SUV 14.  Left infraclavicular lymph node measuring 1.8 cm (image 95), with SUV max 10.  There is fluid and gas within the level 2 B measuring 2.1 cm, likely related to recent biopsy.     In the chest, numerous enlarged hypermetabolic mediastinal, hilar, and axilla lymph nodes.  For example subcarinal lymph node measuring 3.2 x 6.5 cm demonstrating SUV max of 9.8 (image 129).     In the abdomen and pelvis, there is physiologic tracer distribution within the abdominal organs and excretion into the genitourinary system.     Numerous enlarged hypermetabolic lymph nodes.  For example, conglomerate of lymph nodes within the anterior abdomen demonstrating SUV max of 18 (fused image 205).  Posterior right pararenal space hypermetabolic node measuring 3.0 x 2.4 cm with SUV max of 16 (image 218).  Conglomerate of right pelvic sidewall nodes measuring 8.0 AP x 4.4 TV x 11.1 CC cm demonstrating SUV max of 19 (image 251).     The  spleen has multifocal hypermetabolic uptake within SUV max of 13 and is enlarged at 19.0 cm in craniocaudal dimension.  There are multiple areas of hypoattenuation, better characterized on prior CTs.     In the bones, there are no hypermetabolic lesions worrisome for malignancy.     Additional CT findings     Right-sided chest port tip terminating in the cavoatrial junction.  Trace left pleural fluid, prior cholecystectomy.     Impression:     Multiple hypermetabolic lymph nodes involving both sides of the diaphragm including bulky right iliac conglomerate in keeping with known large B-cell lymphoma.     Splenomegaly and multifocal hypermetabolic foci within the spleen.     The Deauville Score is: 5     Reference values:     Mediastinal blood pool maximum SUV: 2.9     Normal liver maximum SUV: 3.0      CT chest/abdomen/pelvis (11/7/22): I have personally reviewed the images     Innumerable adenopathy above and below the diaphragm and throughout the neck, LEFT axilla and chest wall, retroperitoneum and mesentery as well as in the iliac chain, right greater than left.     Findings are suspicious for lymphoma/leukemia.     Multiple splenic lesions appearing to have increased in number and size since the prior exam.        CT soft tissue neck (11/7/22): I have personally reviewed the images  Extensive adenopathy within the right greater than left neck with also adenopathy within the mediastinum and left axilla.  The largest lymph node in the right posterior triangle/spinal accessory region demonstrates internal necrosis as does a right paratracheal lymph node within the mediastinum..  There is inflammatory change surrounding the necrotic lymph nodes and although an infectious/inflammatory process is not excluded, a neoplastic process including lymphoma to be considered.      Assessment:       1. Diffuse large B-cell lymphoma of lymph nodes of multiple regions    2. Severe obesity (BMI 35.0-39.9) with comorbidity    3.  "Type 2 diabetes mellitus with hyperglycemia, without long-term current use of insulin    4. Folate deficiency           Plan:     Diffuse large B-cell lymphoma  - I have reviewed his chart  - CT scans (11/7/22) revealed extensive adenopathy.  - excisional biopsy (11/23/22) revealed diffuse large B-cell lymphoma, non-GCB subtype.  - No rearrangement of MYC or BCL2, so not a double-hit lymphoma. 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  - I and via oncology recommend R-CHOP x 6 cycles.  - The risks and benefits of chemotherapy were discussed, written information was given, and informed consent was obtained.  - hepatitis B core antibody/surface antigen and hepatitis C antibody were negative.  - he underwent port-a-cath placement on 12/7/22 with Dr. Velazquez.  - PET/CT scan (12/9/22) revealed "Multiple hypermetabolic lymph nodes involving both sides of the diaphragm including bulky right iliac conglomerate in keeping with known large B-cell lymphoma."  - echocardiogram (12/13/22) revealed an ejection fraction of 65%  - he received a unit of blood on 12/15/22 for symptomatic anemia  - PET/CT scan (3/22/23) [following 4 cycles of therapy] revealed "interval resolution of pathologically enlarged, hypermetabolic lymph nodes on both sides of the diaphragm in keeping with excellent response to therapy." Since his pet scan on 3/22/23 revealed a complete response to therapy, we will hold off on further PET scans (end-of-treatment pet scan).   - following 6 cycles of R-CHOP, he was hospitalized for sepsis secondary to Klebsiella  - pet scan (8/4/23) revealed "no hypermetabolic tumor or lymphadenopathy."  - he underwent port removal on 8/15/23.  - clinically, he is doing well with no evidence of recurrent lymphoma  - Labs have been reviewed.  - return to clinic in 6 months.     2. Folate deficiency  - he has been taking folic acid. I think he can discontinue it (follow-up level was great).    3. " Type 2 diabetes  - last hemoglobin A1c (6/17/24) was 8.1%  - continue diabetic medications  - defer management to primary care     4. Severe obesity  - Body mass index is 38.87 kg/m².  - comorbid condition is diabetes    - return to clinic in 6 months.     Sandip Vickers M.D.  Hematology/Oncology  [facility] - [address], [address]  [place] [address]  Phone: [phone]  Fax: [phone]

## 2024-08-20 ENCOUNTER — LAB VISIT (OUTPATIENT)
Dept: LAB | Facility: HOSPITAL | Age: 59
End: 2024-08-20
Attending: INTERNAL MEDICINE
Payer: COMMERCIAL

## 2024-08-20 DIAGNOSIS — C83.38 DIFFUSE LARGE B-CELL LYMPHOMA OF LYMPH NODES OF MULTIPLE REGIONS: ICD-10-CM

## 2024-08-20 LAB
ALBUMIN SERPL BCP-MCNC: 4.4 G/DL (ref 3.5–5.2)
ALP SERPL-CCNC: 85 U/L (ref 38–126)
ALT SERPL W/O P-5'-P-CCNC: 46 U/L (ref 10–44)
ANION GAP SERPL CALC-SCNC: 13 MMOL/L (ref 8–16)
AST SERPL-CCNC: 39 U/L (ref 15–46)
BASOPHILS # BLD AUTO: 0.07 K/UL (ref 0–0.2)
BASOPHILS NFR BLD: 1.1 % (ref 0–1.9)
BILIRUB SERPL-MCNC: 0.6 MG/DL (ref 0.1–1)
CALCIUM SERPL-MCNC: 9.1 MG/DL (ref 8.7–10.5)
CHLORIDE SERPL-SCNC: 94 MMOL/L (ref 95–110)
CO2 SERPL-SCNC: 28 MMOL/L (ref 23–29)
CREAT SERPL-MCNC: 0.81 MG/DL (ref 0.5–1.4)
DIFFERENTIAL METHOD BLD: ABNORMAL
EOSINOPHIL # BLD AUTO: 0.1 K/UL (ref 0–0.5)
EOSINOPHIL NFR BLD: 1.6 % (ref 0–8)
ERYTHROCYTE [DISTWIDTH] IN BLOOD BY AUTOMATED COUNT: 13.9 % (ref 11.5–14.5)
EST. GFR  (NO RACE VARIABLE): >60 ML/MIN/1.73 M^2
GLUCOSE SERPL-MCNC: 282 MG/DL (ref 70–110)
HCT VFR BLD AUTO: 38.9 % (ref 40–54)
HGB BLD-MCNC: 13.1 G/DL (ref 14–18)
IMM GRANULOCYTES # BLD AUTO: 0.05 K/UL (ref 0–0.04)
IMM GRANULOCYTES NFR BLD AUTO: 0.8 % (ref 0–0.5)
LDH SERPL L TO P-CCNC: 211 U/L (ref 110–260)
LYMPHOCYTES # BLD AUTO: 1.5 K/UL (ref 1–4.8)
LYMPHOCYTES NFR BLD: 22.7 % (ref 18–48)
MCH RBC QN AUTO: 32 PG (ref 27–31)
MCHC RBC AUTO-ENTMCNC: 33.7 G/DL (ref 32–36)
MCV RBC AUTO: 95 FL (ref 82–98)
MONOCYTES # BLD AUTO: 0.5 K/UL (ref 0.3–1)
MONOCYTES NFR BLD: 8.3 % (ref 4–15)
NEUTROPHILS # BLD AUTO: 4.2 K/UL (ref 1.8–7.7)
NEUTROPHILS NFR BLD: 65.5 % (ref 38–73)
NRBC BLD-RTO: 0 /100 WBC
PLATELET # BLD AUTO: 191 K/UL (ref 150–450)
PMV BLD AUTO: 9 FL (ref 9.2–12.9)
POTASSIUM SERPL-SCNC: 4.1 MMOL/L (ref 3.5–5.1)
PROT SERPL-MCNC: 7.7 G/DL (ref 6–8.4)
RBC # BLD AUTO: 4.09 M/UL (ref 4.6–6.2)
SODIUM SERPL-SCNC: 135 MMOL/L (ref 136–145)
URATE SERPL-MCNC: 3.3 MG/DL (ref 3.4–7)
UUN UR-MCNC: 12 MG/DL (ref 2–20)
WBC # BLD AUTO: 6.4 K/UL (ref 3.9–12.7)

## 2024-08-20 PROCEDURE — 36415 COLL VENOUS BLD VENIPUNCTURE: CPT | Mod: PN | Performed by: INTERNAL MEDICINE

## 2024-08-20 PROCEDURE — 80053 COMPREHEN METABOLIC PANEL: CPT | Mod: PN | Performed by: INTERNAL MEDICINE

## 2024-08-20 PROCEDURE — 83615 LACTATE (LD) (LDH) ENZYME: CPT | Performed by: INTERNAL MEDICINE

## 2024-08-20 PROCEDURE — 84550 ASSAY OF BLOOD/URIC ACID: CPT | Performed by: INTERNAL MEDICINE

## 2024-08-20 PROCEDURE — 85025 COMPLETE CBC W/AUTO DIFF WBC: CPT | Mod: PN | Performed by: INTERNAL MEDICINE

## 2024-08-21 ENCOUNTER — OFFICE VISIT (OUTPATIENT)
Dept: HEMATOLOGY/ONCOLOGY | Facility: CLINIC | Age: 59
End: 2024-08-21
Payer: COMMERCIAL

## 2024-08-21 VITALS
SYSTOLIC BLOOD PRESSURE: 131 MMHG | HEART RATE: 88 BPM | BODY MASS INDEX: 38.99 KG/M2 | DIASTOLIC BLOOD PRESSURE: 65 MMHG | TEMPERATURE: 98 F | OXYGEN SATURATION: 96 % | HEIGHT: 69 IN | RESPIRATION RATE: 20 BRPM | WEIGHT: 263.25 LBS

## 2024-08-21 DIAGNOSIS — E53.8 FOLATE DEFICIENCY: ICD-10-CM

## 2024-08-21 DIAGNOSIS — E66.01 SEVERE OBESITY (BMI 35.0-39.9) WITH COMORBIDITY: ICD-10-CM

## 2024-08-21 DIAGNOSIS — E11.65 TYPE 2 DIABETES MELLITUS WITH HYPERGLYCEMIA, WITHOUT LONG-TERM CURRENT USE OF INSULIN: ICD-10-CM

## 2024-08-21 DIAGNOSIS — C83.38 DIFFUSE LARGE B-CELL LYMPHOMA OF LYMPH NODES OF MULTIPLE REGIONS: Primary | ICD-10-CM

## 2024-08-21 PROCEDURE — 3008F BODY MASS INDEX DOCD: CPT | Mod: CPTII,S$GLB,, | Performed by: INTERNAL MEDICINE

## 2024-08-21 PROCEDURE — 3052F HG A1C>EQUAL 8.0%<EQUAL 9.0%: CPT | Mod: CPTII,S$GLB,, | Performed by: INTERNAL MEDICINE

## 2024-08-21 PROCEDURE — 3075F SYST BP GE 130 - 139MM HG: CPT | Mod: CPTII,S$GLB,, | Performed by: INTERNAL MEDICINE

## 2024-08-21 PROCEDURE — 3061F NEG MICROALBUMINURIA REV: CPT | Mod: CPTII,S$GLB,, | Performed by: INTERNAL MEDICINE

## 2024-08-21 PROCEDURE — 99999 PR PBB SHADOW E&M-EST. PATIENT-LVL III: CPT | Mod: PBBFAC,,, | Performed by: INTERNAL MEDICINE

## 2024-08-21 PROCEDURE — 1160F RVW MEDS BY RX/DR IN RCRD: CPT | Mod: CPTII,S$GLB,, | Performed by: INTERNAL MEDICINE

## 2024-08-21 PROCEDURE — 3078F DIAST BP <80 MM HG: CPT | Mod: CPTII,S$GLB,, | Performed by: INTERNAL MEDICINE

## 2024-08-21 PROCEDURE — 1159F MED LIST DOCD IN RCRD: CPT | Mod: CPTII,S$GLB,, | Performed by: INTERNAL MEDICINE

## 2024-08-21 PROCEDURE — 3066F NEPHROPATHY DOC TX: CPT | Mod: CPTII,S$GLB,, | Performed by: INTERNAL MEDICINE

## 2024-08-21 PROCEDURE — 99214 OFFICE O/P EST MOD 30 MIN: CPT | Mod: S$GLB,,, | Performed by: INTERNAL MEDICINE

## 2024-08-21 PROCEDURE — 4010F ACE/ARB THERAPY RXD/TAKEN: CPT | Mod: CPTII,S$GLB,, | Performed by: INTERNAL MEDICINE

## 2024-08-23 DIAGNOSIS — I10 ESSENTIAL HYPERTENSION: ICD-10-CM

## 2024-08-23 RX ORDER — LOSARTAN POTASSIUM 25 MG/1
25 TABLET ORAL
Qty: 90 TABLET | Refills: 3 | Status: SHIPPED | OUTPATIENT
Start: 2024-08-23

## 2024-08-23 NOTE — TELEPHONE ENCOUNTER
Refill Decision Note   Saud Mayers  is requesting a refill authorization.  Brief Assessment and Rationale for Refill:  Approve     Medication Therapy Plan:         Comments:     Note composed:10:10 AM 08/23/2024             Appointments     Last Visit   6/14/2024 Anson Blunt MD   Next Visit   12/16/2024 Anson Blunt MD

## 2024-08-23 NOTE — TELEPHONE ENCOUNTER
No care due was identified.  [facility] Embedded Care Due Messages. Reference number: [id number].   8/23/2024 4:34:45 AM CDT

## 2024-09-18 DIAGNOSIS — E11.9 TYPE 2 DIABETES MELLITUS WITHOUT COMPLICATION: ICD-10-CM

## 2024-12-03 ENCOUNTER — PATIENT MESSAGE (OUTPATIENT)
Dept: FAMILY MEDICINE | Facility: CLINIC | Age: 59
End: 2024-12-03
Payer: COMMERCIAL

## 2024-12-03 DIAGNOSIS — I10 ESSENTIAL HYPERTENSION: ICD-10-CM

## 2024-12-03 DIAGNOSIS — E11.65 TYPE 2 DIABETES MELLITUS WITH HYPERGLYCEMIA, WITHOUT LONG-TERM CURRENT USE OF INSULIN: ICD-10-CM

## 2024-12-03 DIAGNOSIS — E78.00 PURE HYPERCHOLESTEROLEMIA: Primary | ICD-10-CM

## 2024-12-03 DIAGNOSIS — Z12.5 PROSTATE CANCER SCREENING: ICD-10-CM

## 2024-12-03 NOTE — TELEPHONE ENCOUNTER
Only labs ordered are A1c and microalbumin    Can you order additional fasting labs  Rtn to nurse to link to appt

## 2024-12-12 ENCOUNTER — LAB VISIT (OUTPATIENT)
Dept: LAB | Facility: HOSPITAL | Age: 59
End: 2024-12-12
Attending: STUDENT IN AN ORGANIZED HEALTH CARE EDUCATION/TRAINING PROGRAM
Payer: COMMERCIAL

## 2024-12-12 DIAGNOSIS — I10 ESSENTIAL HYPERTENSION: ICD-10-CM

## 2024-12-12 DIAGNOSIS — E11.65 TYPE 2 DIABETES MELLITUS WITH HYPERGLYCEMIA, WITHOUT LONG-TERM CURRENT USE OF INSULIN: ICD-10-CM

## 2024-12-12 DIAGNOSIS — E78.00 PURE HYPERCHOLESTEROLEMIA: ICD-10-CM

## 2024-12-12 DIAGNOSIS — E11.9 TYPE 2 DIABETES MELLITUS WITHOUT COMPLICATION: ICD-10-CM

## 2024-12-12 DIAGNOSIS — Z12.5 PROSTATE CANCER SCREENING: ICD-10-CM

## 2024-12-12 LAB
ALBUMIN SERPL BCP-MCNC: 4.6 G/DL (ref 3.5–5.2)
ALBUMIN/CREAT UR: 6.8 UG/MG (ref 0–30)
ALP SERPL-CCNC: 73 U/L (ref 38–126)
ALT SERPL W/O P-5'-P-CCNC: 37 U/L (ref 10–44)
ANION GAP SERPL CALC-SCNC: 9 MMOL/L (ref 8–16)
AST SERPL-CCNC: 31 U/L (ref 15–46)
BASOPHILS # BLD AUTO: 0.07 K/UL (ref 0–0.2)
BASOPHILS NFR BLD: 1.3 % (ref 0–1.9)
BILIRUB SERPL-MCNC: 0.6 MG/DL (ref 0.1–1)
CALCIUM SERPL-MCNC: 9.2 MG/DL (ref 8.7–10.5)
CHLORIDE SERPL-SCNC: 97 MMOL/L (ref 95–110)
CHOLEST SERPL-MCNC: 159 MG/DL (ref 120–199)
CHOLEST/HDLC SERPL: 4 {RATIO} (ref 2–5)
CO2 SERPL-SCNC: 28 MMOL/L (ref 23–29)
COMPLEXED PSA SERPL-MCNC: 0.28 NG/ML (ref 0–4)
CREAT SERPL-MCNC: 0.74 MG/DL (ref 0.5–1.4)
CREAT UR-MCNC: 133 MG/DL (ref 23–375)
DIFFERENTIAL METHOD BLD: ABNORMAL
EOSINOPHIL # BLD AUTO: 0.1 K/UL (ref 0–0.5)
EOSINOPHIL NFR BLD: 1.8 % (ref 0–8)
ERYTHROCYTE [DISTWIDTH] IN BLOOD BY AUTOMATED COUNT: 13.6 % (ref 11.5–14.5)
EST. GFR  (NO RACE VARIABLE): >60 ML/MIN/1.73 M^2
ESTIMATED AVG GLUCOSE: 232 MG/DL (ref 68–131)
GLUCOSE SERPL-MCNC: 304 MG/DL (ref 70–110)
HBA1C MFR BLD: 9.7 % (ref 4–5.6)
HCT VFR BLD AUTO: 40.1 % (ref 40–54)
HDLC SERPL-MCNC: 40 MG/DL (ref 40–75)
HDLC SERPL: 25.2 % (ref 20–50)
HGB BLD-MCNC: 13.5 G/DL (ref 14–18)
IMM GRANULOCYTES # BLD AUTO: 0.01 K/UL (ref 0–0.04)
IMM GRANULOCYTES NFR BLD AUTO: 0.2 % (ref 0–0.5)
LDLC SERPL CALC-MCNC: 88.6 MG/DL (ref 63–159)
LYMPHOCYTES # BLD AUTO: 1.5 K/UL (ref 1–4.8)
LYMPHOCYTES NFR BLD: 28 % (ref 18–48)
MCH RBC QN AUTO: 31.9 PG (ref 27–31)
MCHC RBC AUTO-ENTMCNC: 33.7 G/DL (ref 32–36)
MCV RBC AUTO: 95 FL (ref 82–98)
MICROALBUMIN UR DL<=1MG/L-MCNC: 9 UG/ML
MONOCYTES # BLD AUTO: 0.5 K/UL (ref 0.3–1)
MONOCYTES NFR BLD: 8.8 % (ref 4–15)
NEUTROPHILS # BLD AUTO: 3.3 K/UL (ref 1.8–7.7)
NEUTROPHILS NFR BLD: 59.9 % (ref 38–73)
NONHDLC SERPL-MCNC: 119 MG/DL
NRBC BLD-RTO: 0 /100 WBC
PLATELET # BLD AUTO: 174 K/UL (ref 150–450)
PMV BLD AUTO: 9.2 FL (ref 9.2–12.9)
POTASSIUM SERPL-SCNC: 4 MMOL/L (ref 3.5–5.1)
PROT SERPL-MCNC: 7.6 G/DL (ref 6–8.4)
RBC # BLD AUTO: 4.23 M/UL (ref 4.6–6.2)
SODIUM SERPL-SCNC: 134 MMOL/L (ref 136–145)
TRIGL SERPL-MCNC: 152 MG/DL (ref 30–150)
TSH SERPL DL<=0.005 MIU/L-ACNC: 2.96 UIU/ML (ref 0.4–4)
UUN UR-MCNC: 16 MG/DL (ref 2–20)
WBC # BLD AUTO: 5.46 K/UL (ref 3.9–12.7)

## 2024-12-12 PROCEDURE — 84443 ASSAY THYROID STIM HORMONE: CPT | Mod: PN | Performed by: STUDENT IN AN ORGANIZED HEALTH CARE EDUCATION/TRAINING PROGRAM

## 2024-12-12 PROCEDURE — 36415 COLL VENOUS BLD VENIPUNCTURE: CPT | Mod: PN | Performed by: STUDENT IN AN ORGANIZED HEALTH CARE EDUCATION/TRAINING PROGRAM

## 2024-12-12 PROCEDURE — 85025 COMPLETE CBC W/AUTO DIFF WBC: CPT | Mod: PN | Performed by: STUDENT IN AN ORGANIZED HEALTH CARE EDUCATION/TRAINING PROGRAM

## 2024-12-12 PROCEDURE — 82570 ASSAY OF URINE CREATININE: CPT | Mod: PN | Performed by: STUDENT IN AN ORGANIZED HEALTH CARE EDUCATION/TRAINING PROGRAM

## 2024-12-12 PROCEDURE — 80061 LIPID PANEL: CPT | Performed by: STUDENT IN AN ORGANIZED HEALTH CARE EDUCATION/TRAINING PROGRAM

## 2024-12-12 PROCEDURE — 83036 HEMOGLOBIN GLYCOSYLATED A1C: CPT | Performed by: STUDENT IN AN ORGANIZED HEALTH CARE EDUCATION/TRAINING PROGRAM

## 2024-12-12 PROCEDURE — 84153 ASSAY OF PSA TOTAL: CPT | Performed by: STUDENT IN AN ORGANIZED HEALTH CARE EDUCATION/TRAINING PROGRAM

## 2024-12-12 PROCEDURE — 80053 COMPREHEN METABOLIC PANEL: CPT | Mod: PN | Performed by: STUDENT IN AN ORGANIZED HEALTH CARE EDUCATION/TRAINING PROGRAM

## 2024-12-16 ENCOUNTER — OFFICE VISIT (OUTPATIENT)
Dept: FAMILY MEDICINE | Facility: CLINIC | Age: 59
End: 2024-12-16
Payer: COMMERCIAL

## 2024-12-16 ENCOUNTER — PATIENT MESSAGE (OUTPATIENT)
Dept: HEMATOLOGY/ONCOLOGY | Facility: CLINIC | Age: 59
End: 2024-12-16
Payer: COMMERCIAL

## 2024-12-16 VITALS
DIASTOLIC BLOOD PRESSURE: 80 MMHG | OXYGEN SATURATION: 97 % | TEMPERATURE: 98 F | HEIGHT: 69 IN | HEART RATE: 82 BPM | SYSTOLIC BLOOD PRESSURE: 138 MMHG | WEIGHT: 253.19 LBS | BODY MASS INDEX: 37.5 KG/M2

## 2024-12-16 DIAGNOSIS — E66.01 CLASS 2 SEVERE OBESITY DUE TO EXCESS CALORIES WITH SERIOUS COMORBIDITY AND BODY MASS INDEX (BMI) OF 37.0 TO 37.9 IN ADULT: ICD-10-CM

## 2024-12-16 DIAGNOSIS — E11.65 TYPE 2 DIABETES MELLITUS WITH HYPERGLYCEMIA, WITHOUT LONG-TERM CURRENT USE OF INSULIN: Primary | ICD-10-CM

## 2024-12-16 DIAGNOSIS — R73.9 HYPERGLYCEMIA: ICD-10-CM

## 2024-12-16 DIAGNOSIS — I10 ESSENTIAL HYPERTENSION: ICD-10-CM

## 2024-12-16 DIAGNOSIS — N52.9 ERECTILE DYSFUNCTION, UNSPECIFIED ERECTILE DYSFUNCTION TYPE: ICD-10-CM

## 2024-12-16 DIAGNOSIS — E66.812 CLASS 2 SEVERE OBESITY DUE TO EXCESS CALORIES WITH SERIOUS COMORBIDITY AND BODY MASS INDEX (BMI) OF 37.0 TO 37.9 IN ADULT: ICD-10-CM

## 2024-12-16 PROCEDURE — 3061F NEG MICROALBUMINURIA REV: CPT | Mod: CPTII,S$GLB,, | Performed by: STUDENT IN AN ORGANIZED HEALTH CARE EDUCATION/TRAINING PROGRAM

## 2024-12-16 PROCEDURE — 99214 OFFICE O/P EST MOD 30 MIN: CPT | Mod: S$GLB,,, | Performed by: STUDENT IN AN ORGANIZED HEALTH CARE EDUCATION/TRAINING PROGRAM

## 2024-12-16 PROCEDURE — 3008F BODY MASS INDEX DOCD: CPT | Mod: CPTII,S$GLB,, | Performed by: STUDENT IN AN ORGANIZED HEALTH CARE EDUCATION/TRAINING PROGRAM

## 2024-12-16 PROCEDURE — 3075F SYST BP GE 130 - 139MM HG: CPT | Mod: CPTII,S$GLB,, | Performed by: STUDENT IN AN ORGANIZED HEALTH CARE EDUCATION/TRAINING PROGRAM

## 2024-12-16 PROCEDURE — 3046F HEMOGLOBIN A1C LEVEL >9.0%: CPT | Mod: CPTII,S$GLB,, | Performed by: STUDENT IN AN ORGANIZED HEALTH CARE EDUCATION/TRAINING PROGRAM

## 2024-12-16 PROCEDURE — 3079F DIAST BP 80-89 MM HG: CPT | Mod: CPTII,S$GLB,, | Performed by: STUDENT IN AN ORGANIZED HEALTH CARE EDUCATION/TRAINING PROGRAM

## 2024-12-16 PROCEDURE — 4010F ACE/ARB THERAPY RXD/TAKEN: CPT | Mod: CPTII,S$GLB,, | Performed by: STUDENT IN AN ORGANIZED HEALTH CARE EDUCATION/TRAINING PROGRAM

## 2024-12-16 PROCEDURE — 1160F RVW MEDS BY RX/DR IN RCRD: CPT | Mod: CPTII,S$GLB,, | Performed by: STUDENT IN AN ORGANIZED HEALTH CARE EDUCATION/TRAINING PROGRAM

## 2024-12-16 PROCEDURE — 1159F MED LIST DOCD IN RCRD: CPT | Mod: CPTII,S$GLB,, | Performed by: STUDENT IN AN ORGANIZED HEALTH CARE EDUCATION/TRAINING PROGRAM

## 2024-12-16 PROCEDURE — 3066F NEPHROPATHY DOC TX: CPT | Mod: CPTII,S$GLB,, | Performed by: STUDENT IN AN ORGANIZED HEALTH CARE EDUCATION/TRAINING PROGRAM

## 2024-12-16 RX ORDER — ORAL SEMAGLUTIDE 3 MG/1
3 TABLET ORAL DAILY
Qty: 30 TABLET | Refills: 1 | Status: SHIPPED | OUTPATIENT
Start: 2024-12-16

## 2024-12-16 RX ORDER — ORAL SEMAGLUTIDE 7 MG/1
7 TABLET ORAL DAILY
Qty: 30 TABLET | Refills: 5 | Status: SHIPPED | OUTPATIENT
Start: 2025-01-16

## 2024-12-16 NOTE — PROGRESS NOTES
Patient ID: Saud Mayers is a 59 y.o. male.     Chief Complaint: f/u labs    Follow-up  Pertinent negatives include no chest pain, coughing, fever, headaches, myalgias, nausea, rash, vomiting or weakness.     History of Present Illness    Saud presents today for concerns about uncontrolled diabetes.    He reports blood sugar consistently in the 300s. He discontinued Ozempic due to side effects and currently takes Metformin. He recently started taking an old diabetes medication found at home, after which he experiences increased urinary and bowel frequency with 3-4 bathroom visits daily.       He is having problems getting an erection. He has tried taking 200mg of viagra in the past without getting an erection. He is unsure if this could be related to previous chemo.     Review of Systems  Review of Systems   Constitutional:  Negative for fever.   HENT:  Negative for ear pain and sinus pain.    Eyes:  Negative for discharge.   Respiratory:  Negative for cough and shortness of breath.    Cardiovascular:  Negative for chest pain and leg swelling.   Gastrointestinal:  Negative for diarrhea, nausea and vomiting.   Genitourinary:  Negative for urgency.   Musculoskeletal:  Negative for myalgias.   Skin:  Negative for rash.   Neurological:  Negative for weakness and headaches.   Psychiatric/Behavioral:  Negative for depression.    All other systems reviewed and are negative.      Currently Medications  Current Outpatient Medications on File Prior to Visit   Medication Sig Dispense Refill    acetaminophen (TYLENOL) 500 MG tablet Take 500 mg by mouth every 6 (six) hours as needed for Pain.      allopurinoL (ZYLOPRIM) 300 MG tablet Take 1 tablet (300 mg total) by mouth once daily. 30 tablet 11    atorvastatin (LIPITOR) 20 MG tablet Take 1 tablet (20 mg total) by mouth once daily. 90 tablet 3    cyanocobalamin (VITAMIN B-12) 100 MCG tablet Take 100 mcg by mouth once daily.      LIDOcaine-prilocaine (EMLA) cream Apply  "topically as needed. Apply to skin overlying port site 30 minutes before chemotherapy. 30 g 1    losartan (COZAAR) 25 MG tablet TAKE 1 TABLET(25 MG) BY MOUTH EVERY DAY 90 tablet 3    magic mouthwash diphen/antac/lidoc/nysta 10 mLs 3 (three) times daily as needed (chemotherapy-induced mucositis.). 300 mL 1    meloxicam (MOBIC) 7.5 MG tablet Take 1 tablet (7.5 mg total) by mouth daily as needed for Pain. 30 tablet 1    metFORMIN (GLUCOPHAGE) 1000 MG tablet Take 1 tablet (1,000 mg total) by mouth 2 (two) times daily with meals. 180 tablet 3    metoprolol succinate (TOPROL-XL) 50 MG 24 hr tablet Take 1 tablet (50 mg total) by mouth once daily. 90 tablet 3    multivit-min-folic-vit K-lycop (ONE-A-DAY MEN'S 50 PLUS) 400- mcg Tab Take 1 tablet by mouth once daily.      oxyCODONE-acetaminophen (PERCOCET) 5-325 mg per tablet Take 1 tablet by mouth every 6 (six) hours as needed for Pain. 28 tablet 0    tadalafiL (CIALIS) 10 MG tablet Take 1 tablet (10 mg total) by mouth daily as needed for Erectile Dysfunction. 30 tablet 1    [DISCONTINUED] semaglutide (OZEMPIC) 1 mg/dose (4 mg/3 mL) Inject 1 mg into the skin every 7 days. 3 mL 11    folic acid (FOLVITE) 1 MG tablet Take 1 tablet (1 mg total) by mouth once daily. 100 tablet 3    mometasone 0.1% (ELOCON) 0.1 % cream Apply topically once daily. Apply bid to affected area for 10 days 45 g 5     No current facility-administered medications on file prior to visit.       Physical  Exam  Vitals:    12/16/24 0941   BP: 138/80   Pulse: 82   Temp: 98 °F (36.7 °C)   SpO2: 97%   Weight: 114.8 kg (253 lb 3.2 oz)   Height: 5' 9" (1.753 m)      Body mass index is 37.39 kg/m².  Wt Readings from Last 3 Encounters:   12/16/24 114.8 kg (253 lb 3.2 oz)   08/21/24 119.4 kg (263 lb 3.7 oz)   06/14/24 121 kg (266 lb 12.1 oz)       Physical Exam  Vitals and nursing note reviewed.   Constitutional:       General: He is not in acute distress.     Appearance: He is not ill-appearing.   HENT:      " Head: Normocephalic and atraumatic.      Right Ear: External ear normal.      Left Ear: External ear normal.      Nose: Nose normal.      Mouth/Throat:      Mouth: Mucous membranes are moist.   Eyes:      Extraocular Movements: Extraocular movements intact.      Conjunctiva/sclera: Conjunctivae normal.   Cardiovascular:      Rate and Rhythm: Normal rate and regular rhythm.      Pulses: Normal pulses.      Heart sounds: No murmur heard.  Pulmonary:      Effort: Pulmonary effort is normal. No respiratory distress.      Breath sounds: No wheezing.   Abdominal:      General: There is no distension.      Palpations: Abdomen is soft. There is no mass.      Tenderness: There is no abdominal tenderness.   Musculoskeletal:         General: No swelling.      Cervical back: Normal range of motion.   Skin:     Coloration: Skin is not jaundiced.      Findings: No rash.   Neurological:      General: No focal deficit present.      Mental Status: He is alert and oriented to person, place, and time.   Psychiatric:         Mood and Affect: Mood normal.         Thought Content: Thought content normal.         Labs:    Complete Blood Count  Lab Results   Component Value Date    RBC 4.23 (L) 12/12/2024    HGB 13.5 (L) 12/12/2024    HCT 40.1 12/12/2024    MCV 95 12/12/2024    MCH 31.9 (H) 12/12/2024    MCHC 33.7 12/12/2024    RDW 13.6 12/12/2024     12/12/2024    MPV 9.2 12/12/2024    GRAN 3.3 12/12/2024    GRAN 59.9 12/12/2024    LYMPH 1.5 12/12/2024    LYMPH 28.0 12/12/2024    MONO 0.5 12/12/2024    MONO 8.8 12/12/2024    EOS 0.1 12/12/2024    BASO 0.07 12/12/2024    EOSINOPHIL 1.8 12/12/2024    BASOPHIL 1.3 12/12/2024    DIFFMETHOD Automated 12/12/2024       Comprehensive Metabolic Panel  Lab Results   Component Value Date     (H) 12/12/2024    BUN 16 12/12/2024    CREATININE 0.74 12/12/2024     (L) 12/12/2024    K 4.0 12/12/2024    CL 97 12/12/2024    PROT 7.6 12/12/2024    ALBUMIN 4.6 12/12/2024    BILITOT 0.6  12/12/2024    AST 31 12/12/2024    ALKPHOS 73 12/12/2024    CO2 28 12/12/2024    ALT 37 12/12/2024    ANIONGAP 9 12/12/2024       TSH  Lab Results   Component Value Date    TSH 2.960 12/12/2024       Imaging:  US AAA Screening  Narrative: EXAMINATION:  US AAA SCREENING    CLINICAL HISTORY:  Other specified symptoms and signs involving the circulatory and respiratory systems    COMPARISON:  None    FINDINGS:  Findings: Ultrasound of the abdominal aorta obtained. The proximal abdominal aorta measures 2.5 x 2.3 cm in transverse dimensions.  The mid abdominal aorta measures 2.2 x 1.9 cm in transverse dimensions.  The distal abdominal aorta measures 1.8 x 1.7 cm in transverse dimensions.  Mild wall irregularity noted, consistent with atherosclerotic change.  The aortic bifurcation is unremarkable.  Peak systolic velocity 99 centimeters/second.  Impression: No evidence of abdominal aortic aneurysm.    Electronically signed by: Kaden Romo MD  Date:    06/17/2024  Time:    15:27      Assessment/Plan:  Assessment & Plan    Assessed patient's uncontrolled diabetes with glucose levels in the 300s  Considered oral form of Ozempic (Rybelsus) as alternative to injectable Ozempic, which previously caused adverse effects  Planned gradual dose escalation of Rybelsus to improve tolerability    TYPE 2 DIABETES MELLITUS:  - Explained that uncontrolled diabetes can cause weight loss.  - Discussed potential GI side effects of diabetes medications.  - Started Rybelsus 3 mg daily for 1 month.  - Increase Rybelsus to 7 mg daily after 1 month.  - Continued Metformin.    FOLLOW-UP:  - Follow up in 1 month to assess response to Rybelsus and consider dose increase.         1. Type 2 diabetes mellitus with hyperglycemia, without long-term current use of insulin  Overview:  - follows with [facility]  - restart ARB at low dose    Orders:  -     semaglutide (RYBELSUS) 3 mg tablet; Take 1 tablet (3 mg total) by mouth once daily.  Dispense:  30 tablet; Refill: 1  -     Vitamin B12; Future  -     Folate; Future  -     semaglutide (RYBELSUS) 7 mg tablet; Take 1 tablet (7 mg total) by mouth once daily.  Dispense: 30 tablet; Refill: 5    2. Essential hypertension  -     CBC Auto Differential; Future  -     Comprehensive metabolic panel; Future; Expected date: 12/16/2024    3. Hyperglycemia  -     HEMOGLOBIN A1C; Future; Expected date: 12/16/2024    4. Erectile dysfunction, unspecified erectile dysfunction type  -     Ambulatory referral/consult to Urology; Future; Expected date: 12/23/2024         Discussed how to stay healthy including: diet, exercise, refraining from smoking and discussed screening exams / tests needed for age, sex and family Hx.    RTC 3 mo    Anson Blunt MD    This note was generated with the assistance of ambient listening technology. Verbal consent was obtained by the patient and accompanying visitor(s) for the recording of patient appointment to facilitate this note. I attest to having reviewed and edited the generated note for accuracy, though some syntax or spelling errors may persist. Please contact the author of this note for any clarification.

## 2024-12-23 ENCOUNTER — PATIENT OUTREACH (OUTPATIENT)
Dept: ADMINISTRATIVE | Facility: HOSPITAL | Age: 59
End: 2024-12-23
Payer: COMMERCIAL

## 2024-12-23 NOTE — LETTER
24    AUTHORIZATION FOR RELEASE OF   CONFIDENTIAL INFORMATION    [employer]    We are seeing Saud Mayers, date of birth 1965, in the clinic at [facility]. Anson Blunt MD is the patient's PCP. Saud Mayers has an outstanding lab/procedure at the time we reviewed his chart. In order to help keep his health information updated, he has authorized us to request the following medical record(s):       LATEST DIABETIC EYE EXAM       Please fax records to [facility], Reine, Addy N., MD,  at [phone] or email to [email or web address].                Saud Mayers  MRN: [id number]  : 1965  Age: 59 y.o.  Sex: male         Patient/Legal Guardian Signature  This signature was collected at 2024    Saud aMyers       _______________________________   Printed Name/Relationship to Patient      Consent for Examination and Treatment: I hereby authorize the providers and employees of [employer] [employer] [facility] ([facility]) to provide medical treatment/services which includes, but is not limited to, performing and administering tests and diagnostic procedures that are deemed necessary, including, but not limited to, imaging examinations, blood tests and other laboratory procedures as may be required by the hospital, clinic, or may be ordered by my physician(s) or persons working under the general and/or special instructions of my physician(s).      I understand and agree that this consent covers all authorized persons, including but not limited to physicians, residents, nurse practitioners, physicians' assistants, specialists, consultants, student nurses, and independently contracted physicians, who are called upon by the physician in charge, to carry out the diagnostic procedures and medical or surgical treatment.     I hereby authorize [facility] to retain or dispose of any specimens or tissue, should there be such remaining from any test or procedure.     I hereby authorize and give consent  for [facility] providers and employees to take photographs, images or videotapes of such diagnostic, surgical or treatment procedures of Patient as may be required by [facility] or as may be ordered by a physician. I further acknowledge and agree that [facility] may use cameras or other devices for patient monitoring.     I am aware that the practice of medicine is not an exact science, and I acknowledge that no guarantees have been made to me as to the outcome of any tests, procedures or treatment.     Authorization for Release of Information: I understand that my insurance company and/or their agents may need information necessary to make determinations about payment/reimbursement. I hereby provide authorization to release to all insurance companies, their successors, assignees, other parties with whom they may have contracted, or others acting on their behalf, that are involved with payment for any hospital and/or clinic charges incurred by the patient, any information that they request and deem necessary for payment/reimbursement, and/or quality review.  I further authorize the release of my health information to physicians or other health care practitioners on staff who are involved in my health care now and in the future, and to other health care providers, entities, or institutions for the purpose of my continued care and treatment, including referrals.     REGISTRATION AUTHORIZATION  Form No. 25372 (Rev. 3/25/2024)    Page 1 of 3                 Patient Name: Saud Mayers  : 1965  Patient Phone #: [phone]

## 2024-12-26 ENCOUNTER — OFFICE VISIT (OUTPATIENT)
Dept: UROLOGY | Facility: CLINIC | Age: 59
End: 2024-12-26
Payer: COMMERCIAL

## 2024-12-26 VITALS
WEIGHT: 249.25 LBS | DIASTOLIC BLOOD PRESSURE: 72 MMHG | HEART RATE: 81 BPM | SYSTOLIC BLOOD PRESSURE: 128 MMHG | BODY MASS INDEX: 36.92 KG/M2 | HEIGHT: 69 IN

## 2024-12-26 DIAGNOSIS — N52.9 ERECTILE DYSFUNCTION, UNSPECIFIED ERECTILE DYSFUNCTION TYPE: ICD-10-CM

## 2024-12-26 PROCEDURE — 99214 OFFICE O/P EST MOD 30 MIN: CPT | Mod: S$GLB,,, | Performed by: UROLOGY

## 2024-12-26 PROCEDURE — 3008F BODY MASS INDEX DOCD: CPT | Mod: CPTII,S$GLB,, | Performed by: UROLOGY

## 2024-12-26 PROCEDURE — 3066F NEPHROPATHY DOC TX: CPT | Mod: CPTII,S$GLB,, | Performed by: UROLOGY

## 2024-12-26 PROCEDURE — 1159F MED LIST DOCD IN RCRD: CPT | Mod: CPTII,S$GLB,, | Performed by: UROLOGY

## 2024-12-26 PROCEDURE — 3074F SYST BP LT 130 MM HG: CPT | Mod: CPTII,S$GLB,, | Performed by: UROLOGY

## 2024-12-26 PROCEDURE — 1160F RVW MEDS BY RX/DR IN RCRD: CPT | Mod: CPTII,S$GLB,, | Performed by: UROLOGY

## 2024-12-26 PROCEDURE — 3046F HEMOGLOBIN A1C LEVEL >9.0%: CPT | Mod: CPTII,S$GLB,, | Performed by: UROLOGY

## 2024-12-26 PROCEDURE — 4010F ACE/ARB THERAPY RXD/TAKEN: CPT | Mod: CPTII,S$GLB,, | Performed by: UROLOGY

## 2024-12-26 PROCEDURE — 3061F NEG MICROALBUMINURIA REV: CPT | Mod: CPTII,S$GLB,, | Performed by: UROLOGY

## 2024-12-26 PROCEDURE — 99999 PR PBB SHADOW E&M-EST. PATIENT-LVL III: CPT | Mod: PBBFAC,,, | Performed by: UROLOGY

## 2024-12-26 PROCEDURE — 3078F DIAST BP <80 MM HG: CPT | Mod: CPTII,S$GLB,, | Performed by: UROLOGY

## 2024-12-26 RX ORDER — PAPAVERINE HYDROCHLORIDE 30 MG/ML
INJECTION INTRAMUSCULAR; INTRAVENOUS
Qty: 10 ML | Refills: 5 | Status: SHIPPED | OUTPATIENT
Start: 2024-12-26

## 2024-12-26 NOTE — PROGRESS NOTES
[place] - Urology   Clinic Note    SUBJECTIVE:     Chief Complaint   Patient presents with    Erectile Dysfunction       Referral from: Anson Blunt MD.    History of Present Illness:  Saud Mayers is a 59 y.o. male who presents to clinic for erectile dysfunction.    Patient here with complaints of erectile dysfunction.  He endorses difficulty getting and maintaining erections.  He is not able to reliably achieve a strong enough erection for intercourse. No issues with libido.    CYNTHIA: 0/25    Nitrate Usage: no    Previous ED treatment: yes, viagra and cialis.    Patient endorses no additional complaints at this time.    Past Medical History:   Diagnosis Date    Asthma     Cancer     Diabetes mellitus     High cholesterol     Hypertension     ANAMIKA on CPAP     Testicular hypofunction        Past Surgical History:   Procedure Laterality Date    CARPAL TUNNEL RELEASE Bilateral 2020    CHOLECYSTECTOMY  01/01/1990    COLONOSCOPY  01/01/2013    COLONOSCOPY N/A 9/2/2022    Procedure: COLONOSCOPY sutab;  Surgeon: Jeovany Cisse MD;  Location: [facility];  Service: Endoscopy;  Laterality: N/A;    ESOPHAGOGASTRODUODENOSCOPY N/A 9/2/2022    Procedure: EGD (ESOPHAGOGASTRODUODENOSCOPY);  Surgeon: Jeovany Cisse MD;  Location: [facility];  Service: Endoscopy;  Laterality: N/A;    INSERTION OF TUNNELED CENTRAL VENOUS CATHETER (CVC) WITH SUBCUTANEOUS PORT N/A 12/7/2022    Procedure: KAYCUIGMY-PXHF-C-CATH;  Surgeon: Francisco Velazquez MD;  Location: [facility];  Service: General;  Laterality: N/A;    ORTHOPEDIC SURGERY Bilateral 01/01/2007    feet plantar    ORTHOPEDIC SURGERY Right 01/01/2006    knee    ROTATOR CUFF REPAIR Left 01/01/1997    SURGICAL REMOVAL OF LYMPH NODE Right 11/23/2022    Procedure: EXCISION, LYMPH NODE;  Surgeon: Francisco Velazquez MD;  Location: [facility] OR;  Service: General;  Laterality: Right;   (right neck)       Family History   Problem Relation Name Age of Onset    Heart attack Father         Social History      Tobacco Use    Smoking status: Former     Current packs/day: 0.00     Types: Cigarettes     Quit date: 1/15/1992     Years since quittin.9    Smokeless tobacco: Never   Substance Use Topics    Alcohol use: Never    Drug use: Never       Current Outpatient Medications on File Prior to Visit   Medication Sig Dispense Refill    acetaminophen (TYLENOL) 500 MG tablet Take 500 mg by mouth every 6 (six) hours as needed for Pain.      allopurinoL (ZYLOPRIM) 300 MG tablet Take 1 tablet (300 mg total) by mouth once daily. 30 tablet 11    atorvastatin (LIPITOR) 20 MG tablet Take 1 tablet (20 mg total) by mouth once daily. 90 tablet 3    cyanocobalamin (VITAMIN B-12) 100 MCG tablet Take 100 mcg by mouth once daily.      LIDOcaine-prilocaine (EMLA) cream Apply topically as needed. Apply to skin overlying port site 30 minutes before chemotherapy. 30 g 1    losartan (COZAAR) 25 MG tablet TAKE 1 TABLET(25 MG) BY MOUTH EVERY DAY 90 tablet 3    magic mouthwash diphen/antac/lidoc/nysta 10 mLs 3 (three) times daily as needed (chemotherapy-induced mucositis.). 300 mL 1    meloxicam (MOBIC) 7.5 MG tablet Take 1 tablet (7.5 mg total) by mouth daily as needed for Pain. 30 tablet 1    metFORMIN (GLUCOPHAGE) 1000 MG tablet Take 1 tablet (1,000 mg total) by mouth 2 (two) times daily with meals. 180 tablet 3    metoprolol succinate (TOPROL-XL) 50 MG 24 hr tablet Take 1 tablet (50 mg total) by mouth once daily. 90 tablet 3    multivit-min-folic-vit K-lycop (ONE-A-DAY MEN'S 50 PLUS) 400- mcg Tab Take 1 tablet by mouth once daily.      oxyCODONE-acetaminophen (PERCOCET) 5-325 mg per tablet Take 1 tablet by mouth every 6 (six) hours as needed for Pain. 28 tablet 0    semaglutide (RYBELSUS) 3 mg tablet Take 1 tablet (3 mg total) by mouth once daily. 30 tablet 1    [START ON 2025] semaglutide (RYBELSUS) 7 mg tablet Take 1 tablet (7 mg total) by mouth once daily. 30 tablet 5    tadalafiL (CIALIS) 10 MG tablet Take 1 tablet (10 mg  "total) by mouth daily as needed for Erectile Dysfunction. 30 tablet 1    folic acid (FOLVITE) 1 MG tablet Take 1 tablet (1 mg total) by mouth once daily. 100 tablet 3    mometasone 0.1% (ELOCON) 0.1 % cream Apply topically once daily. Apply bid to affected area for 10 days 45 g 5     No current facility-administered medications on file prior to visit.       Review of patient's allergies indicates:   Allergen Reactions    Gabapentin Hallucinations    Tizanidine Other (See Comments)     somnolence       Review of Systems:  A review of 10+ systems was conducted with pertinent positive and negative findings documented in HPI with all other systems reviewed and negative.    OBJECTIVE:     Estimated body mass index is 36.8 kg/m² as calculated from the following:    Height as of this encounter: 5' 9" (1.753 m).    Weight as of this encounter: 113 kg (249 lb 3.7 oz).    Vital Signs (Most Recent)  Vitals:    12/26/24 0919   BP: 128/72   Pulse: 81       Physical Exam:  GENERAL: patient sitting comfortably  HEENT: normocephalic  NECK: supple, no JVD  PULM: normal chest rise, no increased WOB  HEART: non-diaphoretic  ABDO: soft, nondistended, nontender  BACK: no CVA tenderness bilaterally  SKIN: warm, dry, well perfused  EXT: no bruising or edema  NEURO: grossly normal with no focal deficits  PSYCH: appropriate mood and affect    Genitourinary Exam:  deferred    Lab Results   Component Value Date    BUN 16 12/12/2024    CREATININE 0.74 12/12/2024    WBC 5.46 12/12/2024    HGB 13.5 (L) 12/12/2024    HCT 40.1 12/12/2024     12/12/2024    AST 31 12/12/2024    ALT 37 12/12/2024    ALKPHOS 73 12/12/2024    ALBUMIN 4.6 12/12/2024    HGBA1C 9.7 (H) 12/12/2024    INR 1.0 07/26/2023        Imaging:   I have personally reviewed all relevant imaging studies.    No results found for this or any previous visit (from the past 2160 hours).  No results found for this or any previous visit (from the past 2160 hours).  US AAA " Screening  Narrative: EXAMINATION:  US AAA SCREENING    CLINICAL HISTORY:  Other specified symptoms and signs involving the circulatory and respiratory systems    COMPARISON:  None    FINDINGS:  Findings: Ultrasound of the abdominal aorta obtained. The proximal abdominal aorta measures 2.5 x 2.3 cm in transverse dimensions.  The mid abdominal aorta measures 2.2 x 1.9 cm in transverse dimensions.  The distal abdominal aorta measures 1.8 x 1.7 cm in transverse dimensions.  Mild wall irregularity noted, consistent with atherosclerotic change.  The aortic bifurcation is unremarkable.  Peak systolic velocity 99 centimeters/second.  Impression: No evidence of abdominal aortic aneurysm.    Electronically signed by: Kaden Romo MD  Date:    06/17/2024  Time:    15:27       PSA:  Lab Results   Component Value Date    PSA 0.28 12/12/2024    PSA 0.27 12/08/2023    PSA 0.38 05/15/2023    PSA 0.33 10/08/2021    PSADIAG 0.36 05/05/2020    PSADIAG 0.35 04/09/2019       Testosterone:  Lab Results   Component Value Date    TESTOSTERONE 3.5 03/30/2015        ASSESSMENT     1. Erectile dysfunction, unspecified erectile dysfunction type        PLAN:     Erectile Dysfunction    - We discussed the etiology and management of ED, including organic and psychogenic causes. First line therapy involves treatment with PDE-5 inhibitors.    - We discussed the risks and benefits of treatment with PDE5i's. Treatment with CARLOS was also briefly discussed.     - Next line therapies would include intraurethral suppository (125-1000 ug), then intracavernosal injections (1 ml trimix papaverine 30 mg, phentolamine 1 mg, prostaglandin E1 10 ug). Ultimately, a penile prosthesis would be the final option.     - We will initate trimix (Tri-Mix - PGE (alprostadil) 10mcg, papavarine 30mg, phentolamine 1mg; dispense 5mL vial, typical starting is 0.2 mL which is equal to 20 units). RTC for injection teachings.    Parag Nixon MD  Urology  [facility] [place]  [facility] [place]    Disclaimer: This note has been generated using voice-recognition software. There may be typographical errors that have been missed during proof-reading.

## 2025-01-23 ENCOUNTER — PATIENT MESSAGE (OUTPATIENT)
Dept: FAMILY MEDICINE | Facility: CLINIC | Age: 60
End: 2025-01-23
Payer: COMMERCIAL

## 2025-01-23 RX ORDER — PIOGLITAZONEHYDROCHLORIDE 15 MG/1
15 TABLET ORAL DAILY
Qty: 90 TABLET | Refills: 3 | Status: SHIPPED | OUTPATIENT
Start: 2025-01-23 | End: 2026-01-23

## 2025-01-31 ENCOUNTER — TELEPHONE (OUTPATIENT)
Dept: HEMATOLOGY/ONCOLOGY | Facility: CLINIC | Age: 60
End: 2025-01-31
Payer: COMMERCIAL

## 2025-02-07 NOTE — PROGRESS NOTES
"PATIENT: Saud Mayers  MRN: [id number]  DATE: 2/12/2025    Diagnosis:   1. Diffuse large B-cell lymphoma of lymph nodes of multiple regions    2. Severe obesity (BMI 35.0-39.9) with comorbidity    3. Type 2 diabetes mellitus with hyperglycemia, without long-term current use of insulin    4. Folate deficiency      Chief Complaint: Lymphoma    Oncologic History:      Oncologic History Diffuse large B-cell lymphoma      Oncologic Treatment R-CHOP (start date 12/21/22)      Pathology 11/23/22:  "LYMPH NODE", RIGHT CERVICAL, EXCISION:   -- DIFFUSE LARGE B-CELL LYMPHOMA, NON-GCB SUBTYPE (SEE COMMENT).     Flow cytometric analysis of right cervical lymph node detects a kappa light   chain restricted B lymphocyte population expressing CD19 and CD20, and CD5   (partial and dim).    CD10 is negative.  T lymphocytes are   immunophenotypically unremarkable.  Correlation with tissue morphology is   required. Flow differential: Lymphocytes 35.8%, Monocytes 0.0%, Granulocytes   4.4%, Blast  0.0%, Debris/nRBC 60.0%,  Viability 43.5%.   Immunohistochemical stains are performed with adequate c ontrols.  The large   lymphoid cells are positive for CD20, BCL6 (>30%), MUM1 (>30%), and BCL2   (>50%); negative for CD5, CD10, CD30, cyclin D1, ALK1 and EBV (VICKY in-situ   hybridization).  They display high proliferation index (Ki-67 90%).   CD3-positive small mature T-cells are seen.   Taken together, the overall findings consistent with diffuse large B-cell   lymphoma, non-GCB subtype.   1. Immunohistochemical stain:  The lymphoma cells are positive for MYC (>40%).   2. Fluorescence in-situ hybridization: The result is abnormal and indicates a   BCL6 rearrangement in 91% of nuclei.  No rearrangement of MYC or BCL2 and no   fusion of MYC and IGH was observed.  Rearrangement of BCL6 has been   associated with several B-cell lymphomas, including follicular lymphoma,   diffuse large B-cell lymphoma, and high-grade B-cell lymphoma.  As no MYC "   rearrangement and no fusion of MYC and IGH was observed, this makes unlikely   the possibility of high-grade B-cell lymphoma with MYC and BCL6 rearrangement.   Taken together, the overall findings are consistent with diffuse large B-cell   lymphoma, NOS (non-GCB subtype).  The lymphoma cells display MYC and BCL2   double expressor phenotype.       Flow cytometry - lymph node (11/23/22):  Flow cytometric analysis of lymph node detects a kappa light chain restricted   B lymphocyte population expressing CD19 and CD20, and CD5 (partial and dim).     CD10 is negative.  T lymphocytes are immunophenotypically unremarkable.   Correlation with tissue morphology is required.   Flow differential:  Lymphocytes 35.8%, Monocytes 0.0%, Granulocytes  4.4%,   Blast  0.0%, Debris/nRBC 60.0%,  Viability 43.5%.           Subjective:    History of Present Illness: Mr. Mayers is a 59 y.o. male who presents for evaluation and management of diffuse large B-cell lymphoma. I had seen him during a hospitalization in November 2022.    - he was admitted for lymphadenopathy/weight loss.  - he underwent excisional lymph node biopsy on 11/23/22. Pathology revealed diffuse large B-cell lymphoma, non-GCB type.  - he underwent port-a-cath placement on 12/7/22 with Dr. Velazquez.  - he underwent pet scan on 12/9/22.  - he underwent port-a-cath placement on 12/7/22 with Dr. Velazquez.  - he underwent pet scan on 12/9/22.  - he underwent PET scan on 3/22/23.  - he completed 6 cycles of R-CHOP.  - he underwent PET scan on 8/4/23    Interval history:  - he presents for a follow-up appointment for his diffuse large B-cell lymphoma.  - today, he is doing well. He realized the weight loss was from a diabetic medication. He denies fever, unintentional weight loss, lymphadenopathy. He denies chest pain, vomiting, diarrhea, constipation.      Past medical, surgical, family, and social histories have been reviewed and updated below.    Past Medical History:    Past Medical History:   Diagnosis Date    Allergy     Asthma     Cancer     Diabetes mellitus     High cholesterol     Hypertension     ANAMIKA on CPAP     Testicular hypofunction        Past Surgical History:   Past Surgical History:   Procedure Laterality Date    CARPAL TUNNEL RELEASE Bilateral 2020    CHOLECYSTECTOMY  01/01/1990    COLONOSCOPY  01/01/2013    COLONOSCOPY N/A 9/2/2022    Procedure: COLONOSCOPY sutab;  Surgeon: Jeovany Cisse MD;  Location: [facility] ENDO;  Service: Endoscopy;  Laterality: N/A;    ESOPHAGOGASTRODUODENOSCOPY N/A 9/2/2022    Procedure: EGD (ESOPHAGOGASTRODUODENOSCOPY);  Surgeon: Jeovany Cisse MD;  Location: [facility] ENDO;  Service: Endoscopy;  Laterality: N/A;    INSERTION OF TUNNELED CENTRAL VENOUS CATHETER (CVC) WITH SUBCUTANEOUS PORT N/A 12/7/2022    Procedure: KMXNRXTRU-PINE-Y-CATH;  Surgeon: Francisco Velazquez MD;  Location: [facility] OR;  Service: General;  Laterality: N/A;    ORTHOPEDIC SURGERY Bilateral 01/01/2007    feet plantar    ORTHOPEDIC SURGERY Right 01/01/2006    knee    ROTATOR CUFF REPAIR Left 01/01/1997    SURGICAL REMOVAL OF LYMPH NODE Right 11/23/2022    Procedure: EXCISION, LYMPH NODE;  Surgeon: Francisco Velazquez MD;  Location: [facility] OR;  Service: General;  Laterality: Right;   (right neck)       Family History:   Family History   Problem Relation Name Age of Onset    Heart attack Father         Social History:  reports that he quit smoking about 33 years ago. His smoking use included cigarettes. He has been exposed to tobacco smoke. He has never used smokeless tobacco. He reports that he does not drink alcohol and does not use drugs.    Allergies:  Review of patient's allergies indicates:   Allergen Reactions    Gabapentin Hallucinations    Tizanidine Other (See Comments)     somnolence       Medications:  Current Outpatient Medications   Medication Sig Dispense Refill    acetaminophen (TYLENOL) 500 MG tablet Take 500 mg by mouth every 6 (six) hours as needed for Pain.       allopurinoL (ZYLOPRIM) 300 MG tablet Take 1 tablet (300 mg total) by mouth once daily. 30 tablet 11    atorvastatin (LIPITOR) 20 MG tablet Take 1 tablet (20 mg total) by mouth once daily. 90 tablet 3    cyanocobalamin (VITAMIN B-12) 100 MCG tablet Take 100 mcg by mouth once daily.      folic acid (FOLVITE) 1 MG tablet Take 1 tablet (1 mg total) by mouth once daily. 100 tablet 3    LIDOcaine-prilocaine (EMLA) cream Apply topically as needed. Apply to skin overlying port site 30 minutes before chemotherapy. 30 g 1    losartan (COZAAR) 25 MG tablet TAKE 1 TABLET(25 MG) BY MOUTH EVERY DAY 90 tablet 3    magic mouthwash diphen/antac/lidoc/nysta 10 mLs 3 (three) times daily as needed (chemotherapy-induced mucositis.). 300 mL 1    meloxicam (MOBIC) 7.5 MG tablet Take 1 tablet (7.5 mg total) by mouth daily as needed for Pain. 30 tablet 1    metFORMIN (GLUCOPHAGE) 1000 MG tablet Take 1 tablet (1,000 mg total) by mouth 2 (two) times daily with meals. 180 tablet 3    metoprolol succinate (TOPROL-XL) 50 MG 24 hr tablet Take 1 tablet (50 mg total) by mouth once daily. 90 tablet 3    mometasone 0.1% (ELOCON) 0.1 % cream Apply topically once daily. Apply bid to affected area for 10 days 45 g 5    multivit-min-folic-vit K-lycop (ONE-A-DAY MEN'S 50 PLUS) 400- mcg Tab Take 1 tablet by mouth once daily.      oxyCODONE-acetaminophen (PERCOCET) 5-325 mg per tablet Take 1 tablet by mouth every 6 (six) hours as needed for Pain. 28 tablet 0    papaverine 30 mg/mL injection Tri-Mix - PGE (alprostadil) 10mcg, papavarine 30mg, phentolamine 1mg; dispense 5mL vial, typical starting is 0.2 mL which is equal to 20 units 10 mL 5    pioglitazone (ACTOS) 15 MG tablet Take 1 tablet (15 mg total) by mouth once daily. 90 tablet 3    semaglutide (RYBELSUS) 3 mg tablet Take 1 tablet (3 mg total) by mouth once daily. 30 tablet 1    semaglutide (RYBELSUS) 7 mg tablet Take 1 tablet (7 mg total) by mouth once daily. 30 tablet 5    tadalafiL (CIALIS) 10  MG tablet Take 1 tablet (10 mg total) by mouth daily as needed for Erectile Dysfunction. 30 tablet 1     No current facility-administered medications for this visit.       Review of Systems   Constitutional:  Negative for fatigue and fever.   HENT:  Negative for sore throat.    Eyes:  Negative for visual disturbance.   Respiratory:  Negative for shortness of breath.    Cardiovascular:  Negative for chest pain.   Gastrointestinal:  Negative for abdominal pain and nausea.   Genitourinary:  Negative for dysuria.   Musculoskeletal:  Negative for back pain.   Skin:  Negative for rash.   Neurological:  Negative for headaches.   Hematological:  Negative for adenopathy.   Psychiatric/Behavioral:  The patient is not nervous/anxious.        ECOG Performance Status:   ECOG SCORE    0 - Fully active-able to carry on all pre-disease performance without restriction         Objective:      Vitals:   Vitals:    02/12/25 1041   BP: 135/72   BP Location: Left arm   Patient Position: Sitting   Pulse: 81   Resp: 16   SpO2: 98%   Weight: 115 kg (253 lb 8.5 oz)         BMI: Body mass index is 37.44 kg/m².    Physical Exam  Vitals and nursing note reviewed.   Constitutional:       Appearance: He is well-developed.   HENT:      Head: Normocephalic and atraumatic.   Eyes:      Pupils: Pupils are equal, round, and reactive to light.   Cardiovascular:      Rate and Rhythm: Normal rate and regular rhythm.   Pulmonary:      Effort: Pulmonary effort is normal.      Breath sounds: Normal breath sounds.   Abdominal:      General: Bowel sounds are normal.      Palpations: Abdomen is soft.   Musculoskeletal:         General: Normal range of motion.      Cervical back: Normal range of motion and neck supple.   Lymphadenopathy:      Cervical: No cervical adenopathy.      Upper Body:      Right upper body: No axillary adenopathy.      Left upper body: No axillary adenopathy.   Skin:     General: Skin is warm and dry.   Neurological:      Mental Status:  He is alert and oriented to person, place, and time.   Psychiatric:         Behavior: Behavior normal.         Thought Content: Thought content normal.         Judgment: Judgment normal.         Laboratory Data:  Labs have been reviewed.    Lab Results   Component Value Date    WBC 5.46 12/12/2024    HGB 13.5 (L) 12/12/2024    HCT 40.1 12/12/2024    MCV 95 12/12/2024     12/12/2024     Lab Results   Component Value Date    FOLATE 6.9 02/10/2025         Imaging:    PET/CT scan (8/4/23): I have personally reviewed the images  Quality of the study: Adequate.     Reference values:     Mediastinal blood pool maximum SUV: 2.5     Normal liver background maximum SUV: 3     In the head and neck, there are no hypermetabolic lesions worrisome for malignancy. There are no hypermetabolic mucosal lesions, and there are no pathologically enlarged or hypermetabolic lymph nodes.     In the chest, there are no hypermetabolic lesions worrisome for malignancy.  There are no concerning pulmonary nodules or masses, and there are no pathologically enlarged or hypermetabolic lymph nodes.     In the abdomen and pelvis, there is physiologic tracer distribution within the abdominal organs and excretion into the genitourinary system.  No pathologically enlarged or hypermetabolic lymph nodes.  Stable non FDG avid normal-sized mesenteric lymph nodes with mild adjacent mesenteric stranding.     The spleen has normal uptake and is enlarged at 16 cm.  Stable splenic hypodensity.     In the bones, there are no hypermetabolic lesions worrisome for malignancy.     In the extremities, there are no hypermetabolic lesions worrisome for malignancy.     Additional CT findings: Small fat containing right inguinal hernia.  Calcific atherosclerosis of the coronary arteries.  Cholecystectomy.  Right chest port transvenous catheter tip terminates in the right atrium.     Impression:     1.  No hypermetabolic tumor or lymphadenopathy.     2.  Stable  splenomegaly.      PET/CT scan (3/22/23): I have personally reviewed the images  1.  In this patient with lymphoma, there has been interval resolution of pathologically enlarged, hypermetabolic lymph nodes on both sides of the diaphragm in keeping with excellent response to therapy.     2.  Improved but persistent splenomegaly, with interval resolution of previously noted hypermetabolic foci.     3.  Diffuse uptake in the bone marrow, consistent with bone marrow hyperplasia.         Echocardiogram (12/13/22):  The left ventricle is normal in size with concentric remodeling and normal systolic function.  The estimated ejection fraction is 65%.  Normal left ventricular diastolic function.  Normal right ventricular size with normal right ventricular systolic function.  Normal central venous pressure (3 mmHg).  The left ventricular global longitudinal strain is -17.6%.  There is moderate aortic valve stenosis.  Aortic valve area is 2.04 cm2; peak velocity is 3.17 m/s; mean gradient is 26 mmHg.      PET/CT scan (12/9/22): I have personally reviewed the images  Quality of the study: Adequate.     Reference values:     Mediastinal blood pool maximum SUV: 2.9     Normal liver background maximum SUV: 3.0     In the head and neck, numerous enlarged hypermetabolic lymph nodes within bilateral cervical periclavicular lesions.  For example, Conglomerate of right cervical and supraclavicular lymph nodes with SUV 14.  Left infraclavicular lymph node measuring 1.8 cm (image 95), with SUV max 10.  There is fluid and gas within the level 2 B measuring 2.1 cm, likely related to recent biopsy.     In the chest, numerous enlarged hypermetabolic mediastinal, hilar, and axilla lymph nodes.  For example subcarinal lymph node measuring 3.2 x 6.5 cm demonstrating SUV max of 9.8 (image 129).     In the abdomen and pelvis, there is physiologic tracer distribution within the abdominal organs and excretion into the genitourinary system.      Numerous enlarged hypermetabolic lymph nodes.  For example, conglomerate of lymph nodes within the anterior abdomen demonstrating SUV max of 18 (fused image 205).  Posterior right pararenal space hypermetabolic node measuring 3.0 x 2.4 cm with SUV max of 16 (image 218).  Conglomerate of right pelvic sidewall nodes measuring 8.0 AP x 4.4 TV x 11.1 CC cm demonstrating SUV max of 19 (image 251).     The spleen has multifocal hypermetabolic uptake within SUV max of 13 and is enlarged at 19.0 cm in craniocaudal dimension.  There are multiple areas of hypoattenuation, better characterized on prior CTs.     In the bones, there are no hypermetabolic lesions worrisome for malignancy.     Additional CT findings     Right-sided chest port tip terminating in the cavoatrial junction.  Trace left pleural fluid, prior cholecystectomy.     Impression:     Multiple hypermetabolic lymph nodes involving both sides of the diaphragm including bulky right iliac conglomerate in keeping with known large B-cell lymphoma.     Splenomegaly and multifocal hypermetabolic foci within the spleen.     The Deauville Score is: 5     Reference values:     Mediastinal blood pool maximum SUV: 2.9     Normal liver maximum SUV: 3.0      CT chest/abdomen/pelvis (11/7/22): I have personally reviewed the images     Innumerable adenopathy above and below the diaphragm and throughout the neck, LEFT axilla and chest wall, retroperitoneum and mesentery as well as in the iliac chain, right greater than left.     Findings are suspicious for lymphoma/leukemia.     Multiple splenic lesions appearing to have increased in number and size since the prior exam.        CT soft tissue neck (11/7/22): I have personally reviewed the images  Extensive adenopathy within the right greater than left neck with also adenopathy within the mediastinum and left axilla.  The largest lymph node in the right posterior triangle/spinal accessory region demonstrates internal necrosis  "as does a right paratracheal lymph node within the mediastinum..  There is inflammatory change surrounding the necrotic lymph nodes and although an infectious/inflammatory process is not excluded, a neoplastic process including lymphoma to be considered.      Assessment:       1. Diffuse large B-cell lymphoma of lymph nodes of multiple regions    2. Severe obesity (BMI 35.0-39.9) with comorbidity    3. Type 2 diabetes mellitus with hyperglycemia, without long-term current use of insulin    4. Folate deficiency           Plan:     Diffuse large B-cell lymphoma  - I have reviewed his chart  - CT scans (11/7/22) revealed extensive adenopathy.  - excisional biopsy (11/23/22) revealed diffuse large B-cell lymphoma, non-GCB subtype.  - No rearrangement of MYC or BCL2, so not a double-hit lymphoma. 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  - I and via oncology recommend R-CHOP x 6 cycles.  - The risks and benefits of chemotherapy were discussed, written information was given, and informed consent was obtained.  - hepatitis B core antibody/surface antigen and hepatitis C antibody were negative.  - he underwent port-a-cath placement on 12/7/22 with Dr. Velazquez.  - PET/CT scan (12/9/22) revealed "Multiple hypermetabolic lymph nodes involving both sides of the diaphragm including bulky right iliac conglomerate in keeping with known large B-cell lymphoma."  - echocardiogram (12/13/22) revealed an ejection fraction of 65%  - he received a unit of blood on 12/15/22 for symptomatic anemia  - PET/CT scan (3/22/23) [following 4 cycles of therapy] revealed "interval resolution of pathologically enlarged, hypermetabolic lymph nodes on both sides of the diaphragm in keeping with excellent response to therapy." Since his pet scan on 3/22/23 revealed a complete response to therapy, we will hold off on further PET scans (end-of-treatment pet scan).   - following 6 cycles of R-CHOP, he was hospitalized " "for sepsis secondary to Klebsiella  - pet scan (8/4/23) revealed "no hypermetabolic tumor or lymphadenopathy."  - he underwent port removal on 8/15/23.  - clinically, he is doing well with no evidence of recurrent lymphoma  - Labs have been reviewed.  - return to clinic in one year    2. Folate deficiency  - folate is 6.5; he had previously discontinued supplement.     3. Type 2 diabetes  - last hemoglobin A1c (12/12/24) was 9.7%  - continue diabetic medications  - defer management to primary care     4. Severe obesity  - Body mass index is 37.44 kg/m².  - comorbid condition is diabetes    - return to clinic in one year    Sandip Vickers M.D.  Hematology/Oncology  [facility] - [address], [address]  [place] [address]  Phone: [phone]  Fax: [phone]  "

## 2025-02-10 ENCOUNTER — LAB VISIT (OUTPATIENT)
Dept: LAB | Facility: HOSPITAL | Age: 60
End: 2025-02-10
Attending: INTERNAL MEDICINE
Payer: COMMERCIAL

## 2025-02-10 DIAGNOSIS — E53.8 FOLATE DEFICIENCY: ICD-10-CM

## 2025-02-10 DIAGNOSIS — C83.38 DIFFUSE LARGE B-CELL LYMPHOMA OF LYMPH NODES OF MULTIPLE REGIONS: ICD-10-CM

## 2025-02-10 LAB
ALBUMIN SERPL BCP-MCNC: 4.1 G/DL (ref 3.5–5.2)
ALP SERPL-CCNC: 62 U/L (ref 38–126)
ALT SERPL W/O P-5'-P-CCNC: 36 U/L (ref 10–44)
ANION GAP SERPL CALC-SCNC: 9 MMOL/L (ref 8–16)
AST SERPL-CCNC: 31 U/L (ref 15–46)
BASOPHILS # BLD AUTO: 0.05 K/UL (ref 0–0.2)
BASOPHILS NFR BLD: 1.1 % (ref 0–1.9)
BILIRUB SERPL-MCNC: 0.5 MG/DL (ref 0.1–1)
CALCIUM SERPL-MCNC: 8.9 MG/DL (ref 8.7–10.5)
CHLORIDE SERPL-SCNC: 98 MMOL/L (ref 95–110)
CO2 SERPL-SCNC: 29 MMOL/L (ref 23–29)
CREAT SERPL-MCNC: 0.8 MG/DL (ref 0.5–1.4)
DIFFERENTIAL METHOD BLD: ABNORMAL
EOSINOPHIL # BLD AUTO: 0.1 K/UL (ref 0–0.5)
EOSINOPHIL NFR BLD: 1.8 % (ref 0–8)
ERYTHROCYTE [DISTWIDTH] IN BLOOD BY AUTOMATED COUNT: 13.8 % (ref 11.5–14.5)
EST. GFR  (NO RACE VARIABLE): >60 ML/MIN/1.73 M^2
FOLATE SERPL-MCNC: 6.9 NG/ML (ref 4–24)
GLUCOSE SERPL-MCNC: 320 MG/DL (ref 70–110)
HCT VFR BLD AUTO: 37.9 % (ref 40–54)
HGB BLD-MCNC: 12.6 G/DL (ref 14–18)
IMM GRANULOCYTES # BLD AUTO: 0.06 K/UL (ref 0–0.04)
IMM GRANULOCYTES NFR BLD AUTO: 1.4 % (ref 0–0.5)
LDH SERPL L TO P-CCNC: 205 U/L (ref 110–260)
LYMPHOCYTES # BLD AUTO: 1.1 K/UL (ref 1–4.8)
LYMPHOCYTES NFR BLD: 25.1 % (ref 18–48)
MCH RBC QN AUTO: 31.5 PG (ref 27–31)
MCHC RBC AUTO-ENTMCNC: 33.2 G/DL (ref 32–36)
MCV RBC AUTO: 95 FL (ref 82–98)
MONOCYTES # BLD AUTO: 0.3 K/UL (ref 0.3–1)
MONOCYTES NFR BLD: 7.1 % (ref 4–15)
NEUTROPHILS # BLD AUTO: 2.8 K/UL (ref 1.8–7.7)
NEUTROPHILS NFR BLD: 63.5 % (ref 38–73)
NRBC BLD-RTO: 0 /100 WBC
PLATELET # BLD AUTO: 197 K/UL (ref 150–450)
PMV BLD AUTO: 9.4 FL (ref 9.2–12.9)
POTASSIUM SERPL-SCNC: 4.4 MMOL/L (ref 3.5–5.1)
PROT SERPL-MCNC: 7.2 G/DL (ref 6–8.4)
RBC # BLD AUTO: 4 M/UL (ref 4.6–6.2)
SODIUM SERPL-SCNC: 136 MMOL/L (ref 136–145)
URATE SERPL-MCNC: 4.4 MG/DL (ref 3.4–7)
UUN UR-MCNC: 16 MG/DL (ref 2–20)
WBC # BLD AUTO: 4.35 K/UL (ref 3.9–12.7)

## 2025-02-10 PROCEDURE — 84550 ASSAY OF BLOOD/URIC ACID: CPT | Performed by: INTERNAL MEDICINE

## 2025-02-10 PROCEDURE — 82746 ASSAY OF FOLIC ACID SERUM: CPT | Mod: PN | Performed by: INTERNAL MEDICINE

## 2025-02-10 PROCEDURE — 85025 COMPLETE CBC W/AUTO DIFF WBC: CPT | Mod: PN | Performed by: INTERNAL MEDICINE

## 2025-02-10 PROCEDURE — 83615 LACTATE (LD) (LDH) ENZYME: CPT | Performed by: INTERNAL MEDICINE

## 2025-02-10 PROCEDURE — 80053 COMPREHEN METABOLIC PANEL: CPT | Mod: PN | Performed by: INTERNAL MEDICINE

## 2025-02-10 PROCEDURE — 36415 COLL VENOUS BLD VENIPUNCTURE: CPT | Mod: PN | Performed by: INTERNAL MEDICINE

## 2025-02-12 ENCOUNTER — OFFICE VISIT (OUTPATIENT)
Dept: HEMATOLOGY/ONCOLOGY | Facility: CLINIC | Age: 60
End: 2025-02-12
Payer: COMMERCIAL

## 2025-02-12 VITALS
DIASTOLIC BLOOD PRESSURE: 72 MMHG | WEIGHT: 253.5 LBS | OXYGEN SATURATION: 98 % | RESPIRATION RATE: 16 BRPM | HEART RATE: 81 BPM | SYSTOLIC BLOOD PRESSURE: 135 MMHG | BODY MASS INDEX: 37.44 KG/M2

## 2025-02-12 DIAGNOSIS — C83.38 DIFFUSE LARGE B-CELL LYMPHOMA OF LYMPH NODES OF MULTIPLE REGIONS: Primary | ICD-10-CM

## 2025-02-12 DIAGNOSIS — E11.65 TYPE 2 DIABETES MELLITUS WITH HYPERGLYCEMIA, WITHOUT LONG-TERM CURRENT USE OF INSULIN: ICD-10-CM

## 2025-02-12 DIAGNOSIS — E66.01 SEVERE OBESITY (BMI 35.0-39.9) WITH COMORBIDITY: ICD-10-CM

## 2025-02-12 DIAGNOSIS — E53.8 FOLATE DEFICIENCY: ICD-10-CM

## 2025-02-12 PROCEDURE — 3075F SYST BP GE 130 - 139MM HG: CPT | Mod: CPTII,S$GLB,, | Performed by: INTERNAL MEDICINE

## 2025-02-12 PROCEDURE — 99214 OFFICE O/P EST MOD 30 MIN: CPT | Mod: S$GLB,,, | Performed by: INTERNAL MEDICINE

## 2025-02-12 PROCEDURE — 3078F DIAST BP <80 MM HG: CPT | Mod: CPTII,S$GLB,, | Performed by: INTERNAL MEDICINE

## 2025-02-12 PROCEDURE — 1160F RVW MEDS BY RX/DR IN RCRD: CPT | Mod: CPTII,S$GLB,, | Performed by: INTERNAL MEDICINE

## 2025-02-12 PROCEDURE — 1159F MED LIST DOCD IN RCRD: CPT | Mod: CPTII,S$GLB,, | Performed by: INTERNAL MEDICINE

## 2025-02-12 PROCEDURE — 99999 PR PBB SHADOW E&M-EST. PATIENT-LVL IV: CPT | Mod: PBBFAC,,, | Performed by: INTERNAL MEDICINE

## 2025-02-12 PROCEDURE — 4010F ACE/ARB THERAPY RXD/TAKEN: CPT | Mod: CPTII,S$GLB,, | Performed by: INTERNAL MEDICINE

## 2025-02-12 PROCEDURE — 3008F BODY MASS INDEX DOCD: CPT | Mod: CPTII,S$GLB,, | Performed by: INTERNAL MEDICINE

## 2025-02-19 DIAGNOSIS — E11.65 TYPE 2 DIABETES MELLITUS WITH HYPERGLYCEMIA, WITHOUT LONG-TERM CURRENT USE OF INSULIN: ICD-10-CM

## 2025-02-19 DIAGNOSIS — M54.2 NECK PAIN: ICD-10-CM

## 2025-02-19 RX ORDER — MELOXICAM 7.5 MG/1
7.5 TABLET ORAL DAILY PRN
Qty: 30 TABLET | Refills: 1 | Status: SHIPPED | OUTPATIENT
Start: 2025-02-19

## 2025-02-19 NOTE — TELEPHONE ENCOUNTER
No care due was identified.  [facility] Embedded Care Due Messages. Reference number: [id number].   2/19/2025 5:45:35 PM CST

## 2025-02-20 RX ORDER — ORAL SEMAGLUTIDE 7 MG/1
7 TABLET ORAL DAILY
Qty: 30 TABLET | Refills: 5 | Status: SHIPPED | OUTPATIENT
Start: 2025-02-20

## 2025-03-17 ENCOUNTER — PATIENT OUTREACH (OUTPATIENT)
Dept: ADMINISTRATIVE | Facility: HOSPITAL | Age: 60
End: 2025-03-17
Payer: COMMERCIAL

## 2025-03-17 ENCOUNTER — OFFICE VISIT (OUTPATIENT)
Dept: FAMILY MEDICINE | Facility: CLINIC | Age: 60
End: 2025-03-17
Payer: COMMERCIAL

## 2025-03-17 VITALS
SYSTOLIC BLOOD PRESSURE: 124 MMHG | HEART RATE: 89 BPM | TEMPERATURE: 98 F | BODY MASS INDEX: 38.12 KG/M2 | OXYGEN SATURATION: 97 % | HEIGHT: 69 IN | DIASTOLIC BLOOD PRESSURE: 72 MMHG | WEIGHT: 257.38 LBS

## 2025-03-17 DIAGNOSIS — E66.9 OBESITY, DIABETES, AND HYPERTENSION SYNDROME: ICD-10-CM

## 2025-03-17 DIAGNOSIS — G47.19 EXCESSIVE DAYTIME SLEEPINESS: ICD-10-CM

## 2025-03-17 DIAGNOSIS — E78.2 MIXED HYPERLIPIDEMIA: ICD-10-CM

## 2025-03-17 DIAGNOSIS — E11.59 OBESITY, DIABETES, AND HYPERTENSION SYNDROME: ICD-10-CM

## 2025-03-17 DIAGNOSIS — E66.01 SEVERE OBESITY (BMI 35.0-39.9) WITH COMORBIDITY: ICD-10-CM

## 2025-03-17 DIAGNOSIS — E11.65 TYPE 2 DIABETES MELLITUS WITH HYPERGLYCEMIA, WITHOUT LONG-TERM CURRENT USE OF INSULIN: ICD-10-CM

## 2025-03-17 DIAGNOSIS — I15.2 OBESITY, DIABETES, AND HYPERTENSION SYNDROME: ICD-10-CM

## 2025-03-17 DIAGNOSIS — K04.7 DENTAL INFECTION: ICD-10-CM

## 2025-03-17 DIAGNOSIS — G47.33 OSA (OBSTRUCTIVE SLEEP APNEA): Primary | ICD-10-CM

## 2025-03-17 DIAGNOSIS — I10 ESSENTIAL HYPERTENSION: ICD-10-CM

## 2025-03-17 DIAGNOSIS — E11.69 OBESITY, DIABETES, AND HYPERTENSION SYNDROME: ICD-10-CM

## 2025-03-17 PROCEDURE — G2211 COMPLEX E/M VISIT ADD ON: HCPCS | Mod: S$GLB,,, | Performed by: STUDENT IN AN ORGANIZED HEALTH CARE EDUCATION/TRAINING PROGRAM

## 2025-03-17 PROCEDURE — 3008F BODY MASS INDEX DOCD: CPT | Mod: CPTII,S$GLB,, | Performed by: STUDENT IN AN ORGANIZED HEALTH CARE EDUCATION/TRAINING PROGRAM

## 2025-03-17 PROCEDURE — 3078F DIAST BP <80 MM HG: CPT | Mod: CPTII,S$GLB,, | Performed by: STUDENT IN AN ORGANIZED HEALTH CARE EDUCATION/TRAINING PROGRAM

## 2025-03-17 PROCEDURE — 1160F RVW MEDS BY RX/DR IN RCRD: CPT | Mod: CPTII,S$GLB,, | Performed by: STUDENT IN AN ORGANIZED HEALTH CARE EDUCATION/TRAINING PROGRAM

## 2025-03-17 PROCEDURE — 1159F MED LIST DOCD IN RCRD: CPT | Mod: CPTII,S$GLB,, | Performed by: STUDENT IN AN ORGANIZED HEALTH CARE EDUCATION/TRAINING PROGRAM

## 2025-03-17 PROCEDURE — 4010F ACE/ARB THERAPY RXD/TAKEN: CPT | Mod: CPTII,S$GLB,, | Performed by: STUDENT IN AN ORGANIZED HEALTH CARE EDUCATION/TRAINING PROGRAM

## 2025-03-17 PROCEDURE — 99214 OFFICE O/P EST MOD 30 MIN: CPT | Mod: S$GLB,,, | Performed by: STUDENT IN AN ORGANIZED HEALTH CARE EDUCATION/TRAINING PROGRAM

## 2025-03-17 PROCEDURE — 3074F SYST BP LT 130 MM HG: CPT | Mod: CPTII,S$GLB,, | Performed by: STUDENT IN AN ORGANIZED HEALTH CARE EDUCATION/TRAINING PROGRAM

## 2025-03-17 RX ORDER — AMOXICILLIN AND CLAVULANATE POTASSIUM 875; 125 MG/1; MG/1
1 TABLET, FILM COATED ORAL EVERY 12 HOURS
Qty: 14 TABLET | Refills: 0 | Status: SHIPPED | OUTPATIENT
Start: 2025-03-17 | End: 2025-03-24

## 2025-03-17 NOTE — PROGRESS NOTES
Patient ID: Saud Mayers is a 60 y.o. male.     Chief Complaint: f/u chronic medical conditions    Follow-up  Pertinent negatives include no chest pain, coughing, fever, headaches, myalgias, nausea, rash, vomiting or weakness.     History of Present Illness    Saud presents today for follow up    He reports blood sugar averaging around 120. Due to recent travel and family commitments, including a cruise and helping his stepdaughter with her new house, he was irregular with medications resulting in blood sugar elevations over 300. After resuming regular medication, blood sugar improved to 240. He currently takes Metformin 1000mg twice daily and Pioglitazone with good compliance and no difficulty obtaining prescriptions.    He reports daytime somnolence with episodes lasting up to 1.5-2 hours, particularly when sitting. He has diagnosed sleep apnea but has been non-compliant with CPAP therapy for 10 years due to mask discomfort. He uses an adjustable bed which reportedly reduces snoring and improves sleep quality when elevated.    He reports a two-month history of infection under dental implants with pressure-induced soreness. Bacterial zapping treatments from dentist have provided minimal improvement.    Current medications include Rubellsis 7mg with reported satisfaction regarding its weight management effects and no side effects.         Review of Systems  Review of Systems   Constitutional:  Negative for fever.   HENT:  Negative for ear pain and sinus pain.    Eyes:  Negative for discharge.   Respiratory:  Negative for cough and shortness of breath.    Cardiovascular:  Negative for chest pain and leg swelling.   Gastrointestinal:  Negative for diarrhea, nausea and vomiting.   Genitourinary:  Negative for urgency.   Musculoskeletal:  Negative for myalgias.   Skin:  Negative for rash.   Neurological:  Negative for weakness and headaches.   Psychiatric/Behavioral:  Negative for depression.    All other systems  "reviewed and are negative.      Currently Medications  Medications Ordered Prior to Encounter[1]    Physical  Exam  Vitals:    03/17/25 0808   BP: 124/72   BP Location: Right arm   Patient Position: Sitting   Pulse: 89   Temp: 98.4 °F (36.9 °C)   SpO2: 97%   Weight: 116.7 kg (257 lb 6.2 oz)   Height: 5' 9" (1.753 m)      Body mass index is 38.01 kg/m².  Wt Readings from Last 3 Encounters:   03/17/25 116.7 kg (257 lb 6.2 oz)   02/12/25 115 kg (253 lb 8.5 oz)   01/09/25 115.6 kg (254 lb 13.6 oz)       Physical Exam  Vitals and nursing note reviewed.   Constitutional:       General: He is not in acute distress.     Appearance: He is obese. He is not ill-appearing.   HENT:      Head: Normocephalic and atraumatic.      Right Ear: External ear normal.      Left Ear: External ear normal.      Nose: Nose normal.      Mouth/Throat:      Mouth: Mucous membranes are moist.   Eyes:      Extraocular Movements: Extraocular movements intact.      Conjunctiva/sclera: Conjunctivae normal.   Cardiovascular:      Rate and Rhythm: Normal rate and regular rhythm.      Pulses: Normal pulses.      Heart sounds: No murmur heard.  Pulmonary:      Effort: Pulmonary effort is normal. No respiratory distress.      Breath sounds: No wheezing.   Abdominal:      General: There is no distension.      Palpations: Abdomen is soft. There is no mass.      Tenderness: There is no abdominal tenderness.   Musculoskeletal:         General: No swelling.      Cervical back: Normal range of motion.   Skin:     Coloration: Skin is not jaundiced.      Findings: No rash.   Neurological:      General: No focal deficit present.      Mental Status: He is alert and oriented to person, place, and time.   Psychiatric:         Mood and Affect: Mood normal.         Thought Content: Thought content normal.         Labs:    Complete Blood Count  Lab Results   Component Value Date    RBC 4.00 (L) 02/10/2025    HGB 12.6 (L) 02/10/2025    HCT 37.9 (L) 02/10/2025    MCV 95 " 02/10/2025    MCH 31.5 (H) 02/10/2025    MCHC 33.2 02/10/2025    RDW 13.8 02/10/2025     02/10/2025    MPV 9.4 02/10/2025    GRAN 2.8 02/10/2025    GRAN 63.5 02/10/2025    LYMPH 1.1 02/10/2025    LYMPH 25.1 02/10/2025    MONO 0.3 02/10/2025    MONO 7.1 02/10/2025    EOS 0.1 02/10/2025    BASO 0.05 02/10/2025    EOSINOPHIL 1.8 02/10/2025    BASOPHIL 1.1 02/10/2025    DIFFMETHOD Automated 02/10/2025       Comprehensive Metabolic Panel  Lab Results   Component Value Date     (H) 02/10/2025    BUN 16 02/10/2025    CREATININE 0.80 02/10/2025     02/10/2025    K 4.4 02/10/2025    CL 98 02/10/2025    PROT 7.2 02/10/2025    ALBUMIN 4.1 02/10/2025    BILITOT 0.5 02/10/2025    AST 31 02/10/2025    ALKPHOS 62 02/10/2025    CO2 29 02/10/2025    ALT 36 02/10/2025    ANIONGAP 9 02/10/2025       TSH  Lab Results   Component Value Date    TSH 2.960 12/12/2024       Imaging:  US AAA Screening  Narrative: EXAMINATION:  US AAA SCREENING    CLINICAL HISTORY:  Other specified symptoms and signs involving the circulatory and respiratory systems    COMPARISON:  None    FINDINGS:  Findings: Ultrasound of the abdominal aorta obtained. The proximal abdominal aorta measures 2.5 x 2.3 cm in transverse dimensions.  The mid abdominal aorta measures 2.2 x 1.9 cm in transverse dimensions.  The distal abdominal aorta measures 1.8 x 1.7 cm in transverse dimensions.  Mild wall irregularity noted, consistent with atherosclerotic change.  The aortic bifurcation is unremarkable.  Peak systolic velocity 99 centimeters/second.  Impression: No evidence of abdominal aortic aneurysm.    Electronically signed by: Kaden Romo MD  Date:    06/17/2024  Time:    15:27      Assessment/Plan:  Assessment & Plan    Assessed response to Rybelsus for weight management and diabetes control.  Considered sleep apnea as potential cause for daytime fatigue.  Evaluated options for sleep apnea management, including CPAP alternatives.  Reviewed diabetes  management, noting recent improvement in glucose levels with consistent medication adherence.  Considered antibiotic treatment for persistent dental infection.  Will check vitamin D levels due to ongoing dental issues.    TYPE 2 DIABETES MELLITUS WITH HYPERGLYCEMIA:  - Monitored the patient's blood sugar, which has been averaging around 120.  - Recently, it increased to over 300 when medication was missed, but has decreased to 240 after resuming regular medication.  - Evaluated the patient's high blood sugar and discussed the importance of medication adherence.  - Assessed the need to check A1c in 3 months to get an accurate reading after starting the 7 mg dose of Rybelsus.  - Continued the patient's current medication regimen: metformin 1000mg twice daily, pioglitazone, and Rybelsus 7mg.  - Discussed the relationship between consistent medication adherence and improved glucose control.  - Planned to monitor A1c levels in 3 months.  - Continued the patient's current oral hypoglycemic medication regimen for diabetes management: - Rybelsus 7 mg - Metformin 1000 mg twice daily - Pioglitazone  - Planned to assess the effectiveness of the current medication regimen by checking A1c levels in 3 months.    OBSTRUCTIVE SLEEP APNEA:  - Monitored the patient's reported symptoms of excessive daytime sleepiness and tendency to fall asleep easily when sitting during the day.  - Evaluated the patient's symptoms as classic for sleep apnea.  - Emphasized the importance of treating sleep apnea to prevent future cardiovascular complications.  - Recommend the patient to consider using a nasal CPAP device or exploring dental appliance options for sleep apnea management.  - Noted that the patient has a CPAP machine but has not used it in 10 years.    HYPERSOMNIA:  - Monitored the patient's reported excessive daytime sleepiness for the past 3 weeks.  - Evaluated other potential causes of hypersomnia, such as low iron, folic acid, or B12  levels.  - Planned to check iron, B12, and other levels to rule out alternative causes of hypersomnia.  - Recommend the patient to take B12, D, and E supplements.  - Planned future orders for iron, B12, and vitamin D level tests.    DENTAL IMPLANT INFECTION:  - Monitored the patient's reported infection under two dental implants causing soreness when pressure is applied.  - Noted that the dentist has been using a bacterial zapper to treat the infection, which has calmed it down but not eliminated it.  - Considered checking vitamin D levels to address potential bone loss under the teeth.  - Prescribed antibiotics to treat the infection.  - Warned the patient about potential side effects of the antibiotics, such as diarrhea.    MEDICATION NON-COMPLIANCE:  - Monitored the patient's admission to skipping medication doses and taking them irregularly during recent travel and family events.  - Noted that the patient's blood sugar increased due to medication non-compliance.  - Acknowledged the patient's recent return to regular medication adherence.  - Emphasized the importance of consistent medication adherence.  - Instructed the patient to resume taking B12, D, and E vitamins.    FOLLOW-UP:  - Scheduled a follow-up visit in 3 months.  - Arranged for labs to be   conducted a few days before the next appointment.         1. ANAMIKA (obstructive sleep apnea)    2. Type 2 diabetes mellitus with hyperglycemia, without long-term current use of insulin  Overview:  - follows with [facility]  - restart ARB at low dose      3. Essential hypertension    4. Obesity, diabetes, and hypertension syndrome    5. Mixed hyperlipidemia    6. Excessive daytime sleepiness  -     Folate; Future  -     Vitamin B12; Future  -     Iron and TIBC; Future  -     Ferritin; Future  -     TSH; Future; Expected date: 03/17/2025    7. Severe obesity (BMI 35.0-39.9) with comorbidity  -     Misc Sendout Test, Blood vitamin D; Future; Expected date:  03/17/2025    8. Dental infection  -     amoxicillin-clavulanate 875-125mg (AUGMENTIN) 875-125 mg per tablet; Take 1 tablet by mouth every 12 (twelve) hours. for 7 days  Dispense: 14 tablet; Refill: 0         Discussed how to stay healthy including: diet, exercise, refraining from smoking and discussed screening exams / tests needed for age, sex and family Hx.      Anson Blunt MD    This note was generated with the assistance of ambient listening technology. Verbal consent was obtained by the patient and accompanying visitor(s) for the recording of patient appointment to facilitate this note. I attest to having reviewed and edited the generated note for accuracy, though some syntax or spelling errors may persist. Please contact the author of this note for any clarification.           [1]   Current Outpatient Medications on File Prior to Visit   Medication Sig Dispense Refill    acetaminophen (TYLENOL) 500 MG tablet Take 500 mg by mouth every 6 (six) hours as needed for Pain.      allopurinoL (ZYLOPRIM) 300 MG tablet Take 1 tablet (300 mg total) by mouth once daily. 30 tablet 11    atorvastatin (LIPITOR) 20 MG tablet Take 1 tablet (20 mg total) by mouth once daily. 90 tablet 3    cyanocobalamin (VITAMIN B-12) 100 MCG tablet Take 100 mcg by mouth once daily.      LIDOcaine-prilocaine (EMLA) cream Apply topically as needed. Apply to skin overlying port site 30 minutes before chemotherapy. 30 g 1    losartan (COZAAR) 25 MG tablet TAKE 1 TABLET(25 MG) BY MOUTH EVERY DAY 90 tablet 3    magic mouthwash diphen/antac/lidoc/nysta 10 mLs 3 (three) times daily as needed (chemotherapy-induced mucositis.). 300 mL 1    meloxicam (MOBIC) 7.5 MG tablet Take 1 tablet (7.5 mg total) by mouth daily as needed for Pain. 30 tablet 1    metFORMIN (GLUCOPHAGE) 1000 MG tablet Take 1 tablet (1,000 mg total) by mouth 2 (two) times daily with meals. 180 tablet 3    metoprolol succinate (TOPROL-XL) 50 MG 24 hr tablet Take 1 tablet (50 mg total)  by mouth once daily. 90 tablet 3    multivit-min-folic-vit K-lycop (ONE-A-DAY MEN'S 50 PLUS) 400- mcg Tab Take 1 tablet by mouth once daily.      oxyCODONE-acetaminophen (PERCOCET) 5-325 mg per tablet Take 1 tablet by mouth every 6 (six) hours as needed for Pain. 28 tablet 0    papaverine 30 mg/mL injection Tri-Mix - PGE (alprostadil) 10mcg, papavarine 30mg, phentolamine 1mg; dispense 5mL vial, typical starting is 0.2 mL which is equal to 20 units 10 mL 5    pioglitazone (ACTOS) 15 MG tablet Take 1 tablet (15 mg total) by mouth once daily. 90 tablet 3    semaglutide (RYBELSUS) 7 mg tablet Take 1 tablet (7 mg total) by mouth once daily. 30 tablet 5    tadalafiL (CIALIS) 10 MG tablet Take 1 tablet (10 mg total) by mouth daily as needed for Erectile Dysfunction. 30 tablet 1    [DISCONTINUED] semaglutide (RYBELSUS) 3 mg tablet Take 1 tablet (3 mg total) by mouth once daily. 30 tablet 1    folic acid (FOLVITE) 1 MG tablet Take 1 tablet (1 mg total) by mouth once daily. 100 tablet 3    mometasone 0.1% (ELOCON) 0.1 % cream Apply topically once daily. Apply bid to affected area for 10 days 45 g 5     No current facility-administered medications on file prior to visit.

## 2025-04-06 ENCOUNTER — PATIENT MESSAGE (OUTPATIENT)
Dept: FAMILY MEDICINE | Facility: CLINIC | Age: 60
End: 2025-04-06
Payer: COMMERCIAL

## 2025-04-06 DIAGNOSIS — R05.9 COUGH, UNSPECIFIED TYPE: Primary | ICD-10-CM

## 2025-04-07 RX ORDER — METHYLPREDNISOLONE 4 MG/1
TABLET ORAL
Qty: 21 EACH | Refills: 0 | Status: SHIPPED | OUTPATIENT
Start: 2025-04-07 | End: 2025-04-28

## 2025-04-07 RX ORDER — CODEINE PHOSPHATE AND GUAIFENESIN 10; 100 MG/5ML; MG/5ML
5 SOLUTION ORAL EVERY 8 HOURS PRN
Qty: 120 ML | Refills: 0 | Status: SHIPPED | OUTPATIENT
Start: 2025-04-07 | End: 2025-04-17

## 2025-04-24 ENCOUNTER — PATIENT MESSAGE (OUTPATIENT)
Dept: FAMILY MEDICINE | Facility: CLINIC | Age: 60
End: 2025-04-24
Payer: COMMERCIAL

## 2025-04-25 ENCOUNTER — OFFICE VISIT (OUTPATIENT)
Dept: FAMILY MEDICINE | Facility: CLINIC | Age: 60
End: 2025-04-25
Payer: COMMERCIAL

## 2025-04-25 VITALS
SYSTOLIC BLOOD PRESSURE: 110 MMHG | HEART RATE: 88 BPM | DIASTOLIC BLOOD PRESSURE: 70 MMHG | HEIGHT: 69 IN | TEMPERATURE: 98 F | BODY MASS INDEX: 36.83 KG/M2 | OXYGEN SATURATION: 97 % | WEIGHT: 248.69 LBS

## 2025-04-25 DIAGNOSIS — J45.20 MILD INTERMITTENT ASTHMA WITHOUT COMPLICATION: Primary | ICD-10-CM

## 2025-04-25 DIAGNOSIS — E11.65 TYPE 2 DIABETES MELLITUS WITH HYPERGLYCEMIA, WITHOUT LONG-TERM CURRENT USE OF INSULIN: ICD-10-CM

## 2025-04-25 DIAGNOSIS — L30.9 ECZEMA, UNSPECIFIED TYPE: ICD-10-CM

## 2025-04-25 DIAGNOSIS — R05.3 CHRONIC COUGH: ICD-10-CM

## 2025-04-25 PROCEDURE — 4010F ACE/ARB THERAPY RXD/TAKEN: CPT | Mod: CPTII,S$GLB,, | Performed by: PHYSICIAN ASSISTANT

## 2025-04-25 PROCEDURE — 3078F DIAST BP <80 MM HG: CPT | Mod: CPTII,S$GLB,, | Performed by: PHYSICIAN ASSISTANT

## 2025-04-25 PROCEDURE — 3074F SYST BP LT 130 MM HG: CPT | Mod: CPTII,S$GLB,, | Performed by: PHYSICIAN ASSISTANT

## 2025-04-25 PROCEDURE — 99214 OFFICE O/P EST MOD 30 MIN: CPT | Mod: S$GLB,,, | Performed by: PHYSICIAN ASSISTANT

## 2025-04-25 PROCEDURE — 1159F MED LIST DOCD IN RCRD: CPT | Mod: CPTII,S$GLB,, | Performed by: PHYSICIAN ASSISTANT

## 2025-04-25 PROCEDURE — 1160F RVW MEDS BY RX/DR IN RCRD: CPT | Mod: CPTII,S$GLB,, | Performed by: PHYSICIAN ASSISTANT

## 2025-04-25 PROCEDURE — 3008F BODY MASS INDEX DOCD: CPT | Mod: CPTII,S$GLB,, | Performed by: PHYSICIAN ASSISTANT

## 2025-04-25 RX ORDER — IPRATROPIUM BROMIDE AND ALBUTEROL SULFATE 2.5; .5 MG/3ML; MG/3ML
3 SOLUTION RESPIRATORY (INHALATION) EVERY 6 HOURS PRN
Qty: 75 ML | Refills: 5 | Status: SHIPPED | OUTPATIENT
Start: 2025-04-25 | End: 2026-04-25

## 2025-04-25 RX ORDER — ALBUTEROL SULFATE 90 UG/1
2 INHALANT RESPIRATORY (INHALATION) EVERY 6 HOURS PRN
Qty: 18 G | Refills: 3 | Status: SHIPPED | OUTPATIENT
Start: 2025-04-25

## 2025-04-25 RX ORDER — PREDNISONE 10 MG/1
TABLET ORAL
Qty: 30 TABLET | Refills: 0 | Status: SHIPPED | OUTPATIENT
Start: 2025-04-25

## 2025-04-25 RX ORDER — FLUTICASONE PROPIONATE 50 MCG
1 SPRAY, SUSPENSION (ML) NASAL DAILY
Qty: 16 G | Refills: 3 | Status: SHIPPED | OUTPATIENT
Start: 2025-04-25

## 2025-04-25 RX ORDER — PIOGLITAZONE 15 MG/1
15 TABLET ORAL DAILY
Start: 2025-04-25 | End: 2026-04-25

## 2025-04-25 RX ORDER — MONTELUKAST SODIUM 10 MG/1
10 TABLET ORAL NIGHTLY
Qty: 30 TABLET | Refills: 0 | Status: SHIPPED | OUTPATIENT
Start: 2025-04-25 | End: 2025-05-25

## 2025-04-25 RX ORDER — PROMETHAZINE HYDROCHLORIDE AND DEXTROMETHORPHAN HYDROBROMIDE 6.25; 15 MG/5ML; MG/5ML
5 SYRUP ORAL NIGHTLY PRN
Qty: 118 ML | Refills: 0 | Status: SHIPPED | OUTPATIENT
Start: 2025-04-25

## 2025-04-25 RX ORDER — GUAIFENESIN 1200 MG/1
1200 TABLET, EXTENDED RELEASE ORAL 2 TIMES DAILY
Qty: 20 TABLET | Refills: 0 | Status: SHIPPED | OUTPATIENT
Start: 2025-04-25 | End: 2025-05-05

## 2025-04-25 RX ORDER — BENZONATATE 200 MG/1
200 CAPSULE ORAL 2 TIMES DAILY PRN
Qty: 30 CAPSULE | Refills: 0 | Status: SHIPPED | OUTPATIENT
Start: 2025-04-25

## 2025-04-25 NOTE — PATIENT INSTRUCTIONS
Start prednisone as directed until gone  Start mucinex twice daily to thin mucus  Start singulair once daily  Start albuterol every 6 hours as needed for cough  Start tessalon pearls up to twice daily for cough  Start promethazine DM nightly for cough  Follow with allergy asthma and sinus    Start jardiance daily for diabetes  Follow in clinic 1 month    
Intact

## 2025-04-25 NOTE — PROGRESS NOTES
Patient ID: Saud Mayers is a 60 y.o. male.     Chief Complaint: Cough    Mr. Mayers is a 60 year old with history of diffuse large B-cell lymphoma s/p 6 cycles of R-CHOp (most recently 4/13), HTN, HLD, ANAMIKA, DM2 who presents for eval of 2 month cough    Pt has had wet cough, chest/nasal congestion, rhinorrhea, and wheezing for 2 months. No recent sick contact and denies fever, chills, SOB, N/V/D. He has tried nyquil/dayquil and cough medicine but it didn't provide any relief. He was also given a steroid pack and he noticed symptoms went away for 2-3 days, but it returned. Denies history of COPD, but admits hx of exercised-induced asthma and currently is not on any inhalers. He smoked for 11 years and quit 30 years ago. Not taking any medication for allergies.     Pt states he was previously following with cardiology for heart murmur and was told he was stable. He only has to see cardiology if worsening sx. Denies CP, JAY, syncope, dizziness, or orthopnea.     Pt reports checking his BG once a day in the morning and usually is around 240. He takes metformin 1000 mg BID and another medication he cannot remember the name of. He states he was unable to tolerate semiglutide 7 mg. Denies polyuria, numbness/tingling, or vision changes. He will schedule for his eye exam soon.         Review of Systems  Review of Systems   Constitutional:  Negative for chills, fever and malaise/fatigue.   HENT:  Positive for congestion. Negative for ear pain.    Eyes:  Negative for blurred vision.   Respiratory:  Positive for cough, sputum production and wheezing. Negative for hemoptysis and shortness of breath.    Cardiovascular:  Negative for chest pain, palpitations and leg swelling.   Gastrointestinal:  Negative for diarrhea, nausea and vomiting.   Genitourinary:  Negative for urgency.   Musculoskeletal:  Negative for falls.   Neurological:  Negative for dizziness, tingling, weakness and headaches.   Psychiatric/Behavioral:   "Negative for depression.        Currently Medications  Medications Ordered Prior to Encounter[1]    Physical  Exam  Vitals:    04/25/25 0829   BP: 110/70   BP Location: Right arm   Patient Position: Sitting   Pulse: 88   Temp: 98.1 °F (36.7 °C)   TempSrc: Oral   SpO2: 97%   Weight: 112.8 kg (248 lb 10.9 oz)   Height: 5' 9" (1.753 m)      Body mass index is 36.72 kg/m².  Wt Readings from Last 3 Encounters:   04/25/25 112.8 kg (248 lb 10.9 oz)   03/17/25 116.7 kg (257 lb 6.2 oz)   02/12/25 115 kg (253 lb 8.5 oz)       Physical Exam  Constitutional:       General: He is not in acute distress.     Appearance: He is well-developed and well-groomed. He is morbidly obese.   HENT:      Head: Normocephalic and atraumatic.   Eyes:      Extraocular Movements: Extraocular movements intact.   Cardiovascular:      Rate and Rhythm: Normal rate and regular rhythm.      Pulses:           Radial pulses are 2+ on the right side and 2+ on the left side.        Dorsalis pedis pulses are 2+ on the right side and 2+ on the left side.        Posterior tibial pulses are 2+ on the right side and 2+ on the left side.      Heart sounds: Murmur heard.      Systolic murmur is present with a grade of 3/6.   Pulmonary:      Breath sounds: Examination of the right-upper field reveals wheezing. Examination of the left-upper field reveals wheezing. Examination of the right-lower field reveals wheezing. Examination of the left-lower field reveals wheezing. Wheezing present.   Abdominal:      General: There is no distension.   Musculoskeletal:      Right lower leg: Edema present.      Left lower leg: Edema present.      Right foot: Normal range of motion. No deformity, bunion or Charcot foot.      Left foot: Normal range of motion. No deformity, bunion or Charcot foot.   Feet:      Right foot:      Protective Sensation: 10 sites tested.  10 sites sensed.      Skin integrity: Skin integrity normal. No ulcer, skin breakdown, erythema or warmth.      " Toenail Condition: Right toenails are normal.      Left foot:      Protective Sensation: 10 sites tested.  10 sites sensed.      Skin integrity: Skin integrity normal. No ulcer, skin breakdown, erythema or warmth.      Toenail Condition: Left toenails are normal.   Lymphadenopathy:      Cervical: No cervical adenopathy.   Skin:     General: Skin is warm.      Capillary Refill: Capillary refill takes less than 2 seconds.      Coloration: Skin is not jaundiced.   Neurological:      Mental Status: He is alert and oriented to person, place, and time.      Motor: No weakness.   Psychiatric:         Mood and Affect: Mood normal.       Labs:    Complete Blood Count  Lab Results   Component Value Date    RBC 4.00 (L) 02/10/2025    HGB 12.6 (L) 02/10/2025    HCT 37.9 (L) 02/10/2025    MCV 95 02/10/2025    MCH 31.5 (H) 02/10/2025    MCHC 33.2 02/10/2025    RDW 13.8 02/10/2025     02/10/2025    MPV 9.4 02/10/2025    GRAN 2.8 02/10/2025    GRAN 63.5 02/10/2025    LYMPH 1.1 02/10/2025    LYMPH 25.1 02/10/2025    MONO 0.3 02/10/2025    MONO 7.1 02/10/2025    EOS 0.1 02/10/2025    BASO 0.05 02/10/2025    EOSINOPHIL 1.8 02/10/2025    BASOPHIL 1.1 02/10/2025    DIFFMETHOD Automated 02/10/2025       Comprehensive Metabolic Panel  Lab Results   Component Value Date     (H) 02/10/2025    BUN 16 02/10/2025    CREATININE 0.80 02/10/2025     02/10/2025    K 4.4 02/10/2025    CL 98 02/10/2025    PROT 7.2 02/10/2025    ALBUMIN 4.1 02/10/2025    BILITOT 0.5 02/10/2025    AST 31 02/10/2025    ALKPHOS 62 02/10/2025    CO2 29 02/10/2025    ALT 36 02/10/2025    ANIONGAP 9 02/10/2025       TSH  Lab Results   Component Value Date    TSH 2.960 12/12/2024       Imaging:  US AAA Screening  Narrative: EXAMINATION:  US AAA SCREENING    CLINICAL HISTORY:  Other specified symptoms and signs involving the circulatory and respiratory systems    COMPARISON:  None    FINDINGS:  Findings: Ultrasound of the abdominal aorta obtained. The  proximal abdominal aorta measures 2.5 x 2.3 cm in transverse dimensions.  The mid abdominal aorta measures 2.2 x 1.9 cm in transverse dimensions.  The distal abdominal aorta measures 1.8 x 1.7 cm in transverse dimensions.  Mild wall irregularity noted, consistent with atherosclerotic change.  The aortic bifurcation is unremarkable.  Peak systolic velocity 99 centimeters/second.  Impression: No evidence of abdominal aortic aneurysm.    Electronically signed by: Kaden Romo MD  Date:    06/17/2024  Time:    15:27      Assessment/Plan:    1. Mild intermittent asthma without complication  Comments:  - Rx prednisone, singulair and mucinex, and duoneb  - Tessalone pearls daily and promethazine DM nightly for cough  - Referred to allergy specialist  Orders:  -     predniSONE (DELTASONE) 10 MG tablet; Start 4 tablets for 3 days, then start 3 tablets for 3 days, then 2 tablets for 3 days, then 1 tablet for 3 days, then stop  Dispense: 30 tablet; Refill: 0  -     montelukast (SINGULAIR) 10 mg tablet; Take 1 tablet (10 mg total) by mouth every evening.  Dispense: 30 tablet; Refill: 0  -     fluticasone propionate (FLONASE) 50 mcg/actuation nasal spray; 1 spray (50 mcg total) by Each Nostril route once daily.  Dispense: 16 g; Refill: 3  -     guaiFENesin (MUCINEX) 1,200 mg Ta12; Take 1,200 mg by mouth 2 (two) times a day. for 10 days  Dispense: 20 tablet; Refill: 0  -     albuterol (PROVENTIL/VENTOLIN HFA) 90 mcg/actuation inhaler; Inhale 2 puffs into the lungs every 6 (six) hours as needed for Wheezing. Rescue  Dispense: 18 g; Refill: 3  -     Ambulatory referral/consult to Allergy; Future; Expected date: 05/02/2025  -     albuterol-ipratropium (DUO-NEB) 2.5 mg-0.5 mg/3 mL nebulizer solution; Take 3 mLs by nebulization every 6 (six) hours as needed for Wheezing. Rescue  Dispense: 75 mL; Refill: 5  -     NEBULIZER FOR HOME USE    2. Eczema, unspecified type  -     Ambulatory referral/consult to Allergy; Future; Expected date:  05/02/2025    3. Type 2 diabetes mellitus with hyperglycemia, without long-term current use of insulin  Comments:  - Foot exam normal  - Rx Jardiance once daily   - F/u 1 month  Overview:  - follows with [facility]  - restart ARB at low dose    Orders:  -     pioglitazone (ACTOS) 15 MG tablet; Take 1 tablet (15 mg total) by mouth once daily.  -     Microalbumin/Creatinine Ratio, Urine; Future; Expected date: 04/25/2025  -     Hemoglobin A1C; Future; Expected date: 04/25/2025  -     empagliflozin (JARDIANCE) 10 mg tablet; Take 1 tablet (10 mg total) by mouth once daily.  Dispense: 30 tablet; Refill: 11    4. Chronic cough  -     predniSONE (DELTASONE) 10 MG tablet; Start 4 tablets for 3 days, then start 3 tablets for 3 days, then 2 tablets for 3 days, then 1 tablet for 3 days, then stop  Dispense: 30 tablet; Refill: 0  -     guaiFENesin (MUCINEX) 1,200 mg Ta12; Take 1,200 mg by mouth 2 (two) times a day. for 10 days  Dispense: 20 tablet; Refill: 0  -     promethazine-dextromethorphan (PROMETHAZINE-DM) 6.25-15 mg/5 mL Syrp; Take 5 mLs by mouth nightly as needed (cough).  Dispense: 118 mL; Refill: 0  -     benzonatate (TESSALON) 200 MG capsule; Take 1 capsule (200 mg total) by mouth 2 (two) times daily as needed for Cough.  Dispense: 30 capsule; Refill: 0  -     Ambulatory referral/consult to Allergy; Future; Expected date: 05/02/2025            Discussed how to stay healthy including: diet, exercise, refraining from smoking and discussed screening exams / tests needed for age, sex and family Hx.       RTC every 3-6 months with PCP, or PRN      Natividad Garza PA-C            [1]   Current Outpatient Medications on File Prior to Visit   Medication Sig Dispense Refill    acetaminophen (TYLENOL) 500 MG tablet Take 500 mg by mouth every 6 (six) hours as needed for Pain.      allopurinoL (ZYLOPRIM) 300 MG tablet Take 1 tablet (300 mg total) by mouth once daily. 30 tablet 11    atorvastatin (LIPITOR) 20 MG tablet Take 1  tablet (20 mg total) by mouth once daily. 90 tablet 3    cyanocobalamin (VITAMIN B-12) 100 MCG tablet Take 100 mcg by mouth once daily.      losartan (COZAAR) 25 MG tablet TAKE 1 TABLET(25 MG) BY MOUTH EVERY DAY 90 tablet 3    meloxicam (MOBIC) 7.5 MG tablet Take 1 tablet (7.5 mg total) by mouth daily as needed for Pain. 30 tablet 1    metFORMIN (GLUCOPHAGE) 1000 MG tablet Take 1 tablet (1,000 mg total) by mouth 2 (two) times daily with meals. 180 tablet 3    metoprolol succinate (TOPROL-XL) 50 MG 24 hr tablet Take 1 tablet (50 mg total) by mouth once daily. 90 tablet 3    mometasone 0.1% (ELOCON) 0.1 % cream Apply topically once daily. Apply bid to affected area for 10 days 45 g 5    folic acid (FOLVITE) 1 MG tablet Take 1 tablet (1 mg total) by mouth once daily. 100 tablet 3     No current facility-administered medications on file prior to visit.

## 2025-05-12 ENCOUNTER — PATIENT MESSAGE (OUTPATIENT)
Dept: FAMILY MEDICINE | Facility: CLINIC | Age: 60
End: 2025-05-12
Payer: COMMERCIAL

## 2025-05-12 RX ORDER — AZITHROMYCIN 250 MG/1
TABLET, FILM COATED ORAL
Qty: 6 TABLET | Refills: 0 | Status: SHIPPED | OUTPATIENT
Start: 2025-05-12 | End: 2025-05-17

## 2025-05-14 ENCOUNTER — PATIENT MESSAGE (OUTPATIENT)
Dept: HEMATOLOGY/ONCOLOGY | Facility: CLINIC | Age: 60
End: 2025-05-14
Payer: COMMERCIAL

## 2025-05-14 ENCOUNTER — PATIENT MESSAGE (OUTPATIENT)
Dept: FAMILY MEDICINE | Facility: CLINIC | Age: 60
End: 2025-05-14
Payer: COMMERCIAL

## 2025-05-16 ENCOUNTER — PATIENT MESSAGE (OUTPATIENT)
Dept: HEMATOLOGY/ONCOLOGY | Facility: CLINIC | Age: 60
End: 2025-05-16
Payer: COMMERCIAL

## 2025-05-16 ENCOUNTER — LAB VISIT (OUTPATIENT)
Dept: LAB | Facility: HOSPITAL | Age: 60
End: 2025-05-16
Attending: INTERNAL MEDICINE
Payer: COMMERCIAL

## 2025-05-16 DIAGNOSIS — C83.38 DIFFUSE LARGE B-CELL LYMPHOMA OF LYMPH NODES OF MULTIPLE REGIONS: ICD-10-CM

## 2025-05-16 LAB
ABSOLUTE EOSINOPHIL (OHS): 0.08 K/UL
ABSOLUTE MONOCYTE (OHS): 0.48 K/UL (ref 0.3–1)
ABSOLUTE NEUTROPHIL COUNT (OHS): 3.57 K/UL (ref 1.8–7.7)
ALBUMIN SERPL BCP-MCNC: 4.3 G/DL (ref 3.5–5.2)
ALP SERPL-CCNC: 74 UNIT/L (ref 38–126)
ALT SERPL W/O P-5'-P-CCNC: 38 UNIT/L (ref 10–44)
ANION GAP (OHS): 11 MMOL/L (ref 8–16)
AST SERPL-CCNC: 25 UNIT/L (ref 15–46)
BASOPHILS # BLD AUTO: 0.07 K/UL
BASOPHILS NFR BLD AUTO: 1.2 %
BILIRUB SERPL-MCNC: 0.8 MG/DL (ref 0.1–1)
BUN SERPL-MCNC: 16 MG/DL (ref 2–20)
CALCIUM SERPL-MCNC: 9 MG/DL (ref 8.7–10.5)
CHLORIDE SERPL-SCNC: 101 MMOL/L (ref 95–110)
CO2 SERPL-SCNC: 25 MMOL/L (ref 23–29)
CREAT SERPL-MCNC: 0.8 MG/DL (ref 0.5–1.4)
ERYTHROCYTE [DISTWIDTH] IN BLOOD BY AUTOMATED COUNT: 14.1 % (ref 11.5–14.5)
GFR SERPLBLD CREATININE-BSD FMLA CKD-EPI: >60 ML/MIN/1.73/M2
GLUCOSE SERPL-MCNC: 298 MG/DL (ref 70–110)
HCT VFR BLD AUTO: 40.7 % (ref 40–54)
HGB BLD-MCNC: 13.8 GM/DL (ref 14–18)
IMM GRANULOCYTES # BLD AUTO: 0.04 K/UL (ref 0–0.04)
IMM GRANULOCYTES NFR BLD AUTO: 0.7 % (ref 0–0.5)
LDH SERPL-CCNC: 211 U/L (ref 110–260)
LYMPHOCYTES # BLD AUTO: 1.73 K/UL (ref 1–4.8)
MCH RBC QN AUTO: 32.5 PG (ref 27–31)
MCHC RBC AUTO-ENTMCNC: 33.9 G/DL (ref 32–36)
MCV RBC AUTO: 96 FL (ref 82–98)
NUCLEATED RBC (/100WBC) (OHS): 0 /100 WBC
PLATELET # BLD AUTO: 165 K/UL (ref 150–450)
PMV BLD AUTO: 9.3 FL (ref 9.2–12.9)
POTASSIUM SERPL-SCNC: 4.2 MMOL/L (ref 3.5–5.1)
PROT SERPL-MCNC: 7.4 GM/DL (ref 6–8.4)
RBC # BLD AUTO: 4.24 M/UL (ref 4.6–6.2)
RELATIVE EOSINOPHIL (OHS): 1.3 %
RELATIVE LYMPHOCYTE (OHS): 29 % (ref 18–48)
RELATIVE MONOCYTE (OHS): 8 % (ref 4–15)
RELATIVE NEUTROPHIL (OHS): 59.8 % (ref 38–73)
SODIUM SERPL-SCNC: 137 MMOL/L (ref 136–145)
URATE SERPL-MCNC: 4.5 MG/DL (ref 3.4–7)
WBC # BLD AUTO: 5.97 K/UL (ref 3.9–12.7)

## 2025-05-16 PROCEDURE — 36415 COLL VENOUS BLD VENIPUNCTURE: CPT | Mod: PN

## 2025-05-16 PROCEDURE — 84550 ASSAY OF BLOOD/URIC ACID: CPT | Mod: PN

## 2025-05-16 PROCEDURE — 85025 COMPLETE CBC W/AUTO DIFF WBC: CPT | Mod: PN

## 2025-05-16 PROCEDURE — 83615 LACTATE (LD) (LDH) ENZYME: CPT | Mod: PN

## 2025-05-16 PROCEDURE — 80053 COMPREHEN METABOLIC PANEL: CPT | Mod: PN

## 2025-06-10 ENCOUNTER — LAB VISIT (OUTPATIENT)
Dept: LAB | Facility: HOSPITAL | Age: 60
End: 2025-06-10
Attending: STUDENT IN AN ORGANIZED HEALTH CARE EDUCATION/TRAINING PROGRAM
Payer: COMMERCIAL

## 2025-06-10 DIAGNOSIS — G47.19 EXCESSIVE DAYTIME SLEEPINESS: ICD-10-CM

## 2025-06-10 DIAGNOSIS — I10 ESSENTIAL HYPERTENSION: ICD-10-CM

## 2025-06-10 DIAGNOSIS — E66.01 SEVERE OBESITY (BMI 35.0-39.9) WITH COMORBIDITY: ICD-10-CM

## 2025-06-10 DIAGNOSIS — R73.9 HYPERGLYCEMIA: ICD-10-CM

## 2025-06-10 LAB
25(OH)D3+25(OH)D2 SERPL-MCNC: 19 NG/ML (ref 30–96)
ABSOLUTE EOSINOPHIL (OHS): 0.1 K/UL
ABSOLUTE MONOCYTE (OHS): 0.47 K/UL (ref 0.3–1)
ABSOLUTE NEUTROPHIL COUNT (OHS): 2.82 K/UL (ref 1.8–7.7)
ALBUMIN SERPL BCP-MCNC: 4.2 G/DL (ref 3.5–5.2)
ALP SERPL-CCNC: 63 UNIT/L (ref 38–126)
ALT SERPL W/O P-5'-P-CCNC: 30 UNIT/L (ref 10–44)
ANION GAP (OHS): 10 MMOL/L (ref 8–16)
AST SERPL-CCNC: 26 UNIT/L (ref 15–46)
BASOPHILS # BLD AUTO: 0.08 K/UL
BASOPHILS NFR BLD AUTO: 1.6 %
BILIRUB SERPL-MCNC: 0.5 MG/DL (ref 0.1–1)
BUN SERPL-MCNC: 16 MG/DL (ref 2–20)
CALCIUM SERPL-MCNC: 9 MG/DL (ref 8.7–10.5)
CHLORIDE SERPL-SCNC: 98 MMOL/L (ref 95–110)
CO2 SERPL-SCNC: 28 MMOL/L (ref 23–29)
CREAT SERPL-MCNC: 0.7 MG/DL (ref 0.5–1.4)
EAG (OHS): 192 MG/DL (ref 68–131)
ERYTHROCYTE [DISTWIDTH] IN BLOOD BY AUTOMATED COUNT: 14 % (ref 11.5–14.5)
FERRITIN SERPL-MCNC: 238.2 NG/ML (ref 20–300)
FOLATE SERPL-MCNC: 5.1 NG/ML (ref 4–24)
GFR SERPLBLD CREATININE-BSD FMLA CKD-EPI: >60 ML/MIN/1.73/M2
GLUCOSE SERPL-MCNC: 254 MG/DL (ref 70–110)
HBA1C MFR BLD: 8.3 % (ref 4–5.6)
HCT VFR BLD AUTO: 38.4 % (ref 40–54)
HGB BLD-MCNC: 13.1 GM/DL (ref 14–18)
IMM GRANULOCYTES # BLD AUTO: 0.04 K/UL (ref 0–0.04)
IMM GRANULOCYTES NFR BLD AUTO: 0.8 % (ref 0–0.5)
IRON SATN MFR SERPL: 20 % (ref 20–50)
IRON SERPL-MCNC: 76 UG/DL (ref 45–160)
LYMPHOCYTES # BLD AUTO: 1.42 K/UL (ref 1–4.8)
MCH RBC QN AUTO: 32.8 PG (ref 27–31)
MCHC RBC AUTO-ENTMCNC: 34.1 G/DL (ref 32–36)
MCV RBC AUTO: 96 FL (ref 82–98)
NUCLEATED RBC (/100WBC) (OHS): 0 /100 WBC
PLATELET # BLD AUTO: 170 K/UL (ref 150–450)
PMV BLD AUTO: 9.2 FL (ref 9.2–12.9)
POTASSIUM SERPL-SCNC: 4.6 MMOL/L (ref 3.5–5.1)
PROT SERPL-MCNC: 7.2 GM/DL (ref 6–8.4)
RBC # BLD AUTO: 4 M/UL (ref 4.6–6.2)
RELATIVE EOSINOPHIL (OHS): 2 %
RELATIVE LYMPHOCYTE (OHS): 28.8 % (ref 18–48)
RELATIVE MONOCYTE (OHS): 9.5 % (ref 4–15)
RELATIVE NEUTROPHIL (OHS): 57.3 % (ref 38–73)
SODIUM SERPL-SCNC: 136 MMOL/L (ref 136–145)
TIBC SERPL-MCNC: 373 UG/DL (ref 250–450)
TRANSFERRIN SERPL-MCNC: 252 MG/DL (ref 200–375)
TSH SERPL-ACNC: 4.55 UIU/ML (ref 0.4–4)
VIT B12 SERPL-MCNC: 873 PG/ML (ref 210–950)
WBC # BLD AUTO: 4.93 K/UL (ref 3.9–12.7)

## 2025-06-10 PROCEDURE — 85025 COMPLETE CBC W/AUTO DIFF WBC: CPT | Mod: PN

## 2025-06-10 PROCEDURE — 84466 ASSAY OF TRANSFERRIN: CPT | Mod: PN

## 2025-06-10 PROCEDURE — 82746 ASSAY OF FOLIC ACID SERUM: CPT | Mod: PN

## 2025-06-10 PROCEDURE — 36415 COLL VENOUS BLD VENIPUNCTURE: CPT | Mod: PN

## 2025-06-10 PROCEDURE — 82607 VITAMIN B-12: CPT | Mod: PN

## 2025-06-10 PROCEDURE — 82310 ASSAY OF CALCIUM: CPT | Mod: PN

## 2025-06-10 PROCEDURE — 84443 ASSAY THYROID STIM HORMONE: CPT | Mod: PN

## 2025-06-10 PROCEDURE — 83036 HEMOGLOBIN GLYCOSYLATED A1C: CPT | Mod: PN

## 2025-06-10 PROCEDURE — 82728 ASSAY OF FERRITIN: CPT | Mod: PN

## 2025-06-10 PROCEDURE — 84439 ASSAY OF FREE THYROXINE: CPT | Mod: PN

## 2025-06-11 ENCOUNTER — RESULTS FOLLOW-UP (OUTPATIENT)
Dept: FAMILY MEDICINE | Facility: CLINIC | Age: 60
End: 2025-06-11

## 2025-06-11 LAB — T4 FREE SERPL-MCNC: 0.78 NG/DL (ref 0.71–1.51)

## 2025-06-13 DIAGNOSIS — E78.00 PURE HYPERCHOLESTEROLEMIA: ICD-10-CM

## 2025-06-13 NOTE — TELEPHONE ENCOUNTER
No care due was identified.  [facility] Embedded Care Due Messages. Reference number: [id number].   6/13/2025 8:57:56 AM CDT

## 2025-06-14 RX ORDER — ATORVASTATIN CALCIUM 20 MG/1
20 TABLET, FILM COATED ORAL DAILY
Qty: 90 TABLET | Refills: 3 | Status: SHIPPED | OUTPATIENT
Start: 2025-06-14

## 2025-06-17 ENCOUNTER — OFFICE VISIT (OUTPATIENT)
Dept: FAMILY MEDICINE | Facility: CLINIC | Age: 60
End: 2025-06-17
Payer: COMMERCIAL

## 2025-06-17 VITALS
SYSTOLIC BLOOD PRESSURE: 120 MMHG | OXYGEN SATURATION: 98 % | DIASTOLIC BLOOD PRESSURE: 82 MMHG | WEIGHT: 251.88 LBS | HEIGHT: 69 IN | TEMPERATURE: 98 F | HEART RATE: 79 BPM | BODY MASS INDEX: 37.31 KG/M2

## 2025-06-17 DIAGNOSIS — E66.812 CLASS 2 SEVERE OBESITY WITH SERIOUS COMORBIDITY AND BODY MASS INDEX (BMI) OF 37.0 TO 37.9 IN ADULT, UNSPECIFIED OBESITY TYPE: ICD-10-CM

## 2025-06-17 DIAGNOSIS — I10 ESSENTIAL HYPERTENSION: ICD-10-CM

## 2025-06-17 DIAGNOSIS — E55.9 VITAMIN D DEFICIENCY: ICD-10-CM

## 2025-06-17 DIAGNOSIS — E78.2 MIXED HYPERLIPIDEMIA: ICD-10-CM

## 2025-06-17 DIAGNOSIS — E66.01 CLASS 2 SEVERE OBESITY WITH SERIOUS COMORBIDITY AND BODY MASS INDEX (BMI) OF 37.0 TO 37.9 IN ADULT, UNSPECIFIED OBESITY TYPE: ICD-10-CM

## 2025-06-17 DIAGNOSIS — E11.65 TYPE 2 DIABETES MELLITUS WITH HYPERGLYCEMIA, WITHOUT LONG-TERM CURRENT USE OF INSULIN: Primary | ICD-10-CM

## 2025-06-17 DIAGNOSIS — Z12.5 PROSTATE CANCER SCREENING: ICD-10-CM

## 2025-06-17 PROCEDURE — 3052F HG A1C>EQUAL 8.0%<EQUAL 9.0%: CPT | Mod: CPTII,S$GLB,, | Performed by: STUDENT IN AN ORGANIZED HEALTH CARE EDUCATION/TRAINING PROGRAM

## 2025-06-17 PROCEDURE — 3008F BODY MASS INDEX DOCD: CPT | Mod: CPTII,S$GLB,, | Performed by: STUDENT IN AN ORGANIZED HEALTH CARE EDUCATION/TRAINING PROGRAM

## 2025-06-17 PROCEDURE — 1159F MED LIST DOCD IN RCRD: CPT | Mod: CPTII,S$GLB,, | Performed by: STUDENT IN AN ORGANIZED HEALTH CARE EDUCATION/TRAINING PROGRAM

## 2025-06-17 PROCEDURE — 3079F DIAST BP 80-89 MM HG: CPT | Mod: CPTII,S$GLB,, | Performed by: STUDENT IN AN ORGANIZED HEALTH CARE EDUCATION/TRAINING PROGRAM

## 2025-06-17 PROCEDURE — 3074F SYST BP LT 130 MM HG: CPT | Mod: CPTII,S$GLB,, | Performed by: STUDENT IN AN ORGANIZED HEALTH CARE EDUCATION/TRAINING PROGRAM

## 2025-06-17 PROCEDURE — 99214 OFFICE O/P EST MOD 30 MIN: CPT | Mod: S$GLB,,, | Performed by: STUDENT IN AN ORGANIZED HEALTH CARE EDUCATION/TRAINING PROGRAM

## 2025-06-17 PROCEDURE — 4010F ACE/ARB THERAPY RXD/TAKEN: CPT | Mod: CPTII,S$GLB,, | Performed by: STUDENT IN AN ORGANIZED HEALTH CARE EDUCATION/TRAINING PROGRAM

## 2025-06-17 PROCEDURE — G2211 COMPLEX E/M VISIT ADD ON: HCPCS | Mod: S$GLB,,, | Performed by: STUDENT IN AN ORGANIZED HEALTH CARE EDUCATION/TRAINING PROGRAM

## 2025-06-17 PROCEDURE — 1160F RVW MEDS BY RX/DR IN RCRD: CPT | Mod: CPTII,S$GLB,, | Performed by: STUDENT IN AN ORGANIZED HEALTH CARE EDUCATION/TRAINING PROGRAM

## 2025-06-17 NOTE — PROGRESS NOTES
Patient ID: Saud Mayers is a 60 y.o. male.     Chief Complaint: f/u chronic medical conditions    Follow-up  Pertinent negatives include no chest pain, coughing, fever, headaches, myalgias, nausea, rash, vomiting or weakness.     History of Present Illness    Saud presents today for follow-up.    He reports recent weight fluctuations. His weight decreased to 241 lbs while taking cold medication due to increased bowel movements (3-4 times daily). Currently weighs 251 lbs, which is lower than his January weight of 254 lbs. His weight is slowly increasing since discontinuing the medication.    He is currently taking Jardiance 10mg, Prednisone, and Metformin for diabetes management. He acknowledges not following a strict diet or portion control, which has impacted his blood sugar control.    He reports progressive balance problems and instability when stepping for approximately 1.5 years, requiring support to maintain balance. He specifically notes difficulty getting into boats while fishing, requiring handholds for stability, with a recent near-fall incident while attempting to board a boat.    He has a known benign heart murmur followed by cardiology without concerns for the past 4-5 years. He has a history of chemotherapy treatment.    He reports feeling good overall and sleeping well since retiring. He has increased his physical activity, contributing to improved energy levels. He denies blood in stool.    His last eye exam was in March of previous year, and he plans to schedule a follow up in the next 2-3 months.           Review of Systems  Review of Systems   Constitutional:  Negative for fever.   HENT:  Negative for ear pain and sinus pain.    Eyes:  Negative for discharge.   Respiratory:  Negative for cough and shortness of breath.    Cardiovascular:  Negative for chest pain and leg swelling.   Gastrointestinal:  Negative for diarrhea, nausea and vomiting.   Genitourinary:  Negative for urgency.  "  Musculoskeletal:  Negative for myalgias.   Skin:  Negative for rash.   Neurological:  Negative for weakness and headaches.   Psychiatric/Behavioral:  Negative for depression.    All other systems reviewed and are negative.      Currently Medications  Medications Ordered Prior to Encounter[1]    Physical  Exam  Vitals:    06/17/25 0838   BP: 120/82   BP Location: Right arm   Patient Position: Sitting   Pulse: 79   Temp: 98.3 °F (36.8 °C)   SpO2: 98%   Weight: 114.3 kg (251 lb 14 oz)   Height: 5' 9" (1.753 m)      Body mass index is 37.2 kg/m².  Wt Readings from Last 3 Encounters:   06/17/25 114.3 kg (251 lb 14 oz)   04/25/25 112.8 kg (248 lb 10.9 oz)   03/17/25 116.7 kg (257 lb 6.2 oz)       Physical Exam  Vitals and nursing note reviewed.   Constitutional:       General: He is not in acute distress.     Appearance: He is not ill-appearing.   HENT:      Head: Normocephalic and atraumatic.      Right Ear: External ear normal.      Left Ear: External ear normal.      Nose: Nose normal.      Mouth/Throat:      Mouth: Mucous membranes are moist.   Eyes:      Extraocular Movements: Extraocular movements intact.      Conjunctiva/sclera: Conjunctivae normal.   Cardiovascular:      Rate and Rhythm: Normal rate and regular rhythm.      Pulses: Normal pulses.      Heart sounds: Murmur heard.   Pulmonary:      Effort: Pulmonary effort is normal. No respiratory distress.      Breath sounds: No wheezing.   Abdominal:      General: There is no distension.      Palpations: Abdomen is soft. There is no mass.      Tenderness: There is no abdominal tenderness.   Musculoskeletal:         General: No swelling.      Cervical back: Normal range of motion.   Skin:     Coloration: Skin is not jaundiced.      Findings: No rash.   Neurological:      General: No focal deficit present.      Mental Status: He is alert and oriented to person, place, and time.   Psychiatric:         Mood and Affect: Mood normal.         Thought Content: Thought " content normal.         Labs:    Complete Blood Count  Lab Results   Component Value Date    RBC 4.00 (L) 06/10/2025    HGB 13.1 (L) 06/10/2025    HCT 38.4 (L) 06/10/2025    MCV 96 06/10/2025    MCH 32.8 (H) 06/10/2025    MCHC 34.1 06/10/2025    RDW 14.0 06/10/2025     06/10/2025    MPV 9.2 06/10/2025    GRAN 2.8 02/10/2025    GRAN 63.5 02/10/2025    LYMPH 28.8 06/10/2025    LYMPH 1.42 06/10/2025    MONO 9.5 06/10/2025    MONO 0.47 06/10/2025    EOS 2.0 06/10/2025    EOS 0.10 06/10/2025    BASO 0.05 02/10/2025    EOSINOPHIL 1.8 02/10/2025    BASOPHIL 1.6 06/10/2025    BASOPHIL 0.08 06/10/2025    DIFFMETHOD Automated 02/10/2025       Comprehensive Metabolic Panel  Lab Results   Component Value Date     (H) 06/10/2025    BUN 16 06/10/2025    CREATININE 0.7 06/10/2025     06/10/2025    K 4.6 06/10/2025    CL 98 06/10/2025    PROT 7.2 06/10/2025    ALBUMIN 4.2 06/10/2025    BILITOT 0.5 06/10/2025    AST 26 06/10/2025    ALKPHOS 63 06/10/2025    CO2 28 06/10/2025    ALT 30 06/10/2025    ANIONGAP 10 06/10/2025       TSH  Lab Results   Component Value Date    TSH 4.550 (H) 06/10/2025       Imaging:  US AAA Screening  Narrative: EXAMINATION:  US AAA SCREENING    CLINICAL HISTORY:  Other specified symptoms and signs involving the circulatory and respiratory systems    COMPARISON:  None    FINDINGS:  Findings: Ultrasound of the abdominal aorta obtained. The proximal abdominal aorta measures 2.5 x 2.3 cm in transverse dimensions.  The mid abdominal aorta measures 2.2 x 1.9 cm in transverse dimensions.  The distal abdominal aorta measures 1.8 x 1.7 cm in transverse dimensions.  Mild wall irregularity noted, consistent with atherosclerotic change.  The aortic bifurcation is unremarkable.  Peak systolic velocity 99 centimeters/second.  Impression: No evidence of abdominal aortic aneurysm.    Electronically signed by: Kaden Romo MD  Date:    06/17/2024  Time:    15:27      Assessment/Plan:  Assessment & Plan     Improvement in diabetes management with A1C reduction from 9.7 to 8.3, indicating average glucose decrease from 232 to 192 mg/dL.  Vitamin D deficiency identified.  Thyroid function, iron levels, B12, folic acid, CBC, liver, and renal function - all WNL.  Presence of heart murmur, but no immediate concern due to previous cardiology clearance.  Physical therapy recommended for reported balance issues, potentially related to previous chemotherapy affecting nerve endings. Patient declines referral.    TYPE 2 DIABETES MELLITUS:  - Noted improvement in the patient's average glucose from 232 to 192 and HbA1c from 9.7 to 8.3.  - Saud admits to not following portion control and consuming unrestricted food choices.  - Will increase Jardiance dosage from 10 mg to 25 mg daily after current 30-day supply is finished.  - Saud instructed to contact office for this dosage increase when ready for next refill.  - Continue current regimen of Jardiance, Prednisone, and Metformin for diabetes management.  - Monitoring weight, which is slowly increasing after discontinuation of cold medication; current weight is 251 lbs, down from 254 lbs in January, though had dropped to 241 lbs while on medication.    BALANCE ISSUES:  - Saud reports terrible balance for approximately 1.5 years, requiring support for stability.  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  - Recommend physical therapy to address balance issues and improve stability.    CARDIAC MURMUR:  - Known heart murmur is stable and not a current concern.  - Saud continues under the care of a cardiologist for this condition.    VITAMIN D DEFICIENCY:  - Evaluated vitamin D level and found it to be low.  - Educated patient on importance of supplementation and prescribed vitamin D 2000 units daily.    FOLLOW-UP:  - Return in 3 months.         1. Type 2 diabetes mellitus with  hyperglycemia, without long-term current use of insulin  Comments:  - Foot exam normal  - Rx Jardiance once daily   - F/u 1 month  Overview:  - follows with [facility]  - restart ARB at low dose    Orders:  -     empagliflozin (JARDIANCE) 25 mg tablet; Take 1 tablet (25 mg total) by mouth once daily.  Dispense: 90 tablet; Refill: 3  -     Ambulatory referral/consult to Optometry; Future; Expected date: 06/24/2025  -     CBC Auto Differential; Future  -     Comprehensive Metabolic Panel; Future; Expected date: 06/17/2025  -     Hemoglobin A1C; Future; Expected date: 06/17/2025  -     Microalbumin/Creatinine Ratio, Urine; Future; Expected date: 06/17/2025    2. Essential hypertension  -     CBC Auto Differential; Future  -     Comprehensive Metabolic Panel; Future; Expected date: 06/17/2025    3. Mixed hyperlipidemia  -     Lipid Panel; Future; Expected date: 06/17/2025    4. Prostate cancer screening  -     PSA, Screening; Future; Expected date: 06/17/2026    5. Class 2 severe obesity with serious comorbidity and body mass index (BMI) of 37.0 to 37.9 in adult, unspecified obesity type         Discussed how to stay healthy including: diet, exercise, refraining from smoking and discussed screening exams / tests needed for age, sex and family Hx.    RTC 3 mo    Anson Blunt MD    This note was generated with the assistance of ambient listening technology. Verbal consent was obtained by the patient and accompanying visitor(s) for the recording of patient appointment to facilitate this note. I attest to having reviewed and edited the generated note for accuracy, though some syntax or spelling errors may persist. Please contact the author of this note for any clarification.           [1]   Current Outpatient Medications on File Prior to Visit   Medication Sig Dispense Refill    acetaminophen (TYLENOL) 500 MG tablet Take 500 mg by mouth every 6 (six) hours as needed for Pain.      albuterol (PROVENTIL/VENTOLIN HFA) 90  mcg/actuation inhaler Inhale 2 puffs into the lungs every 6 (six) hours as needed for Wheezing. Rescue 18 g 3    albuterol-ipratropium (DUO-NEB) 2.5 mg-0.5 mg/3 mL nebulizer solution Take 3 mLs by nebulization every 6 (six) hours as needed for Wheezing. Rescue 75 mL 5    allopurinoL (ZYLOPRIM) 300 MG tablet Take 1 tablet (300 mg total) by mouth once daily. 30 tablet 11    atorvastatin (LIPITOR) 20 MG tablet Take 1 tablet (20 mg total) by mouth once daily. 90 tablet 3    benzonatate (TESSALON) 200 MG capsule Take 1 capsule (200 mg total) by mouth 2 (two) times daily as needed for Cough. 30 capsule 0    cyanocobalamin (VITAMIN B-12) 100 MCG tablet Take 100 mcg by mouth once daily.      fluticasone propionate (FLONASE) 50 mcg/actuation nasal spray 1 spray (50 mcg total) by Each Nostril route once daily. 16 g 3    losartan (COZAAR) 25 MG tablet TAKE 1 TABLET(25 MG) BY MOUTH EVERY DAY 90 tablet 3    meloxicam (MOBIC) 7.5 MG tablet Take 1 tablet (7.5 mg total) by mouth daily as needed for Pain. 30 tablet 1    metFORMIN (GLUCOPHAGE) 1000 MG tablet Take 1 tablet (1,000 mg total) by mouth 2 (two) times daily with meals. 180 tablet 3    metoprolol succinate (TOPROL-XL) 50 MG 24 hr tablet Take 1 tablet (50 mg total) by mouth once daily. 90 tablet 3    pioglitazone (ACTOS) 15 MG tablet Take 1 tablet (15 mg total) by mouth once daily.      promethazine-dextromethorphan (PROMETHAZINE-DM) 6.25-15 mg/5 mL Syrp Take 5 mLs by mouth nightly as needed (cough). 118 mL 0    [DISCONTINUED] empagliflozin (JARDIANCE) 10 mg tablet Take 1 tablet (10 mg total) by mouth once daily. 30 tablet 11    [DISCONTINUED] predniSONE (DELTASONE) 10 MG tablet Start 4 tablets for 3 days, then start 3 tablets for 3 days, then 2 tablets for 3 days, then 1 tablet for 3 days, then stop 30 tablet 0    mometasone 0.1% (ELOCON) 0.1 % cream Apply topically once daily. Apply bid to affected area for 10 days 45 g 5    [DISCONTINUED] folic acid (FOLVITE) 1 MG tablet  Take 1 tablet (1 mg total) by mouth once daily. 100 tablet 3    [DISCONTINUED] montelukast (SINGULAIR) 10 mg tablet Take 1 tablet (10 mg total) by mouth every evening. 30 tablet 0     No current facility-administered medications on file prior to visit.

## 2025-08-13 ENCOUNTER — TELEPHONE (OUTPATIENT)
Dept: FAMILY MEDICINE | Facility: CLINIC | Age: 60
End: 2025-08-13
Payer: COMMERCIAL

## 2025-08-13 ENCOUNTER — PATIENT MESSAGE (OUTPATIENT)
Dept: FAMILY MEDICINE | Facility: CLINIC | Age: 60
End: 2025-08-13
Payer: COMMERCIAL